# Patient Record
Sex: MALE | Race: WHITE | Employment: OTHER | URBAN - NONMETROPOLITAN AREA
[De-identification: names, ages, dates, MRNs, and addresses within clinical notes are randomized per-mention and may not be internally consistent; named-entity substitution may affect disease eponyms.]

---

## 2017-02-07 LAB — HBA1C MFR BLD: 12.4 %

## 2017-04-13 LAB
CHOLESTEROL, TOTAL: 209 MG/DL
CHOLESTEROL/HDL RATIO: 5
HDLC SERPL-MCNC: 42 MG/DL (ref 35–70)
LDL CHOLESTEROL CALCULATED: 98.2 MG/DL (ref 0–160)
TRIGL SERPL-MCNC: 344 MG/DL
VLDLC SERPL CALC-MCNC: 69 MG/DL

## 2017-05-01 RX ORDER — LISINOPRIL 5 MG/1
5 TABLET ORAL DAILY
Qty: 90 TABLET | Refills: 3 | Status: SHIPPED | OUTPATIENT
Start: 2017-05-01 | End: 2017-12-13 | Stop reason: SDUPTHER

## 2017-05-04 VITALS
HEART RATE: 84 BPM | WEIGHT: 187 LBS | HEIGHT: 65 IN | SYSTOLIC BLOOD PRESSURE: 122 MMHG | DIASTOLIC BLOOD PRESSURE: 90 MMHG | BODY MASS INDEX: 31.16 KG/M2

## 2017-05-04 DIAGNOSIS — Z80.42 FAMILY HISTORY OF PROSTATE CANCER: ICD-10-CM

## 2017-05-04 DIAGNOSIS — K21.9 GASTROESOPHAGEAL REFLUX DISEASE WITHOUT ESOPHAGITIS: ICD-10-CM

## 2017-05-04 DIAGNOSIS — I10 UNSPECIFIED ESSENTIAL HYPERTENSION: ICD-10-CM

## 2017-05-04 DIAGNOSIS — E78.49 FAMILIAL COMBINED HYPERLIPIDEMIA: ICD-10-CM

## 2017-05-04 DIAGNOSIS — I35.0 NONRHEUMATIC AORTIC VALVE STENOSIS: ICD-10-CM

## 2017-05-04 PROBLEM — E11.9 TYPE 2 DIABETES MELLITUS WITHOUT COMPLICATION (HCC): Status: ACTIVE | Noted: 2017-05-04

## 2017-05-04 RX ORDER — GLIMEPIRIDE 4 MG/1
TABLET ORAL
COMMUNITY
End: 2017-12-13 | Stop reason: SDUPTHER

## 2017-05-04 RX ORDER — CLOTRIMAZOLE AND BETAMETHASONE DIPROPIONATE 10; .64 MG/G; MG/G
CREAM TOPICAL 2 TIMES DAILY
COMMUNITY
End: 2017-08-15 | Stop reason: ALTCHOICE

## 2017-05-04 RX ORDER — HYDROCHLOROTHIAZIDE 50 MG/1
50 TABLET ORAL DAILY
COMMUNITY
End: 2017-07-09 | Stop reason: SDUPTHER

## 2017-05-04 RX ORDER — OMEPRAZOLE 20 MG/1
20 CAPSULE, DELAYED RELEASE ORAL DAILY
COMMUNITY
End: 2017-12-13 | Stop reason: SDUPTHER

## 2017-05-04 RX ORDER — PRAVASTATIN SODIUM 80 MG/1
80 TABLET ORAL DAILY
COMMUNITY
End: 2017-11-16 | Stop reason: ALTCHOICE

## 2017-05-04 RX ORDER — LABETALOL 100 MG/1
100 TABLET, FILM COATED ORAL 2 TIMES DAILY
COMMUNITY
End: 2017-12-13 | Stop reason: SDUPTHER

## 2017-05-04 RX ORDER — PIOGLITAZONEHYDROCHLORIDE 15 MG/1
45 TABLET ORAL DAILY
COMMUNITY
End: 2017-05-10 | Stop reason: SDUPTHER

## 2017-05-04 RX ORDER — NAPROXEN SODIUM 220 MG
220 TABLET ORAL 2 TIMES DAILY WITH MEALS
Status: ON HOLD | COMMUNITY
End: 2018-11-04 | Stop reason: HOSPADM

## 2017-05-10 ENCOUNTER — OFFICE VISIT (OUTPATIENT)
Dept: FAMILY MEDICINE CLINIC | Age: 72
End: 2017-05-10
Payer: MEDICARE

## 2017-05-10 VITALS
DIASTOLIC BLOOD PRESSURE: 80 MMHG | HEIGHT: 65 IN | HEART RATE: 68 BPM | WEIGHT: 189 LBS | BODY MASS INDEX: 31.49 KG/M2 | SYSTOLIC BLOOD PRESSURE: 140 MMHG

## 2017-05-10 DIAGNOSIS — E11.8 TYPE 2 DIABETES MELLITUS WITH COMPLICATION, WITHOUT LONG-TERM CURRENT USE OF INSULIN (HCC): ICD-10-CM

## 2017-05-10 DIAGNOSIS — Z80.42 FAMILY HISTORY OF PROSTATE CANCER: ICD-10-CM

## 2017-05-10 DIAGNOSIS — E11.65 TYPE 2 DIABETES MELLITUS WITH HYPERGLYCEMIA, WITHOUT LONG-TERM CURRENT USE OF INSULIN (HCC): Primary | ICD-10-CM

## 2017-05-10 DIAGNOSIS — K21.9 GASTROESOPHAGEAL REFLUX DISEASE, ESOPHAGITIS PRESENCE NOT SPECIFIED: ICD-10-CM

## 2017-05-10 DIAGNOSIS — I10 ESSENTIAL HYPERTENSION: ICD-10-CM

## 2017-05-10 DIAGNOSIS — I35.0 NONRHEUMATIC AORTIC VALVE STENOSIS: ICD-10-CM

## 2017-05-10 DIAGNOSIS — E78.49 FAMILIAL COMBINED HYPERLIPIDEMIA: ICD-10-CM

## 2017-05-10 LAB — HBA1C MFR BLD: 11 %

## 2017-05-10 PROCEDURE — 3046F HEMOGLOBIN A1C LEVEL >9.0%: CPT | Performed by: FAMILY MEDICINE

## 2017-05-10 PROCEDURE — 4040F PNEUMOC VAC/ADMIN/RCVD: CPT | Performed by: FAMILY MEDICINE

## 2017-05-10 PROCEDURE — 1123F ACP DISCUSS/DSCN MKR DOCD: CPT | Performed by: FAMILY MEDICINE

## 2017-05-10 PROCEDURE — 99214 OFFICE O/P EST MOD 30 MIN: CPT | Performed by: FAMILY MEDICINE

## 2017-05-10 PROCEDURE — 83036 HEMOGLOBIN GLYCOSYLATED A1C: CPT | Performed by: FAMILY MEDICINE

## 2017-05-10 PROCEDURE — 1036F TOBACCO NON-USER: CPT | Performed by: FAMILY MEDICINE

## 2017-05-10 PROCEDURE — 3017F COLORECTAL CA SCREEN DOC REV: CPT | Performed by: FAMILY MEDICINE

## 2017-05-10 PROCEDURE — G8427 DOCREV CUR MEDS BY ELIG CLIN: HCPCS | Performed by: FAMILY MEDICINE

## 2017-05-10 PROCEDURE — G8417 CALC BMI ABV UP PARAM F/U: HCPCS | Performed by: FAMILY MEDICINE

## 2017-05-10 RX ORDER — PIOGLITAZONEHYDROCHLORIDE 45 MG/1
45 TABLET ORAL DAILY
Qty: 90 TABLET | Refills: 3 | Status: SHIPPED | OUTPATIENT
Start: 2017-05-10 | End: 2017-12-13 | Stop reason: SDUPTHER

## 2017-05-10 ASSESSMENT — PATIENT HEALTH QUESTIONNAIRE - PHQ9
2. FEELING DOWN, DEPRESSED OR HOPELESS: 0
1. LITTLE INTEREST OR PLEASURE IN DOING THINGS: 0
SUM OF ALL RESPONSES TO PHQ QUESTIONS 1-9: 0
SUM OF ALL RESPONSES TO PHQ9 QUESTIONS 1 & 2: 0

## 2017-05-11 PROBLEM — E11.65 TYPE 2 DIABETES MELLITUS WITH HYPERGLYCEMIA, WITHOUT LONG-TERM CURRENT USE OF INSULIN (HCC): Status: ACTIVE | Noted: 2017-05-11

## 2017-05-11 ASSESSMENT — ENCOUNTER SYMPTOMS
WHEEZING: 0
BACK PAIN: 1
SHORTNESS OF BREATH: 0
ABDOMINAL PAIN: 0
CHEST TIGHTNESS: 0
COUGH: 0
BLOOD IN STOOL: 0
SORE THROAT: 0

## 2017-06-26 ENCOUNTER — TELEPHONE (OUTPATIENT)
Dept: FAMILY MEDICINE CLINIC | Age: 72
End: 2017-06-26

## 2017-06-26 DIAGNOSIS — M54.40 LOW BACK PAIN WITH SCIATICA, SCIATICA LATERALITY UNSPECIFIED, UNSPECIFIED BACK PAIN LATERALITY, UNSPECIFIED CHRONICITY: Primary | ICD-10-CM

## 2017-07-10 RX ORDER — HYDROCHLOROTHIAZIDE 50 MG/1
TABLET ORAL
Qty: 90 TABLET | Refills: 3 | Status: SHIPPED | OUTPATIENT
Start: 2017-07-10 | End: 2017-12-13 | Stop reason: SDUPTHER

## 2017-07-10 RX ORDER — TAMSULOSIN HYDROCHLORIDE 0.4 MG/1
CAPSULE ORAL
Qty: 90 CAPSULE | Refills: 3 | Status: SHIPPED | OUTPATIENT
Start: 2017-07-10 | End: 2017-12-13 | Stop reason: SDUPTHER

## 2017-08-15 ENCOUNTER — OFFICE VISIT (OUTPATIENT)
Dept: FAMILY MEDICINE CLINIC | Age: 72
End: 2017-08-15
Payer: MEDICARE

## 2017-08-15 VITALS
DIASTOLIC BLOOD PRESSURE: 70 MMHG | BODY MASS INDEX: 32.62 KG/M2 | HEART RATE: 60 BPM | SYSTOLIC BLOOD PRESSURE: 112 MMHG | WEIGHT: 193 LBS

## 2017-08-15 DIAGNOSIS — I10 ESSENTIAL HYPERTENSION: ICD-10-CM

## 2017-08-15 DIAGNOSIS — E11.8 TYPE 2 DIABETES MELLITUS WITH COMPLICATION, UNSPECIFIED LONG TERM INSULIN USE STATUS: Primary | ICD-10-CM

## 2017-08-15 DIAGNOSIS — I10 UNSPECIFIED ESSENTIAL HYPERTENSION: ICD-10-CM

## 2017-08-15 DIAGNOSIS — E78.2 MIXED HYPERLIPIDEMIA: ICD-10-CM

## 2017-08-15 DIAGNOSIS — I35.0 NONRHEUMATIC AORTIC VALVE STENOSIS: ICD-10-CM

## 2017-08-15 DIAGNOSIS — Z12.5 SCREENING PSA (PROSTATE SPECIFIC ANTIGEN): ICD-10-CM

## 2017-08-15 DIAGNOSIS — Z12.11 ENCOUNTER FOR SCREENING COLONOSCOPY: ICD-10-CM

## 2017-08-15 DIAGNOSIS — K21.9 GASTROESOPHAGEAL REFLUX DISEASE, ESOPHAGITIS PRESENCE NOT SPECIFIED: ICD-10-CM

## 2017-08-15 LAB — HBA1C MFR BLD: 8.4 %

## 2017-08-15 PROCEDURE — 3017F COLORECTAL CA SCREEN DOC REV: CPT | Performed by: FAMILY MEDICINE

## 2017-08-15 PROCEDURE — 3046F HEMOGLOBIN A1C LEVEL >9.0%: CPT | Performed by: FAMILY MEDICINE

## 2017-08-15 PROCEDURE — 1036F TOBACCO NON-USER: CPT | Performed by: FAMILY MEDICINE

## 2017-08-15 PROCEDURE — G8427 DOCREV CUR MEDS BY ELIG CLIN: HCPCS | Performed by: FAMILY MEDICINE

## 2017-08-15 PROCEDURE — 83036 HEMOGLOBIN GLYCOSYLATED A1C: CPT | Performed by: FAMILY MEDICINE

## 2017-08-15 PROCEDURE — G8417 CALC BMI ABV UP PARAM F/U: HCPCS | Performed by: FAMILY MEDICINE

## 2017-08-15 PROCEDURE — 99214 OFFICE O/P EST MOD 30 MIN: CPT | Performed by: FAMILY MEDICINE

## 2017-08-15 PROCEDURE — 1123F ACP DISCUSS/DSCN MKR DOCD: CPT | Performed by: FAMILY MEDICINE

## 2017-08-15 PROCEDURE — 4040F PNEUMOC VAC/ADMIN/RCVD: CPT | Performed by: FAMILY MEDICINE

## 2017-08-15 RX ORDER — CLOTRIMAZOLE AND BETAMETHASONE DIPROPIONATE 10; .64 MG/G; MG/G
CREAM TOPICAL 2 TIMES DAILY
Status: ON HOLD | COMMUNITY
End: 2018-11-04

## 2017-08-17 ASSESSMENT — ENCOUNTER SYMPTOMS
ABDOMINAL DISTENTION: 0
CHEST TIGHTNESS: 0
SHORTNESS OF BREATH: 0
BACK PAIN: 1
WHEEZING: 0
SORE THROAT: 0
ABDOMINAL PAIN: 0

## 2017-10-09 LAB
AGE FOR GFR: 72
ALT SERPL-CCNC: 49 UNITS/L
ANION GAP SERPL CALCULATED.3IONS-SCNC: 16 MMOL/L
AST SERPL-CCNC: 39 UNITS/L
BUN BLDV-MCNC: 16 MG/DL
CALCIUM SERPL-MCNC: 10.4 MG/DL
CHLORIDE BLD-SCNC: 98 MMOL/L
CHOLESTEROL/HDL RATIO: 4.3 RATIO
CHOLESTEROL: 232 MG/DL
CO2: 26 MMOL/L
CREAT SERPL-MCNC: 0.8 MG/DL
CREATININE, RANDOM: 47.1 MG/DL
EGFR BF: 85 ML/MIN/1.73 M2
EGFR BM: 115 ML/MIN/1.73 M2
EGFR WF: 71 ML/MIN/1.73 M2
EGFR WM: 95 ML/MIN/1.73 M2
GLUCOSE: 206 MG/DL
HCT VFR BLD CALC: 38.7 %
HDL, DIRECT: 54 MG/DL
HEMOGLOBIN: 12.7 G/DL
LDL CHOLESTEROL CALCULATED: 131.8 MG/DL
MCH RBC QN AUTO: 30.2 PG
MCHC RBC AUTO-ENTMCNC: 32.9 G/DL
MCV RBC AUTO: 91.7 FL
MICROALBUMIN UR-MCNC: 4.1 MG/DL
MICROALBUMIN/CREAT UR-RTO: 87 MCG/MG CR
PDW BLD-RTO: 11.4 %
PLATELET # BLD: 294 THOU/MM3
PMV BLD AUTO: 7.9 FL
POTASSIUM SERPL-SCNC: 4.8 MMOL/L
RBC # BLD: 4.22 M/UL
SCREENING PSA: 1.07 NG/ML
SODIUM BLD-SCNC: 135 MMOL/L
TRIGL SERPL-MCNC: 231 MG/DL
VLDLC SERPL CALC-MCNC: 46 MG/DL
WBC # BLD: 8.92 THOU/ML3

## 2017-11-16 ENCOUNTER — OFFICE VISIT (OUTPATIENT)
Dept: FAMILY MEDICINE CLINIC | Age: 72
End: 2017-11-16
Payer: MEDICARE

## 2017-11-16 VITALS
WEIGHT: 201 LBS | HEIGHT: 65 IN | SYSTOLIC BLOOD PRESSURE: 106 MMHG | DIASTOLIC BLOOD PRESSURE: 60 MMHG | BODY MASS INDEX: 33.49 KG/M2 | HEART RATE: 68 BPM

## 2017-11-16 DIAGNOSIS — M54.40 LOW BACK PAIN WITH SCIATICA, SCIATICA LATERALITY UNSPECIFIED, UNSPECIFIED BACK PAIN LATERALITY, UNSPECIFIED CHRONICITY: ICD-10-CM

## 2017-11-16 DIAGNOSIS — K21.9 GASTROESOPHAGEAL REFLUX DISEASE, ESOPHAGITIS PRESENCE NOT SPECIFIED: ICD-10-CM

## 2017-11-16 DIAGNOSIS — E78.2 MIXED HYPERLIPIDEMIA: ICD-10-CM

## 2017-11-16 DIAGNOSIS — E11.8 TYPE 2 DIABETES MELLITUS WITH COMPLICATION, UNSPECIFIED LONG TERM INSULIN USE STATUS: Primary | ICD-10-CM

## 2017-11-16 DIAGNOSIS — Z80.42 FAMILY HISTORY OF PROSTATE CANCER: ICD-10-CM

## 2017-11-16 DIAGNOSIS — I35.0 NONRHEUMATIC AORTIC VALVE STENOSIS: ICD-10-CM

## 2017-11-16 DIAGNOSIS — I10 ESSENTIAL HYPERTENSION: ICD-10-CM

## 2017-11-16 DIAGNOSIS — K57.30 DIVERTICULOSIS OF LARGE INTESTINE WITHOUT HEMORRHAGE: ICD-10-CM

## 2017-11-16 LAB — HBA1C MFR BLD: 8 %

## 2017-11-16 PROCEDURE — G8417 CALC BMI ABV UP PARAM F/U: HCPCS | Performed by: FAMILY MEDICINE

## 2017-11-16 PROCEDURE — G8484 FLU IMMUNIZE NO ADMIN: HCPCS | Performed by: FAMILY MEDICINE

## 2017-11-16 PROCEDURE — 3045F PR MOST RECENT HEMOGLOBIN A1C LEVEL 7.0-9.0%: CPT | Performed by: FAMILY MEDICINE

## 2017-11-16 PROCEDURE — 1036F TOBACCO NON-USER: CPT | Performed by: FAMILY MEDICINE

## 2017-11-16 PROCEDURE — 99214 OFFICE O/P EST MOD 30 MIN: CPT | Performed by: FAMILY MEDICINE

## 2017-11-16 PROCEDURE — G8427 DOCREV CUR MEDS BY ELIG CLIN: HCPCS | Performed by: FAMILY MEDICINE

## 2017-11-16 PROCEDURE — 1123F ACP DISCUSS/DSCN MKR DOCD: CPT | Performed by: FAMILY MEDICINE

## 2017-11-16 PROCEDURE — 3017F COLORECTAL CA SCREEN DOC REV: CPT | Performed by: FAMILY MEDICINE

## 2017-11-16 PROCEDURE — 4040F PNEUMOC VAC/ADMIN/RCVD: CPT | Performed by: FAMILY MEDICINE

## 2017-11-16 PROCEDURE — 83036 HEMOGLOBIN GLYCOSYLATED A1C: CPT | Performed by: FAMILY MEDICINE

## 2017-11-16 RX ORDER — ROSUVASTATIN CALCIUM 20 MG/1
20 TABLET, COATED ORAL DAILY
Qty: 30 TABLET | Refills: 5 | Status: SHIPPED | OUTPATIENT
Start: 2017-11-16 | End: 2017-12-13 | Stop reason: SDUPTHER

## 2017-11-19 PROBLEM — M54.40 LOW BACK PAIN WITH SCIATICA: Status: ACTIVE | Noted: 2017-11-19

## 2017-11-19 ASSESSMENT — ENCOUNTER SYMPTOMS
CHEST TIGHTNESS: 0
BACK PAIN: 1
ABDOMINAL PAIN: 0
SORE THROAT: 0
WHEEZING: 0
ABDOMINAL DISTENTION: 0
SHORTNESS OF BREATH: 0

## 2017-11-19 NOTE — PROGRESS NOTES
 lisinopril (PRINIVIL;ZESTRIL) 5 MG tablet Take 1 tablet by mouth daily 90 tablet 3    SITagliptin (JANUVIA) 50 MG tablet Take 50 mg by mouth daily       No current facility-administered medications for this visit. Allergies   Allergen Reactions    Invokana [Canagliflozin]      dizziness    Januvia [Sitagliptin]      dizziness       Health Maintenance   Topic Date Due    DTaP/Tdap/Td vaccine (1 - Tdap) 11/15/1963    Zostavax vaccine  11/15/2004    Flu vaccine (1) 09/01/2017    Pneumococcal low/med risk (1 of 2 - PCV13) 08/15/2022 (Originally 11/15/2009)    Diabetic foot exam  08/17/2018    Diabetic retinal exam  08/24/2018    Diabetic microalbuminuria test  10/09/2018    Lipid screen  10/09/2018    Diabetic hemoglobin A1C test  11/16/2018    Colon cancer screen colonoscopy  10/09/2027       Subjective:      Review of Systems   Constitutional: Negative for chills, diaphoresis and fever. HENT: Negative for sore throat. Respiratory: Negative for chest tightness, shortness of breath and wheezing. Cardiovascular: Negative for chest pain, palpitations and leg swelling. Gastrointestinal: Negative for abdominal distention and abdominal pain. Genitourinary: Negative for difficulty urinating, dysuria and hematuria. Nocturia    Musculoskeletal: Positive for back pain (much improved). Negative for neck pain. Hematological: Negative for adenopathy. Objective:     /60   Pulse 68   Ht 5' 4.5\" (1.638 m)   Wt 201 lb (91.2 kg)   BMI 33.97 kg/m²     Physical Exam   Constitutional: He appears well-developed and well-nourished. HENT:   Head: Normocephalic and atraumatic. Nose: Nose normal.   Mouth/Throat: No posterior oropharyngeal edema or posterior oropharyngeal erythema. Eyes: No scleral icterus. Neck: Neck supple. Carotid bruit is not present. Cardiovascular: Normal rate, regular rhythm, S1 normal and S2 normal.   No extrasystoles are present.  Exam reveals distant exam:     Answer:   history of aortic stenosis     Prescriptions:    Orders Placed This Encounter   Medications    rosuvastatin (CRESTOR) 20 MG tablet     Sig: Take 1 tablet by mouth daily     Dispense:  30 tablet     Refill:  5        Return in about 3 months (around 2/16/2018). Electronically signed by Kandace Schulte MD on 11/19/2017.

## 2017-12-01 DIAGNOSIS — I35.0 NONRHEUMATIC AORTIC VALVE STENOSIS: ICD-10-CM

## 2017-12-13 DIAGNOSIS — K21.9 GASTROESOPHAGEAL REFLUX DISEASE, ESOPHAGITIS PRESENCE NOT SPECIFIED: Primary | ICD-10-CM

## 2017-12-13 DIAGNOSIS — N40.0 BENIGN PROSTATIC HYPERPLASIA, UNSPECIFIED WHETHER LOWER URINARY TRACT SYMPTOMS PRESENT: ICD-10-CM

## 2017-12-13 DIAGNOSIS — E78.2 MIXED HYPERLIPIDEMIA: ICD-10-CM

## 2017-12-13 DIAGNOSIS — E11.65 TYPE 2 DIABETES MELLITUS WITH HYPERGLYCEMIA, WITHOUT LONG-TERM CURRENT USE OF INSULIN (HCC): ICD-10-CM

## 2017-12-13 RX ORDER — TAMSULOSIN HYDROCHLORIDE 0.4 MG/1
0.4 CAPSULE ORAL DAILY
Qty: 90 CAPSULE | Refills: 3 | Status: SHIPPED | OUTPATIENT
Start: 2017-12-13 | End: 2018-03-08 | Stop reason: SDUPTHER

## 2017-12-13 RX ORDER — ROSUVASTATIN CALCIUM 20 MG/1
20 TABLET, COATED ORAL DAILY
Qty: 90 TABLET | Refills: 3 | Status: SHIPPED | OUTPATIENT
Start: 2017-12-13 | End: 2018-03-08 | Stop reason: SDUPTHER

## 2017-12-13 RX ORDER — GLIMEPIRIDE 4 MG/1
4 TABLET ORAL
Qty: 90 TABLET | Refills: 3 | Status: SHIPPED | OUTPATIENT
Start: 2017-12-13 | End: 2018-03-08 | Stop reason: SDUPTHER

## 2017-12-13 RX ORDER — LISINOPRIL 5 MG/1
5 TABLET ORAL DAILY
Qty: 90 TABLET | Refills: 3 | Status: SHIPPED | OUTPATIENT
Start: 2017-12-13 | End: 2018-03-08 | Stop reason: SDUPTHER

## 2017-12-13 RX ORDER — PIOGLITAZONEHYDROCHLORIDE 45 MG/1
45 TABLET ORAL DAILY
Qty: 90 TABLET | Refills: 3 | Status: SHIPPED | OUTPATIENT
Start: 2017-12-13 | End: 2018-03-08 | Stop reason: SDUPTHER

## 2017-12-13 RX ORDER — HYDROCHLOROTHIAZIDE 50 MG/1
50 TABLET ORAL DAILY
Qty: 90 TABLET | Refills: 3 | Status: SHIPPED | OUTPATIENT
Start: 2017-12-13 | End: 2018-03-08 | Stop reason: SDUPTHER

## 2017-12-13 RX ORDER — OMEPRAZOLE 20 MG/1
20 CAPSULE, DELAYED RELEASE ORAL DAILY
Qty: 90 CAPSULE | Refills: 3 | Status: SHIPPED | OUTPATIENT
Start: 2017-12-13 | End: 2018-03-08 | Stop reason: SDUPTHER

## 2017-12-13 RX ORDER — LABETALOL 100 MG/1
100 TABLET, FILM COATED ORAL 2 TIMES DAILY
Qty: 180 TABLET | Refills: 3 | Status: SHIPPED | OUTPATIENT
Start: 2017-12-13 | End: 2018-03-08 | Stop reason: SDUPTHER

## 2017-12-13 NOTE — TELEPHONE ENCOUNTER
Sofía Joe is calling to request a refill on the following medication(s):  Requested Prescriptions     Pending Prescriptions Disp Refills    glimepiride (AMARYL) 4 MG tablet 90 tablet 3     Sig: Take 1 tablet by mouth every morning (before breakfast)    hydrochlorothiazide (HYDRODIURIL) 50 MG tablet 90 tablet 3     Sig: Take 1 tablet by mouth daily    labetalol (NORMODYNE) 100 MG tablet 180 tablet 3     Sig: Take 1 tablet by mouth 2 times daily    lisinopril (PRINIVIL;ZESTRIL) 5 MG tablet 90 tablet 3     Sig: Take 1 tablet by mouth daily    metFORMIN (GLUCOPHAGE) 500 MG tablet 180 tablet 3     Sig: Take 1 tablet by mouth 2 times daily (with meals)    omeprazole (PRILOSEC) 20 MG delayed release capsule 90 capsule 3     Sig: Take 1 capsule by mouth daily    pioglitazone (ACTOS) 45 MG tablet 90 tablet 3     Sig: Take 1 tablet by mouth daily    rosuvastatin (CRESTOR) 20 MG tablet 90 tablet 3     Sig: Take 1 tablet by mouth daily    SITagliptin (JANUVIA) 50 MG tablet 90 tablet 3     Sig: Take 1 tablet by mouth daily    tamsulosin (FLOMAX) 0.4 MG capsule 90 capsule 3     Sig: Take 1 capsule by mouth daily       Last Visit Date (If Applicable):  18/03/5525    Next Visit Date:    2/19/2018

## 2018-02-19 ENCOUNTER — OFFICE VISIT (OUTPATIENT)
Dept: FAMILY MEDICINE CLINIC | Age: 73
End: 2018-02-19
Payer: MEDICARE

## 2018-02-19 VITALS
DIASTOLIC BLOOD PRESSURE: 84 MMHG | SYSTOLIC BLOOD PRESSURE: 120 MMHG | WEIGHT: 195 LBS | HEART RATE: 76 BPM | BODY MASS INDEX: 32.95 KG/M2

## 2018-02-19 DIAGNOSIS — E11.8 TYPE 2 DIABETES MELLITUS WITH COMPLICATION, UNSPECIFIED LONG TERM INSULIN USE STATUS: Primary | ICD-10-CM

## 2018-02-19 DIAGNOSIS — E78.2 MIXED HYPERLIPIDEMIA: ICD-10-CM

## 2018-02-19 DIAGNOSIS — I35.0 NONRHEUMATIC AORTIC VALVE STENOSIS: ICD-10-CM

## 2018-02-19 DIAGNOSIS — K21.9 GASTROESOPHAGEAL REFLUX DISEASE, ESOPHAGITIS PRESENCE NOT SPECIFIED: ICD-10-CM

## 2018-02-19 DIAGNOSIS — N40.0 BENIGN PROSTATIC HYPERPLASIA, UNSPECIFIED WHETHER LOWER URINARY TRACT SYMPTOMS PRESENT: ICD-10-CM

## 2018-02-19 DIAGNOSIS — E11.65 TYPE 2 DIABETES MELLITUS WITH HYPERGLYCEMIA, WITHOUT LONG-TERM CURRENT USE OF INSULIN (HCC): ICD-10-CM

## 2018-02-19 LAB
ALT SERPL-CCNC: 38 UNITS/L
AST SERPL-CCNC: 26 UNITS/L
CHOLESTEROL/HDL RATIO: 3.1 RATIO
CHOLESTEROL: 146 MG/DL
HBA1C MFR BLD: 8.5 %
HDL, DIRECT: 47 MG/DL
LDL CHOLESTEROL CALCULATED: 60.8 MG/DL
TRIGL SERPL-MCNC: 191 MG/DL
VLDLC SERPL CALC-MCNC: 38 MG/DL

## 2018-02-19 PROCEDURE — 3017F COLORECTAL CA SCREEN DOC REV: CPT | Performed by: FAMILY MEDICINE

## 2018-02-19 PROCEDURE — G8417 CALC BMI ABV UP PARAM F/U: HCPCS | Performed by: FAMILY MEDICINE

## 2018-02-19 PROCEDURE — 1036F TOBACCO NON-USER: CPT | Performed by: FAMILY MEDICINE

## 2018-02-19 PROCEDURE — G8484 FLU IMMUNIZE NO ADMIN: HCPCS | Performed by: FAMILY MEDICINE

## 2018-02-19 PROCEDURE — 1123F ACP DISCUSS/DSCN MKR DOCD: CPT | Performed by: FAMILY MEDICINE

## 2018-02-19 PROCEDURE — 4040F PNEUMOC VAC/ADMIN/RCVD: CPT | Performed by: FAMILY MEDICINE

## 2018-02-19 PROCEDURE — 99214 OFFICE O/P EST MOD 30 MIN: CPT | Performed by: FAMILY MEDICINE

## 2018-02-19 PROCEDURE — 3045F PR MOST RECENT HEMOGLOBIN A1C LEVEL 7.0-9.0%: CPT | Performed by: FAMILY MEDICINE

## 2018-02-19 PROCEDURE — 83036 HEMOGLOBIN GLYCOSYLATED A1C: CPT | Performed by: FAMILY MEDICINE

## 2018-02-19 PROCEDURE — G8427 DOCREV CUR MEDS BY ELIG CLIN: HCPCS | Performed by: FAMILY MEDICINE

## 2018-02-19 NOTE — PROGRESS NOTES
[address] [address] [address]  Dept: [phone]  Dept Fax: [phone]    Power Hoff is a 67 y.o. male who presents today for his medical conditions/complaints as noted below. Power Hoff is c/o of 3 Month Follow-Up; Hyperlipidemia (pt states sometimes will get a pain in upper left shoulder area, sob with exertion, c/o leg edema. denies dizziness, palpatations); Hypertension (pt states sometimes will get a pain in upper left shoulder area, sob with exertion, c/o leg edema.  denies dizziness, palpatations); Diabetes (denies bs ck, last eye exam in october, denies polyuria or polydypsia, numbness or tingling in extremeties); and Gastroesophageal Reflux (denies abd pains, nausea or vomiting, heartburn, rectal bleeding)      HPI:   Patient is here for a routine check up     Hypertension  Doesn't check   well controlled; BP: 120/84   No problems with medication side effects; tolerating well  No chest pain  No edema     Hyperlipidemia   No labs since we started crestor  Medication side effects - none  The 10-year CVD risk score (D'Agostino, et al., 2008) is: 34.2%    Values used to calculate the score:      Age: 67 years      Sex: Male      Diabetic: Yes      Tobacco smoker: No      Systolic Blood Pressure: 829 mmHg      Is BP treated: Yes      HDL Cholesterol: 47 mg/dL      Total Cholesterol: 146 mg/dL  Chest pain -   Claudication -     Diabetes  Doing well      Checking blood sugars   []   none Doesn't check at all since he is needle phobic    Meds   []   none    Last hemoglobin A1C   []   none 8   Last eye exam   []   none August 2017 ; [facility]   microalbumin   []   none    Last diabetic foot exam   []   none    Endocrinologist    [x]   none    Hypoglycemic episodes    [x]   none    Complications   []   none     CAD    []   none       Aortic stenosis   Moderate on most recent echo  Chest pains left upper chest ; occurs when he lays down and goes to bed   Quite a change in report ; went from mild to moderate to severe     Pain management   Doing well since his last injections with Dr Edilma Moreno  He does note that he can walk better with a shopping cart  BP Readings from Last 3 Encounters:   02/20/18 118/78   02/19/18 120/84   11/16/17 106/60            (goal 120/80)    Past Medical History:   Diagnosis Date    Degenerative disc disease, lumbar     Diverticulosis of sigmoid colon     severe    Hyperlipidemia     Hypertension     Kidney stone     Obesity     Tubular adenoma of colon 2007    refuses repeat colonoscopy    Type 2 diabetes mellitus without complication (HCC)       Past Surgical History:   Procedure Laterality Date    CARDIAC CATHETERIZATION      minimal disease    COLONOSCOPY  2005    for hemoccult positive stool    COLONOSCOPY  10/2017    diverticuli; Dr Cherie Mendoza Left 1971    softball injury     Family History   Problem Relation Age of Onset    Cancer Father      prostate    Alzheimer's Disease Father      Social History   Substance Use Topics    Smoking status: Never Smoker    Smokeless tobacco: Never Used    Alcohol use No        Current Outpatient Prescriptions   Medication Sig Dispense Refill    glimepiride (AMARYL) 4 MG tablet Take 1 tablet by mouth every morning (before breakfast) 90 tablet 3    hydrochlorothiazide (HYDRODIURIL) 50 MG tablet Take 1 tablet by mouth daily 90 tablet 3    labetalol (NORMODYNE) 100 MG tablet Take 1 tablet by mouth 2 times daily 180 tablet 3    lisinopril (PRINIVIL;ZESTRIL) 5 MG tablet Take 1 tablet by mouth daily 90 tablet 3    metFORMIN (GLUCOPHAGE) 500 MG tablet Take 1 tablet by mouth 2 times daily (with meals) 180 tablet 3    omeprazole (PRILOSEC) 20 MG delayed release capsule Take 1 capsule by mouth daily 90 capsule 3    pioglitazone (ACTOS) 45 MG tablet Take 1 tablet by mouth daily 90 tablet 3    rosuvastatin (CRESTOR) 20 MG tablet Take 1 tablet by tenderness. Gait is very antalgic; wide based    Skin: No rash noted. Diabetic foot exam performed today. Normal strength; intact sensation to monofilament; normal pulses. POC Testing Results (If Applicable):  Results for POC orders placed in visit on 02/19/18   POCT glycosylated hemoglobin (Hb A1C)   Result Value Ref Range    Hemoglobin A1C 8.5 %       Recent Lab Results:    Lab Results   Component Value Date    WBC 8.92 10/09/2017    HGB 12.7 (L) 10/09/2017    HCT 38.7 (L) 10/09/2017     10/09/2017    CHOL 146 02/19/2018    TRIG 191 02/19/2018    HDL 42 04/13/2017    ALT 38 02/19/2018    AST 26 02/19/2018     10/09/2017    K 4.8 10/09/2017    CL 98 10/09/2017    CREATININE 0.8 10/09/2017    BUN 16 10/09/2017    CO2 26 10/09/2017    LABA1C 8.5 02/19/2018    LABMICR 4.1 (H) 10/09/2017     Assessment/Plan:       Mr. Flor Alfaro came in to the office for a routine follow-up of his Hypertension, Hyperlipidemia, and Diabetes. Visit DX and Orders are as follows:    1. Type 2 diabetes mellitus with complication, unspecified long term insulin use status (HCC)  POCT glycosylated hemoglobin (Hb A1C)   2. Type 2 diabetes mellitus with hyperglycemia, without long-term current use of insulin (HCC)   DIABETES FOOT EXAM   3. Mixed hyperlipidemia  Lipid Panel    AST    ALT   4. Gastroesophageal reflux disease, esophagitis presence not specified     5. Benign prostatic hyperplasia, unspecified whether lower urinary tract symptoms present     6. Nonrheumatic aortic valve stenosis  Agustina Peterson MD, Cardiology [facility]    moderate to severe on most recent echo       · Continue current medication(s) regimen. · Recheck in 3 months, sooner should new symptoms or problems arise  · Needs lipids repeated after addition of crestor  · Will proceed with referral to cardiology in face of increasing aortic stenosis     Electronically signed by Douglas Collins MD on 2/24/2018.

## 2018-02-20 ENCOUNTER — OFFICE VISIT (OUTPATIENT)
Dept: CARDIOLOGY CLINIC | Age: 73
End: 2018-02-20
Payer: MEDICARE

## 2018-02-20 VITALS
DIASTOLIC BLOOD PRESSURE: 78 MMHG | SYSTOLIC BLOOD PRESSURE: 118 MMHG | BODY MASS INDEX: 32.97 KG/M2 | WEIGHT: 195.11 LBS | HEART RATE: 72 BPM

## 2018-02-20 DIAGNOSIS — I35.0 AORTIC VALVE STENOSIS, ETIOLOGY OF CARDIAC VALVE DISEASE UNSPECIFIED: Primary | ICD-10-CM

## 2018-02-20 DIAGNOSIS — I10 ESSENTIAL HYPERTENSION: ICD-10-CM

## 2018-02-20 DIAGNOSIS — I35.0 NONRHEUMATIC AORTIC VALVE STENOSIS: ICD-10-CM

## 2018-02-20 DIAGNOSIS — E11.8 TYPE 2 DIABETES MELLITUS WITH COMPLICATION, UNSPECIFIED LONG TERM INSULIN USE STATUS: ICD-10-CM

## 2018-02-20 DIAGNOSIS — Z12.5 SCREENING PSA (PROSTATE SPECIFIC ANTIGEN): ICD-10-CM

## 2018-02-20 DIAGNOSIS — E78.2 MIXED HYPERLIPIDEMIA: ICD-10-CM

## 2018-02-20 PROCEDURE — 99203 OFFICE O/P NEW LOW 30 MIN: CPT | Performed by: INTERNAL MEDICINE

## 2018-02-20 NOTE — PROGRESS NOTES
clotrimazole-betamethasone (LOTRISONE) 1-0.05 % cream Apply topically 2 times daily Apply topically 2 times daily. Yes Historical Provider, MD   naproxen sodium (ALEVE) 220 MG tablet Take 220 mg by mouth 2 times daily (with meals)   Yes Historical Provider, MD   aspirin 81 MG tablet Take 81 mg by mouth daily   Yes Historical Provider, MD       Allergies:  Invokana [canagliflozin] and Januvia [sitagliptin]    Social History:   reports that he has never smoked. He has never used smokeless tobacco. He reports that he does not drink alcohol or use drugs. REVIEW OF SYSTEMS:  CONSTITUTIONAL:NEGATIVE  HEENT:NEG  Cardiovascular: No chest pain, Yes dyspnea on exertion, No palpitations. Lower extremity edema: Yes  RESPIRATORY: DIA  GASTROINTESTINAL:  negative  GENITOURINARY:  negative  INTEGUMENT:  negative  MUSCULOSKELETAL:  positive for  pain  NEUROLOGICAL:  negative    PHYSICAL EXAM:      /78   Pulse 72   Wt 195 lb 1.7 oz (88.5 kg)   BMI 32.97 kg/m²    HEENT: PERRL, no cervical lymphadenopathy. No masses palpable. Cardiovascular:  · The apical impulse is not displaced  · Heart  Sounds:RRR, MID-LATE PEAKING WILNER  · Jugular venous pulsation Normal  · The carotid upstroke is NL  · Peripheral pulses are symmetrical and full  Respiratory: Good respiratory effort. On auscultation: clear to auscultation bilaterally and rales bibasilar  Abdomen:  · No masses or tenderness  · Bowel sounds present  Extremities:  ·  No Cyanosis or Clubbing  ·  Lower extremity edema: Yes  Skin: Warm and dry    Cardiac data:      Labs:     CBC: No results for input(s): WBC, HGB, HCT, PLT in the last 72 hours. BMP: No results for input(s): NA, K, CO2, BUN, CREATININE, LABGLOM, GLUCOSE in the last 72 hours. PT/INR: No results for input(s): PROTIME, INR in the last 72 hours.   FASTING LIPID PANEL:  Lab Results   Component Value Date    HDL 42 04/13/2017    LDLCALC 60.8 02/19/2018    TRIG 191 02/19/2018     LIVER PROFILE:  Recent Labs

## 2018-02-24 ASSESSMENT — ENCOUNTER SYMPTOMS
SHORTNESS OF BREATH: 0
BACK PAIN: 1
ABDOMINAL DISTENTION: 0
CHEST TIGHTNESS: 0
ABDOMINAL PAIN: 0
SORE THROAT: 0
WHEEZING: 0

## 2018-03-01 ENCOUNTER — HOSPITAL ENCOUNTER (OUTPATIENT)
Dept: CARDIAC CATH/INVASIVE PROCEDURES | Age: 73
Discharge: HOME | End: 2018-03-01
Payer: MEDICARE

## 2018-03-01 VITALS
TEMPERATURE: 97.8 F | HEIGHT: 66 IN | BODY MASS INDEX: 31.34 KG/M2 | RESPIRATION RATE: 20 BRPM | OXYGEN SATURATION: 96 % | SYSTOLIC BLOOD PRESSURE: 118 MMHG | HEART RATE: 84 BPM | WEIGHT: 195 LBS | DIASTOLIC BLOOD PRESSURE: 62 MMHG

## 2018-03-01 LAB
GFR NON-AFRICAN AMERICAN: >60 ML/MIN
GFR SERPL CREATININE-BSD FRML MDRD: >60 ML/MIN
GFR SERPL CREATININE-BSD FRML MDRD: NORMAL ML/MIN/{1.73_M2}
GLUCOSE BLD-MCNC: 270 MG/DL (ref 74–100)
PLATELET # BLD: 255 K/UL (ref 138–453)
POC CHLORIDE: 103 MMOL/L (ref 98–107)
POC CREATININE: 0.69 MG/DL (ref 0.51–1.19)
POC HEMATOCRIT: 34 % (ref 41–53)
POC HEMOGLOBIN: 11.5 G/DL (ref 13.5–17.5)
POC POTASSIUM: 4.2 MMOL/L (ref 3.5–4.5)
POC SODIUM: 137 MMOL/L (ref 138–146)

## 2018-03-01 PROCEDURE — C1760 CLOSURE DEV, VASC: HCPCS

## 2018-03-01 PROCEDURE — C1725 CATH, TRANSLUMIN NON-LASER: HCPCS

## 2018-03-01 PROCEDURE — 7100000011 HC PHASE II RECOVERY - ADDTL 15 MIN

## 2018-03-01 PROCEDURE — 84295 ASSAY OF SERUM SODIUM: CPT

## 2018-03-01 PROCEDURE — C1894 INTRO/SHEATH, NON-LASER: HCPCS

## 2018-03-01 PROCEDURE — 2500000003 HC RX 250 WO HCPCS

## 2018-03-01 PROCEDURE — 82565 ASSAY OF CREATININE: CPT

## 2018-03-01 PROCEDURE — 93567 NJX CAR CTH SPRVLV AORTGRPHY: CPT | Performed by: INTERNAL MEDICINE

## 2018-03-01 PROCEDURE — 7100000010 HC PHASE II RECOVERY - FIRST 15 MIN

## 2018-03-01 PROCEDURE — 82435 ASSAY OF BLOOD CHLORIDE: CPT

## 2018-03-01 PROCEDURE — 85014 HEMATOCRIT: CPT

## 2018-03-01 PROCEDURE — 82947 ASSAY GLUCOSE BLOOD QUANT: CPT

## 2018-03-01 PROCEDURE — 6360000004 HC RX CONTRAST MEDICATION

## 2018-03-01 PROCEDURE — 84132 ASSAY OF SERUM POTASSIUM: CPT

## 2018-03-01 PROCEDURE — 6360000002 HC RX W HCPCS

## 2018-03-01 PROCEDURE — C1751 CATH, INF, PER/CENT/MIDLINE: HCPCS

## 2018-03-01 PROCEDURE — 93460 R&L HRT ART/VENTRICLE ANGIO: CPT | Performed by: INTERNAL MEDICINE

## 2018-03-01 PROCEDURE — 85049 AUTOMATED PLATELET COUNT: CPT

## 2018-03-01 PROCEDURE — C1769 GUIDE WIRE: HCPCS

## 2018-03-01 RX ORDER — ACETAMINOPHEN 325 MG/1
650 TABLET ORAL EVERY 4 HOURS PRN
Status: DISCONTINUED | OUTPATIENT
Start: 2018-03-01 | End: 2018-03-02 | Stop reason: HOSPADM

## 2018-03-01 RX ORDER — SODIUM CHLORIDE 9 MG/ML
INJECTION, SOLUTION INTRAVENOUS CONTINUOUS
Status: DISCONTINUED | OUTPATIENT
Start: 2018-03-01 | End: 2018-03-02 | Stop reason: HOSPADM

## 2018-03-01 RX ORDER — ONDANSETRON 2 MG/ML
4 INJECTION INTRAMUSCULAR; INTRAVENOUS EVERY 6 HOURS PRN
Status: DISCONTINUED | OUTPATIENT
Start: 2018-03-01 | End: 2018-03-02 | Stop reason: HOSPADM

## 2018-03-01 RX ORDER — SODIUM CHLORIDE 0.9 % (FLUSH) 0.9 %
10 SYRINGE (ML) INJECTION PRN
Status: DISCONTINUED | OUTPATIENT
Start: 2018-03-01 | End: 2018-03-02 | Stop reason: HOSPADM

## 2018-03-01 RX ORDER — SODIUM CHLORIDE 0.9 % (FLUSH) 0.9 %
10 SYRINGE (ML) INJECTION EVERY 12 HOURS SCHEDULED
Status: DISCONTINUED | OUTPATIENT
Start: 2018-03-01 | End: 2018-03-02 | Stop reason: HOSPADM

## 2018-03-01 RX ADMIN — SODIUM CHLORIDE: 9 INJECTION, SOLUTION INTRAVENOUS at 11:15

## 2018-03-01 NOTE — OP NOTE
[facility]        Date:   3/1/2018  Patient name: Rebecca Deal  Date of admission:  3/1/2018 10:33 AM  MRN:   [id number]  YOB: 1945    CARDIAC CATHETERIZATION    Operators:  Primary: Gabby Paulino (Attending Physician)  Assistant: George Saez (Cardiovascular Fellow)    Indications for cath: AS    Procedure performed: Right heart catheterization, Left heart catheterization, selective coronary angiography, left ventriculography    Catheters used: JL4, JR4, Pigtail    Access: Right Femoral artery     Procedure: After informed consent was obtained with explanation of the risks and benefits, patient was brought to the cath lab. The right groin were prepped and draped in sterile fashion. 1% lidocaine was used for local block. The Femoral artery was cannulated with 6  Fr sheath with brisk arterial blood return. The Femoral vein was cannulated with 7  Fr sheath with brisk arterial blood return. The side port was frequently flushed and aspirated with normal saline. Findings:  Left main: NL  LAD: Diffuse 30-40%  LCX: Diffuse 20-30%  RCA: Diffuse 30-40%  The LV gram was performed in the HUBBRAD 30 position. LVEF: 60%. Mean AV gradient 28  HYUN 1.3    RT HEART  RA 4/2 (2)  RV 24/1 (5)  PA 23/7 (14)  PCW 9/5 (7)    CO 7.56  CI 3.8    Conclusions:  1. Non obstructive CAD. 2. Moderate AS. 3. Normal RT heart pressures  4. Overall preserved LV function. Recommendation:  1. Medical treatments. 2. Risk factor modification. Electronically signed by Cesar Noel MD on 3/1/2018 at 2:48 PM  Cardiology Fellow. Attending Physician Statement  I have discussed the case of Rebecca Deal including pertinent history and exam findings with the resident. I have seen and examined the patient and the key elements of the encounter have been performed by me. I agree with the assessment, plan and orders as documented by the resident With changes made to the note.      Electronically signed

## 2018-03-08 DIAGNOSIS — E78.2 MIXED HYPERLIPIDEMIA: ICD-10-CM

## 2018-03-08 DIAGNOSIS — N40.0 BENIGN PROSTATIC HYPERPLASIA, UNSPECIFIED WHETHER LOWER URINARY TRACT SYMPTOMS PRESENT: ICD-10-CM

## 2018-03-08 DIAGNOSIS — K21.9 GASTROESOPHAGEAL REFLUX DISEASE, ESOPHAGITIS PRESENCE NOT SPECIFIED: ICD-10-CM

## 2018-03-08 DIAGNOSIS — E11.65 TYPE 2 DIABETES MELLITUS WITH HYPERGLYCEMIA, WITHOUT LONG-TERM CURRENT USE OF INSULIN (HCC): ICD-10-CM

## 2018-03-08 RX ORDER — TAMSULOSIN HYDROCHLORIDE 0.4 MG/1
0.4 CAPSULE ORAL DAILY
Qty: 90 CAPSULE | Refills: 3 | Status: SHIPPED | OUTPATIENT
Start: 2018-03-08 | End: 2018-06-18 | Stop reason: SDUPTHER

## 2018-03-08 RX ORDER — HYDROCHLOROTHIAZIDE 50 MG/1
50 TABLET ORAL DAILY
Qty: 90 TABLET | Refills: 3 | Status: SHIPPED | OUTPATIENT
Start: 2018-03-08 | End: 2018-06-18 | Stop reason: SDUPTHER

## 2018-03-08 RX ORDER — ROSUVASTATIN CALCIUM 20 MG/1
20 TABLET, COATED ORAL DAILY
Qty: 90 TABLET | Refills: 3 | Status: SHIPPED | OUTPATIENT
Start: 2018-03-08 | End: 2018-06-18 | Stop reason: SDUPTHER

## 2018-03-08 RX ORDER — LABETALOL 100 MG/1
100 TABLET, FILM COATED ORAL 2 TIMES DAILY
Qty: 180 TABLET | Refills: 3 | Status: SHIPPED | OUTPATIENT
Start: 2018-03-08 | End: 2018-06-18 | Stop reason: SDUPTHER

## 2018-03-08 RX ORDER — OMEPRAZOLE 20 MG/1
20 CAPSULE, DELAYED RELEASE ORAL DAILY
Qty: 90 CAPSULE | Refills: 3 | Status: SHIPPED | OUTPATIENT
Start: 2018-03-08 | End: 2018-06-18 | Stop reason: SDUPTHER

## 2018-03-08 RX ORDER — LISINOPRIL 5 MG/1
5 TABLET ORAL DAILY
Qty: 90 TABLET | Refills: 3 | Status: SHIPPED | OUTPATIENT
Start: 2018-03-08 | End: 2018-06-18 | Stop reason: SDUPTHER

## 2018-03-08 RX ORDER — GLIMEPIRIDE 4 MG/1
4 TABLET ORAL
Qty: 90 TABLET | Refills: 3 | Status: SHIPPED | OUTPATIENT
Start: 2018-03-08 | End: 2018-08-20 | Stop reason: SDUPTHER

## 2018-03-08 RX ORDER — PIOGLITAZONEHYDROCHLORIDE 45 MG/1
45 TABLET ORAL DAILY
Qty: 90 TABLET | Refills: 3 | Status: SHIPPED | OUTPATIENT
Start: 2018-03-08 | End: 2018-06-18 | Stop reason: SDUPTHER

## 2018-03-08 NOTE — TELEPHONE ENCOUNTER
Rebeca Johnson is calling to request a refill on the following medication(s):  Requested Prescriptions     Pending Prescriptions Disp Refills    tamsulosin (FLOMAX) 0.4 MG capsule 90 capsule 3     Sig: Take 1 capsule by mouth daily    rosuvastatin (CRESTOR) 20 MG tablet 90 tablet 3     Sig: Take 1 tablet by mouth daily    pioglitazone (ACTOS) 45 MG tablet 90 tablet 3     Sig: Take 1 tablet by mouth daily    omeprazole (PRILOSEC) 20 MG delayed release capsule 90 capsule 3     Sig: Take 1 capsule by mouth daily    metFORMIN (GLUCOPHAGE) 500 MG tablet 180 tablet 3     Sig: Take 1 tablet by mouth 2 times daily (with meals)    lisinopril (PRINIVIL;ZESTRIL) 5 MG tablet 90 tablet 3     Sig: Take 1 tablet by mouth daily    labetalol (NORMODYNE) 100 MG tablet 180 tablet 3     Sig: Take 1 tablet by mouth 2 times daily    hydrochlorothiazide (HYDRODIURIL) 50 MG tablet 90 tablet 3     Sig: Take 1 tablet by mouth daily    glimepiride (AMARYL) 4 MG tablet 90 tablet 3     Sig: Take 1 tablet by mouth every morning (before breakfast)       Last Visit Date (If Applicable):  3/89/8275    Next Visit Date:    6/1/2018

## 2018-03-20 ENCOUNTER — OFFICE VISIT (OUTPATIENT)
Dept: CARDIOLOGY CLINIC | Age: 73
End: 2018-03-20
Payer: MEDICARE

## 2018-03-20 VITALS
DIASTOLIC BLOOD PRESSURE: 80 MMHG | SYSTOLIC BLOOD PRESSURE: 122 MMHG | BODY MASS INDEX: 31.86 KG/M2 | HEART RATE: 80 BPM | WEIGHT: 197.4 LBS

## 2018-03-20 DIAGNOSIS — I35.0 AORTIC VALVE STENOSIS, ETIOLOGY OF CARDIAC VALVE DISEASE UNSPECIFIED: Primary | ICD-10-CM

## 2018-03-20 PROCEDURE — 99213 OFFICE O/P EST LOW 20 MIN: CPT | Performed by: INTERNAL MEDICINE

## 2018-03-20 NOTE — PROGRESS NOTES
Today's Date: 3/20/2018  Patient Name: Lisa Weller  Patient's age: 67 y. o., 1945          The patient is a 67 y.o.  male is in the office for f/u, had a cardiac cath on 3/1/2018 which showed no significant CAD and moderate AS. Past Medical History:   has a past medical history of Abnormal echocardiogram; Aortic stenosis; Degenerative disc disease, lumbar; Diverticulosis of sigmoid colon; DIA (dyspnea on exertion); GERD (gastroesophageal reflux disease); Hyperlipidemia; Hypertension; Kidney stone; Obesity; Tubular adenoma of colon; and Type 2 diabetes mellitus without complication ([facility] [address] Past Surgical History:   has a past surgical history that includes Orbital fracture repair (Left, 1971); Colonoscopy (2005); Colonoscopy (10/2017); and Cardiac catheterization (03/01/2018). Home Medications:    Prior to Admission medications    Medication Sig Start Date End Date Taking?  Authorizing Provider   tamsulosin (FLOMAX) 0.4 MG capsule Take 1 capsule by mouth daily 3/8/18   Ebonie Mustafa MD   rosuvastatin (CRESTOR) 20 MG tablet Take 1 tablet by mouth daily 3/8/18   Ebonie Mustafa MD   pioglitazone (ACTOS) 45 MG tablet Take 1 tablet by mouth daily 3/8/18   Ebonie Mustafa MD   omeprazole (PRILOSEC) 20 MG delayed release capsule Take 1 capsule by mouth daily 3/8/18   Ebonie Mustafa MD   metFORMIN (GLUCOPHAGE) 500 MG tablet Take 1 tablet by mouth 2 times daily (with meals) 3/8/18   Ebonie Mustafa MD   lisinopril (PRINIVIL;ZESTRIL) 5 MG tablet Take 1 tablet by mouth daily 3/8/18   Ebonie Mustafa MD   labetalol (NORMODYNE) 100 MG tablet Take 1 tablet by mouth 2 times daily 3/8/18   Ebonie Mustafa MD   hydrochlorothiazide (HYDRODIURIL) 50 MG tablet Take 1 tablet by mouth daily 3/8/18   Ebonie Mustafa MD   glimepiride (AMARYL) 4 MG tablet Take 1 tablet by mouth every morning (before breakfast) 3/8/18   Ebonie Mustafa MD   clotrimazole-betamethasone Nate Fang) 1-0.05 % cream Apply topically 2 times daily Apply topically 2 times daily. Historical Provider, MD   naproxen sodium (ALEVE) 220 MG tablet Take 220 mg by mouth 2 times daily (with meals)    Historical Provider, MD   aspirin 81 MG tablet Take 81 mg by mouth daily    Historical Provider, MD       Allergies:  Invokana [canagliflozin] and Januvia [sitagliptin]    Social History:   reports that he has quit smoking. He has never used smokeless tobacco. He reports that he does not drink alcohol or use drugs. REVIEW OF SYSTEMS:  CONSTITUTIONAL:NEGATIVE  HEENT:NEG  Cardiovascular: No chest pain, Yes dyspnea on exertion, No palpitations. Lower extremity edema: No  RESPIRATORY: DIA  GASTROINTESTINAL:  negative  GENITOURINARY:  negative  INTEGUMENT:  negative  MUSCULOSKELETAL:  positive for  pain  NEUROLOGICAL:  negative    PHYSICAL EXAM:      /80   Pulse 80   Wt 197 lb 6.4 oz (89.5 kg)   BMI 31.86 kg/m²    HEENT: PERRL, no cervical lymphadenopathy. No masses palpable. Cardiovascular:  · The apical impulse is not displaced  · Heart  Sounds:RRR, WILNER, S4  · Jugular venous pulsation Normal  · The carotid upstroke is NL  · Peripheral pulses are symmetrical and full  Respiratory: Good respiratory effort. On auscultation: clear to auscultation bilaterally  Abdomen:  · No masses or tenderness  · Bowel sounds present  Extremities:  ·  No Cyanosis or Clubbing  ·  Lower extremity edema: No  Skin: Warm and dry    Cardiac data:    Cardiac cath 3/1/2018: Findings:  Left main: NL  LAD: Diffuse 30-40%  LCX: Diffuse 20-30%  RCA: Diffuse 30-40%  The LV gram was performed in the HUBBARD 30 position. LVEF: 60%.     Mean AV gradient 28  HYUN 1.3  Labs:     CBC: No results for input(s): WBC, HGB, HCT, PLT in the last 72 hours. BMP: No results for input(s): NA, K, CO2, BUN, CREATININE, LABGLOM, GLUCOSE in the last 72 hours. PT/INR: No results for input(s): PROTIME, INR in the last 72 hours.   FASTING LIPID PANEL:  Lab Results

## 2018-06-01 ENCOUNTER — OFFICE VISIT (OUTPATIENT)
Dept: FAMILY MEDICINE CLINIC | Age: 73
End: 2018-06-01
Payer: MEDICARE

## 2018-06-01 VITALS
BODY MASS INDEX: 31.96 KG/M2 | WEIGHT: 198 LBS | DIASTOLIC BLOOD PRESSURE: 78 MMHG | SYSTOLIC BLOOD PRESSURE: 120 MMHG | HEART RATE: 76 BPM

## 2018-06-01 DIAGNOSIS — R09.89 BILATERAL CAROTID BRUITS: ICD-10-CM

## 2018-06-01 DIAGNOSIS — E11.65 TYPE 2 DIABETES MELLITUS WITH HYPERGLYCEMIA, WITHOUT LONG-TERM CURRENT USE OF INSULIN (HCC): ICD-10-CM

## 2018-06-01 DIAGNOSIS — K21.9 GASTROESOPHAGEAL REFLUX DISEASE WITHOUT ESOPHAGITIS: ICD-10-CM

## 2018-06-01 DIAGNOSIS — I10 ESSENTIAL HYPERTENSION: ICD-10-CM

## 2018-06-01 DIAGNOSIS — Z13.6 SCREENING FOR AAA (AORTIC ABDOMINAL ANEURYSM): ICD-10-CM

## 2018-06-01 DIAGNOSIS — E11.8 TYPE 2 DIABETES MELLITUS WITH COMPLICATION, UNSPECIFIED LONG TERM INSULIN USE STATUS: Primary | ICD-10-CM

## 2018-06-01 DIAGNOSIS — E78.2 MIXED HYPERLIPIDEMIA: ICD-10-CM

## 2018-06-01 DIAGNOSIS — Z12.5 SCREENING PSA (PROSTATE SPECIFIC ANTIGEN): ICD-10-CM

## 2018-06-01 DIAGNOSIS — Z80.42 FAMILY HISTORY OF PROSTATE CANCER: ICD-10-CM

## 2018-06-01 DIAGNOSIS — I35.0 NONRHEUMATIC AORTIC VALVE STENOSIS: ICD-10-CM

## 2018-06-01 LAB — HBA1C MFR BLD: 8.1 %

## 2018-06-01 PROCEDURE — 4040F PNEUMOC VAC/ADMIN/RCVD: CPT | Performed by: FAMILY MEDICINE

## 2018-06-01 PROCEDURE — 3017F COLORECTAL CA SCREEN DOC REV: CPT | Performed by: FAMILY MEDICINE

## 2018-06-01 PROCEDURE — 99214 OFFICE O/P EST MOD 30 MIN: CPT | Performed by: FAMILY MEDICINE

## 2018-06-01 PROCEDURE — 3045F PR MOST RECENT HEMOGLOBIN A1C LEVEL 7.0-9.0%: CPT | Performed by: FAMILY MEDICINE

## 2018-06-01 PROCEDURE — 83036 HEMOGLOBIN GLYCOSYLATED A1C: CPT | Performed by: FAMILY MEDICINE

## 2018-06-01 PROCEDURE — G8417 CALC BMI ABV UP PARAM F/U: HCPCS | Performed by: FAMILY MEDICINE

## 2018-06-01 PROCEDURE — G8427 DOCREV CUR MEDS BY ELIG CLIN: HCPCS | Performed by: FAMILY MEDICINE

## 2018-06-01 PROCEDURE — 1036F TOBACCO NON-USER: CPT | Performed by: FAMILY MEDICINE

## 2018-06-01 PROCEDURE — 1123F ACP DISCUSS/DSCN MKR DOCD: CPT | Performed by: FAMILY MEDICINE

## 2018-06-01 PROCEDURE — 2022F DILAT RTA XM EVC RTNOPTHY: CPT | Performed by: FAMILY MEDICINE

## 2018-06-01 ASSESSMENT — ENCOUNTER SYMPTOMS
SORE THROAT: 0
CHEST TIGHTNESS: 0
WHEEZING: 0
ABDOMINAL PAIN: 0
ABDOMINAL DISTENTION: 0
SHORTNESS OF BREATH: 0

## 2018-06-01 ASSESSMENT — PATIENT HEALTH QUESTIONNAIRE - PHQ9
SUM OF ALL RESPONSES TO PHQ QUESTIONS 1-9: 0
2. FEELING DOWN, DEPRESSED OR HOPELESS: 0
SUM OF ALL RESPONSES TO PHQ9 QUESTIONS 1 & 2: 0
1. LITTLE INTEREST OR PLEASURE IN DOING THINGS: 0

## 2018-06-06 ASSESSMENT — ENCOUNTER SYMPTOMS: BACK PAIN: 0

## 2018-06-15 DIAGNOSIS — I65.22 CAROTID STENOSIS, ASYMPTOMATIC, LEFT: Primary | ICD-10-CM

## 2018-06-18 DIAGNOSIS — E11.65 TYPE 2 DIABETES MELLITUS WITH HYPERGLYCEMIA, WITHOUT LONG-TERM CURRENT USE OF INSULIN (HCC): ICD-10-CM

## 2018-06-18 DIAGNOSIS — E11.8 TYPE 2 DIABETES MELLITUS WITH COMPLICATION, WITHOUT LONG-TERM CURRENT USE OF INSULIN (HCC): Primary | ICD-10-CM

## 2018-06-18 DIAGNOSIS — E78.2 MIXED HYPERLIPIDEMIA: ICD-10-CM

## 2018-06-18 DIAGNOSIS — K21.9 GASTROESOPHAGEAL REFLUX DISEASE, ESOPHAGITIS PRESENCE NOT SPECIFIED: ICD-10-CM

## 2018-06-18 DIAGNOSIS — N40.0 BENIGN PROSTATIC HYPERPLASIA, UNSPECIFIED WHETHER LOWER URINARY TRACT SYMPTOMS PRESENT: ICD-10-CM

## 2018-06-18 LAB
AGE FOR GFR: 73
CREAT SERPL-MCNC: 0.7 MG/DL
EGFR BF: 99 ML/MIN/1.73 M2
EGFR BM: 134 ML/MIN/1.73 M2
EGFR WF: 82 ML/MIN/1.73 M2
EGFR WM: 111 ML/MIN/1.73 M2

## 2018-06-18 RX ORDER — LABETALOL 100 MG/1
100 TABLET, FILM COATED ORAL 2 TIMES DAILY
Qty: 180 TABLET | Refills: 3 | Status: ON HOLD | OUTPATIENT
Start: 2018-06-18 | End: 2018-11-04 | Stop reason: HOSPADM

## 2018-06-18 RX ORDER — HYDROCHLOROTHIAZIDE 50 MG/1
50 TABLET ORAL DAILY
Qty: 90 TABLET | Refills: 3 | Status: ON HOLD | OUTPATIENT
Start: 2018-06-18 | End: 2018-11-04 | Stop reason: HOSPADM

## 2018-06-18 RX ORDER — OMEPRAZOLE 20 MG/1
20 CAPSULE, DELAYED RELEASE ORAL DAILY
Qty: 90 CAPSULE | Refills: 3 | Status: SHIPPED | OUTPATIENT
Start: 2018-06-18 | End: 2018-08-20 | Stop reason: SDUPTHER

## 2018-06-18 RX ORDER — LISINOPRIL 5 MG/1
5 TABLET ORAL DAILY
Qty: 90 TABLET | Refills: 3 | Status: SHIPPED | OUTPATIENT
Start: 2018-06-18 | End: 2018-08-20 | Stop reason: SDUPTHER

## 2018-06-18 RX ORDER — PIOGLITAZONEHYDROCHLORIDE 45 MG/1
45 TABLET ORAL DAILY
Qty: 90 TABLET | Refills: 3 | Status: SHIPPED | OUTPATIENT
Start: 2018-06-18 | End: 2018-08-20 | Stop reason: SDUPTHER

## 2018-06-18 RX ORDER — ROSUVASTATIN CALCIUM 20 MG/1
20 TABLET, COATED ORAL DAILY
Qty: 90 TABLET | Refills: 3 | Status: SHIPPED | OUTPATIENT
Start: 2018-06-18 | End: 2018-08-20 | Stop reason: SDUPTHER

## 2018-06-18 RX ORDER — TAMSULOSIN HYDROCHLORIDE 0.4 MG/1
0.4 CAPSULE ORAL DAILY
Qty: 90 CAPSULE | Refills: 3 | Status: SHIPPED | OUTPATIENT
Start: 2018-06-18 | End: 2018-08-20 | Stop reason: SDUPTHER

## 2018-06-26 ENCOUNTER — TELEPHONE (OUTPATIENT)
Dept: FAMILY MEDICINE CLINIC | Age: 73
End: 2018-06-26

## 2018-06-26 DIAGNOSIS — I65.22: Primary | ICD-10-CM

## 2018-07-12 ENCOUNTER — OFFICE VISIT (OUTPATIENT)
Dept: VASCULAR SURGERY | Age: 73
End: 2018-07-12
Payer: MEDICARE

## 2018-07-12 VITALS
BODY MASS INDEX: 33.53 KG/M2 | WEIGHT: 196.4 LBS | OXYGEN SATURATION: 99 % | HEART RATE: 80 BPM | DIASTOLIC BLOOD PRESSURE: 80 MMHG | HEIGHT: 64 IN | SYSTOLIC BLOOD PRESSURE: 112 MMHG

## 2018-07-12 DIAGNOSIS — I65.22 CAROTID STENOSIS, ASYMPTOMATIC, LEFT: Primary | ICD-10-CM

## 2018-07-12 PROCEDURE — G8598 ASA/ANTIPLAT THER USED: HCPCS | Performed by: SURGERY

## 2018-07-12 PROCEDURE — 3017F COLORECTAL CA SCREEN DOC REV: CPT | Performed by: SURGERY

## 2018-07-12 PROCEDURE — 99204 OFFICE O/P NEW MOD 45 MIN: CPT | Performed by: SURGERY

## 2018-07-12 PROCEDURE — G8427 DOCREV CUR MEDS BY ELIG CLIN: HCPCS | Performed by: SURGERY

## 2018-07-12 PROCEDURE — G8417 CALC BMI ABV UP PARAM F/U: HCPCS | Performed by: SURGERY

## 2018-07-12 PROCEDURE — 4040F PNEUMOC VAC/ADMIN/RCVD: CPT | Performed by: SURGERY

## 2018-07-12 PROCEDURE — 1036F TOBACCO NON-USER: CPT | Performed by: SURGERY

## 2018-07-12 PROCEDURE — 1101F PT FALLS ASSESS-DOCD LE1/YR: CPT | Performed by: SURGERY

## 2018-07-12 PROCEDURE — 1123F ACP DISCUSS/DSCN MKR DOCD: CPT | Performed by: SURGERY

## 2018-07-31 ENCOUNTER — OFFICE VISIT (OUTPATIENT)
Dept: CARDIOLOGY CLINIC | Age: 73
End: 2018-07-31
Payer: MEDICARE

## 2018-07-31 VITALS
BODY MASS INDEX: 32.99 KG/M2 | SYSTOLIC BLOOD PRESSURE: 138 MMHG | HEIGHT: 65 IN | HEART RATE: 82 BPM | WEIGHT: 198 LBS | DIASTOLIC BLOOD PRESSURE: 86 MMHG

## 2018-07-31 DIAGNOSIS — Z01.810 PREOP CARDIOVASCULAR EXAM: ICD-10-CM

## 2018-07-31 DIAGNOSIS — I10 ESSENTIAL HYPERTENSION: ICD-10-CM

## 2018-07-31 DIAGNOSIS — I35.0 NONRHEUMATIC AORTIC VALVE STENOSIS: Primary | ICD-10-CM

## 2018-07-31 PROCEDURE — 99213 OFFICE O/P EST LOW 20 MIN: CPT | Performed by: INTERNAL MEDICINE

## 2018-07-31 NOTE — PROGRESS NOTES
MG tablet Take 1 tablet by mouth every morning (before breakfast) 3/8/18  Yes Jv Whittington MD   clotrimazole-betamethasone (LOTRISONE) 1-0.05 % cream Apply topically 2 times daily Apply topically 2 times daily. Yes Historical Provider, MD   naproxen sodium (ALEVE) 220 MG tablet Take 220 mg by mouth 2 times daily (with meals)   Yes Historical Provider, MD   aspirin 81 MG tablet Take 81 mg by mouth daily   Yes Historical Provider, MD       Allergies:  Invokana [canagliflozin] and Januvia [sitagliptin]    Social History:   reports that he has quit smoking. He has never used smokeless tobacco. He reports that he does not drink alcohol or use drugs. REVIEW OF SYSTEMS:  CONSTITUTIONAL:NEGATIVE  HEENT:NEG  Cardiovascular: No chest pain, No dyspnea on exertion, No palpitations. Lower extremity edema: No  RESPIRATORY: neg  GASTROINTESTINAL:  negative  GENITOURINARY:  negative  INTEGUMENT:  negative  MUSCULOSKELETAL:  positive for  pain  NEUROLOGICAL:  negative    PHYSICAL EXAM:      /86   Pulse 82   Ht 5' 5\" (1.651 m)   Wt 198 lb (89.8 kg)   BMI 32.95 kg/m²    HEENT: PERRL, no cervical lymphadenopathy. No masses palpable. Cardiovascular:  · The apical impulse is not displaced  · Heart  Sounds:RRR, WILNER MID PEAKING  · Jugular venous pulsation Normal  · The carotid upstroke is NL  · Peripheral pulses are symmetrical and full  Respiratory: Good respiratory effort. On auscultation: clear to auscultation bilaterally  Abdomen:  · No masses or tenderness  · Bowel sounds present  Extremities:  ·  No Cyanosis or Clubbing  ·  Lower extremity edema: No  Skin: Warm and dry    Cardiac data:    EKG: NSR, RBBB  Cardiac cath 3/1/2018: Findings:  Left main: NL  LAD: Diffuse 30-40%  LCX: Diffuse 20-30%  RCA: Diffuse 30-40%  The LV gram was performed in the HUBBARD 30 position. LVEF: 60%. Labs:     CBC: No results for input(s): WBC, HGB, HCT, PLT in the last 72 hours.   BMP: No results for input(s): NA, K, CO2, BUN,

## 2018-08-15 LAB
AGE FOR GFR: 73
ANION GAP SERPL CALCULATED.3IONS-SCNC: 15 MMOL/L
BASOPHILS # BLD: 0.13 THOU/MM3
BUN BLDV-MCNC: 17 MG/DL
CALCIUM SERPL-MCNC: 9.8 MG/DL
CHLORIDE BLD-SCNC: 101 MMOL/L
CO2: 27 MMOL/L
CREAT SERPL-MCNC: 0.8 MG/DL
DIFFERENTIAL: AUTOMATED DIFF
EGFR BF: 85 ML/MIN/1.73 M2
EGFR BM: 115 ML/MIN/1.73 M2
EGFR WF: 70 ML/MIN/1.73 M2
EGFR WM: 95 ML/MIN/1.73 M2
EOSINOPHIL # BLD: 0.3 THOU/MM3
GLUCOSE: 199 MG/DL
HCT VFR BLD CALC: 33.4 %
HEMOGLOBIN: 11.4 G/DL
LYMPHOCYTES # BLD: 2.49 THOU/MM3
MCH RBC QN AUTO: 30.9 PG
MCHC RBC AUTO-ENTMCNC: 34.3 G/DL
MCV RBC AUTO: 90.2 FL
MONOCYTES # BLD: 0.59 THOU/MM3
NEUTROPHILS: 5.52 THOU/MM3
PDW BLD-RTO: 12.2 %
PLATELET # BLD: 216 THOU/MM3
PMV BLD AUTO: 7.6 FL
POTASSIUM SERPL-SCNC: 4.8 MMOL/L
RBC # BLD: 3.7 M/UL
SCREENING PSA: 0.94 NG/ML
SODIUM BLD-SCNC: 138 MMOL/L
WBC # BLD: 9.03 THOU/ML3

## 2018-08-20 DIAGNOSIS — K21.9 GASTROESOPHAGEAL REFLUX DISEASE, ESOPHAGITIS PRESENCE NOT SPECIFIED: ICD-10-CM

## 2018-08-20 DIAGNOSIS — E11.65 TYPE 2 DIABETES MELLITUS WITH HYPERGLYCEMIA, WITHOUT LONG-TERM CURRENT USE OF INSULIN (HCC): ICD-10-CM

## 2018-08-20 DIAGNOSIS — N40.0 BENIGN PROSTATIC HYPERPLASIA, UNSPECIFIED WHETHER LOWER URINARY TRACT SYMPTOMS PRESENT: ICD-10-CM

## 2018-08-20 DIAGNOSIS — E78.2 MIXED HYPERLIPIDEMIA: ICD-10-CM

## 2018-08-20 RX ORDER — OMEPRAZOLE 20 MG/1
20 CAPSULE, DELAYED RELEASE ORAL DAILY
Qty: 90 CAPSULE | Refills: 3 | Status: ON HOLD | OUTPATIENT
Start: 2018-08-20 | End: 2018-11-04

## 2018-08-20 RX ORDER — TAMSULOSIN HYDROCHLORIDE 0.4 MG/1
0.4 CAPSULE ORAL DAILY
Qty: 90 CAPSULE | Refills: 3 | Status: ON HOLD | OUTPATIENT
Start: 2018-08-20 | End: 2018-11-04

## 2018-08-20 RX ORDER — GLIMEPIRIDE 4 MG/1
4 TABLET ORAL
Qty: 90 TABLET | Refills: 3 | Status: SHIPPED | OUTPATIENT
Start: 2018-08-20 | End: 2018-08-30 | Stop reason: DRUGHIGH

## 2018-08-20 RX ORDER — ROSUVASTATIN CALCIUM 20 MG/1
20 TABLET, COATED ORAL DAILY
Qty: 90 TABLET | Refills: 3 | Status: ON HOLD | OUTPATIENT
Start: 2018-08-20 | End: 2018-11-04

## 2018-08-20 RX ORDER — PIOGLITAZONEHYDROCHLORIDE 45 MG/1
45 TABLET ORAL DAILY
Qty: 90 TABLET | Refills: 3 | Status: ON HOLD | OUTPATIENT
Start: 2018-08-20 | End: 2018-11-04

## 2018-08-20 RX ORDER — LISINOPRIL 5 MG/1
5 TABLET ORAL DAILY
Qty: 90 TABLET | Refills: 3 | Status: ON HOLD | OUTPATIENT
Start: 2018-08-20 | End: 2018-11-04 | Stop reason: HOSPADM

## 2018-08-27 ENCOUNTER — TELEPHONE (OUTPATIENT)
Dept: FAMILY MEDICINE CLINIC | Age: 73
End: 2018-08-27

## 2018-08-30 RX ORDER — GLIMEPIRIDE 4 MG/1
4 TABLET ORAL 2 TIMES DAILY
Status: ON HOLD | COMMUNITY
End: 2018-11-04

## 2018-09-11 ENCOUNTER — OFFICE VISIT (OUTPATIENT)
Dept: FAMILY MEDICINE CLINIC | Age: 73
End: 2018-09-11
Payer: MEDICARE

## 2018-09-11 ENCOUNTER — TELEPHONE (OUTPATIENT)
Dept: CARDIOLOGY CLINIC | Age: 73
End: 2018-09-11

## 2018-09-11 VITALS
TEMPERATURE: 98.4 F | WEIGHT: 200 LBS | SYSTOLIC BLOOD PRESSURE: 108 MMHG | BODY MASS INDEX: 33.28 KG/M2 | HEART RATE: 80 BPM | DIASTOLIC BLOOD PRESSURE: 76 MMHG | OXYGEN SATURATION: 98 % | RESPIRATION RATE: 14 BRPM

## 2018-09-11 DIAGNOSIS — K21.9 GASTROESOPHAGEAL REFLUX DISEASE, ESOPHAGITIS PRESENCE NOT SPECIFIED: ICD-10-CM

## 2018-09-11 DIAGNOSIS — E78.2 MIXED HYPERLIPIDEMIA: ICD-10-CM

## 2018-09-11 DIAGNOSIS — E11.65 TYPE 2 DIABETES MELLITUS WITH HYPERGLYCEMIA, WITHOUT LONG-TERM CURRENT USE OF INSULIN (HCC): Primary | ICD-10-CM

## 2018-09-11 DIAGNOSIS — N40.0 BENIGN PROSTATIC HYPERPLASIA, UNSPECIFIED WHETHER LOWER URINARY TRACT SYMPTOMS PRESENT: ICD-10-CM

## 2018-09-11 DIAGNOSIS — Z80.42 FAMILY HISTORY OF PROSTATE CANCER: ICD-10-CM

## 2018-09-11 DIAGNOSIS — I35.0 NONRHEUMATIC AORTIC VALVE STENOSIS: ICD-10-CM

## 2018-09-11 DIAGNOSIS — I65.22 CAROTID STENOSIS, ASYMPTOMATIC, LEFT: ICD-10-CM

## 2018-09-11 DIAGNOSIS — I35.0 AORTIC VALVE STENOSIS, ETIOLOGY OF CARDIAC VALVE DISEASE UNSPECIFIED: ICD-10-CM

## 2018-09-11 DIAGNOSIS — I10 ESSENTIAL HYPERTENSION: ICD-10-CM

## 2018-09-11 LAB — HBA1C MFR BLD: 8.3 %

## 2018-09-11 PROCEDURE — 1123F ACP DISCUSS/DSCN MKR DOCD: CPT | Performed by: FAMILY MEDICINE

## 2018-09-11 PROCEDURE — 3045F PR MOST RECENT HEMOGLOBIN A1C LEVEL 7.0-9.0%: CPT | Performed by: FAMILY MEDICINE

## 2018-09-11 PROCEDURE — 1101F PT FALLS ASSESS-DOCD LE1/YR: CPT | Performed by: FAMILY MEDICINE

## 2018-09-11 PROCEDURE — 3017F COLORECTAL CA SCREEN DOC REV: CPT | Performed by: FAMILY MEDICINE

## 2018-09-11 PROCEDURE — G8427 DOCREV CUR MEDS BY ELIG CLIN: HCPCS | Performed by: FAMILY MEDICINE

## 2018-09-11 PROCEDURE — 99214 OFFICE O/P EST MOD 30 MIN: CPT | Performed by: FAMILY MEDICINE

## 2018-09-11 PROCEDURE — 83036 HEMOGLOBIN GLYCOSYLATED A1C: CPT | Performed by: FAMILY MEDICINE

## 2018-09-11 PROCEDURE — 4040F PNEUMOC VAC/ADMIN/RCVD: CPT | Performed by: FAMILY MEDICINE

## 2018-09-11 PROCEDURE — 1036F TOBACCO NON-USER: CPT | Performed by: FAMILY MEDICINE

## 2018-09-11 PROCEDURE — G8598 ASA/ANTIPLAT THER USED: HCPCS | Performed by: FAMILY MEDICINE

## 2018-09-11 PROCEDURE — 2022F DILAT RTA XM EVC RTNOPTHY: CPT | Performed by: FAMILY MEDICINE

## 2018-09-11 PROCEDURE — G8417 CALC BMI ABV UP PARAM F/U: HCPCS | Performed by: FAMILY MEDICINE

## 2018-09-13 ENCOUNTER — TELEPHONE (OUTPATIENT)
Dept: PHARMACY | Age: 73
End: 2018-09-13

## 2018-09-13 NOTE — TELEPHONE ENCOUNTER
Transitions of Care Pharmacy Service   Pharmacy Medication Review: Pre-AdmissionTesting    Attempted to contact patient to update his/her medication list prior to the upcoming Pre-Admission Testing appt scheduled for 9/13/18. Left message for pt to call me back at [phone] to review meds prior to the appt or to otherwise bring all medications (including OTC meds) to the upcoming appt.       Respectfully,     [place] [facility] [place] [facility] [place]  Transitions of [facility] [place]   [phone]

## 2018-09-17 ASSESSMENT — ENCOUNTER SYMPTOMS
BACK PAIN: 0
SORE THROAT: 0
ABDOMINAL DISTENTION: 0
CHEST TIGHTNESS: 0
SHORTNESS OF BREATH: 0
WHEEZING: 0
ABDOMINAL PAIN: 0

## 2018-09-18 ENCOUNTER — HOSPITAL ENCOUNTER (OUTPATIENT)
Dept: PREADMISSION TESTING | Age: 73
Discharge: HOME | End: 2018-09-22
Payer: MEDICARE

## 2018-09-18 VITALS
SYSTOLIC BLOOD PRESSURE: 114 MMHG | HEIGHT: 62 IN | BODY MASS INDEX: 36.43 KG/M2 | OXYGEN SATURATION: 98 % | WEIGHT: 197.97 LBS | RESPIRATION RATE: 18 BRPM | HEART RATE: 79 BPM | DIASTOLIC BLOOD PRESSURE: 71 MMHG

## 2018-09-18 LAB
ABO/RH: NORMAL
ABSOLUTE EOS #: 0.4 K/UL (ref 0–0.4)
ABSOLUTE IMMATURE GRANULOCYTE: ABNORMAL K/UL (ref 0–0.3)
ABSOLUTE LYMPH #: 2.4 K/UL (ref 1–4.8)
ABSOLUTE MONO #: 0.6 K/UL (ref 0.2–0.8)
ANION GAP SERPL CALCULATED.3IONS-SCNC: 14 MMOL/L (ref 9–17)
ANTIBODY SCREEN: NEGATIVE
ARM BAND NUMBER: NORMAL
BASOPHILS # BLD: 1 % (ref 0–2)
BASOPHILS ABSOLUTE: 0.1 K/UL (ref 0–0.2)
BUN BLDV-MCNC: 20 MG/DL (ref 8–23)
BUN/CREAT BLD: 26 (ref 9–20)
CALCIUM SERPL-MCNC: 10 MG/DL (ref 8.6–10.4)
CHLORIDE BLD-SCNC: 97 MMOL/L (ref 98–107)
CO2: 24 MMOL/L (ref 20–31)
CREAT SERPL-MCNC: 0.76 MG/DL (ref 0.7–1.2)
DIFFERENTIAL TYPE: ABNORMAL
EOSINOPHILS RELATIVE PERCENT: 4 % (ref 1–4)
EXPIRATION DATE: NORMAL
GFR AFRICAN AMERICAN: >60 ML/MIN
GFR NON-AFRICAN AMERICAN: >60 ML/MIN
GFR SERPL CREATININE-BSD FRML MDRD: ABNORMAL ML/MIN/{1.73_M2}
GFR SERPL CREATININE-BSD FRML MDRD: ABNORMAL ML/MIN/{1.73_M2}
GLUCOSE BLD-MCNC: 185 MG/DL (ref 70–99)
HCT VFR BLD CALC: 34.7 % (ref 41–53)
HEMOGLOBIN: 11.5 G/DL (ref 13.5–17.5)
IMMATURE GRANULOCYTES: ABNORMAL %
INR BLD: 1.1
LYMPHOCYTES # BLD: 25 % (ref 24–44)
MCH RBC QN AUTO: 30.7 PG (ref 26–34)
MCHC RBC AUTO-ENTMCNC: 33.3 G/DL (ref 31–37)
MCV RBC AUTO: 92.4 FL (ref 80–100)
MONOCYTES # BLD: 7 % (ref 1–7)
NRBC AUTOMATED: ABNORMAL PER 100 WBC
PARTIAL THROMBOPLASTIN TIME: 28.6 SEC (ref 23–31)
PDW BLD-RTO: 14.1 % (ref 11.5–14.5)
PLATELET # BLD: 254 K/UL (ref 130–400)
PLATELET ESTIMATE: ABNORMAL
PMV BLD AUTO: 8.6 FL (ref 6–12)
POTASSIUM SERPL-SCNC: 4.5 MMOL/L (ref 3.7–5.3)
PROTHROMBIN TIME: 11.4 SEC (ref 9.7–11.6)
RBC # BLD: 3.75 M/UL (ref 4.5–5.9)
RBC # BLD: ABNORMAL 10*6/UL
SEG NEUTROPHILS: 63 % (ref 36–66)
SEGMENTED NEUTROPHILS ABSOLUTE COUNT: 6 K/UL (ref 1.8–7.7)
SODIUM BLD-SCNC: 135 MMOL/L (ref 135–144)
WBC # BLD: 9.5 K/UL (ref 3.5–11)
WBC # BLD: ABNORMAL 10*3/UL

## 2018-09-18 PROCEDURE — 86900 BLOOD TYPING SEROLOGIC ABO: CPT

## 2018-09-18 PROCEDURE — 85610 PROTHROMBIN TIME: CPT

## 2018-09-18 PROCEDURE — 86901 BLOOD TYPING SEROLOGIC RH(D): CPT

## 2018-09-18 PROCEDURE — 80048 BASIC METABOLIC PNL TOTAL CA: CPT

## 2018-09-18 PROCEDURE — 86850 RBC ANTIBODY SCREEN: CPT

## 2018-09-18 PROCEDURE — 85730 THROMBOPLASTIN TIME PARTIAL: CPT

## 2018-09-18 PROCEDURE — 85025 COMPLETE CBC W/AUTO DIFF WBC: CPT

## 2018-09-18 PROCEDURE — 36415 COLL VENOUS BLD VENIPUNCTURE: CPT

## 2018-09-18 NOTE — PROGRESS NOTES
documented eye exam was about a year ago, but he says he is pretty sure he had one at [facility] because he just had new glasses. He is on a baby aspirin per day.     Coronary artery disease   He is on labetalol. He does take a baby aspirin. This year he had a cardiac catheterization, in March, really for his aortic stenosis. It showed nonocclusive disease in the right circumflex and LAD. Treated medically. He denies chest pains. He denies palpitations. He has no PND, orthopnea or increase in edema.     Aortic stenosis  Followed by Cardiology. He did get an echo 6 months after his cardiac catheterization. The aortic stenosis was felt to be very severe but on the catheterization it was moderate, so it is just being followed. He denies chest pain. No syncope.      Left carotid stenosis  Since his last visit he is documented to have a high-grade stenosis in the left carotid system. He is due to have a left carotid endarterectomy with Dr. Osiel Apple in October. He has no amaurosis fugax. No weakness. No difficulties with speech. No visual changes. He is on aspirin.      Hypertension  He does not check his blood pressures. His blood pressure is well controlled for us today with a systolic of 185/87. He remains on labetalol 100 mg twice a day, also hydrochlorothiazide 50 mg daily and lisinopril 5 mg daily.     Hyperlipidemia  He is on Crestor 20 mg daily. His lipids checked in February of this year had a LDL of 60.       Low back pain  He has seen Dr. Jessica Dawn in the past. He has not received any call back from him. His low back hurts him particularly if he stands for a long amount of time.   He does try to stay active.            BP Readings from Last 3 Encounters:   09/11/18 108/76   07/31/18 138/86   07/12/18 112/80                                                                                         (goal 120/80)     Past Medical History        Past Medical History:   Diagnosis Date    Abnormal daily 180 tablet 3    hydrochlorothiazide (HYDRODIURIL) 50 MG tablet Take 1 tablet by mouth daily 90 tablet 3    clotrimazole-betamethasone (LOTRISONE) 1-0.05 % cream Apply topically 2 times daily Apply topically 2 times daily.        naproxen sodium (ALEVE) 220 MG tablet Take 220 mg by mouth 2 times daily (with meals)        aspirin 81 MG tablet Take 81 mg by mouth daily          No current facility-administered medications for this visit.                Allergies   Allergen Reactions    Invokana [Canagliflozin]         dizziness    Januvia [Sitagliptin]         dizziness              Health Maintenance   Topic Date Due    DTaP/Tdap/Td vaccine (1 - Tdap) 11/15/1963    Shingles Vaccine (1 of 2 - 2 Dose Series) 11/15/1994    Flu vaccine (1) 09/01/2018    Diabetic retinal exam  08/24/2018    Pneumococcal low/med risk (1 of 2 - PCV13) 08/15/2022 (Originally 11/15/2009)    Diabetic microalbuminuria test  10/09/2018    Diabetic foot exam  02/19/2019    Lipid screen  02/19/2019    Potassium monitoring  08/15/2019    Creatinine monitoring  08/15/2019    A1C test (Diabetic or Prediabetic)  09/11/2019    Colon cancer screen colonoscopy  10/09/2027    AAA screen  Completed               Lab Results   Component Value Date     K 4.8 08/15/2018     CREATININE 0.8 08/15/2018     AST 26 02/19/2018     ALT 38 02/19/2018     HCT 33.4 08/15/2018     LABA1C 8.3 09/11/2018     GLUCOSE 199 08/15/2018     CALCIUM 9.8 08/15/2018            Lab Results   Component Value Date     CHOL 146 02/19/2018     CHOL 209 04/13/2017     TRIG 191 02/19/2018     HDL 42 04/13/2017         Review of Systems:      Review of Systems   Constitutional: Negative for chills, diaphoresis and fever. HENT: Negative for sore throat. Eyes: Negative for visual disturbance. Respiratory: Negative for chest tightness, shortness of breath and wheezing. Cardiovascular: Negative for chest pain, palpitations and leg swelling. Gastrointestinal: Negative for abdominal distention and abdominal pain. Endocrine: Negative for polydipsia, polyphagia and polyuria. Genitourinary: Negative for difficulty urinating, dysuria, frequency and hematuria. Nocturia    Musculoskeletal: Negative for back pain and neck pain. Hematological: Negative for adenopathy.         Objective:      /76   Pulse 80   Temp 98.4 °F (36.9 °C)   Resp 14   Wt 200 lb (90.7 kg)   SpO2 98%   BMI 33.28 kg/m²      Physical Exam   Constitutional: He appears well-developed and well-nourished. HENT:   Head: Normocephalic and atraumatic. Nose: Nose normal.   Mouth/Throat: No posterior oropharyngeal edema or posterior oropharyngeal erythema. Neck: Neck supple. Carotid bruit: Transmitted murmur of aortic stenosis. No thyromegaly present. Cardiovascular: Normal rate, regular rhythm, S1 normal and S2 normal.   No extrasystoles are present. Exam reveals distant heart sounds. Murmur heard. Systolic murmur is present with a grade of 3/6    Diastolic murmur is present with a grade of 2/6   Pulmonary/Chest: Breath sounds normal.   Abdominal: Soft. Bowel sounds are normal. There is no hepatosplenomegaly. Musculoskeletal: He exhibits no edema or tenderness. Neurological:   Antalgic, wide based gait    Skin: No rash noted.         Assessment:        Diagnosis Orders   1. Type 2 diabetes mellitus with hyperglycemia, without long-term current use of insulin (HCC)  POCT glycosylated hemoglobin (Hb A1C)   2. Mixed hyperlipidemia      3. Gastroesophageal reflux disease, esophagitis presence not specified      4. Benign prostatic hyperplasia, unspecified whether lower urinary tract symptoms present      5. Carotid stenosis, asymptomatic, left      6. Essential hypertension      7. Family history of prostate cancer      8.  Nonrheumatic aortic valve stenosis         POC Testing Results (If Applicable):        Results for POC orders placed in visit on 09/11/18

## 2018-09-18 NOTE — PRE-PROCEDURE INSTRUCTIONS
from your physician. You will need to shower at home the night before surgery and the morning of surgery with a special soap called chlorhexidine gluconate (CHG*). *Not to be used by people allergic to Chlorhexidine Gluconate (CHG). Following these instructions will help you be sure that your skin is clean before surgery. Instructions on cleaning your skin before surgery: The night before your surgery:      You will need to shower with warm water (not hot) and the CHG soap.  Use a clean wash cloth and a clean towel. Have clean clothes available to put on after the shower.    First wash your hair with regular shampoo. Rinse your hair and body thoroughly to remove the shampoo. [facility] Wash your face with your regular soap or water only. Thoroughly rinse your body with warm water from the neck down.  Turn water off to prevent rinsing the soap off too soon.  With a clean wet washcloth and half of the CHG soap in the bottle, lather your entire body from the neck down. Do not use CHG soap near your eyes or ears to avoid injury to those areas.  Wash thoroughly, paying special attention to the area where your surgery will be performed.  Wash your body gently for five (5) minutes. Avoid scrubbing your skin too hard.  Turn the water back on and rinse your body thoroughly.  Pat yourself dry with a clean, soft towel. Do not apply lotion, cream or powder.  Dress with clean freshly washed clothes. The morning of surgery:     Repeat shower following steps above - using remaining half of CHG soap in bottle. Patient Instructions:    [facility] If you are having any type of anesthesia you are to have nothing to eat or drink after midnight the night before your surgery. This includes gum, mints, water or smoking or chewing tobacco.  The only exception to this is a small sip of water to take with any morning dose of heart, blood pressure, or seizure medications.   No alcoholic

## 2018-09-21 ENCOUNTER — TELEPHONE (OUTPATIENT)
Dept: VASCULAR SURGERY | Age: 73
End: 2018-09-21

## 2018-09-21 ENCOUNTER — TELEPHONE (OUTPATIENT)
Dept: CARDIOLOGY | Age: 73
End: 2018-09-21

## 2018-09-21 ENCOUNTER — OFFICE VISIT (OUTPATIENT)
Dept: CARDIOLOGY CLINIC | Age: 73
End: 2018-09-21
Payer: MEDICARE

## 2018-09-21 VITALS
WEIGHT: 198.2 LBS | BODY MASS INDEX: 36.25 KG/M2 | SYSTOLIC BLOOD PRESSURE: 144 MMHG | DIASTOLIC BLOOD PRESSURE: 74 MMHG | HEART RATE: 76 BPM

## 2018-09-21 DIAGNOSIS — I35.0 SEVERE AORTIC STENOSIS: Primary | ICD-10-CM

## 2018-09-21 DIAGNOSIS — I35.0 AORTIC VALVE STENOSIS, ETIOLOGY OF CARDIAC VALVE DISEASE UNSPECIFIED: Primary | ICD-10-CM

## 2018-09-21 PROCEDURE — 99213 OFFICE O/P EST LOW 20 MIN: CPT | Performed by: INTERNAL MEDICINE

## 2018-09-21 NOTE — PROGRESS NOTES
Today's Date: 9/21/2018  Patient Name: Yesi Kenney  Patient's age: 67 y. o., 1945          The patient is a 67 y.o.  male is in the office for f/u, had TTE on 9/11/2018 which showed severe AS. He denies angina, syncope. He is scheduled to have left carotid done. Past Medical History:   has a past medical history of Abnormal echocardiogram; Aortic stenosis; Degenerative disc disease, lumbar; Diverticulosis of sigmoid colon; DIA (dyspnea on exertion); GERD (gastroesophageal reflux disease); Hyperlipidemia; Hypertension; Kidney stone; Obesity; Tubular adenoma of colon; and Type 2 diabetes mellitus without complication ([facility] [address] Past Surgical History:   has a past surgical history that includes Orbital fracture repair (Left, 1971); Colonoscopy (2005); Colonoscopy (10/2017); Cardiac catheterization (03/01/2018); and hernia repair. Home Medications:    Prior to Admission medications    Medication Sig Start Date End Date Taking?  Authorizing Provider   metFORMIN (GLUCOPHAGE) 500 MG tablet Take 500 mg by mouth 2 times daily (with meals) Patient takes 2 tabs twice daily    Historical Provider, MD   glimepiride (AMARYL) 4 MG tablet Take 4 mg by mouth 2 times daily    Historical Provider, MD   lisinopril (PRINIVIL;ZESTRIL) 5 MG tablet Take 1 tablet by mouth daily 8/20/18   Alfred Dobson MD   omeprazole (PRILOSEC) 20 MG delayed release capsule Take 1 capsule by mouth daily 8/20/18   Alfred Dobson MD   pioglitazone (ACTOS) 45 MG tablet Take 1 tablet by mouth daily 8/20/18   Alfred Dobson MD   rosuvastatin (CRESTOR) 20 MG tablet Take 1 tablet by mouth daily 8/20/18   Alfred Dobson MD   tamsulosin [facility]) 0.4 MG capsule Take 1 capsule by mouth daily 8/20/18   Alfred Dobson MD   labetalol (NORMODYNE) 100 MG tablet Take 1 tablet by mouth 2 times daily 6/18/18   Alfred Dobson MD   hydrochlorothiazide (HYDRODIURIL) 50 MG tablet Take 1 tablet by mouth daily 6/18/18 Baldo De La Paz MD   clotrimazole-betamethasone (LOTRISONE) 1-0.05 % cream Apply topically 2 times daily Apply topically 2 times daily. Historical Provider, MD   naproxen sodium (ALEVE) 220 MG tablet Take 220 mg by mouth 2 times daily (with meals)    Historical Provider, MD   aspirin 81 MG tablet Take 81 mg by mouth daily    Historical Provider, MD       Allergies:  Invokana [canagliflozin] and Januvia [sitagliptin]    Social History:   reports that he quit smoking about 20 years ago. His smoking use included Cigarettes. He has a 80.00 pack-year smoking history. He has never used smokeless tobacco. He reports that he does not drink alcohol or use drugs. REVIEW OF SYSTEMS:  CONSTITUTIONAL:NEGATIVE  HEENT:NEG  Cardiovascular: No chest pain, Yes dyspnea on exertion, No palpitations. Lower extremity edema: No  RESPIRATORY: DIA  GASTROINTESTINAL:  negative  GENITOURINARY:  negative  INTEGUMENT:  negative  MUSCULOSKELETAL:  positive for  pain  NEUROLOGICAL:  negative    PHYSICAL EXAM:      BP (!) 144/74   Pulse 76   Wt 198 lb 3.2 oz (89.9 kg)   BMI 36.25 kg/m²    HEENT: PERRL, no cervical lymphadenopathy. No masses palpable. Cardiovascular:  · The apical impulse is not displaced  · Heart  Sounds:RRR, WILNER  · Jugular venous pulsation Normal  · The carotid upstroke is NL  · Peripheral pulses are symmetrical and full  Respiratory: Good respiratory effort.  On auscultation: clear to auscultation bilaterally  Abdomen:  · No masses or tenderness  · Bowel sounds present  Extremities:  ·  No Cyanosis or Clubbing  ·  Lower extremity edema: No  Skin: Warm and dry    Cardiac data:      Labs:     CBC:   Recent Labs      09/18/18   1241   WBC  9.5   HGB  11.5*   HCT  34.7*   PLT  254     BMP:   Recent Labs      09/18/18   1241   NA  135   K  4.5   CO2  24   BUN  20   CREATININE  0.76   LABGLOM  >60   GLUCOSE  185*     PT/INR:   Recent Labs      09/18/18   1241   PROTIME  11.4   INR  1.1     FASTING LIPID PANEL:  Lab Results

## 2018-10-03 ENCOUNTER — INITIAL CONSULT (OUTPATIENT)
Dept: CARDIOTHORACIC SURGERY | Age: 73
End: 2018-10-03
Payer: MEDICARE

## 2018-10-03 VITALS
RESPIRATION RATE: 15 BRPM | DIASTOLIC BLOOD PRESSURE: 83 MMHG | OXYGEN SATURATION: 97 % | TEMPERATURE: 97.1 F | HEART RATE: 74 BPM | SYSTOLIC BLOOD PRESSURE: 131 MMHG | BODY MASS INDEX: 32.47 KG/M2 | HEIGHT: 66 IN | WEIGHT: 202 LBS

## 2018-10-03 DIAGNOSIS — R06.02 SOB (SHORTNESS OF BREATH): ICD-10-CM

## 2018-10-03 DIAGNOSIS — I35.0 AORTIC VALVE STENOSIS, ETIOLOGY OF CARDIAC VALVE DISEASE UNSPECIFIED: Primary | ICD-10-CM

## 2018-10-03 DIAGNOSIS — I70.0 AORTIC ATHEROSCLEROSIS (HCC): ICD-10-CM

## 2018-10-03 PROCEDURE — G8427 DOCREV CUR MEDS BY ELIG CLIN: HCPCS | Performed by: PHYSICIAN ASSISTANT

## 2018-10-03 PROCEDURE — 1123F ACP DISCUSS/DSCN MKR DOCD: CPT | Performed by: THORACIC SURGERY (CARDIOTHORACIC VASCULAR SURGERY)

## 2018-10-03 PROCEDURE — 4040F PNEUMOC VAC/ADMIN/RCVD: CPT | Performed by: THORACIC SURGERY (CARDIOTHORACIC VASCULAR SURGERY)

## 2018-10-03 PROCEDURE — 1036F TOBACCO NON-USER: CPT | Performed by: THORACIC SURGERY (CARDIOTHORACIC VASCULAR SURGERY)

## 2018-10-03 PROCEDURE — G8484 FLU IMMUNIZE NO ADMIN: HCPCS | Performed by: PHYSICIAN ASSISTANT

## 2018-10-03 PROCEDURE — G8417 CALC BMI ABV UP PARAM F/U: HCPCS | Performed by: THORACIC SURGERY (CARDIOTHORACIC VASCULAR SURGERY)

## 2018-10-03 PROCEDURE — 4040F PNEUMOC VAC/ADMIN/RCVD: CPT | Performed by: PHYSICIAN ASSISTANT

## 2018-10-03 PROCEDURE — 1101F PT FALLS ASSESS-DOCD LE1/YR: CPT | Performed by: PHYSICIAN ASSISTANT

## 2018-10-03 PROCEDURE — 1123F ACP DISCUSS/DSCN MKR DOCD: CPT | Performed by: PHYSICIAN ASSISTANT

## 2018-10-03 PROCEDURE — 99204 OFFICE O/P NEW MOD 45 MIN: CPT | Performed by: PHYSICIAN ASSISTANT

## 2018-10-03 PROCEDURE — 3017F COLORECTAL CA SCREEN DOC REV: CPT | Performed by: PHYSICIAN ASSISTANT

## 2018-10-03 PROCEDURE — 1036F TOBACCO NON-USER: CPT | Performed by: PHYSICIAN ASSISTANT

## 2018-10-03 PROCEDURE — G8598 ASA/ANTIPLAT THER USED: HCPCS | Performed by: THORACIC SURGERY (CARDIOTHORACIC VASCULAR SURGERY)

## 2018-10-03 PROCEDURE — G8598 ASA/ANTIPLAT THER USED: HCPCS | Performed by: PHYSICIAN ASSISTANT

## 2018-10-03 PROCEDURE — 3017F COLORECTAL CA SCREEN DOC REV: CPT | Performed by: THORACIC SURGERY (CARDIOTHORACIC VASCULAR SURGERY)

## 2018-10-03 PROCEDURE — G8427 DOCREV CUR MEDS BY ELIG CLIN: HCPCS | Performed by: THORACIC SURGERY (CARDIOTHORACIC VASCULAR SURGERY)

## 2018-10-03 PROCEDURE — G8484 FLU IMMUNIZE NO ADMIN: HCPCS | Performed by: THORACIC SURGERY (CARDIOTHORACIC VASCULAR SURGERY)

## 2018-10-03 PROCEDURE — G8417 CALC BMI ABV UP PARAM F/U: HCPCS | Performed by: PHYSICIAN ASSISTANT

## 2018-10-03 PROCEDURE — 1101F PT FALLS ASSESS-DOCD LE1/YR: CPT | Performed by: THORACIC SURGERY (CARDIOTHORACIC VASCULAR SURGERY)

## 2018-10-03 ASSESSMENT — ENCOUNTER SYMPTOMS
NAUSEA: 0
DIARRHEA: 0
WHEEZING: 0
ABDOMINAL PAIN: 0
SHORTNESS OF BREATH: 1
CHEST TIGHTNESS: 0
COUGH: 0
VOMITING: 0
CONSTIPATION: 0

## 2018-10-03 NOTE — PROGRESS NOTES
[id number] [facility] Cardiothoracic Surgery  Consult    Patient's Name/Date of Birth: Danya Gallego / [birth year] (11 y.o.)    Date: October 3, 2018     Chief Complaint   Patient presents with   Marylu Hamman Consultation     referral from [facility] Severe AS       HPI: Danya Gallego is a 67 y.o.  male who presents to office as consult from Dr George Corona for severe AS. Pt c/o Sob and fatigue for sometime. Cardiac work up revealed AS. Cath from 3/18 shows area of 1.3 cm, mean of 27 and TTE from 9/7/18 shows area of less than 1 cm, mean of 44 and peak of 73. Pt with non obstructive CAD. Fairly active, lives alone. Pt also has left carotid stenosis, was being scheduled for CEA with Dr. Radha De Guzman but was cancelled due to severe AS. I am seeing this pt with Dr. Halina Nixon. All available reports and films reviewed. ROS:   Review of Systems   Constitutional: Positive for fatigue. Negative for chills, diaphoresis, fever and unexpected weight change. Respiratory: Positive for shortness of breath. Negative for cough, chest tightness and wheezing. Cardiovascular: Negative for chest pain, palpitations and leg swelling. Gastrointestinal: Negative for abdominal pain, constipation, diarrhea, nausea and vomiting. Endocrine: Negative for polydipsia, polyphagia and polyuria. Genitourinary: Negative for dysuria and frequency. Musculoskeletal: Negative for arthralgias. Skin: Negative for pallor and rash. Neurological: Negative for dizziness, tremors, seizures, syncope, weakness, light-headedness and numbness. Psychiatric/Behavioral: Negative for confusion, sleep disturbance and suicidal ideas.        Past Medical History:   Diagnosis Date    Abnormal echocardiogram     Aortic stenosis     Degenerative disc disease, lumbar     Diverticulosis of sigmoid colon     severe    DIA (dyspnea on exertion)     GERD (gastroesophageal reflux disease)     Hyperlipidemia     Hypertension     Kidney stone     Obesity     Tubular

## 2018-10-06 NOTE — TELEPHONE ENCOUNTER
Pt saw Dr. Homer Bird, Cardiothoracic Surgeon. Pt to be scheduled for additional testing prior to surgery. Dr. Homer Bird is aware of Vascular CEA need also. See his note.

## 2018-10-10 ENCOUNTER — HOSPITAL ENCOUNTER (OUTPATIENT)
Dept: PULMONOLOGY | Age: 73
Discharge: HOME | End: 2018-10-10
Payer: MEDICARE

## 2018-10-10 ENCOUNTER — HOSPITAL ENCOUNTER (OUTPATIENT)
Dept: CT IMAGING | Age: 73
Discharge: HOME | End: 2018-10-12
Payer: MEDICARE

## 2018-10-10 DIAGNOSIS — R06.02 SOB (SHORTNESS OF BREATH): ICD-10-CM

## 2018-10-10 DIAGNOSIS — I70.0 AORTIC ATHEROSCLEROSIS (HCC): ICD-10-CM

## 2018-10-10 PROCEDURE — 94060 EVALUATION OF WHEEZING: CPT

## 2018-10-10 PROCEDURE — 94640 AIRWAY INHALATION TREATMENT: CPT

## 2018-10-10 PROCEDURE — 71250 CT THORAX DX C-: CPT

## 2018-10-10 PROCEDURE — 94726 PLETHYSMOGRAPHY LUNG VOLUMES: CPT

## 2018-10-10 PROCEDURE — 94729 DIFFUSING CAPACITY: CPT

## 2018-10-10 PROCEDURE — 6360000002 HC RX W HCPCS: Performed by: INTERNAL MEDICINE

## 2018-10-10 RX ORDER — ALBUTEROL SULFATE 2.5 MG/3ML
2.5 SOLUTION RESPIRATORY (INHALATION) ONCE
Status: COMPLETED | OUTPATIENT
Start: 2018-10-10 | End: 2018-10-10

## 2018-10-10 RX ADMIN — ALBUTEROL SULFATE 2.5 MG: 2.5 SOLUTION RESPIRATORY (INHALATION) at 14:52

## 2018-10-11 ENCOUNTER — HOSPITAL ENCOUNTER (OUTPATIENT)
Dept: CARDIAC CATH/INVASIVE PROCEDURES | Age: 73
Discharge: HOME | End: 2018-10-11
Payer: MEDICARE

## 2018-10-11 VITALS
WEIGHT: 202 LBS | HEIGHT: 65 IN | OXYGEN SATURATION: 98 % | TEMPERATURE: 97.6 F | RESPIRATION RATE: 18 BRPM | HEART RATE: 76 BPM | SYSTOLIC BLOOD PRESSURE: 107 MMHG | DIASTOLIC BLOOD PRESSURE: 64 MMHG | BODY MASS INDEX: 33.66 KG/M2

## 2018-10-11 DIAGNOSIS — I34.0 MODERATE MITRAL REGURGITATION: Chronic | ICD-10-CM

## 2018-10-11 LAB
GLUCOSE BLD-MCNC: 193 MG/DL (ref 75–110)
LV EF: 60 %
LV EF: 60 %
LVEF MODALITY: NORMAL
LVEF MODALITY: NORMAL

## 2018-10-11 PROCEDURE — 2709999900 HC NON-CHARGEABLE SUPPLY

## 2018-10-11 PROCEDURE — 82947 ASSAY GLUCOSE BLOOD QUANT: CPT

## 2018-10-11 PROCEDURE — 93325 DOPPLER ECHO COLOR FLOW MAPG: CPT

## 2018-10-11 PROCEDURE — 93320 DOPPLER ECHO COMPLETE: CPT

## 2018-10-11 PROCEDURE — 7100000011 HC PHASE II RECOVERY - ADDTL 15 MIN

## 2018-10-11 PROCEDURE — 93312 ECHO TRANSESOPHAGEAL: CPT

## 2018-10-11 PROCEDURE — 6360000002 HC RX W HCPCS

## 2018-10-11 PROCEDURE — 7100000010 HC PHASE II RECOVERY - FIRST 15 MIN

## 2018-10-11 RX ORDER — SODIUM CHLORIDE 9 MG/ML
INJECTION, SOLUTION INTRAVENOUS CONTINUOUS
Status: DISCONTINUED | OUTPATIENT
Start: 2018-10-11 | End: 2018-10-12 | Stop reason: HOSPADM

## 2018-10-11 RX ORDER — SODIUM CHLORIDE 0.9 % (FLUSH) 0.9 %
10 SYRINGE (ML) INJECTION PRN
Status: CANCELLED | OUTPATIENT
Start: 2018-10-11

## 2018-10-11 RX ORDER — SODIUM CHLORIDE 0.9 % (FLUSH) 0.9 %
10 SYRINGE (ML) INJECTION EVERY 12 HOURS SCHEDULED
Status: CANCELLED | OUTPATIENT
Start: 2018-10-11

## 2018-10-11 RX ADMIN — SODIUM CHLORIDE: 9 INJECTION, SOLUTION INTRAVENOUS at 13:35

## 2018-10-11 NOTE — PROGRESS NOTES
All discharge instructions reviewed, questions answered, paper signed and given copy. Patient discharged per ambulatory with son and belongings.

## 2018-10-11 NOTE — H&P
[facility]  Pre- Procedure History and Physical/Update          Patient Name:  Lisette Conn  MRN:    [id number]  YOB: 1945  Date of evaluation:  10/11/2018       Please refer to the consult note / H&P completed by Dr. Amrit Lucia on 9/21/18 in the medical record and note that:       [] I have examined the patient and reviewed the H&P/Consult and there are no changes to be made to the assessment or plan. [x] I have examined the patient and reviewed the H&P/Consult and have noted the following changes: The patient has been having dyspnea on exertion especially when he takes the garbage out to the curb down his driveway. This has gotten worse for him in the last 1 year. No syncope, or anginal episodes. Had the following workuop:  2D Echo 12/1/2017 [facility]   LVEF 55-60%, AV MG 34 mmHg, HYUN 1.0 cm2, mild-moderate MR, posteriorly directed     Coronary Angiography 3/1/2018  Non-obstructive CAD. Moderate aortic stenosis, HYUN of 1.3 cm2 and mean gradient of 27 mmHg.   Normal LV systolic function.   Normal right heart pressures    2D Echo [facility] 9/4/18  LVEF 70% grade II diastolic dysfunction. \"HYUN less than 1 cm2\", joe SÁNCHEZ.  MG 44, PG 73         Past Medical History:   Diagnosis Date    Abnormal echocardiogram     Aortic stenosis     Degenerative disc disease, lumbar     Diverticulosis of sigmoid colon     severe    DIA (dyspnea on exertion)     GERD (gastroesophageal reflux disease)     Hyperlipidemia     Hypertension     Kidney stone     Obesity     Tubular adenoma of colon 2007    refuses repeat colonoscopy    Type 2 diabetes mellitus without complication ([facility] [address]     last A1C 8.3       Past Surgical History:   Procedure Laterality Date    CARDIAC CATHETERIZATION  03/01/2018    non obstructive plaque ; normal EF ; mild AS    COLONOSCOPY  2005    for hemoccult positive stool    COLONOSCOPY  10/2017    diverticuli; Dr Brian Blevins

## 2018-10-24 ENCOUNTER — OFFICE VISIT (OUTPATIENT)
Dept: CARDIOTHORACIC SURGERY | Age: 73
End: 2018-10-24
Payer: MEDICARE

## 2018-10-24 VITALS
HEIGHT: 65 IN | TEMPERATURE: 97 F | HEART RATE: 79 BPM | BODY MASS INDEX: 33.66 KG/M2 | DIASTOLIC BLOOD PRESSURE: 78 MMHG | SYSTOLIC BLOOD PRESSURE: 126 MMHG | OXYGEN SATURATION: 98 % | RESPIRATION RATE: 14 BRPM | WEIGHT: 202 LBS

## 2018-10-24 DIAGNOSIS — R06.09 DYSPNEA ON EXERTION: Primary | ICD-10-CM

## 2018-10-24 PROCEDURE — G8427 DOCREV CUR MEDS BY ELIG CLIN: HCPCS | Performed by: THORACIC SURGERY (CARDIOTHORACIC VASCULAR SURGERY)

## 2018-10-24 PROCEDURE — G8484 FLU IMMUNIZE NO ADMIN: HCPCS | Performed by: THORACIC SURGERY (CARDIOTHORACIC VASCULAR SURGERY)

## 2018-10-24 PROCEDURE — G8417 CALC BMI ABV UP PARAM F/U: HCPCS | Performed by: THORACIC SURGERY (CARDIOTHORACIC VASCULAR SURGERY)

## 2018-10-24 PROCEDURE — 3017F COLORECTAL CA SCREEN DOC REV: CPT | Performed by: THORACIC SURGERY (CARDIOTHORACIC VASCULAR SURGERY)

## 2018-10-24 PROCEDURE — 4040F PNEUMOC VAC/ADMIN/RCVD: CPT | Performed by: THORACIC SURGERY (CARDIOTHORACIC VASCULAR SURGERY)

## 2018-10-24 PROCEDURE — G8598 ASA/ANTIPLAT THER USED: HCPCS | Performed by: THORACIC SURGERY (CARDIOTHORACIC VASCULAR SURGERY)

## 2018-10-24 PROCEDURE — 1036F TOBACCO NON-USER: CPT | Performed by: THORACIC SURGERY (CARDIOTHORACIC VASCULAR SURGERY)

## 2018-10-24 PROCEDURE — 99213 OFFICE O/P EST LOW 20 MIN: CPT | Performed by: THORACIC SURGERY (CARDIOTHORACIC VASCULAR SURGERY)

## 2018-10-24 PROCEDURE — 1101F PT FALLS ASSESS-DOCD LE1/YR: CPT | Performed by: THORACIC SURGERY (CARDIOTHORACIC VASCULAR SURGERY)

## 2018-10-24 PROCEDURE — 1123F ACP DISCUSS/DSCN MKR DOCD: CPT | Performed by: THORACIC SURGERY (CARDIOTHORACIC VASCULAR SURGERY)

## 2018-10-24 NOTE — PROGRESS NOTES
mouth daily 90 tablet 3    rosuvastatin (CRESTOR) 20 MG tablet Take 1 tablet by mouth daily 90 tablet 3    tamsulosin (FLOMAX) 0.4 MG capsule Take 1 capsule by mouth daily 90 capsule 3    labetalol (NORMODYNE) 100 MG tablet Take 1 tablet by mouth 2 times daily 180 tablet 3    hydrochlorothiazide (HYDRODIURIL) 50 MG tablet Take 1 tablet by mouth daily 90 tablet 3    clotrimazole-betamethasone (LOTRISONE) 1-0.05 % cream Apply topically 2 times daily Apply topically 2 times daily.  naproxen sodium (ALEVE) 220 MG tablet Take 220 mg by mouth 2 times daily (with meals)      aspirin 81 MG tablet Take 81 mg by mouth daily       No current facility-administered medications for this visit. Allergies   Allergen Reactions    Invokana [Canagliflozin]      dizziness    Januvia [Sitagliptin]      dizziness     Family History   Problem Relation Age of Onset    Cancer Father         prostate    Alzheimer's Disease Father     Heart Disease Neg Hx     Diabetes Neg Hx      Social History     Social History    Marital status:      Spouse name: N/A    Number of children: N/A    Years of education: N/A     Occupational History    Not on file.      Social History Main Topics    Smoking status: Former Smoker     Packs/day: 2.00     Years: 40.00     Types: Cigarettes     Quit date: 1998    Smokeless tobacco: Never Used    Alcohol use No    Drug use: No    Sexual activity: Not on file     Other Topics Concern    Not on file     Social History Narrative    No narrative on file     ROS:   CONSTITUTIONAL:  fatigue  Respiratory: positive for shortness of breath  Cardiovascular: positive for dyspnea  Gastrointestinal: negative  Genitourinary:negative  Hematologic/lymphatic: negative  Musculoskeletal:negative  Neurological: negative  Endocrine: negative    Physical Exam:  Vitals:    10/24/18 1013   BP: 126/78   Pulse: 79   Resp: 14   Temp: 97 °F (36.1 °C)   SpO2: 98%     Weight: Weight: 202 lb (91.6 kg) Weight: 202 lb (91.6 kg)    [unfilled]    Labs:      CBC: No results for input(s): WBC, HGB, HCT, MCV, PLT in the last 72 hours. BMP: No results for input(s): NA, K, CL, CO2, PHOS, BUN, CREATININE, MG in the last 72 hours. Invalid input(s): CA  Accucheck Glucoses: No results for input(s): POCGLU in the last 72 hours. Cardiac Enzymes: No results for input(s): CKTOTAL, CKMB, CKMBINDEX, TROPONINI in the last 72 hours. PT/INR: No results for input(s): PROTIME, INR in the last 72 hours. APTT: No results for input(s): APTT in the last 72 hours. Liver Profile:  Lab Results   Component Value Date    AST 26 02/19/2018    ALT 38 02/19/2018     Lab Results   Component Value Date    CHOL 146 02/19/2018    CHOL 209 04/13/2017    HDL 42 04/13/2017    TRIG 191 02/19/2018     TSH: No results found for: TSH  UA: No results found for: NITRITE, COLORU, PHUR, LABCAST, WBCUA, RBCUA, MUCUS, TRICHOMONAS, YEAST, BACTERIA, CLARITYU, SPECGRAV, LEUKOCYTESUR, UROBILINOGEN, BILIRUBINUR, BLOODU, GLUCOSEU, AMORPHOUS             Neck: Supple, no JVD, no carotid bruits  Heart: S1 and S2, no murmurs, no rubs  Lungs: no rales, wheezes, or rhonchi  Abdomen: positive bowel sounds, soft, non-tender, non-distended, no bruits, no masses  Extremities: warm and dry, no varicosities, no edema  Neurologic: alert and oriented x 3, moves all extremities, grasps strong bilaterally    Assessment:  Patient Active Problem List   Diagnosis    Mixed hyperlipidemia    Essential hypertension    Esophageal reflux    Family history of prostate cancer    Nonrheumatic aortic valve stenosis    Type 2 diabetes mellitus with complication (HCC)    Type 2 diabetes mellitus with hyperglycemia, without long-term current use of insulin (HCC)    Low back pain with sciatica    Moderate mitral regurgitation       Plan:  1.  Plan for surgery next Tuesday discussed SAVR/TAVR patient is moderate risk and wishes to have surgical replacement  2. preop pending

## 2018-10-26 ENCOUNTER — HOSPITAL ENCOUNTER (OUTPATIENT)
Dept: PREADMISSION TESTING | Age: 73
Discharge: HOME | End: 2018-10-30
Payer: MEDICARE

## 2018-10-26 ENCOUNTER — HOSPITAL ENCOUNTER (OUTPATIENT)
Dept: GENERAL RADIOLOGY | Age: 73
Discharge: HOME | End: 2018-10-28
Payer: MEDICARE

## 2018-10-26 VITALS
HEIGHT: 65 IN | TEMPERATURE: 98.1 F | DIASTOLIC BLOOD PRESSURE: 79 MMHG | RESPIRATION RATE: 18 BRPM | BODY MASS INDEX: 33.5 KG/M2 | OXYGEN SATURATION: 96 % | WEIGHT: 201.06 LBS | HEART RATE: 79 BPM | SYSTOLIC BLOOD PRESSURE: 128 MMHG

## 2018-10-26 LAB
ABSOLUTE EOS #: <0.03 K/UL (ref 0–0.44)
ABSOLUTE IMMATURE GRANULOCYTE: 0.06 K/UL (ref 0–0.3)
ABSOLUTE LYMPH #: 2.39 K/UL (ref 1.1–3.7)
ABSOLUTE MONO #: 0.71 K/UL (ref 0.1–1.2)
ALBUMIN SERPL-MCNC: 4.2 G/DL (ref 3.5–5.2)
ALBUMIN/GLOBULIN RATIO: 1.4 (ref 1–2.5)
ALLEN TEST: POSITIVE
ALP BLD-CCNC: 47 U/L (ref 40–129)
ALT SERPL-CCNC: 25 U/L (ref 5–41)
ANION GAP SERPL CALCULATED.3IONS-SCNC: 19 MMOL/L (ref 9–17)
AST SERPL-CCNC: 29 U/L
BASOPHILS # BLD: 1 % (ref 0–2)
BASOPHILS ABSOLUTE: 0.06 K/UL (ref 0–0.2)
BILIRUB SERPL-MCNC: 0.42 MG/DL (ref 0.3–1.2)
BILIRUBIN URINE: NEGATIVE
BUN BLDV-MCNC: 16 MG/DL (ref 8–23)
BUN/CREAT BLD: ABNORMAL (ref 9–20)
CALCIUM SERPL-MCNC: 9.8 MG/DL (ref 8.6–10.4)
CHLORIDE BLD-SCNC: 94 MMOL/L (ref 98–107)
CO2: 22 MMOL/L (ref 20–31)
COLOR: YELLOW
COMMENT UA: ABNORMAL
CREAT SERPL-MCNC: 0.8 MG/DL (ref 0.7–1.2)
DIFFERENTIAL TYPE: ABNORMAL
EKG ATRIAL RATE: 76 BPM
EKG P AXIS: 41 DEGREES
EKG P-R INTERVAL: 164 MS
EKG Q-T INTERVAL: 406 MS
EKG QRS DURATION: 142 MS
EKG QTC CALCULATION (BAZETT): 456 MS
EKG R AXIS: -53 DEGREES
EKG T AXIS: 3 DEGREES
EKG VENTRICULAR RATE: 76 BPM
EOSINOPHILS RELATIVE PERCENT: 0 % (ref 1–4)
FIO2: 21
GFR AFRICAN AMERICAN: >60 ML/MIN
GFR NON-AFRICAN AMERICAN: >60 ML/MIN
GFR SERPL CREATININE-BSD FRML MDRD: ABNORMAL ML/MIN/{1.73_M2}
GFR SERPL CREATININE-BSD FRML MDRD: ABNORMAL ML/MIN/{1.73_M2}
GLUCOSE BLD-MCNC: 238 MG/DL (ref 70–99)
GLUCOSE URINE: ABNORMAL
HCT VFR BLD CALC: 36.3 % (ref 40.7–50.3)
HEMOGLOBIN: 11.7 G/DL (ref 13–17)
IMMATURE GRANULOCYTES: 1 %
INR BLD: 1
KETONES, URINE: NEGATIVE
LEUKOCYTE ESTERASE, URINE: NEGATIVE
LYMPHOCYTES # BLD: 26 % (ref 24–43)
MCH RBC QN AUTO: 30.2 PG (ref 25.2–33.5)
MCHC RBC AUTO-ENTMCNC: 32.2 G/DL (ref 28.4–34.8)
MCV RBC AUTO: 93.6 FL (ref 82.6–102.9)
MODE: NORMAL
MONOCYTES # BLD: 8 % (ref 3–12)
MRSA, DNA, NASAL: NORMAL
NEGATIVE BASE EXCESS, ART: NORMAL (ref 0–2)
NITRITE, URINE: NEGATIVE
NRBC AUTOMATED: 0 PER 100 WBC
O2 DEVICE/FLOW/%: NORMAL
PARTIAL THROMBOPLASTIN TIME: 27.7 SEC (ref 20.5–30.5)
PATIENT TEMP: NORMAL
PDW BLD-RTO: 13 % (ref 11.8–14.4)
PH UA: 7 (ref 5–8)
PLATELET # BLD: 240 K/UL (ref 138–453)
PLATELET ESTIMATE: ABNORMAL
PMV BLD AUTO: 11.4 FL (ref 8.1–13.5)
POC HCO3: 27 MMOL/L (ref 21–28)
POC O2 SATURATION: 96 % (ref 94–98)
POC PCO2 TEMP: NORMAL MM HG
POC PCO2: 40.5 MM HG (ref 35–48)
POC PH TEMP: NORMAL
POC PH: 7.43 (ref 7.35–7.45)
POC PO2 TEMP: NORMAL MM HG
POC PO2: 83.1 MM HG (ref 83–108)
POSITIVE BASE EXCESS, ART: 2 (ref 0–3)
POTASSIUM SERPL-SCNC: 5.6 MMOL/L (ref 3.7–5.3)
PROTEIN UA: NEGATIVE
PROTHROMBIN TIME: 10.8 SEC (ref 9–12)
RBC # BLD: 3.88 M/UL (ref 4.21–5.77)
RBC # BLD: ABNORMAL 10*6/UL
SAMPLE SITE: NORMAL
SEG NEUTROPHILS: 64 % (ref 36–65)
SEGMENTED NEUTROPHILS ABSOLUTE COUNT: 6.02 K/UL (ref 1.5–8.1)
SODIUM BLD-SCNC: 135 MMOL/L (ref 135–144)
SPECIFIC GRAVITY UA: 1.02 (ref 1–1.03)
SPECIMEN DESCRIPTION: NORMAL
TCO2 (CALC), ART: 28 MMOL/L (ref 22–29)
TOTAL PROTEIN: 7.1 G/DL (ref 6.4–8.3)
TURBIDITY: CLEAR
URINE HGB: NEGATIVE
UROBILINOGEN, URINE: NORMAL
WBC # BLD: 9.2 K/UL (ref 3.5–11.3)
WBC # BLD: ABNORMAL 10*3/UL

## 2018-10-26 PROCEDURE — 71046 X-RAY EXAM CHEST 2 VIEWS: CPT

## 2018-10-26 PROCEDURE — 86901 BLOOD TYPING SEROLOGIC RH(D): CPT

## 2018-10-26 PROCEDURE — 93005 ELECTROCARDIOGRAM TRACING: CPT

## 2018-10-26 PROCEDURE — 86850 RBC ANTIBODY SCREEN: CPT

## 2018-10-26 PROCEDURE — 36415 COLL VENOUS BLD VENIPUNCTURE: CPT

## 2018-10-26 PROCEDURE — 87086 URINE CULTURE/COLONY COUNT: CPT

## 2018-10-26 PROCEDURE — 85610 PROTHROMBIN TIME: CPT

## 2018-10-26 PROCEDURE — 85730 THROMBOPLASTIN TIME PARTIAL: CPT

## 2018-10-26 PROCEDURE — 80053 COMPREHEN METABOLIC PANEL: CPT

## 2018-10-26 PROCEDURE — 36600 WITHDRAWAL OF ARTERIAL BLOOD: CPT

## 2018-10-26 PROCEDURE — 81003 URINALYSIS AUTO W/O SCOPE: CPT

## 2018-10-26 PROCEDURE — 86900 BLOOD TYPING SEROLOGIC ABO: CPT

## 2018-10-26 PROCEDURE — 85025 COMPLETE CBC W/AUTO DIFF WBC: CPT

## 2018-10-26 PROCEDURE — 87641 MR-STAPH DNA AMP PROBE: CPT

## 2018-10-26 PROCEDURE — 82803 BLOOD GASES ANY COMBINATION: CPT

## 2018-10-26 RX ORDER — SODIUM CHLORIDE, SODIUM LACTATE, POTASSIUM CHLORIDE, CALCIUM CHLORIDE 600; 310; 30; 20 MG/100ML; MG/100ML; MG/100ML; MG/100ML
1000 INJECTION, SOLUTION INTRAVENOUS CONTINUOUS
Status: CANCELLED | OUTPATIENT
Start: 2018-10-26

## 2018-10-27 LAB
CULTURE: NORMAL
Lab: NORMAL
SPECIMEN DESCRIPTION: NORMAL
STATUS: NORMAL

## 2018-10-29 ENCOUNTER — ANESTHESIA EVENT (OUTPATIENT)
Dept: OPERATING ROOM | Age: 73
DRG: 220 | End: 2018-10-29
Payer: MEDICARE

## 2018-10-29 PROCEDURE — 99024 POSTOP FOLLOW-UP VISIT: CPT | Performed by: PHYSICIAN ASSISTANT

## 2018-10-29 ASSESSMENT — ENCOUNTER SYMPTOMS
ABDOMINAL PAIN: 0
CHEST TIGHTNESS: 0
WHEEZING: 0
DIARRHEA: 0
COUGH: 0
VOMITING: 0
SHORTNESS OF BREATH: 1
NAUSEA: 0
CONSTIPATION: 0

## 2018-10-29 NOTE — H&P
capsule by mouth daily 90 capsule 3    labetalol (NORMODYNE) 100 MG tablet Take 1 tablet by mouth 2 times daily 180 tablet 3    hydrochlorothiazide (HYDRODIURIL) 50 MG tablet Take 1 tablet by mouth daily 90 tablet 3    clotrimazole-betamethasone (LOTRISONE) 1-0.05 % cream Apply topically 2 times daily       naproxen sodium (ALEVE) 220 MG tablet Take 220 mg by mouth 2 times daily (with meals)      aspirin 81 MG tablet Take 81 mg by mouth daily Pt.will stop 10-30 -18 AM for OR         Physical Exam:  There were no vitals filed for this visit. Weight:           No intake/output data recorded. General: Alert and Oriented x3. Sitting up in bed. No apparent distress. HEENT:  Normocephalic and atraumatic. PERRL. EOMI. Lips and oral mucosa moist and without lesions. Neck:  Supple. Trachea midline. Chest:  No abnormality. Equal and symmetric expansion with respiration. Lungs:  Clear to auscultation. No fremitus. Cardiac:  Regular rate and rhythm without murmurs, rubs or gallops. NSRon bedside telemetry. Abdomen:  Soft, non-tender, normoactive bowel sounds. No masses or organomegaly. Extremities:  No cyanosis, clubbing, or edema. Intact pulses in all four extremities. Musculoskeletal:  Intact range of motion of peripheral joints. Normal muscular strength. Neurologic:  Cranial nerves are grossly intact. Non-focal sensory deficits on exam.  Psychiatric: Mood and affect are appropriate. Data:    CBC: No results for input(s): WBC, HGB, HCT, MCV, PLT in the last 72 hours. BMP: No results for input(s): NA, K, CL, CO2, PHOS, BUN, CREATININE, MG in the last 72 hours. Invalid input(s): CA  Accucheck Glucoses:   No results for input(s): POCGLU in the last 72 hours. Cardiac Enzymes: No results for input(s): CKTOTAL, CKMB, CKMBINDEX, TROPONINI in the last 72 hours. PTT/PT/INR:   No results for input(s): PROTIME, INR in the last 72 hours. No results for input(s): APTT in the last 72 hours.   Liver

## 2018-10-30 ENCOUNTER — ANESTHESIA (OUTPATIENT)
Dept: OPERATING ROOM | Age: 73
DRG: 220 | End: 2018-10-30
Payer: MEDICARE

## 2018-10-30 ENCOUNTER — APPOINTMENT (OUTPATIENT)
Dept: GENERAL RADIOLOGY | Age: 73
DRG: 220 | End: 2018-10-30
Attending: THORACIC SURGERY (CARDIOTHORACIC VASCULAR SURGERY)
Payer: MEDICARE

## 2018-10-30 ENCOUNTER — HOSPITAL ENCOUNTER (INPATIENT)
Age: 73
LOS: 5 days | Discharge: HOME HEALTH | DRG: 220 | End: 2018-11-04
Attending: THORACIC SURGERY (CARDIOTHORACIC VASCULAR SURGERY) | Admitting: THORACIC SURGERY (CARDIOTHORACIC VASCULAR SURGERY)
Payer: MEDICARE

## 2018-10-30 VITALS — TEMPERATURE: 97 F | OXYGEN SATURATION: 100 % | SYSTOLIC BLOOD PRESSURE: 126 MMHG | DIASTOLIC BLOOD PRESSURE: 77 MMHG

## 2018-10-30 DIAGNOSIS — N40.0 BENIGN PROSTATIC HYPERPLASIA, UNSPECIFIED WHETHER LOWER URINARY TRACT SYMPTOMS PRESENT: ICD-10-CM

## 2018-10-30 DIAGNOSIS — E78.2 MIXED HYPERLIPIDEMIA: ICD-10-CM

## 2018-10-30 DIAGNOSIS — Z95.2 S/P AVR (AORTIC VALVE REPLACEMENT): Primary | ICD-10-CM

## 2018-10-30 DIAGNOSIS — E11.65 TYPE 2 DIABETES MELLITUS WITH HYPERGLYCEMIA, WITHOUT LONG-TERM CURRENT USE OF INSULIN (HCC): ICD-10-CM

## 2018-10-30 DIAGNOSIS — K21.9 GASTROESOPHAGEAL REFLUX DISEASE, ESOPHAGITIS PRESENCE NOT SPECIFIED: ICD-10-CM

## 2018-10-30 LAB
ALLEN TEST: ABNORMAL
ANION GAP SERPL CALCULATED.3IONS-SCNC: 10 MMOL/L (ref 9–17)
ANION GAP: 12 MMOL/L (ref 7–16)
BUN BLDV-MCNC: 14 MG/DL (ref 8–23)
BUN/CREAT BLD: ABNORMAL (ref 9–20)
CALCIUM SERPL-MCNC: 7.7 MG/DL (ref 8.6–10.4)
CHLORIDE BLD-SCNC: 108 MMOL/L (ref 98–107)
CO2: 21 MMOL/L (ref 20–31)
CREAT SERPL-MCNC: 0.8 MG/DL (ref 0.7–1.2)
EKG ATRIAL RATE: 82 BPM
EKG P AXIS: 46 DEGREES
EKG P-R INTERVAL: 170 MS
EKG Q-T INTERVAL: 420 MS
EKG QRS DURATION: 142 MS
EKG QTC CALCULATION (BAZETT): 490 MS
EKG R AXIS: -63 DEGREES
EKG T AXIS: -12 DEGREES
EKG VENTRICULAR RATE: 82 BPM
FIO2: 40
FIO2: 55
FIO2: ABNORMAL
GFR AFRICAN AMERICAN: >60 ML/MIN
GFR NON-AFRICAN AMERICAN: >60 ML/MIN
GFR NON-AFRICAN AMERICAN: >60 ML/MIN
GFR SERPL CREATININE-BSD FRML MDRD: >60 ML/MIN
GFR SERPL CREATININE-BSD FRML MDRD: ABNORMAL ML/MIN/{1.73_M2}
GFR SERPL CREATININE-BSD FRML MDRD: ABNORMAL ML/MIN/{1.73_M2}
GFR SERPL CREATININE-BSD FRML MDRD: NORMAL ML/MIN/{1.73_M2}
GLUCOSE BLD-MCNC: 112 MG/DL (ref 75–110)
GLUCOSE BLD-MCNC: 116 MG/DL (ref 75–110)
GLUCOSE BLD-MCNC: 122 MG/DL (ref 75–110)
GLUCOSE BLD-MCNC: 130 MG/DL (ref 75–110)
GLUCOSE BLD-MCNC: 136 MG/DL (ref 75–110)
GLUCOSE BLD-MCNC: 141 MG/DL (ref 75–110)
GLUCOSE BLD-MCNC: 144 MG/DL (ref 74–100)
GLUCOSE BLD-MCNC: 152 MG/DL (ref 75–110)
GLUCOSE BLD-MCNC: 166 MG/DL (ref 70–99)
GLUCOSE BLD-MCNC: 169 MG/DL (ref 74–100)
GLUCOSE BLD-MCNC: 189 MG/DL (ref 74–100)
GLUCOSE BLD-MCNC: 212 MG/DL (ref 74–100)
GLUCOSE BLD-MCNC: 223 MG/DL (ref 74–100)
GLUCOSE BLD-MCNC: 231 MG/DL (ref 74–100)
GLUCOSE BLD-MCNC: 272 MG/DL (ref 74–100)
HCT VFR BLD CALC: 29.1 % (ref 40.7–50.3)
HEMOGLOBIN: 9.3 G/DL (ref 13–17)
INR BLD: 1.2
MAGNESIUM: 2.3 MG/DL (ref 1.6–2.6)
MAGNESIUM: 2.5 MG/DL (ref 1.6–2.6)
MCH RBC QN AUTO: 30.4 PG (ref 25.2–33.5)
MCHC RBC AUTO-ENTMCNC: 32 G/DL (ref 28.4–34.8)
MCV RBC AUTO: 95.1 FL (ref 82.6–102.9)
MODE: ABNORMAL
NEGATIVE BASE EXCESS, ART: 1 (ref 0–2)
NEGATIVE BASE EXCESS, ART: 1 (ref 0–2)
NEGATIVE BASE EXCESS, ART: 2 (ref 0–2)
NEGATIVE BASE EXCESS, ART: 3 (ref 0–2)
NEGATIVE BASE EXCESS, ART: 4 (ref 0–2)
NEGATIVE BASE EXCESS, ART: ABNORMAL (ref 0–2)
NEGATIVE BASE EXCESS, ART: ABNORMAL (ref 0–2)
NRBC AUTOMATED: 0 PER 100 WBC
O2 DEVICE/FLOW/%: ABNORMAL
PARTIAL THROMBOPLASTIN TIME: 28.1 SEC (ref 20.5–30.5)
PATIENT TEMP: ABNORMAL
PDW BLD-RTO: 12.9 % (ref 11.8–14.4)
PLATELET # BLD: 106 K/UL (ref 138–453)
PMV BLD AUTO: 11.6 FL (ref 8.1–13.5)
POC CHLORIDE: 108 MMOL/L (ref 98–107)
POC CREATININE: 0.74 MG/DL (ref 0.51–1.19)
POC HCO3: 20.9 MMOL/L (ref 21–28)
POC HCO3: 21.9 MMOL/L (ref 21–28)
POC HCO3: 22.1 MMOL/L (ref 21–28)
POC HCO3: 22.9 MMOL/L (ref 21–28)
POC HCO3: 23.8 MMOL/L (ref 21–28)
POC HCO3: 25.6 MMOL/L (ref 21–28)
POC HCO3: 26.1 MMOL/L (ref 21–28)
POC HEMATOCRIT: 20 % (ref 41–53)
POC HEMATOCRIT: 21 % (ref 41–53)
POC HEMATOCRIT: 25 % (ref 41–53)
POC HEMATOCRIT: 28 % (ref 41–53)
POC HEMATOCRIT: 29 % (ref 41–53)
POC HEMATOCRIT: 31 % (ref 41–53)
POC HEMOGLOBIN: 10.6 G/DL (ref 13.5–17.5)
POC HEMOGLOBIN: 6.8 G/DL (ref 13.5–17.5)
POC HEMOGLOBIN: 7 G/DL (ref 13.5–17.5)
POC HEMOGLOBIN: 8.6 G/DL (ref 13.5–17.5)
POC HEMOGLOBIN: 9.4 G/DL (ref 13.5–17.5)
POC HEMOGLOBIN: 9.8 G/DL (ref 13.5–17.5)
POC IONIZED CALCIUM: 1.1 MMOL/L (ref 1.15–1.33)
POC IONIZED CALCIUM: 1.16 MMOL/L (ref 1.15–1.33)
POC IONIZED CALCIUM: 1.25 MMOL/L (ref 1.15–1.33)
POC IONIZED CALCIUM: 1.26 MMOL/L (ref 1.15–1.33)
POC IONIZED CALCIUM: 1.28 MMOL/L (ref 1.15–1.33)
POC IONIZED CALCIUM: 1.56 MMOL/L (ref 1.15–1.33)
POC LACTIC ACID: 2.31 MMOL/L (ref 0.56–1.39)
POC O2 SATURATION: 100 % (ref 94–98)
POC O2 SATURATION: 96 % (ref 94–98)
POC O2 SATURATION: 98 % (ref 94–98)
POC O2 SATURATION: 99 % (ref 94–98)
POC PCO2 TEMP: ABNORMAL MM HG
POC PCO2: 34.1 MM HG (ref 35–48)
POC PCO2: 35 MM HG (ref 35–48)
POC PCO2: 35.3 MM HG (ref 35–48)
POC PCO2: 35.9 MM HG (ref 35–48)
POC PCO2: 37.6 MM HG (ref 35–48)
POC PCO2: 39.4 MM HG (ref 35–48)
POC PCO2: 39.6 MM HG (ref 35–48)
POC PH TEMP: ABNORMAL
POC PH: 7.38 (ref 7.35–7.45)
POC PH: 7.39 (ref 7.35–7.45)
POC PH: 7.41 (ref 7.35–7.45)
POC PH: 7.41 (ref 7.35–7.45)
POC PH: 7.42 (ref 7.35–7.45)
POC PH: 7.43 (ref 7.35–7.45)
POC PH: 7.44 (ref 7.35–7.45)
POC PO2 TEMP: ABNORMAL MM HG
POC PO2: 100.1 MM HG (ref 83–108)
POC PO2: 139.5 MM HG (ref 83–108)
POC PO2: 409.8 MM HG (ref 83–108)
POC PO2: 436.3 MM HG (ref 83–108)
POC PO2: 543.7 MM HG (ref 83–108)
POC PO2: 577.5 MM HG (ref 83–108)
POC PO2: 82.2 MM HG (ref 83–108)
POC POTASSIUM: 3.4 MMOL/L (ref 3.5–4.5)
POC POTASSIUM: 3.8 MMOL/L (ref 3.5–4.5)
POC POTASSIUM: 4.1 MMOL/L (ref 3.5–4.5)
POC POTASSIUM: 4.3 MMOL/L (ref 3.5–4.5)
POC POTASSIUM: 4.6 MMOL/L (ref 3.5–4.5)
POC POTASSIUM: 5.2 MMOL/L (ref 3.5–4.5)
POC SODIUM: 142 MMOL/L (ref 138–146)
POSITIVE BASE EXCESS, ART: 1 (ref 0–3)
POSITIVE BASE EXCESS, ART: 2 (ref 0–3)
POSITIVE BASE EXCESS, ART: ABNORMAL (ref 0–3)
POTASSIUM SERPL-SCNC: 3.6 MMOL/L (ref 3.7–5.3)
POTASSIUM SERPL-SCNC: 4.7 MMOL/L (ref 3.7–5.3)
POTASSIUM SERPL-SCNC: 4.9 MMOL/L (ref 3.7–5.3)
PROTHROMBIN TIME: 13.1 SEC (ref 9–12)
RBC # BLD: 3.06 M/UL (ref 4.21–5.77)
SAMPLE SITE: ABNORMAL
SODIUM BLD-SCNC: 139 MMOL/L (ref 135–144)
TCO2 (CALC), ART: 22 MMOL/L (ref 22–29)
TCO2 (CALC), ART: 23 MMOL/L (ref 22–29)
TCO2 (CALC), ART: 23 MMOL/L (ref 22–29)
TCO2 (CALC), ART: 24 MMOL/L (ref 22–29)
TCO2 (CALC), ART: 25 MMOL/L (ref 22–29)
TCO2 (CALC), ART: 27 MMOL/L (ref 22–29)
TCO2 (CALC), ART: 27 MMOL/L (ref 22–29)
WBC # BLD: 19.7 K/UL (ref 3.5–11.3)

## 2018-10-30 PROCEDURE — S0028 INJECTION, FAMOTIDINE, 20 MG: HCPCS | Performed by: PHYSICIAN ASSISTANT

## 2018-10-30 PROCEDURE — 6360000002 HC RX W HCPCS: Performed by: SPECIALIST

## 2018-10-30 PROCEDURE — 3700000001 HC ADD 15 MINUTES (ANESTHESIA): Performed by: THORACIC SURGERY (CARDIOTHORACIC VASCULAR SURGERY)

## 2018-10-30 PROCEDURE — 85576 BLOOD PLATELET AGGREGATION: CPT

## 2018-10-30 PROCEDURE — 2700000000 HC OXYGEN THERAPY PER DAY

## 2018-10-30 PROCEDURE — 88311 DECALCIFY TISSUE: CPT

## 2018-10-30 PROCEDURE — 2580000003 HC RX 258: Performed by: SPECIALIST

## 2018-10-30 PROCEDURE — 94762 N-INVAS EAR/PLS OXIMTRY CONT: CPT

## 2018-10-30 PROCEDURE — 6360000002 HC RX W HCPCS

## 2018-10-30 PROCEDURE — 85390 FIBRINOLYSINS SCREEN I&R: CPT

## 2018-10-30 PROCEDURE — 2000000000 HC ICU R&B

## 2018-10-30 PROCEDURE — 3600000008 HC SURGERY OHS BASE: Performed by: THORACIC SURGERY (CARDIOTHORACIC VASCULAR SURGERY)

## 2018-10-30 PROCEDURE — 2580000003 HC RX 258: Performed by: THORACIC SURGERY (CARDIOTHORACIC VASCULAR SURGERY)

## 2018-10-30 PROCEDURE — 82330 ASSAY OF CALCIUM: CPT

## 2018-10-30 PROCEDURE — 3600000018 HC SURGERY OHS ADDTL 15MIN: Performed by: THORACIC SURGERY (CARDIOTHORACIC VASCULAR SURGERY)

## 2018-10-30 PROCEDURE — 2720000010 HC SURG SUPPLY STERILE: Performed by: THORACIC SURGERY (CARDIOTHORACIC VASCULAR SURGERY)

## 2018-10-30 PROCEDURE — 82947 ASSAY GLUCOSE BLOOD QUANT: CPT

## 2018-10-30 PROCEDURE — 2580000003 HC RX 258: Performed by: PHYSICIAN ASSISTANT

## 2018-10-30 PROCEDURE — 83735 ASSAY OF MAGNESIUM: CPT

## 2018-10-30 PROCEDURE — 82803 BLOOD GASES ANY COMBINATION: CPT

## 2018-10-30 PROCEDURE — 6370000000 HC RX 637 (ALT 250 FOR IP): Performed by: THORACIC SURGERY (CARDIOTHORACIC VASCULAR SURGERY)

## 2018-10-30 PROCEDURE — 2500000003 HC RX 250 WO HCPCS: Performed by: THORACIC SURGERY (CARDIOTHORACIC VASCULAR SURGERY)

## 2018-10-30 PROCEDURE — 3700000000 HC ANESTHESIA ATTENDED CARE: Performed by: THORACIC SURGERY (CARDIOTHORACIC VASCULAR SURGERY)

## 2018-10-30 PROCEDURE — 83605 ASSAY OF LACTIC ACID: CPT

## 2018-10-30 PROCEDURE — 85014 HEMATOCRIT: CPT

## 2018-10-30 PROCEDURE — 80048 BASIC METABOLIC PNL TOTAL CA: CPT

## 2018-10-30 PROCEDURE — B24BZZ4 ULTRASONOGRAPHY OF HEART WITH AORTA, TRANSESOPHAGEAL: ICD-10-PCS | Performed by: THORACIC SURGERY (CARDIOTHORACIC VASCULAR SURGERY)

## 2018-10-30 PROCEDURE — 88305 TISSUE EXAM BY PATHOLOGIST: CPT

## 2018-10-30 PROCEDURE — 85347 COAGULATION TIME ACTIVATED: CPT

## 2018-10-30 PROCEDURE — P9041 ALBUMIN (HUMAN),5%, 50ML: HCPCS

## 2018-10-30 PROCEDURE — 33405 REPLACEMENT AORTIC VALVE OPN: CPT | Performed by: THORACIC SURGERY (CARDIOTHORACIC VASCULAR SURGERY)

## 2018-10-30 PROCEDURE — P9041 ALBUMIN (HUMAN),5%, 50ML: HCPCS | Performed by: PHYSICIAN ASSISTANT

## 2018-10-30 PROCEDURE — 82435 ASSAY OF BLOOD CHLORIDE: CPT

## 2018-10-30 PROCEDURE — 82565 ASSAY OF CREATININE: CPT

## 2018-10-30 PROCEDURE — 71045 X-RAY EXAM CHEST 1 VIEW: CPT

## 2018-10-30 PROCEDURE — 94002 VENT MGMT INPAT INIT DAY: CPT

## 2018-10-30 PROCEDURE — 87086 URINE CULTURE/COLONY COUNT: CPT

## 2018-10-30 PROCEDURE — 84132 ASSAY OF SERUM POTASSIUM: CPT

## 2018-10-30 PROCEDURE — 5A1221Z PERFORMANCE OF CARDIAC OUTPUT, CONTINUOUS: ICD-10-PCS | Performed by: THORACIC SURGERY (CARDIOTHORACIC VASCULAR SURGERY)

## 2018-10-30 PROCEDURE — 02RF0JZ REPLACEMENT OF AORTIC VALVE WITH SYNTHETIC SUBSTITUTE, OPEN APPROACH: ICD-10-PCS | Performed by: THORACIC SURGERY (CARDIOTHORACIC VASCULAR SURGERY)

## 2018-10-30 PROCEDURE — 85384 FIBRINOGEN ACTIVITY: CPT

## 2018-10-30 PROCEDURE — 2709999900 HC NON-CHARGEABLE SUPPLY: Performed by: THORACIC SURGERY (CARDIOTHORACIC VASCULAR SURGERY)

## 2018-10-30 PROCEDURE — 6370000000 HC RX 637 (ALT 250 FOR IP): Performed by: PHYSICIAN ASSISTANT

## 2018-10-30 PROCEDURE — C9290 INJ, BUPIVACAINE LIPOSOME: HCPCS | Performed by: THORACIC SURGERY (CARDIOTHORACIC VASCULAR SURGERY)

## 2018-10-30 PROCEDURE — 84295 ASSAY OF SERUM SODIUM: CPT

## 2018-10-30 PROCEDURE — 2780000006 HC MISC HEART VALVE: Performed by: THORACIC SURGERY (CARDIOTHORACIC VASCULAR SURGERY)

## 2018-10-30 PROCEDURE — 2500000003 HC RX 250 WO HCPCS: Performed by: PHYSICIAN ASSISTANT

## 2018-10-30 PROCEDURE — 6360000002 HC RX W HCPCS: Performed by: THORACIC SURGERY (CARDIOTHORACIC VASCULAR SURGERY)

## 2018-10-30 PROCEDURE — 85610 PROTHROMBIN TIME: CPT

## 2018-10-30 PROCEDURE — 93005 ELECTROCARDIOGRAM TRACING: CPT

## 2018-10-30 PROCEDURE — 6370000000 HC RX 637 (ALT 250 FOR IP): Performed by: SPECIALIST

## 2018-10-30 PROCEDURE — 85730 THROMBOPLASTIN TIME PARTIAL: CPT

## 2018-10-30 PROCEDURE — 85027 COMPLETE CBC AUTOMATED: CPT

## 2018-10-30 PROCEDURE — 94770 HC ETCO2 MONITOR DAILY: CPT

## 2018-10-30 PROCEDURE — 6360000002 HC RX W HCPCS: Performed by: PHYSICIAN ASSISTANT

## 2018-10-30 PROCEDURE — 2500000003 HC RX 250 WO HCPCS: Performed by: SPECIALIST

## 2018-10-30 DEVICE — IMPLANTABLE DEVICE: Type: IMPLANTABLE DEVICE | Site: HEART | Status: FUNCTIONAL

## 2018-10-30 RX ORDER — FAMOTIDINE 20 MG/1
20 TABLET, FILM COATED ORAL 2 TIMES DAILY
Status: DISCONTINUED | OUTPATIENT
Start: 2018-11-01 | End: 2018-11-04 | Stop reason: HOSPADM

## 2018-10-30 RX ORDER — ACETAMINOPHEN 325 MG/1
650 TABLET ORAL EVERY 4 HOURS PRN
Status: DISCONTINUED | OUTPATIENT
Start: 2018-10-30 | End: 2018-11-04 | Stop reason: HOSPADM

## 2018-10-30 RX ORDER — PROPOFOL 10 MG/ML
INJECTION, EMULSION INTRAVENOUS
Status: COMPLETED
Start: 2018-10-30 | End: 2018-10-30

## 2018-10-30 RX ORDER — POTASSIUM CHLORIDE 29.8 MG/ML
20 INJECTION INTRAVENOUS PRN
Status: DISCONTINUED | OUTPATIENT
Start: 2018-10-30 | End: 2018-11-04 | Stop reason: HOSPADM

## 2018-10-30 RX ORDER — MIDAZOLAM HYDROCHLORIDE 1 MG/ML
INJECTION INTRAMUSCULAR; INTRAVENOUS PRN
Status: DISCONTINUED | OUTPATIENT
Start: 2018-10-30 | End: 2018-10-30 | Stop reason: SDUPTHER

## 2018-10-30 RX ORDER — FENTANYL CITRATE 50 UG/ML
50 INJECTION, SOLUTION INTRAMUSCULAR; INTRAVENOUS
Status: DISCONTINUED | OUTPATIENT
Start: 2018-10-30 | End: 2018-11-04 | Stop reason: HOSPADM

## 2018-10-30 RX ORDER — ONDANSETRON 2 MG/ML
4 INJECTION INTRAMUSCULAR; INTRAVENOUS EVERY 8 HOURS PRN
Status: DISCONTINUED | OUTPATIENT
Start: 2018-10-30 | End: 2018-11-04 | Stop reason: HOSPADM

## 2018-10-30 RX ORDER — SODIUM CHLORIDE 0.9 % (FLUSH) 0.9 %
10 SYRINGE (ML) INJECTION PRN
Status: DISCONTINUED | OUTPATIENT
Start: 2018-10-30 | End: 2018-11-04 | Stop reason: HOSPADM

## 2018-10-30 RX ORDER — CHLORHEXIDINE GLUCONATE 4 G/100ML
SOLUTION TOPICAL SEE ADMIN INSTRUCTIONS
Status: DISCONTINUED | OUTPATIENT
Start: 2018-10-30 | End: 2018-10-30 | Stop reason: HOSPADM

## 2018-10-30 RX ORDER — DOCUSATE SODIUM 100 MG/1
100 CAPSULE, LIQUID FILLED ORAL 2 TIMES DAILY
Status: DISCONTINUED | OUTPATIENT
Start: 2018-10-31 | End: 2018-11-04 | Stop reason: HOSPADM

## 2018-10-30 RX ORDER — ALBUMIN, HUMAN INJ 5% 5 %
SOLUTION INTRAVENOUS
Status: COMPLETED
Start: 2018-10-30 | End: 2018-10-30

## 2018-10-30 RX ORDER — FENTANYL CITRATE 50 UG/ML
25 INJECTION, SOLUTION INTRAMUSCULAR; INTRAVENOUS
Status: DISCONTINUED | OUTPATIENT
Start: 2018-10-30 | End: 2018-11-04 | Stop reason: HOSPADM

## 2018-10-30 RX ORDER — POLYETHYLENE GLYCOL 3350 17 G/17G
17 POWDER, FOR SOLUTION ORAL DAILY
Status: DISCONTINUED | OUTPATIENT
Start: 2018-10-31 | End: 2018-11-04 | Stop reason: HOSPADM

## 2018-10-30 RX ORDER — VANCOMYCIN HYDROCHLORIDE 1 G/200ML
INJECTION, SOLUTION INTRAVENOUS PRN
Status: DISCONTINUED | OUTPATIENT
Start: 2018-10-30 | End: 2018-10-30 | Stop reason: SDUPTHER

## 2018-10-30 RX ORDER — EPHEDRINE SULFATE/0.9% NACL/PF 50 MG/5 ML
SYRINGE (ML) INTRAVENOUS PRN
Status: DISCONTINUED | OUTPATIENT
Start: 2018-10-30 | End: 2018-10-30 | Stop reason: SDUPTHER

## 2018-10-30 RX ORDER — ASPIRIN 81 MG/1
81 TABLET ORAL DAILY
Status: DISCONTINUED | OUTPATIENT
Start: 2018-10-30 | End: 2018-11-04 | Stop reason: HOSPADM

## 2018-10-30 RX ORDER — SODIUM CHLORIDE, SODIUM LACTATE, POTASSIUM CHLORIDE, CALCIUM CHLORIDE 600; 310; 30; 20 MG/100ML; MG/100ML; MG/100ML; MG/100ML
1000 INJECTION, SOLUTION INTRAVENOUS CONTINUOUS
Status: DISCONTINUED | OUTPATIENT
Start: 2018-10-30 | End: 2018-10-30

## 2018-10-30 RX ORDER — PROTAMINE SULFATE 10 MG/ML
50 INJECTION, SOLUTION INTRAVENOUS
Status: ACTIVE | OUTPATIENT
Start: 2018-10-30 | End: 2018-10-30

## 2018-10-30 RX ORDER — SODIUM CHLORIDE 0.9 % (FLUSH) 0.9 %
10 SYRINGE (ML) INJECTION EVERY 12 HOURS SCHEDULED
Status: DISCONTINUED | OUTPATIENT
Start: 2018-10-30 | End: 2018-11-04 | Stop reason: HOSPADM

## 2018-10-30 RX ORDER — OXYCODONE HYDROCHLORIDE AND ACETAMINOPHEN 5; 325 MG/1; MG/1
2 TABLET ORAL EVERY 4 HOURS PRN
Status: DISCONTINUED | OUTPATIENT
Start: 2018-10-30 | End: 2018-11-04 | Stop reason: HOSPADM

## 2018-10-30 RX ORDER — HYDRALAZINE HYDROCHLORIDE 20 MG/ML
5 INJECTION INTRAMUSCULAR; INTRAVENOUS EVERY 5 MIN PRN
Status: DISCONTINUED | OUTPATIENT
Start: 2018-10-30 | End: 2018-11-04 | Stop reason: HOSPADM

## 2018-10-30 RX ORDER — BISACODYL 10 MG
10 SUPPOSITORY, RECTAL RECTAL DAILY PRN
Status: DISCONTINUED | OUTPATIENT
Start: 2018-10-30 | End: 2018-11-04 | Stop reason: HOSPADM

## 2018-10-30 RX ORDER — MEPERIDINE HYDROCHLORIDE 50 MG/ML
25 INJECTION INTRAMUSCULAR; INTRAVENOUS; SUBCUTANEOUS
Status: ACTIVE | OUTPATIENT
Start: 2018-10-30 | End: 2018-10-30

## 2018-10-30 RX ORDER — SODIUM CHLORIDE, SODIUM LACTATE, POTASSIUM CHLORIDE, CALCIUM CHLORIDE 600; 310; 30; 20 MG/100ML; MG/100ML; MG/100ML; MG/100ML
INJECTION, SOLUTION INTRAVENOUS CONTINUOUS
Status: DISCONTINUED | OUTPATIENT
Start: 2018-10-30 | End: 2018-10-30

## 2018-10-30 RX ORDER — SODIUM CHLORIDE 9 MG/ML
INJECTION, SOLUTION INTRAVENOUS CONTINUOUS PRN
Status: DISCONTINUED | OUTPATIENT
Start: 2018-10-30 | End: 2018-10-30 | Stop reason: SDUPTHER

## 2018-10-30 RX ORDER — ASPIRIN 81 MG/1
81 TABLET ORAL
Status: DISCONTINUED | OUTPATIENT
Start: 2018-10-30 | End: 2018-10-30 | Stop reason: HOSPADM

## 2018-10-30 RX ORDER — ALBUMIN, HUMAN INJ 5% 5 %
25 SOLUTION INTRAVENOUS PRN
Status: DISCONTINUED | OUTPATIENT
Start: 2018-10-30 | End: 2018-11-04 | Stop reason: HOSPADM

## 2018-10-30 RX ORDER — PROPOFOL 10 MG/ML
INJECTION, EMULSION INTRAVENOUS PRN
Status: DISCONTINUED | OUTPATIENT
Start: 2018-10-30 | End: 2018-10-30 | Stop reason: SDUPTHER

## 2018-10-30 RX ORDER — SODIUM CHLORIDE, SODIUM LACTATE, POTASSIUM CHLORIDE, CALCIUM CHLORIDE 600; 310; 30; 20 MG/100ML; MG/100ML; MG/100ML; MG/100ML
INJECTION, SOLUTION INTRAVENOUS CONTINUOUS PRN
Status: DISCONTINUED | OUTPATIENT
Start: 2018-10-30 | End: 2018-10-30 | Stop reason: SDUPTHER

## 2018-10-30 RX ORDER — CHLORHEXIDINE GLUCONATE 0.12 MG/ML
15 RINSE ORAL ONCE
Status: COMPLETED | OUTPATIENT
Start: 2018-10-30 | End: 2018-10-30

## 2018-10-30 RX ORDER — DEXTROSE MONOHYDRATE 50 MG/ML
100 INJECTION, SOLUTION INTRAVENOUS PRN
Status: DISCONTINUED | OUTPATIENT
Start: 2018-10-30 | End: 2018-11-04 | Stop reason: HOSPADM

## 2018-10-30 RX ORDER — PROPOFOL 10 MG/ML
10 INJECTION, EMULSION INTRAVENOUS
Status: DISCONTINUED | OUTPATIENT
Start: 2018-10-30 | End: 2018-10-31

## 2018-10-30 RX ORDER — AMIODARONE HYDROCHLORIDE 200 MG/1
200 TABLET ORAL 2 TIMES DAILY
Status: DISCONTINUED | OUTPATIENT
Start: 2018-10-30 | End: 2018-11-04 | Stop reason: HOSPADM

## 2018-10-30 RX ORDER — PROTAMINE SULFATE 10 MG/ML
INJECTION, SOLUTION INTRAVENOUS PRN
Status: DISCONTINUED | OUTPATIENT
Start: 2018-10-30 | End: 2018-10-30 | Stop reason: SDUPTHER

## 2018-10-30 RX ORDER — MAGNESIUM SULFATE 1 G/100ML
1 INJECTION INTRAVENOUS PRN
Status: DISCONTINUED | OUTPATIENT
Start: 2018-10-30 | End: 2018-11-04 | Stop reason: HOSPADM

## 2018-10-30 RX ORDER — PROPOFOL 10 MG/ML
INJECTION, EMULSION INTRAVENOUS CONTINUOUS PRN
Status: DISCONTINUED | OUTPATIENT
Start: 2018-10-30 | End: 2018-10-30 | Stop reason: SDUPTHER

## 2018-10-30 RX ORDER — DIPHENHYDRAMINE HCL 25 MG
25 TABLET ORAL NIGHTLY PRN
Status: DISCONTINUED | OUTPATIENT
Start: 2018-10-31 | End: 2018-11-04 | Stop reason: HOSPADM

## 2018-10-30 RX ORDER — OXYCODONE HYDROCHLORIDE AND ACETAMINOPHEN 5; 325 MG/1; MG/1
1 TABLET ORAL EVERY 4 HOURS PRN
Status: DISCONTINUED | OUTPATIENT
Start: 2018-10-30 | End: 2018-11-04 | Stop reason: HOSPADM

## 2018-10-30 RX ORDER — DEXTROSE MONOHYDRATE 25 G/50ML
12.5 INJECTION, SOLUTION INTRAVENOUS PRN
Status: DISCONTINUED | OUTPATIENT
Start: 2018-10-30 | End: 2018-11-04 | Stop reason: HOSPADM

## 2018-10-30 RX ORDER — ROCURONIUM BROMIDE 10 MG/ML
INJECTION, SOLUTION INTRAVENOUS PRN
Status: DISCONTINUED | OUTPATIENT
Start: 2018-10-30 | End: 2018-10-30 | Stop reason: SDUPTHER

## 2018-10-30 RX ORDER — CHLORHEXIDINE GLUCONATE 0.12 MG/ML
15 RINSE ORAL 2 TIMES DAILY
Status: DISCONTINUED | OUTPATIENT
Start: 2018-10-30 | End: 2018-10-31

## 2018-10-30 RX ORDER — NICOTINE POLACRILEX 4 MG
15 LOZENGE BUCCAL PRN
Status: DISCONTINUED | OUTPATIENT
Start: 2018-10-30 | End: 2018-11-04 | Stop reason: HOSPADM

## 2018-10-30 RX ORDER — SODIUM CHLORIDE 9 MG/ML
INJECTION, SOLUTION INTRAVENOUS CONTINUOUS
Status: DISCONTINUED | OUTPATIENT
Start: 2018-10-30 | End: 2018-11-01

## 2018-10-30 RX ORDER — M-VIT,TX,IRON,MINS/CALC/FOLIC 27MG-0.4MG
1 TABLET ORAL
Status: DISCONTINUED | OUTPATIENT
Start: 2018-10-31 | End: 2018-11-04 | Stop reason: HOSPADM

## 2018-10-30 RX ORDER — SODIUM CHLORIDE 0.9 % (FLUSH) 0.9 %
10 SYRINGE (ML) INJECTION PRN
Status: DISCONTINUED | OUTPATIENT
Start: 2018-10-30 | End: 2018-10-30 | Stop reason: HOSPADM

## 2018-10-30 RX ORDER — VANCOMYCIN HYDROCHLORIDE 1 G/20ML
INJECTION, POWDER, LYOPHILIZED, FOR SOLUTION INTRAVENOUS PRN
Status: DISCONTINUED | OUTPATIENT
Start: 2018-10-30 | End: 2018-10-30 | Stop reason: HOSPADM

## 2018-10-30 RX ORDER — SODIUM CHLORIDE 0.9 % (FLUSH) 0.9 %
10 SYRINGE (ML) INJECTION EVERY 12 HOURS SCHEDULED
Status: DISCONTINUED | OUTPATIENT
Start: 2018-10-30 | End: 2018-10-30 | Stop reason: HOSPADM

## 2018-10-30 RX ORDER — FENTANYL CITRATE 50 UG/ML
INJECTION, SOLUTION INTRAMUSCULAR; INTRAVENOUS PRN
Status: DISCONTINUED | OUTPATIENT
Start: 2018-10-30 | End: 2018-10-30 | Stop reason: SDUPTHER

## 2018-10-30 RX ORDER — TAMSULOSIN HYDROCHLORIDE 0.4 MG/1
0.4 CAPSULE ORAL DAILY
Status: DISCONTINUED | OUTPATIENT
Start: 2018-10-30 | End: 2018-11-04 | Stop reason: HOSPADM

## 2018-10-30 RX ORDER — ACETAMINOPHEN 650 MG/1
650 SUPPOSITORY RECTAL EVERY 4 HOURS PRN
Status: DISCONTINUED | OUTPATIENT
Start: 2018-10-30 | End: 2018-11-04 | Stop reason: HOSPADM

## 2018-10-30 RX ORDER — HEPARIN SODIUM 1000 [USP'U]/ML
INJECTION, SOLUTION INTRAVENOUS; SUBCUTANEOUS PRN
Status: DISCONTINUED | OUTPATIENT
Start: 2018-10-30 | End: 2018-10-30 | Stop reason: SDUPTHER

## 2018-10-30 RX ORDER — LIDOCAINE HYDROCHLORIDE 10 MG/ML
INJECTION, SOLUTION INFILTRATION; PERINEURAL PRN
Status: DISCONTINUED | OUTPATIENT
Start: 2018-10-30 | End: 2018-10-30 | Stop reason: SDUPTHER

## 2018-10-30 RX ADMIN — POTASSIUM CHLORIDE 20 MEQ: 29.8 INJECTION, SOLUTION INTRAVENOUS at 12:32

## 2018-10-30 RX ADMIN — PHENYLEPHRINE HYDROCHLORIDE 200 MCG: 10 INJECTION INTRAVENOUS at 07:39

## 2018-10-30 RX ADMIN — PHENYLEPHRINE HYDROCHLORIDE 200 MCG: 10 INJECTION INTRAVENOUS at 08:17

## 2018-10-30 RX ADMIN — ROCURONIUM BROMIDE 30 MG: 10 INJECTION INTRAVENOUS at 08:21

## 2018-10-30 RX ADMIN — PROPOFOL 1000 MG: 10 INJECTION, EMULSION INTRAVENOUS at 13:43

## 2018-10-30 RX ADMIN — ROCURONIUM BROMIDE 20 MG: 10 INJECTION INTRAVENOUS at 09:33

## 2018-10-30 RX ADMIN — ROCURONIUM BROMIDE 30 MG: 10 INJECTION INTRAVENOUS at 10:13

## 2018-10-30 RX ADMIN — ALBUMIN (HUMAN) 25 G: 12.5 INJECTION, SOLUTION INTRAVENOUS at 12:28

## 2018-10-30 RX ADMIN — Medication 10 MG: at 07:39

## 2018-10-30 RX ADMIN — FENTANYL CITRATE 100 MCG: 50 INJECTION, SOLUTION INTRAMUSCULAR; INTRAVENOUS at 07:34

## 2018-10-30 RX ADMIN — EPINEPHRINE 0.04 MCG/KG/MIN: 1 INJECTION PARENTERAL at 10:21

## 2018-10-30 RX ADMIN — Medication 10 MG: at 09:10

## 2018-10-30 RX ADMIN — ROCURONIUM BROMIDE 50 MG: 10 INJECTION INTRAVENOUS at 07:34

## 2018-10-30 RX ADMIN — POTASSIUM CHLORIDE 20 MEQ: 29.8 INJECTION, SOLUTION INTRAVENOUS at 14:43

## 2018-10-30 RX ADMIN — ALBUMIN (HUMAN) 25 G: 12.5 INJECTION, SOLUTION INTRAVENOUS at 12:01

## 2018-10-30 RX ADMIN — PROTAMINE SULFATE 200 MG: 10 INJECTION, SOLUTION INTRAVENOUS at 10:43

## 2018-10-30 RX ADMIN — SODIUM CHLORIDE: 900 INJECTION, SOLUTION INTRAVENOUS at 08:51

## 2018-10-30 RX ADMIN — PHENYLEPHRINE HYDROCHLORIDE 200 MCG: 10 INJECTION INTRAVENOUS at 08:40

## 2018-10-30 RX ADMIN — SODIUM CHLORIDE: 9 INJECTION, SOLUTION INTRAVENOUS at 12:15

## 2018-10-30 RX ADMIN — FENTANYL CITRATE 100 MCG: 50 INJECTION, SOLUTION INTRAMUSCULAR; INTRAVENOUS at 08:51

## 2018-10-30 RX ADMIN — VANCOMYCIN HYDROCHLORIDE 1 G: 1 INJECTION, SOLUTION INTRAVENOUS at 08:11

## 2018-10-30 RX ADMIN — FAMOTIDINE 20 MG: 10 INJECTION, SOLUTION INTRAVENOUS at 19:50

## 2018-10-30 RX ADMIN — METOPROLOL TARTRATE 12.5 MG: 25 TABLET ORAL at 06:14

## 2018-10-30 RX ADMIN — SODIUM CHLORIDE 2 G: 0.9 INJECTION, SOLUTION INTRAVENOUS at 08:08

## 2018-10-30 RX ADMIN — PHENYLEPHRINE HYDROCHLORIDE 50 MCG/MIN: 10 INJECTION INTRAVENOUS at 08:17

## 2018-10-30 RX ADMIN — PHENYLEPHRINE HYDROCHLORIDE 200 MCG: 10 INJECTION INTRAVENOUS at 07:48

## 2018-10-30 RX ADMIN — Medication 10 MG: at 08:40

## 2018-10-30 RX ADMIN — MIDAZOLAM HYDROCHLORIDE 2 MG: 1 INJECTION, SOLUTION INTRAMUSCULAR; INTRAVENOUS at 09:45

## 2018-10-30 RX ADMIN — SODIUM CHLORIDE, POTASSIUM CHLORIDE, SODIUM LACTATE AND CALCIUM CHLORIDE: 600; 310; 30; 20 INJECTION, SOLUTION INTRAVENOUS at 07:05

## 2018-10-30 RX ADMIN — ASPIRIN 81 MG: 81 TABLET, COATED ORAL at 06:00

## 2018-10-30 RX ADMIN — Medication 10 MG: at 07:48

## 2018-10-30 RX ADMIN — SODIUM CHLORIDE 7 UNITS/HR: 9 INJECTION, SOLUTION INTRAVENOUS at 08:26

## 2018-10-30 RX ADMIN — FENTANYL CITRATE 100 MCG: 50 INJECTION, SOLUTION INTRAMUSCULAR; INTRAVENOUS at 10:45

## 2018-10-30 RX ADMIN — LIDOCAINE HYDROCHLORIDE 50 MG: 10 INJECTION, SOLUTION INFILTRATION; PERINEURAL at 07:34

## 2018-10-30 RX ADMIN — LIDOCAINE HYDROCHLORIDE 150 MG: 10 INJECTION, SOLUTION INFILTRATION; PERINEURAL at 08:34

## 2018-10-30 RX ADMIN — FENTANYL CITRATE 100 MCG: 50 INJECTION, SOLUTION INTRAMUSCULAR; INTRAVENOUS at 10:26

## 2018-10-30 RX ADMIN — VASOPRESSIN 5 UNITS/HR: 20 INJECTION INTRAVENOUS at 09:23

## 2018-10-30 RX ADMIN — FAMOTIDINE 20 MG: 10 INJECTION, SOLUTION INTRAVENOUS at 14:05

## 2018-10-30 RX ADMIN — SODIUM CHLORIDE 5.76 UNITS/HR: 9 INJECTION, SOLUTION INTRAVENOUS at 23:00

## 2018-10-30 RX ADMIN — Medication 15 ML: at 06:15

## 2018-10-30 RX ADMIN — FENTANYL CITRATE 100 MCG: 50 INJECTION, SOLUTION INTRAMUSCULAR; INTRAVENOUS at 10:10

## 2018-10-30 RX ADMIN — AMINOCAPROIC ACID 5 G/HR: 250 INJECTION, SOLUTION INTRAVENOUS at 08:04

## 2018-10-30 RX ADMIN — FENTANYL CITRATE 50 MCG: 50 INJECTION, SOLUTION INTRAMUSCULAR; INTRAVENOUS at 15:35

## 2018-10-30 RX ADMIN — FENTANYL CITRATE 50 MCG: 50 INJECTION, SOLUTION INTRAMUSCULAR; INTRAVENOUS at 13:19

## 2018-10-30 RX ADMIN — MUPIROCIN: 20 OINTMENT TOPICAL at 06:00

## 2018-10-30 RX ADMIN — MIDAZOLAM HYDROCHLORIDE 2 MG: 1 INJECTION, SOLUTION INTRAMUSCULAR; INTRAVENOUS at 07:13

## 2018-10-30 RX ADMIN — VANCOMYCIN HYDROCHLORIDE 1500 MG: 10 INJECTION, POWDER, LYOPHILIZED, FOR SOLUTION INTRAVENOUS at 19:50

## 2018-10-30 RX ADMIN — MUPIROCIN: 20 OINTMENT TOPICAL at 19:50

## 2018-10-30 RX ADMIN — ALBUMIN (HUMAN) 25 G: 12.5 INJECTION, SOLUTION INTRAVENOUS at 21:07

## 2018-10-30 RX ADMIN — MUPIROCIN: 20 OINTMENT TOPICAL at 14:05

## 2018-10-30 RX ADMIN — CHLORHEXIDINE GLUCONATE 0.12% ORAL RINSE 15 ML: 1.2 LIQUID ORAL at 14:05

## 2018-10-30 RX ADMIN — Medication 10 MG: at 08:17

## 2018-10-30 RX ADMIN — AMIODARONE HYDROCHLORIDE 200 MG: 200 TABLET ORAL at 19:49

## 2018-10-30 RX ADMIN — PHENYLEPHRINE HYDROCHLORIDE 300 MCG: 10 INJECTION INTRAVENOUS at 10:49

## 2018-10-30 RX ADMIN — PROPOFOL 150 MG: 10 INJECTION, EMULSION INTRAVENOUS at 07:34

## 2018-10-30 RX ADMIN — FENTANYL CITRATE 100 MCG: 50 INJECTION, SOLUTION INTRAMUSCULAR; INTRAVENOUS at 07:35

## 2018-10-30 RX ADMIN — OXYCODONE HYDROCHLORIDE AND ACETAMINOPHEN 1 TABLET: 5; 325 TABLET ORAL at 19:50

## 2018-10-30 RX ADMIN — CHLORHEXIDINE GLUCONATE 0.12% ORAL RINSE 15 ML: 1.2 LIQUID ORAL at 19:50

## 2018-10-30 RX ADMIN — PROPOFOL 20 MCG/KG/MIN: 10 INJECTION, EMULSION INTRAVENOUS at 10:54

## 2018-10-30 RX ADMIN — HEPARIN SODIUM 32000 UNITS: 1000 INJECTION, SOLUTION INTRAVENOUS; SUBCUTANEOUS at 08:38

## 2018-10-30 ASSESSMENT — PULMONARY FUNCTION TESTS
PIF_VALUE: 22
PIF_VALUE: 21
PIF_VALUE: 22
PIF_VALUE: 21
PIF_VALUE: 2
PIF_VALUE: 21
PIF_VALUE: 27
PIF_VALUE: 1
PIF_VALUE: 22
PIF_VALUE: 23
PIF_VALUE: 20
PIF_VALUE: 2
PIF_VALUE: 1
PIF_VALUE: 21
PIF_VALUE: 1
PIF_VALUE: 20
PIF_VALUE: 22
PIF_VALUE: 19
PIF_VALUE: 21
PIF_VALUE: 40
PIF_VALUE: 1
PIF_VALUE: 23
PIF_VALUE: 22
PIF_VALUE: 21
PIF_VALUE: 21
PIF_VALUE: 1
PIF_VALUE: 2
PIF_VALUE: 0
PIF_VALUE: 22
PIF_VALUE: 23
PIF_VALUE: 21
PIF_VALUE: 23
PIF_VALUE: 26
PIF_VALUE: 1
PIF_VALUE: 22
PIF_VALUE: 1
PIF_VALUE: 0
PIF_VALUE: 21
PIF_VALUE: 21
PIF_VALUE: 20
PIF_VALUE: 1
PIF_VALUE: 23
PIF_VALUE: 1
PIF_VALUE: 0
PIF_VALUE: 1
PIF_VALUE: 1
PIF_VALUE: 22
PIF_VALUE: 1
PIF_VALUE: 1
PIF_VALUE: 23
PIF_VALUE: 1
PIF_VALUE: 22
PIF_VALUE: 23
PIF_VALUE: 1
PIF_VALUE: 21
PIF_VALUE: 1
PIF_VALUE: 27
PIF_VALUE: 23
PIF_VALUE: 20
PIF_VALUE: 1
PIF_VALUE: 25
PIF_VALUE: 30
PIF_VALUE: 21
PIF_VALUE: 0
PIF_VALUE: 26
PIF_VALUE: 1
PIF_VALUE: 23
PIF_VALUE: 0
PIF_VALUE: 27
PIF_VALUE: 31
PIF_VALUE: 20
PIF_VALUE: 20
PIF_VALUE: 28
PIF_VALUE: 25
PIF_VALUE: 23
PIF_VALUE: 21
PIF_VALUE: 1
PIF_VALUE: 23
PIF_VALUE: 1
PIF_VALUE: 21
PIF_VALUE: 21
PIF_VALUE: 2
PIF_VALUE: 1
PIF_VALUE: 1
PIF_VALUE: 21
PIF_VALUE: 0
PIF_VALUE: 24
PIF_VALUE: 21
PIF_VALUE: 21
PIF_VALUE: 1
PIF_VALUE: 1
PIF_VALUE: 24
PIF_VALUE: 21
PIF_VALUE: 1
PIF_VALUE: 23
PIF_VALUE: 22
PIF_VALUE: 25
PIF_VALUE: 21
PIF_VALUE: 1
PIF_VALUE: 21
PIF_VALUE: 1
PIF_VALUE: 21
PIF_VALUE: 21
PIF_VALUE: 2
PIF_VALUE: 21
PIF_VALUE: 24
PIF_VALUE: 23
PIF_VALUE: 0
PIF_VALUE: 1
PIF_VALUE: 2
PIF_VALUE: 21
PIF_VALUE: 1
PIF_VALUE: 21
PIF_VALUE: 22
PIF_VALUE: 23
PIF_VALUE: 2
PIF_VALUE: 1
PIF_VALUE: 0
PIF_VALUE: 24
PIF_VALUE: 1
PIF_VALUE: 22
PIF_VALUE: 19
PIF_VALUE: 30
PIF_VALUE: 21
PIF_VALUE: 21
PIF_VALUE: 1
PIF_VALUE: 0
PIF_VALUE: 28
PIF_VALUE: 21
PIF_VALUE: 21
PIF_VALUE: 28
PIF_VALUE: 20
PIF_VALUE: 1
PIF_VALUE: 20
PIF_VALUE: 21
PIF_VALUE: 23
PIF_VALUE: 2
PIF_VALUE: 21
PIF_VALUE: 1
PIF_VALUE: 1
PIF_VALUE: 20
PIF_VALUE: 24
PIF_VALUE: 21
PIF_VALUE: 0
PIF_VALUE: 30
PIF_VALUE: 22
PIF_VALUE: 20
PIF_VALUE: 2
PIF_VALUE: 1
PIF_VALUE: 22
PIF_VALUE: 21
PIF_VALUE: 21
PIF_VALUE: 1
PIF_VALUE: 24
PIF_VALUE: 22
PIF_VALUE: 1
PIF_VALUE: 21
PIF_VALUE: 1
PIF_VALUE: 0
PIF_VALUE: 23
PIF_VALUE: 30
PIF_VALUE: 22
PIF_VALUE: 23
PIF_VALUE: 22
PIF_VALUE: 27
PIF_VALUE: 22
PIF_VALUE: 1
PIF_VALUE: 23
PIF_VALUE: 23
PIF_VALUE: 10
PIF_VALUE: 21
PIF_VALUE: 21
PIF_VALUE: 0
PIF_VALUE: 1
PIF_VALUE: 0
PIF_VALUE: 20
PIF_VALUE: 22
PIF_VALUE: 1
PIF_VALUE: 0
PIF_VALUE: 1
PIF_VALUE: 23
PIF_VALUE: 22
PIF_VALUE: 21
PIF_VALUE: 21
PIF_VALUE: 1
PIF_VALUE: 22
PIF_VALUE: 1
PIF_VALUE: 20
PIF_VALUE: 25
PIF_VALUE: 0
PIF_VALUE: 21
PIF_VALUE: 1
PIF_VALUE: 20
PIF_VALUE: 1
PIF_VALUE: 1
PIF_VALUE: 23
PIF_VALUE: 1
PIF_VALUE: 21
PIF_VALUE: 1
PIF_VALUE: 21
PIF_VALUE: 24
PIF_VALUE: 23
PIF_VALUE: 25
PIF_VALUE: 25
PIF_VALUE: 19
PIF_VALUE: 27
PIF_VALUE: 21
PIF_VALUE: 0
PIF_VALUE: 22
PIF_VALUE: 11
PIF_VALUE: 1
PIF_VALUE: 22
PIF_VALUE: 2
PIF_VALUE: 1
PIF_VALUE: 23
PIF_VALUE: 23
PIF_VALUE: 21
PIF_VALUE: 1
PIF_VALUE: 1
PIF_VALUE: 20
PIF_VALUE: 21
PIF_VALUE: 1
PIF_VALUE: 27
PIF_VALUE: 1
PIF_VALUE: 1
PIF_VALUE: 21
PIF_VALUE: 23
PIF_VALUE: 1
PIF_VALUE: 21
PIF_VALUE: 0
PIF_VALUE: 27
PIF_VALUE: 22
PIF_VALUE: 21
PIF_VALUE: 20
PIF_VALUE: 21
PIF_VALUE: 21
PIF_VALUE: 0
PIF_VALUE: 24
PIF_VALUE: 21
PIF_VALUE: 1
PIF_VALUE: 21
PIF_VALUE: 22
PIF_VALUE: 23
PIF_VALUE: 25
PIF_VALUE: 2
PIF_VALUE: 19
PIF_VALUE: 22
PIF_VALUE: 1
PIF_VALUE: 29
PIF_VALUE: 1
PIF_VALUE: 22
PIF_VALUE: 27
PIF_VALUE: 19
PIF_VALUE: 20
PIF_VALUE: 21
PIF_VALUE: 1
PIF_VALUE: 21
PIF_VALUE: 23
PIF_VALUE: 1
PIF_VALUE: 21
PIF_VALUE: 1
PIF_VALUE: 23
PIF_VALUE: 23
PIF_VALUE: 2
PIF_VALUE: 1
PIF_VALUE: 20
PIF_VALUE: 20
PIF_VALUE: 21
PIF_VALUE: 28
PIF_VALUE: 29
PIF_VALUE: 25
PIF_VALUE: 24
PIF_VALUE: 1
PIF_VALUE: 21

## 2018-10-30 ASSESSMENT — PAIN DESCRIPTION - LOCATION: LOCATION: CHEST

## 2018-10-30 ASSESSMENT — PAIN DESCRIPTION - PAIN TYPE: TYPE: SURGICAL PAIN

## 2018-10-30 ASSESSMENT — PAIN DESCRIPTION - ORIENTATION: ORIENTATION: MID

## 2018-10-30 ASSESSMENT — ENCOUNTER SYMPTOMS: SHORTNESS OF BREATH: 1

## 2018-10-30 ASSESSMENT — PAIN DESCRIPTION - DESCRIPTORS: DESCRIPTORS: ACHING

## 2018-10-30 ASSESSMENT — PAIN SCALES - GENERAL: PAINLEVEL_OUTOF10: 5

## 2018-10-30 ASSESSMENT — PAIN DESCRIPTION - FREQUENCY: FREQUENCY: INTERMITTENT

## 2018-10-30 ASSESSMENT — PAIN DESCRIPTION - ONSET: ONSET: ON-GOING

## 2018-10-30 NOTE — ANESTHESIA PROCEDURE NOTES
Procedure Performed: DOREEN     Start Time:        End Time:                      Department of Anesthesiology  DOREEN Report         Name:  Rosina James                                         Age:  67 y.o. MRN:  [id number]           Procedure (Scheduled):  DOREEN  Requested by Surgeon:  Daniele Serra  Performed by Dr. Smita Moya: Transesophageal Echo    Today's Date: 10/30/2018    Patient seen and examined. History and Physical reviewed. Labs reviewed. DOREEN:    Structures:    LA: Normal  RA: Normal  RV: Normal size and function  LV: LVH. Normal size and function. Estimated LVEF 55 % preop. No RWMA  LV apex: No LV apical thrombus identified  Aorta: Mild atheromatous disease Asc Aorta, arch and descending Aorta  Percardium: No pericardial effusion  JOLIE: No appendage thrombus identified  Septum: No intracardiac shunt via color Doppler. Valves:    Mitral Valve: Structurally normal. mild central regurgitation is identified. MAC. Aortic Valve: The aortic valve is trileaflet and opens inadequately. severe stenosis is identified. Annulus 2.23 cm. Sinus 2.66 cm. Sinotubular junction 2.05 cm.   mild regurgitiation is identified. Tricuspid valve: Structurally normal. mild regurgitation is identified. Pulmonary valve: Normal. No significant regurgitation    No valvular vegetations or thrombus identified. Summary:     1. A DOREEN was performed without complications. 2. LVEF 55 % preop. 3. Pre-op valvular abnormalities Severe AS with peak/mean 49/26 mmHg. Annulus 2.23cm. Mild AI, Mild MR and Mild TR.  4. No Aortic dissection  5. Significant findings were communicated to CTS. Electronically signed by Calixto Kerns MD on 10/30/2018 at 8:19 AM        Summary:   1. A Postop DOREEN was performed without complications. 2. LVEF 55 % postop  3. Post-op patient underwent intuity AVR size 23 valve. Valve is well seated. NO AS. Trace central AI. NO paravalvular leak. Mean gradient 5 mmHg.   4. No apparent Aortic

## 2018-10-30 NOTE — FLOWSHEET NOTE
Spiritual Service Consult:  Pt seen per [facility] re: post ohs. RN told  that pt had been extubated when he saw her while rounding. Pt open to 's presence & appeared happy to have him in the room post op. Pt is  & has children & grandchildren. He mentioned his strong & long connections to his Hindu in [place]. Pt expects that this surgery will add quality to his life. Pt accepted 's offer of prayer w/ gratitude. Chaplains will remain available to offer spiritual and emotional support as needed. Rev. Breann Dacosta, [address]     10/30/18 1900   Encounter Summary   Services provided to: Patient   Referral/Consult From: Rounding  (Spiritual Services Consult)   Support System Children;Family members;Friends/neighbors; Bahai/dewayne community   Place of [address] [place] [address]   Continue Visiting (10/30)   Complexity of Encounter Moderate   Length of Encounter 15 minutes   Spiritual Assessment Completed Yes   Routine   Type Initial  (post procedure)   Assessment Calm; Approachable;Coping; Hopeful  (grateful)   Intervention Sustaining presence/ Ministry of presence; Active listening;Explored feelings, thoughts, concerns;Explored coping resources; Discussed belief system/Orthodoxy practices/dewayne;Nurtured hope;Prayer  (left Spiritual CAre information)   Outcome Receptive; Acceptance; Coping; Hopeful;Encouraged; Shared life review;Expressed feelings of germán, peace, and/or awe;Expressed gratitude

## 2018-10-30 NOTE — PROGRESS NOTES
Pt arrives from [facility] 23 to room 1005 s/p AVR accompanied by OR staff  Krupa Her in R side, L radial ART line, Chest tubes x2, ETT/ OG in place, stein catheter  Pt connected to telemetry, vitals taken  Report received from CRNA  Will continue to monitor

## 2018-10-30 NOTE — CARE COORDINATION
Surgical day, patient intubated, no family available at this time.   Defer initial interview until family available or patient able to participate

## 2018-10-30 NOTE — OP NOTE
operating  room, placed supine on the operating table. After adequate IV sedation,  the patient was intubated without complication. Appropriate monitoring  lines were placed. DOREEN demonstrated severe aortic stenosis with no AI  as well as mild MR, demonstrated with the blood pressure systolic in the  501-588 range. Planned to only perform isolated aortic valve  replacement. Based on the patient's age, tissue valve was chosen. Based on mitigating the patient's other comorbid issues to expedite the  case, an Intuity valve was chosen. Once the chest was prepped and draped in standard sterile fashion,  midline sternotomy was performed. Pericardium was opened in the  midline, teasing the diaphragm inferiorly. Pericardial stay sutures  were placed. Retractor was applied, opened widely. Heart exhibited  normal orientation and anatomy. Cannulation sutures were placed high in  the aorta and right atrial appendage. The patient was given full-dose  heparin. Upon ACT greater than 450, the patient was subsequently  cannulated, placed on coronary bypass. Cross-clamp was applied. Antegrade was given single-dose [place], 1500 mL. Upon arrest,  aortotomy was created 2 cm above the takeoff of the right coronary,  nearly circumferentially, exposing the valve. The valve was then  excised sharply, and debridement was performed around the annulus with  care to monitor particulate matter and remove it. Pulse irrigation was  performed under high pressure and suctioned all debris under the  ventricle. This was done several times. Approximately a liter worth  irrigation was instilled and removed. Once completed, 3-valve stitch  was placed at the nidus of each valve and brought approximately 2 mm  above the annulus. Then, the valve was sized to a 23-mm Intuity valve. The valve was then brought into the field.   Stitch was placed through  the ovidio delineated on the valve, brought down tightly, and secured with  Rommel red rubber catheters. The balloon was then inflated to 4.5 mm,  held for 10 seconds and then removed. The Rommel was removed. The  stitches were then secured using a Cor-Knot device x3. The valve was  then re-inspected. No leak identified using the double-arm,  double-pledged 4-0 Prolene. We then reapproximated the aorta using a  horizontal mattress followed by running locking stitch, and deairing  maneuver was performed during this procedure, and subsequently the  patient was given warm continuous blood. Root was vented and  spontaneous contractility. Bipolar pacing wire was placed on the  diaphragmatic surface of the heart, brought through a separate stable  incision and secured to the skin with 2-0 silk pop-off. Once identified  adequate contractility, echo confirmed mild MR with minimal gradient  noted at the aortic valve with all cusps functioning adequate. No  paravalvular leak noted. The patient had good concentric squeeze. EF  was approximately 60%. All walls moving concentric. No air bubble was  identified. Of important note, during the entire operation, we had CO2  instilled on the field. Once DOREEN demonstrated adequate functionality of  the heart and valvular function, we subsequently weaned the patient from  coronary bypass. Test-dose protamine was given followed by full-dose  protamine, subsequently decannulated. Sites were checked for  hemostasis. Two chest tubes were placed through subxiphoid stab  incisions and secured to the skin with 2-0 silk pop-offs. Six #6  sternal wires were used to reapproximate the sternum. 250 mL of Exparel  were injected along the periosteum. The wires were twisted down  tightly. The deep sternal fascia and linea alba were closed with 0 PDS  sutures. Skin was closed with 3-0 and 4-0 Monocryl. Staples were not  applied, we had running 4-0 Monocryl. Bio-occlusive dressing was  placed. The patient was then transferred to the ICU.   Needle

## 2018-10-30 NOTE — PROGRESS NOTES
Smoking Cessation - topics covered   []  Health Risks  []  Benefits of Quitting   []  Smoking Cessation  []  Patient has no history of tobacco use  [x]  Patient is former smoker. Patient quit in 1998. [x]  No need for tobacco cessation education. []  Booklet given  []  Patient verbalizes understanding. []  Patient denies need for tobacco cessation education.   Mohinder Hudson  12:26 PM

## 2018-10-30 NOTE — PROGRESS NOTES
Physical Therapy  DATE: 10/30/2018    NAME: Sallie Cook  MRN: [id number]   : 1945    Patient not seen this date for Physical Therapy due to:  [] Blood transfusion in progress  [] Hemodialysis  []  Patient Declined  [] Spine Precautions   [] Strict Bedrest  [x] Surgery/ Procedure- AVR. PT will check back 10/31/18  [] Testing      [] Other        [] PT being discontinued at this time. Patient independent. No further needs. [] PT being discontinued at this time as the patient has been transferred to palliative care. No further needs.     Luis Mobley, PT

## 2018-10-31 ENCOUNTER — APPOINTMENT (OUTPATIENT)
Dept: GENERAL RADIOLOGY | Age: 73
DRG: 220 | End: 2018-10-31
Attending: THORACIC SURGERY (CARDIOTHORACIC VASCULAR SURGERY)
Payer: MEDICARE

## 2018-10-31 LAB
ANION GAP SERPL CALCULATED.3IONS-SCNC: 9 MMOL/L (ref 9–17)
BUN BLDV-MCNC: 19 MG/DL (ref 8–23)
BUN/CREAT BLD: ABNORMAL (ref 9–20)
CALCIUM SERPL-MCNC: 8.3 MG/DL (ref 8.6–10.4)
CHLORIDE BLD-SCNC: 107 MMOL/L (ref 98–107)
CO2: 21 MMOL/L (ref 20–31)
CREAT SERPL-MCNC: 0.91 MG/DL (ref 0.7–1.2)
CULTURE: NO GROWTH
GFR AFRICAN AMERICAN: >60 ML/MIN
GFR NON-AFRICAN AMERICAN: >60 ML/MIN
GFR SERPL CREATININE-BSD FRML MDRD: ABNORMAL ML/MIN/{1.73_M2}
GFR SERPL CREATININE-BSD FRML MDRD: ABNORMAL ML/MIN/{1.73_M2}
GLUCOSE BLD-MCNC: 100 MG/DL (ref 75–110)
GLUCOSE BLD-MCNC: 107 MG/DL (ref 70–99)
GLUCOSE BLD-MCNC: 109 MG/DL (ref 75–110)
GLUCOSE BLD-MCNC: 111 MG/DL (ref 75–110)
GLUCOSE BLD-MCNC: 114 MG/DL (ref 75–110)
GLUCOSE BLD-MCNC: 118 MG/DL (ref 75–110)
GLUCOSE BLD-MCNC: 122 MG/DL (ref 75–110)
GLUCOSE BLD-MCNC: 124 MG/DL (ref 75–110)
GLUCOSE BLD-MCNC: 132 MG/DL (ref 75–110)
GLUCOSE BLD-MCNC: 141 MG/DL (ref 75–110)
GLUCOSE BLD-MCNC: 145 MG/DL (ref 75–110)
GLUCOSE BLD-MCNC: 219 MG/DL (ref 75–110)
GLUCOSE BLD-MCNC: 92 MG/DL (ref 75–110)
GLUCOSE BLD-MCNC: 99 MG/DL (ref 75–110)
HBCO, MIXED, EXTENDED: 1 % (ref 0–5)
HCT VFR BLD CALC: 24.2 % (ref 40.7–50.3)
HEMOGLOBIN, MIXED, EXTENDED: 7.9 G/DL (ref 12–18)
HEMOGLOBIN: 7.9 G/DL (ref 13–17)
INR BLD: 1.1
Lab: NORMAL
MAGNESIUM: 1.9 MG/DL (ref 1.6–2.6)
MCH RBC QN AUTO: 30.7 PG (ref 25.2–33.5)
MCHC RBC AUTO-ENTMCNC: 32.6 G/DL (ref 28.4–34.8)
MCV RBC AUTO: 94.2 FL (ref 82.6–102.9)
METHB, MIXED, EXTENDED: 0.6 % (ref 0–1.5)
NRBC AUTOMATED: 0 PER 100 WBC
O2 CONTENT, MIXED, EXTENDED: 9 VOL % (ref 12–20)
O2 SAT, MIXED, EXTENDED: 78.8 % (ref 60–80)
OXYGEN STATUS: ABNORMAL
PDW BLD-RTO: 13.3 % (ref 11.8–14.4)
PLATELET # BLD: ABNORMAL K/UL (ref 138–453)
PLATELET, FLUORESCENCE: 95 K/UL (ref 138–453)
PLATELET, IMMATURE FRACTION: 6 % (ref 1.1–10.3)
PMV BLD AUTO: ABNORMAL FL (ref 8.1–13.5)
POC ANGLE TEG W HEP: 72.3 DEG (ref 59–74)
POC ANGLE TEG W HEP: 72.7 DEG (ref 59–74)
POC ANGLE TEG: 71.8 DEG (ref 59–74)
POC ANGLE TEG: 72.4 DEG (ref 59–74)
POC EPL TEG W/HEP: 0 % (ref 0–15)
POC EPL TEG W/HEP: 2.6 % (ref 0–15)
POC EPL TEG: 0 % (ref 0–15)
POC EPL TEG: 0 % (ref 0–15)
POC KINETICS TEG W HEP: 1.2 MIN (ref 1–3)
POC KINETICS TEG W HEP: 1.3 MIN (ref 1–3)
POC KINETICS TEG: 1.2 MIN (ref 1–3)
POC KINETICS TEG: 1.3 MIN (ref 1–3)
POC LY30(LYSIS) TEG W HEP: 0 % (ref 0–8)
POC LY30(LYSIS) TEG W HEP: 2.6 % (ref 0–8)
POC LY30(LYSIS) TEG: 0 % (ref 0–8)
POC LY30(LYSIS) TEG: 0 % (ref 0–8)
POC MA(MAX CLOT) TEG: 70.1 MM (ref 55–74)
POC MA(MAX CLOT) TEG: 77.8 MM (ref 55–74)
POC MAX CLOT TEG W HEP: 62.8 MM (ref 55–74)
POC MAX CLOT TEG W HEP: 76.9 MM (ref 55–74)
POC REACTION TIME TEG W HEP: 3.8 MIN (ref 4–9)
POC REACTION TIME TEG W HEP: 4.8 MIN (ref 4–9)
POC REACTION TIME TEG: 4 MIN (ref 4–9)
POC REACTION TIME TEG: 5 MIN (ref 4–9)
POTASSIUM SERPL-SCNC: 4.2 MMOL/L (ref 3.7–5.3)
POTASSIUM SERPL-SCNC: 4.4 MMOL/L (ref 3.7–5.3)
PROTHROMBIN TIME: 11.3 SEC (ref 9–12)
RBC # BLD: 2.57 M/UL (ref 4.21–5.77)
SODIUM BLD-SCNC: 137 MMOL/L (ref 135–144)
SPECIMEN DESCRIPTION: NORMAL
STATUS: NORMAL
SURGICAL PATHOLOGY REPORT: NORMAL
TEG COMMENT: ABNORMAL
TEG COMMENT: ABNORMAL
TEG COMMENT: NORMAL
TEG COMMENT: NORMAL
WBC # BLD: 20.1 K/UL (ref 3.5–11.3)

## 2018-10-31 PROCEDURE — 2140000001 HC CVICU INTERMEDIATE R&B

## 2018-10-31 PROCEDURE — 85027 COMPLETE CBC AUTOMATED: CPT

## 2018-10-31 PROCEDURE — 6360000002 HC RX W HCPCS: Performed by: PHYSICIAN ASSISTANT

## 2018-10-31 PROCEDURE — 82805 BLOOD GASES W/O2 SATURATION: CPT

## 2018-10-31 PROCEDURE — 6370000000 HC RX 637 (ALT 250 FOR IP): Performed by: PHYSICIAN ASSISTANT

## 2018-10-31 PROCEDURE — S0028 INJECTION, FAMOTIDINE, 20 MG: HCPCS | Performed by: PHYSICIAN ASSISTANT

## 2018-10-31 PROCEDURE — 99024 POSTOP FOLLOW-UP VISIT: CPT | Performed by: PHYSICIAN ASSISTANT

## 2018-10-31 PROCEDURE — 36415 COLL VENOUS BLD VENIPUNCTURE: CPT

## 2018-10-31 PROCEDURE — 84132 ASSAY OF SERUM POTASSIUM: CPT

## 2018-10-31 PROCEDURE — 85055 RETICULATED PLATELET ASSAY: CPT

## 2018-10-31 PROCEDURE — 83735 ASSAY OF MAGNESIUM: CPT

## 2018-10-31 PROCEDURE — 83050 HGB METHEMOGLOBIN QUAN: CPT

## 2018-10-31 PROCEDURE — 80048 BASIC METABOLIC PNL TOTAL CA: CPT

## 2018-10-31 PROCEDURE — 85610 PROTHROMBIN TIME: CPT

## 2018-10-31 PROCEDURE — 82375 ASSAY CARBOXYHB QUANT: CPT

## 2018-10-31 PROCEDURE — 2580000003 HC RX 258: Performed by: PHYSICIAN ASSISTANT

## 2018-10-31 PROCEDURE — 2500000003 HC RX 250 WO HCPCS: Performed by: PHYSICIAN ASSISTANT

## 2018-10-31 PROCEDURE — 71045 X-RAY EXAM CHEST 1 VIEW: CPT

## 2018-10-31 RX ORDER — FUROSEMIDE 10 MG/ML
20 INJECTION INTRAMUSCULAR; INTRAVENOUS ONCE
Status: COMPLETED | OUTPATIENT
Start: 2018-10-31 | End: 2018-10-31

## 2018-10-31 RX ORDER — WARFARIN SODIUM 5 MG/1
5 TABLET ORAL DAILY
Status: DISCONTINUED | OUTPATIENT
Start: 2018-10-31 | End: 2018-11-02

## 2018-10-31 RX ORDER — MIDODRINE HYDROCHLORIDE 5 MG/1
10 TABLET ORAL
Status: DISCONTINUED | OUTPATIENT
Start: 2018-10-31 | End: 2018-11-01

## 2018-10-31 RX ADMIN — DOCUSATE SODIUM 100 MG: 100 CAPSULE, LIQUID FILLED ORAL at 08:03

## 2018-10-31 RX ADMIN — DOCUSATE SODIUM 100 MG: 100 CAPSULE, LIQUID FILLED ORAL at 21:41

## 2018-10-31 RX ADMIN — VANCOMYCIN HYDROCHLORIDE 1500 MG: 10 INJECTION, POWDER, LYOPHILIZED, FOR SOLUTION INTRAVENOUS at 17:09

## 2018-10-31 RX ADMIN — AMIODARONE HYDROCHLORIDE 200 MG: 200 TABLET ORAL at 21:41

## 2018-10-31 RX ADMIN — MIDODRINE HYDROCHLORIDE 10 MG: 5 TABLET ORAL at 12:05

## 2018-10-31 RX ADMIN — MIDODRINE HYDROCHLORIDE 10 MG: 5 TABLET ORAL at 08:17

## 2018-10-31 RX ADMIN — ENOXAPARIN SODIUM 40 MG: 40 INJECTION SUBCUTANEOUS at 08:02

## 2018-10-31 RX ADMIN — ASPIRIN 81 MG: 81 TABLET ORAL at 08:03

## 2018-10-31 RX ADMIN — WARFARIN SODIUM 5 MG: 5 TABLET ORAL at 17:09

## 2018-10-31 RX ADMIN — INSULIN LISPRO 2 UNITS: 100 INJECTION, SOLUTION INTRAVENOUS; SUBCUTANEOUS at 22:00

## 2018-10-31 RX ADMIN — MAGNESIUM SULFATE HEPTAHYDRATE 1 G: 1 INJECTION, SOLUTION INTRAVENOUS at 05:09

## 2018-10-31 RX ADMIN — FUROSEMIDE 20 MG: 10 INJECTION, SOLUTION INTRAMUSCULAR; INTRAVENOUS at 04:12

## 2018-10-31 RX ADMIN — VANCOMYCIN HYDROCHLORIDE 1500 MG: 10 INJECTION, POWDER, LYOPHILIZED, FOR SOLUTION INTRAVENOUS at 06:07

## 2018-10-31 RX ADMIN — OXYCODONE HYDROCHLORIDE AND ACETAMINOPHEN 2 TABLET: 5; 325 TABLET ORAL at 08:03

## 2018-10-31 RX ADMIN — FAMOTIDINE 20 MG: 10 INJECTION, SOLUTION INTRAVENOUS at 08:03

## 2018-10-31 RX ADMIN — INSULIN LISPRO 2 UNITS: 100 INJECTION, SOLUTION INTRAVENOUS; SUBCUTANEOUS at 16:21

## 2018-10-31 RX ADMIN — MULTIPLE VITAMINS W/ MINERALS TAB 1 TABLET: TAB at 08:02

## 2018-10-31 RX ADMIN — TAMSULOSIN HYDROCHLORIDE 0.4 MG: 0.4 CAPSULE ORAL at 08:02

## 2018-10-31 RX ADMIN — AMIODARONE HYDROCHLORIDE 200 MG: 200 TABLET ORAL at 08:02

## 2018-10-31 RX ADMIN — OXYCODONE HYDROCHLORIDE AND ACETAMINOPHEN 1 TABLET: 5; 325 TABLET ORAL at 21:51

## 2018-10-31 RX ADMIN — MIDODRINE HYDROCHLORIDE 10 MG: 5 TABLET ORAL at 16:20

## 2018-10-31 RX ADMIN — OXYCODONE HYDROCHLORIDE AND ACETAMINOPHEN 2 TABLET: 5; 325 TABLET ORAL at 01:08

## 2018-10-31 RX ADMIN — OXYCODONE HYDROCHLORIDE AND ACETAMINOPHEN 2 TABLET: 5; 325 TABLET ORAL at 14:23

## 2018-10-31 ASSESSMENT — PAIN DESCRIPTION - ORIENTATION: ORIENTATION: MID

## 2018-10-31 ASSESSMENT — PAIN SCALES - GENERAL
PAINLEVEL_OUTOF10: 7
PAINLEVEL_OUTOF10: 4
PAINLEVEL_OUTOF10: 4
PAINLEVEL_OUTOF10: 6

## 2018-10-31 ASSESSMENT — PAIN DESCRIPTION - LOCATION: LOCATION: CHEST

## 2018-10-31 ASSESSMENT — PAIN DESCRIPTION - PAIN TYPE: TYPE: SURGICAL PAIN

## 2018-10-31 ASSESSMENT — PAIN DESCRIPTION - FREQUENCY: FREQUENCY: INTERMITTENT

## 2018-10-31 ASSESSMENT — PAIN DESCRIPTION - DESCRIPTORS: DESCRIPTORS: ACHING

## 2018-10-31 NOTE — PROGRESS NOTES
Nasal BID    therapeutic multivitamin-minerals  1 tablet Oral Daily with breakfast    enoxaparin  40 mg Subcutaneous Daily    aspirin  81 mg Oral Daily    insulin lispro  0-12 Units Subcutaneous TID WC    insulin lispro  0-6 Units Subcutaneous Nightly    vancomycin (VANCOCIN) IV  1,500 mg Intravenous Q12H     Continuous Infusions:    sodium chloride 75 mL/hr at 10/30/18 1215    insulin (HUMAN R) non-weight based infusion 4.08 Units/hr (10/31/18 0609)    dextrose      vasopressin infusion Stopped (10/30/18 1650)    EPINEPHrine infusion Stopped (10/30/18 1635)    norepinephrine      phenylephrine (PAPA-SYNEPHRINE) 50mg/250mL infusion 60 mcg/min (10/31/18 0220)       Exam:   General: alert and oriented to person, place and time, well-developed and well-nourished, in no acute distress  Heart:Normal S1 and S2.  Regular rhythm. No murmurs, gallops, or rubs. , Pacing wires  Yes  Lungs: clear to auscultation bilaterally and diminished breath sounds bibasilar Equal and symmetric expansion with respiration. Chest tubes to suction  Abdomen: soft, non tender, non distended, BSx4  Extremities: Trace edema, intact pulses in all four extremities  Musculoskeletal:  Intact range of motion of peripheral joints. grossly normal  Neurologic:  Non-focal sensory deficits on exam.  Psychiatric: Mood and affect are appropriate. Wounds: clean and dry, healing appropriately, dressing in place       Assessment/Plan:   Patient Active Problem List   Diagnosis    Mixed hyperlipidemia    Essential hypertension    Esophageal reflux    Family history of prostate cancer    Nonrheumatic aortic valve stenosis    Type 2 diabetes mellitus with complication ([facility])    Type 2 diabetes mellitus with hyperglycemia, without long-term current use of insulin ([facility])    Low back pain with sciatica    Moderate mitral regurgitation    S/P AVR (aortic valve replacement)       1. S/p AVR   POD 1  Start Warfarin 5 mg, goal 1.5-2  2.  HD - stable, wean off Frank, start midodrine  3. Pulmo - IS, acapella, OOBTC once off pressors   4. GI - advance diet  5.  - flomax, adequate UOP  6. Neuro - pain minimal  ABLA - 7.9, plt 95 monitor  Leucocytosis - reactive post surgical, monitor    DVT ppx: Lovenox & SCD's while stationary  Plan for discharge rehab vs home    The above recommendations including medications and orders were discussed and agreed upon with  the attending on service for the cardiothoracic surgery group today.     Electronically signed by JUANIS Zaman on 10/31/2018 at 7:29 AM

## 2018-10-31 NOTE — CONSULTS
are crisp and normal except . regular S1 and S2.  · Jugular venous pulsation Normal  · The carotid upstroke is normal in amplitude and contour without delay or bruit  · Peripheral pulses are symmetrical and full     Abdomen:   · No masses or tenderness  · Bowel sounds present  Extremities:  ·  No Cyanosis or Clubbing  ·  Lower extremity edema: No  ·  Skin: Warm and dry  Neurological:  · Alert and oriented. · Moves all extremities well  · No abnormalities of mood, affect, memory, mentation, or behavior are noted    DATA:    Diagnostics:    EKG: Normal sinus rhythm with right bundle branch block. ECHO: on 10-11-18 showed showed ejection fraction 60% with left atrial dilatation. Heavily calcified and thickened aortic valve elevated with evidence of severe aortic stenosis. Peak gradient pressure 101 mmHg and mean gradient of 57 mmHg. Cardiac Angiography: on 3-1-18  Nonobstructive coronary artery disease with moderate aortic stenosis. Normal right heart pressures. Labs:     CBC:   Recent Labs      10/30/18   1220  10/31/18   0431   WBC  19.7*  20.1*   HGB  9.3*  7.9*   HCT  29.1*  24.2*   PLT  106*  See Reflexed IPF Result     BMP: Recent Labs      10/30/18   1220   10/31/18   0033  10/31/18   0431   NA  139   --    --   137   K  3.6*   < >  4.4  4.2   CO2  21   --    --   21   BUN  14   --    --   19   CREATININE  0.80   --    --   0.91   LABGLOM  >60   --    --   >60   GLUCOSE  166*   --    --   107*    < > = values in this interval not displayed. BNP: No results for input(s): BNP in the last 72 hours. PT/INR:   Recent Labs      10/30/18   1220  10/31/18   0431   PROTIME  13.1*  11.3   INR  1.2  1.1     APTT:  Recent Labs      10/30/18   1220   APTT  28.1     CARDIAC ENZYMES:No results for input(s): CKTOTAL, CKMB, CKMBINDEX, TROPONINI in the last 72 hours.     Invalid input(s):  TROPONINT  FASTING LIPID PANEL:  Lab Results   Component Value Date    HDL 42 04/13/2017    LDLCALC 60.8 02/19/2018    TRIG 191

## 2018-10-31 NOTE — DISCHARGE INSTR - COC
(1500kcals/day)    Routes of Feeding: Oral  Liquids: No Restrictions  Daily Fluid Restriction: yes - amount 2400  Last Modified Barium Swallow with Video (Video Swallowing Test): not done    Treatments at the Time of Hospital Discharge:   Respiratory Treatments:   N/A  Oxygen Therapy:  is not on home oxygen therapy. Ventilator:    - No ventilator support    Rehab Therapies: Physical Therapy and Occupational Therapy  Weight Bearing Status/Restrictions: No weight bearing restirctions  Other Medical Equipment (for information only, NOT a DME order):  Patient has own wheeled walker  Other Treatments:   Skilled Cardiac and Resp assessment with vital signs Q visit  Report abnormal findings  Monitor incisional healing report abnormal findings  Reinforce pt to do Daily weight call weight gain 2-3lbs/24hour or 5-7lbs/7 days  Reinforce and teach diet, medication, and activity instructions  Assist medication setup  Incentive spirometer 10x a day for 10 breaths  Ambulate 5 times a day           Patient's personal belongings (please select all that are sent with patient): All personal belongings sent home with patient. RN SIGNATURE:  Electronically signed by Jessi Livingston RN on 11/4/18 at 10:36 AM    CASE MANAGEMENT/SOCIAL WORK SECTION    Inpatient Status Date: 10/30/18    Readmission Risk Assessment Score:  Readmission Risk              Risk of Unplanned Readmission:        11           Discharging to Facility/ Agency   · Name: [facility] care  Address:  [address] [place] [address]        Phone: [phone]       Fax: [phone]        ·   ·     Dialysis Facility (if applicable)   · Name:  · Address:  · Dialysis Schedule:  · Phone:  · Fax:    / signature: Electronically signed by Sariah Colindres RN on 11/4/18 at 8:48 AM    The patient is being discharged home on coumadin for AVR bioprosthetic. His INR at discharge was 1.8. His goal INR is 1.5-2.  The cardiothoracic

## 2018-11-01 ENCOUNTER — APPOINTMENT (OUTPATIENT)
Dept: GENERAL RADIOLOGY | Age: 73
DRG: 220 | End: 2018-11-01
Attending: THORACIC SURGERY (CARDIOTHORACIC VASCULAR SURGERY)
Payer: MEDICARE

## 2018-11-01 LAB
ANION GAP SERPL CALCULATED.3IONS-SCNC: 16 MMOL/L (ref 9–17)
BUN BLDV-MCNC: 28 MG/DL (ref 8–23)
BUN/CREAT BLD: ABNORMAL (ref 9–20)
CALCIUM SERPL-MCNC: 8.6 MG/DL (ref 8.6–10.4)
CHLORIDE BLD-SCNC: 100 MMOL/L (ref 98–107)
CO2: 20 MMOL/L (ref 20–31)
CREAT SERPL-MCNC: 0.84 MG/DL (ref 0.7–1.2)
GFR AFRICAN AMERICAN: >60 ML/MIN
GFR NON-AFRICAN AMERICAN: >60 ML/MIN
GFR SERPL CREATININE-BSD FRML MDRD: ABNORMAL ML/MIN/{1.73_M2}
GFR SERPL CREATININE-BSD FRML MDRD: ABNORMAL ML/MIN/{1.73_M2}
GLUCOSE BLD-MCNC: 184 MG/DL (ref 75–110)
GLUCOSE BLD-MCNC: 226 MG/DL (ref 70–99)
GLUCOSE BLD-MCNC: 241 MG/DL (ref 75–110)
GLUCOSE BLD-MCNC: 262 MG/DL (ref 75–110)
HCT VFR BLD CALC: 25.1 % (ref 40.7–50.3)
HEMOGLOBIN: 7.9 G/DL (ref 13–17)
INR BLD: 1
MAGNESIUM: 2.3 MG/DL (ref 1.6–2.6)
MCH RBC QN AUTO: 30.6 PG (ref 25.2–33.5)
MCHC RBC AUTO-ENTMCNC: 31.5 G/DL (ref 28.4–34.8)
MCV RBC AUTO: 97.3 FL (ref 82.6–102.9)
NRBC AUTOMATED: 0 PER 100 WBC
PDW BLD-RTO: 13.2 % (ref 11.8–14.4)
PLATELET # BLD: ABNORMAL K/UL (ref 138–453)
PLATELET, FLUORESCENCE: 83 K/UL (ref 138–453)
PLATELET, IMMATURE FRACTION: 7 % (ref 1.1–10.3)
PMV BLD AUTO: ABNORMAL FL (ref 8.1–13.5)
POTASSIUM SERPL-SCNC: 4.7 MMOL/L (ref 3.7–5.3)
PROTHROMBIN TIME: 10.7 SEC (ref 9–12)
RBC # BLD: 2.58 M/UL (ref 4.21–5.77)
SODIUM BLD-SCNC: 136 MMOL/L (ref 135–144)
WBC # BLD: 15.8 K/UL (ref 3.5–11.3)

## 2018-11-01 PROCEDURE — G8988 SELF CARE GOAL STATUS: HCPCS

## 2018-11-01 PROCEDURE — 6370000000 HC RX 637 (ALT 250 FOR IP): Performed by: PHYSICIAN ASSISTANT

## 2018-11-01 PROCEDURE — 6370000000 HC RX 637 (ALT 250 FOR IP): Performed by: STUDENT IN AN ORGANIZED HEALTH CARE EDUCATION/TRAINING PROGRAM

## 2018-11-01 PROCEDURE — 82947 ASSAY GLUCOSE BLOOD QUANT: CPT

## 2018-11-01 PROCEDURE — 6370000000 HC RX 637 (ALT 250 FOR IP): Performed by: NURSE PRACTITIONER

## 2018-11-01 PROCEDURE — 97166 OT EVAL MOD COMPLEX 45 MIN: CPT

## 2018-11-01 PROCEDURE — 80048 BASIC METABOLIC PNL TOTAL CA: CPT

## 2018-11-01 PROCEDURE — 97530 THERAPEUTIC ACTIVITIES: CPT

## 2018-11-01 PROCEDURE — 99024 POSTOP FOLLOW-UP VISIT: CPT | Performed by: PHYSICIAN ASSISTANT

## 2018-11-01 PROCEDURE — 2580000003 HC RX 258: Performed by: PHYSICIAN ASSISTANT

## 2018-11-01 PROCEDURE — 6360000002 HC RX W HCPCS: Performed by: PHYSICIAN ASSISTANT

## 2018-11-01 PROCEDURE — 36415 COLL VENOUS BLD VENIPUNCTURE: CPT

## 2018-11-01 PROCEDURE — 97110 THERAPEUTIC EXERCISES: CPT

## 2018-11-01 PROCEDURE — 83735 ASSAY OF MAGNESIUM: CPT

## 2018-11-01 PROCEDURE — G8987 SELF CARE CURRENT STATUS: HCPCS

## 2018-11-01 PROCEDURE — G8978 MOBILITY CURRENT STATUS: HCPCS

## 2018-11-01 PROCEDURE — 71045 X-RAY EXAM CHEST 1 VIEW: CPT

## 2018-11-01 PROCEDURE — 85027 COMPLETE CBC AUTOMATED: CPT

## 2018-11-01 PROCEDURE — 2140000001 HC CVICU INTERMEDIATE R&B

## 2018-11-01 PROCEDURE — 97535 SELF CARE MNGMENT TRAINING: CPT

## 2018-11-01 PROCEDURE — 85055 RETICULATED PLATELET ASSAY: CPT

## 2018-11-01 PROCEDURE — 97162 PT EVAL MOD COMPLEX 30 MIN: CPT

## 2018-11-01 PROCEDURE — G8979 MOBILITY GOAL STATUS: HCPCS

## 2018-11-01 PROCEDURE — 85610 PROTHROMBIN TIME: CPT

## 2018-11-01 RX ORDER — PIOGLITAZONEHYDROCHLORIDE 45 MG/1
45 TABLET ORAL DAILY
Status: DISCONTINUED | OUTPATIENT
Start: 2018-11-01 | End: 2018-11-04 | Stop reason: HOSPADM

## 2018-11-01 RX ORDER — GLIMEPIRIDE 2 MG/1
4 TABLET ORAL 2 TIMES DAILY
Status: DISCONTINUED | OUTPATIENT
Start: 2018-11-01 | End: 2018-11-04 | Stop reason: HOSPADM

## 2018-11-01 RX ORDER — MIDODRINE HYDROCHLORIDE 5 MG/1
5 TABLET ORAL
Status: DISCONTINUED | OUTPATIENT
Start: 2018-11-01 | End: 2018-11-02

## 2018-11-01 RX ADMIN — GLIMEPIRIDE 4 MG: 2 TABLET ORAL at 10:53

## 2018-11-01 RX ADMIN — FAMOTIDINE 20 MG: 20 TABLET, FILM COATED ORAL at 08:23

## 2018-11-01 RX ADMIN — GLIMEPIRIDE 4 MG: 2 TABLET ORAL at 20:38

## 2018-11-01 RX ADMIN — METOPROLOL TARTRATE 25 MG: 25 TABLET ORAL at 20:38

## 2018-11-01 RX ADMIN — FENTANYL CITRATE 25 MCG: 50 INJECTION, SOLUTION INTRAMUSCULAR; INTRAVENOUS at 13:25

## 2018-11-01 RX ADMIN — INSULIN LISPRO 6 UNITS: 100 INJECTION, SOLUTION INTRAVENOUS; SUBCUTANEOUS at 13:40

## 2018-11-01 RX ADMIN — OXYCODONE HYDROCHLORIDE AND ACETAMINOPHEN 2 TABLET: 5; 325 TABLET ORAL at 08:32

## 2018-11-01 RX ADMIN — MIDODRINE HYDROCHLORIDE 5 MG: 5 TABLET ORAL at 17:44

## 2018-11-01 RX ADMIN — AMIODARONE HYDROCHLORIDE 200 MG: 200 TABLET ORAL at 08:23

## 2018-11-01 RX ADMIN — ASPIRIN 81 MG: 81 TABLET ORAL at 08:23

## 2018-11-01 RX ADMIN — TAMSULOSIN HYDROCHLORIDE 0.4 MG: 0.4 CAPSULE ORAL at 08:20

## 2018-11-01 RX ADMIN — Medication 10 ML: at 20:39

## 2018-11-01 RX ADMIN — OXYCODONE HYDROCHLORIDE AND ACETAMINOPHEN 1 TABLET: 5; 325 TABLET ORAL at 21:46

## 2018-11-01 RX ADMIN — MIDODRINE HYDROCHLORIDE 10 MG: 5 TABLET ORAL at 08:21

## 2018-11-01 RX ADMIN — INSULIN LISPRO 4 UNITS: 100 INJECTION, SOLUTION INTRAVENOUS; SUBCUTANEOUS at 08:24

## 2018-11-01 RX ADMIN — INSULIN LISPRO 4 UNITS: 100 INJECTION, SOLUTION INTRAVENOUS; SUBCUTANEOUS at 17:52

## 2018-11-01 RX ADMIN — WARFARIN SODIUM 5 MG: 5 TABLET ORAL at 18:57

## 2018-11-01 RX ADMIN — DOCUSATE SODIUM 100 MG: 100 CAPSULE, LIQUID FILLED ORAL at 08:23

## 2018-11-01 RX ADMIN — METOPROLOL TARTRATE 12.5 MG: 25 TABLET ORAL at 09:17

## 2018-11-01 RX ADMIN — FAMOTIDINE 20 MG: 20 TABLET, FILM COATED ORAL at 20:39

## 2018-11-01 RX ADMIN — ENOXAPARIN SODIUM 40 MG: 40 INJECTION SUBCUTANEOUS at 08:22

## 2018-11-01 RX ADMIN — AMIODARONE HYDROCHLORIDE 200 MG: 200 TABLET ORAL at 20:39

## 2018-11-01 RX ADMIN — MUPIROCIN: 20 OINTMENT TOPICAL at 08:23

## 2018-11-01 RX ADMIN — PIOGLITAZONE 45 MG: 45 TABLET ORAL at 10:53

## 2018-11-01 RX ADMIN — METFORMIN HYDROCHLORIDE 1000 MG: 500 TABLET ORAL at 17:44

## 2018-11-01 RX ADMIN — METFORMIN HYDROCHLORIDE 1000 MG: 500 TABLET ORAL at 10:52

## 2018-11-01 RX ADMIN — MULTIPLE VITAMINS W/ MINERALS TAB 1 TABLET: TAB at 08:21

## 2018-11-01 RX ADMIN — INSULIN LISPRO 1 UNITS: 100 INJECTION, SOLUTION INTRAVENOUS; SUBCUTANEOUS at 20:54

## 2018-11-01 ASSESSMENT — PAIN DESCRIPTION - PAIN TYPE
TYPE: ACUTE PAIN
TYPE: ACUTE PAIN;SURGICAL PAIN
TYPE: ACUTE PAIN
TYPE: SURGICAL PAIN

## 2018-11-01 ASSESSMENT — PAIN DESCRIPTION - LOCATION
LOCATION: CHEST
LOCATION: CHEST
LOCATION: CHEST;INCISION
LOCATION: CHEST
LOCATION: CHEST
LOCATION: CHEST;INCISION
LOCATION: CHEST

## 2018-11-01 ASSESSMENT — PAIN DESCRIPTION - DESCRIPTORS
DESCRIPTORS: DISCOMFORT
DESCRIPTORS: DISCOMFORT

## 2018-11-01 ASSESSMENT — PAIN SCALES - GENERAL
PAINLEVEL_OUTOF10: 2
PAINLEVEL_OUTOF10: 6
PAINLEVEL_OUTOF10: 6
PAINLEVEL_OUTOF10: 2
PAINLEVEL_OUTOF10: 3
PAINLEVEL_OUTOF10: 5
PAINLEVEL_OUTOF10: 5
PAINLEVEL_OUTOF10: 2
PAINLEVEL_OUTOF10: 6

## 2018-11-01 ASSESSMENT — PAIN DESCRIPTION - ORIENTATION
ORIENTATION: MID
ORIENTATION: MID

## 2018-11-01 ASSESSMENT — PAIN DESCRIPTION - FREQUENCY: FREQUENCY: INTERMITTENT

## 2018-11-01 NOTE — PROGRESS NOTES
Status (): At least 1 percent but less than 20 percent impaired, limited or restricted      Goals  Short term goals  Time Frame for Short term goals: 15  Short term goal 1: Pt to perform bed mobility mod. independently  Short term goal 2: Pt to demo functional transfers mod. independently  Short term goal 3: Pt to amb 300ft w/ least restrictive AD mod. independently  Short term goal 4: Ascend/ descend 3 stairs w/ bilateral rails SBA   Short term goal 5: Tolerate 30 minutes to demo increased endurance  Patient Goals   Patient goals :  To start moving around    [address] per week: BID  Current Treatment Recommendations: Strengthening, Balance Training, Functional Mobility Training, Transfer Training, Neuromuscular Re-education, Endurance Training, Gait Training, Stair training, Home Exercise Program, Safety Education & Training, Patient/Caregiver Education & Training  Safety Devices  Type of devices: Call light within reach, Gait belt, Chair alarm in place, Left in bed, Left in chair, Nurse notified  Restraints  Initially in place: No     Therapy Time   Individual Concurrent Group Co-treatment   Time In 1502         Time Out 1536         Minutes [address]

## 2018-11-01 NOTE — PROGRESS NOTES
Physical Therapy    Facility/Department: [facility]  Initial Assessment    NAME: Hiro Manzano  :   MRN: [id number]    Date of Service: 2018   S/p AVR (10/30/18)     Discharge Recommendations:  Home with assist PRN   PT Equipment Recommendations  Equipment Needed: No (Pt states he owns a RW and has access to it at home)    Patient Diagnosis(es): There were no encounter diagnoses. has a past medical history of Aortic stenosis; Degenerative disc disease, lumbar; Diabetes mellitus ([address] Diverticulosis of sigmoid colon; DIA (dyspnea on exertion); Full dentures; GERD (gastroesophageal reflux disease); Heart murmur; Hyperlipidemia; Hypertension; Kidney stone; Mitral regurgitation; Obesity; Tubular adenoma of colon; Wears glasses; and Yeast infection. has a past surgical history that includes Orbital fracture repair (Left, ); hernia repair; Cardiac catheterization (2018); Colonoscopy (); Colonoscopy (10/2017); and pr replacement of mitral valve (N/A, 10/30/2018). Restrictions  Restrictions/Precautions  Restrictions/Precautions: Fall Risk  Position Activity Restriction  Sternal Precautions: No Pushing, 5# Lifting Restrictions, No Pulling  Other position/activity restrictions: Amb. pt; S/p AVR (10/30)   Vision/Hearing  Vision: Impaired  Vision Exceptions: Wears glasses at all times  Hearing: Within functional limits     Subjective  General  Patient assessed for rehabilitation services?: Yes  Response To Previous Treatment: Not applicable  Family / Caregiver Present: No  Follows Commands: Within Functional Limits  Subjective  Subjective: RN and pt agreeable to PT. Pt sititng in bedside chair upon arrival, pleasant and cooperative throughout. Pain Screening  Patient Currently in Pain: Yes  Pain Assessment  Pain Assessment: 0-10  Pain Level: 6  Pain Type: Acute pain  Pain Location: Chest  Pain Descriptors: Discomfort  Pain Intervention(s): Repositioned; Ambulation/Increased 1  Surface: level tile  Device: Rolling Walker  Other Apparatus: O2 (1L)  Assistance: Minimal assistance (For RW progression and line management )  Quality of Gait: Waddling gait, short step length, decreased gait speed  Distance: 100ft   Comments: Pt requires mod vc's for progression of RW- good return demo   Stairs/Curb  Stairs?: No     Balance  Posture: Fair  Sitting - Static: Fair;+  Sitting - Dynamic: Fair;+  Standing - Static: Fair;+  Standing - Dynamic: Fair;-  Comments: Pt sat EOB ~4min CGA provided for safety, Standing balance assessed w/ RW        Assessment   Body structures, Functions, Activity limitations: Decreased functional mobility ; Decreased ROM; Decreased strength;Decreased endurance;Decreased balance;Decreased sensation  Assessment: Pt amb 100ft w/ RW min A, no LOB noted during amb or functional transfers; Recommending pt return home w/ prn assist from family  Prognosis: Good  Decision Making: Medium Complexity  Patient Education: purpose of PT eval; importance of mobility; POC; discharge recommendations; sternal precautions   REQUIRES PT FOLLOW UP: Yes  Activity Tolerance  Activity Tolerance: Patient limited by fatigue;Patient limited by pain; Patient limited by endurance         Plan   Plan  Times per week: BID  Current Treatment Recommendations: Strengthening, Balance Training, Functional Mobility Training, Transfer Training, Neuromuscular Re-education, Endurance Training, Gait Training, Stair training, Home Exercise Program, Safety Education & Training, Patient/Caregiver Education & Training  Safety Devices  Type of devices: Call light within reach, Gait belt, Chair alarm in place, Left in bed, Left in chair, Nurse notified  Restraints  Initially in place: No    G-Code  PT G-Codes  Functional Limitation: Mobility: Walking and moving around  Mobility: Walking and Moving Around Current Status ():  At least 60 percent but less than 80 percent impaired, limited or restricted  Mobility: Walking

## 2018-11-01 NOTE — PROGRESS NOTES
IPF Result     BMP:  Recent Labs      10/30/18   1220   10/31/18   0033  10/31/18   0431  11/01/18   0441   NA  139   --    --   137  136   K  3.6*   < >  4.4  4.2  4.7   CL  108*   --    --   107  100   CO2  21   --    --   21  20   BUN  14   --    --   19  28*   CREATININE  0.80   --    --   0.91  0.84   GLUCOSE  166*   --    --   107*  226*    < > = values in this interval not displayed. Hepatic: No results for input(s): AST, ALT, ALB, BILITOT, ALKPHOS in the last 72 hours. Troponin: No results for input(s): TROPONINI in the last 72 hours. No results for input(s): TROPONINT in the last 72 hours. BNP: No results for input(s): PROBNP in the last 72 hours. No results for input(s): BNP in the last 72 hours. Lipids: No results for input(s): CHOL, HDL in the last 72 hours. Invalid input(s): LDLCALCU  INR:   Recent Labs      10/30/18   1220  10/31/18   0431  11/01/18   0441   INR  1.2  1.1  1.0       Objective:   Vitals: /75   Pulse 124   Temp 98.8 °F (37.1 °C) (Oral)   Resp 18   Ht 5' 5\" (1.651 m)   Wt 208 lb 5.4 oz (94.5 kg)   SpO2 96%   BMI 34.67 kg/m²      Constitutional and General Appearance: alert, cooperative, no distress and appears stated age  HEENT: PERRL, no cervical lymphadenopathy. No masses palpable. Normal oral mucosa  Respiratory:  · Normal excursion and expansion without use of accessory muscles  · Resp Auscultation: Good respiratory effort. No for increased work of breathing. On auscultation: clear to auscultation bilaterally  Cardiovascular:  · Mid-sternal dressing dry and intact.   · The apical impulse is not displaced  · Loud valve closure murmur at right second inter coastal space   · Heart tones are crisp and normal except . regular S1 and S2.  · Jugular venous pulsation Normal  · The carotid upstroke is normal in amplitude and contour without delay or bruit  · Peripheral pulses are symmetrical and full      Abdomen:   · No masses or tenderness  · Bowel sounds present  Extremities:  ·  No Cyanosis or Clubbing  ·  Lower extremity edema: No  ·  Skin: Warm and dry  Neurological:  · Alert and oriented. · Moves all extremities well  · No abnormalities of mood, affect, memory, mentation, or behavior are noted    Diagnostic Studies:   EKG: Normal sinus rhythm with right bundle branch block. ECHO: on 10-11-18 showed showed ejection fraction 60% with left atrial dilatation. Heavily calcified and thickened aortic valve elevated with evidence of severe aortic stenosis. Peak gradient pressure 101 mmHg and mean gradient of 57 mmHg. Cardiac Angiography: on 3-1-18  Nonobstructive coronary artery disease with moderate aortic stenosis. Normal right heart pressures. LAD 30-40%, LCx 20-30%, RCA 30-40%   .    Postop DOREEN 10/30/18 LVEF 55%    Patient's Active Problem List   Active Problems:    S/P AVR (aortic valve replacement)  Resolved Problems:    * No resolved hospital problems. *          Assessment / Acute Cardiac Problems:   1. Severe aortic stenosis s/p SAVR with 23 mm Intuity valve on 10/30  2. Nonobstructive CAD   3. Diabetes   4. Hypertension   5. BPH        Plan of Treatment:   1. Continue anticoagulation  with Coumadin, pharmacy dosing warfarin   2. Wean off midodrine as able   3. Increased metoprolol to 25 mg bid due to tachycardia  4. Post op management by Stan Waters MD  Fellow, Cardiovascular Diseases    [facility]            Please note that part of this chart were generated using voice recognition  dictation software. Although every effort was made to ensure the accuracy of this automated transcription, some errors in transcription may have occurred.       Attending note,   The patient was seen and examined, agree with above, doing well and asymptomatic, continue current medications, encourage ambulation and PT/OT will follow

## 2018-11-01 NOTE — PROGRESS NOTES
level  Home Access: Stairs to enter with rails  Entrance Stairs - Number of Steps: 3 TMO  Entrance Stairs - Rails: Both  Bathroom Shower/Tub: Tub/Shower unit  Bathroom Toilet: Standard  Bathroom Equipment: Grab bars in shower  Home Equipment: Rolling walker (Pt did not amb w/ AD prior to admission)  ADL Assistance: [address]: Independent  Homemaking Responsibilities: Yes  Meal Prep Responsibility: Primary  Laundry Responsibility: Primary  Cleaning Responsibility: Primary  Shopping Responsibility: Primary  Ambulation Assistance: Independent  Transfer Assistance: Independent  Active : Yes  Mode of Transportation: Car  Occupation: Retired  Additional Comments: Pt reports pt's childrenare planning on rotating in to stay with pt for 24/7 assistance on discharge    Objective   Vision: Impaired  Vision Exceptions: Wears glasses at all times  Hearing: Within functional limits      Orientation  Overall Orientation Status: Within Functional Limits  Observation/Palpation  Posture: Good  Balance  Sitting Balance: Contact guard assistance (seated EOB)  Standing Balance: Contact guard assistance (with use of RW)    Standing Balance  Sit to stand: Minimal assistance  Stand to sit: Minimal assistance    Functional Mobility  Functional - Mobility Device: Rolling Walker  Activity: Other  Assist Level: Contact guard assistance  Functional Mobility Comments: use of RW, VCs for hand placement on RW during session. Pt required VCs for proper pursed lipped breathing and rest breaks as needed. VCs for body placement on RW during functional activities     ADL  Feeding: Independent  Grooming: Stand by assistance;Setup (to wash face supine in bed)  UE Bathing: Minimal assistance;Setup (to wash back and chest seated EOB)  LE Bathing:  Moderate assistance;Setup (to wash tops of legs seated EOB)  UE Dressing: Minimal assistance;Setup (to don/ doff gown seated EOB)  LE Dressing: Maximum assistance;Setup (to don/ doff sx. Pt required use of RW to assist with functional mobility on this date  Prognosis: Good  Decision Making: Medium Complexity  Patient Education: OT POC, discharge planning, safety, sternal precautions   REQUIRES OT FOLLOW UP: Yes  Activity Tolerance  Activity Tolerance: Patient Tolerated treatment well  Safety Devices  Safety Devices in place: Yes  Type of devices: Left in chair;Nurse notified;Call light within reach;Gait belt;Patient at risk for falls  Restraints  Initially in place: No         Plan   Plan  Times per week: 4-5x/wk   Current Treatment Recommendations: Functional Mobility Training, Endurance Training, Safety Education & Training, Self-Care / ADL, Patient/Caregiver Education & Training, Equipment Evaluation, Education, & procurement    G-Code  OT G-codes  Functional Assessment Tool Used: [facility]  Score: 17/24  Functional Limitation: Self care  Self Care Current Status (): At least 40 percent but less than 60 percent impaired, limited or restricted  Self Care Goal Status (): At least 20 percent but less than 40 percent impaired, limited or restricted    AM-PAC Score  AM-[facility] Inpatient Daily Activity Raw Score: 17  AM-PAC Inpatient ADL T-Scale Score : 37.26  ADL Inpatient CMS 0-100% Score: 50.11  ADL Inpatient CMS G-Code Modifier : CK  How much help from another person does the pt currently need? Unable A Lot A Little None   1. Putting on and taking off regular lower body clothing? 1      2      3       4   2. Bathing (including washing, rinsing, drying)? 1      2      3      4   3. Toileting, which includes using toilet, bedpan, or urinal?      1      2        3      4   4. Putting on and taking off regular upper body clothing? 1      2      3       4   5. Taking care of personal grooming such as brushing teeth? 1      2      3      4   6. Eating meals? 1      2       3       4     1. Unable = Total/Dependent Assist  2. A Lot = Maximum/Moderate Assist  3.  A Little =

## 2018-11-02 ENCOUNTER — APPOINTMENT (OUTPATIENT)
Dept: GENERAL RADIOLOGY | Age: 73
DRG: 220 | End: 2018-11-02
Attending: THORACIC SURGERY (CARDIOTHORACIC VASCULAR SURGERY)
Payer: MEDICARE

## 2018-11-02 ENCOUNTER — ANTI-COAG VISIT (OUTPATIENT)
Dept: CARDIOTHORACIC SURGERY | Age: 73
End: 2018-11-02

## 2018-11-02 DIAGNOSIS — Z95.2 S/P AVR (AORTIC VALVE REPLACEMENT): ICD-10-CM

## 2018-11-02 LAB
ABO/RH: NORMAL
ANION GAP SERPL CALCULATED.3IONS-SCNC: 13 MMOL/L (ref 9–17)
ANTIBODY SCREEN: NEGATIVE
ARM BAND NUMBER: NORMAL
BLD PROD TYP BPU: NORMAL
BLD PROD TYP BPU: NORMAL
BUN BLDV-MCNC: 42 MG/DL (ref 8–23)
BUN/CREAT BLD: ABNORMAL (ref 9–20)
CALCIUM SERPL-MCNC: 8.8 MG/DL (ref 8.6–10.4)
CHLORIDE BLD-SCNC: 101 MMOL/L (ref 98–107)
CO2: 22 MMOL/L (ref 20–31)
CREAT SERPL-MCNC: 0.8 MG/DL (ref 0.7–1.2)
CROSSMATCH RESULT: NORMAL
CROSSMATCH RESULT: NORMAL
DISPENSE STATUS BLOOD BANK: NORMAL
DISPENSE STATUS BLOOD BANK: NORMAL
EXPIRATION DATE: NORMAL
GFR AFRICAN AMERICAN: >60 ML/MIN
GFR NON-AFRICAN AMERICAN: >60 ML/MIN
GFR SERPL CREATININE-BSD FRML MDRD: ABNORMAL ML/MIN/{1.73_M2}
GFR SERPL CREATININE-BSD FRML MDRD: ABNORMAL ML/MIN/{1.73_M2}
GLUCOSE BLD-MCNC: 137 MG/DL (ref 75–110)
GLUCOSE BLD-MCNC: 164 MG/DL (ref 75–110)
GLUCOSE BLD-MCNC: 215 MG/DL (ref 70–99)
GLUCOSE BLD-MCNC: 216 MG/DL (ref 75–110)
GLUCOSE BLD-MCNC: 235 MG/DL (ref 75–110)
HCT VFR BLD CALC: 24.8 % (ref 40.7–50.3)
HEMOGLOBIN: 7.9 G/DL (ref 13–17)
INR BLD: 1.1
MAGNESIUM: 2.3 MG/DL (ref 1.6–2.6)
MCH RBC QN AUTO: 30.9 PG (ref 25.2–33.5)
MCHC RBC AUTO-ENTMCNC: 31.9 G/DL (ref 28.4–34.8)
MCV RBC AUTO: 96.9 FL (ref 82.6–102.9)
NRBC AUTOMATED: 0 PER 100 WBC
PDW BLD-RTO: 13.3 % (ref 11.8–14.4)
PLATELET # BLD: 113 K/UL (ref 138–453)
PMV BLD AUTO: 12.2 FL (ref 8.1–13.5)
POTASSIUM SERPL-SCNC: 4.4 MMOL/L (ref 3.7–5.3)
PROTHROMBIN TIME: 11.2 SEC (ref 9–12)
RBC # BLD: 2.56 M/UL (ref 4.21–5.77)
SODIUM BLD-SCNC: 136 MMOL/L (ref 135–144)
TRANSFUSION STATUS: NORMAL
TRANSFUSION STATUS: NORMAL
UNIT DIVISION: 0
UNIT DIVISION: 0
UNIT NUMBER: NORMAL
UNIT NUMBER: NORMAL
WBC # BLD: 13 K/UL (ref 3.5–11.3)

## 2018-11-02 PROCEDURE — 6370000000 HC RX 637 (ALT 250 FOR IP): Performed by: PHYSICIAN ASSISTANT

## 2018-11-02 PROCEDURE — 2140000001 HC CVICU INTERMEDIATE R&B

## 2018-11-02 PROCEDURE — 36415 COLL VENOUS BLD VENIPUNCTURE: CPT

## 2018-11-02 PROCEDURE — 6370000000 HC RX 637 (ALT 250 FOR IP): Performed by: STUDENT IN AN ORGANIZED HEALTH CARE EDUCATION/TRAINING PROGRAM

## 2018-11-02 PROCEDURE — 99024 POSTOP FOLLOW-UP VISIT: CPT | Performed by: NURSE PRACTITIONER

## 2018-11-02 PROCEDURE — 2580000003 HC RX 258: Performed by: PHYSICIAN ASSISTANT

## 2018-11-02 PROCEDURE — 85610 PROTHROMBIN TIME: CPT

## 2018-11-02 PROCEDURE — 82947 ASSAY GLUCOSE BLOOD QUANT: CPT

## 2018-11-02 PROCEDURE — 6370000000 HC RX 637 (ALT 250 FOR IP): Performed by: NURSE PRACTITIONER

## 2018-11-02 PROCEDURE — 71045 X-RAY EXAM CHEST 1 VIEW: CPT

## 2018-11-02 PROCEDURE — 97110 THERAPEUTIC EXERCISES: CPT

## 2018-11-02 PROCEDURE — 6360000002 HC RX W HCPCS: Performed by: PHYSICIAN ASSISTANT

## 2018-11-02 PROCEDURE — 83735 ASSAY OF MAGNESIUM: CPT

## 2018-11-02 PROCEDURE — 80048 BASIC METABOLIC PNL TOTAL CA: CPT

## 2018-11-02 PROCEDURE — 97116 GAIT TRAINING THERAPY: CPT

## 2018-11-02 PROCEDURE — 85027 COMPLETE CBC AUTOMATED: CPT

## 2018-11-02 RX ORDER — METOPROLOL TARTRATE 50 MG/1
50 TABLET, FILM COATED ORAL 2 TIMES DAILY
Status: DISCONTINUED | OUTPATIENT
Start: 2018-11-02 | End: 2018-11-04 | Stop reason: HOSPADM

## 2018-11-02 RX ORDER — WARFARIN SODIUM 10 MG/1
10 TABLET ORAL DAILY
Status: DISCONTINUED | OUTPATIENT
Start: 2018-11-02 | End: 2018-11-03

## 2018-11-02 RX ADMIN — FAMOTIDINE 20 MG: 20 TABLET, FILM COATED ORAL at 07:43

## 2018-11-02 RX ADMIN — INSULIN LISPRO 4 UNITS: 100 INJECTION, SOLUTION INTRAVENOUS; SUBCUTANEOUS at 07:48

## 2018-11-02 RX ADMIN — Medication 10 ML: at 20:25

## 2018-11-02 RX ADMIN — MULTIPLE VITAMINS W/ MINERALS TAB 1 TABLET: TAB at 07:43

## 2018-11-02 RX ADMIN — METOPROLOL TARTRATE 25 MG: 25 TABLET ORAL at 07:43

## 2018-11-02 RX ADMIN — ASPIRIN 81 MG: 81 TABLET ORAL at 07:43

## 2018-11-02 RX ADMIN — METFORMIN HYDROCHLORIDE 1000 MG: 500 TABLET ORAL at 07:43

## 2018-11-02 RX ADMIN — Medication 10 ML: at 07:44

## 2018-11-02 RX ADMIN — GLIMEPIRIDE 4 MG: 2 TABLET ORAL at 20:24

## 2018-11-02 RX ADMIN — MUPIROCIN: 20 OINTMENT TOPICAL at 07:44

## 2018-11-02 RX ADMIN — INSULIN LISPRO 4 UNITS: 100 INJECTION, SOLUTION INTRAVENOUS; SUBCUTANEOUS at 11:51

## 2018-11-02 RX ADMIN — GLIMEPIRIDE 4 MG: 2 TABLET ORAL at 07:43

## 2018-11-02 RX ADMIN — WARFARIN SODIUM 10 MG: 10 TABLET ORAL at 18:16

## 2018-11-02 RX ADMIN — METOPROLOL TARTRATE 50 MG: 50 TABLET, FILM COATED ORAL at 20:24

## 2018-11-02 RX ADMIN — METFORMIN HYDROCHLORIDE 1000 MG: 500 TABLET ORAL at 18:16

## 2018-11-02 RX ADMIN — MIDODRINE HYDROCHLORIDE 5 MG: 5 TABLET ORAL at 07:44

## 2018-11-02 RX ADMIN — FAMOTIDINE 20 MG: 20 TABLET, FILM COATED ORAL at 20:24

## 2018-11-02 RX ADMIN — MUPIROCIN: 20 OINTMENT TOPICAL at 20:24

## 2018-11-02 RX ADMIN — AMIODARONE HYDROCHLORIDE 200 MG: 200 TABLET ORAL at 20:24

## 2018-11-02 RX ADMIN — INSULIN LISPRO 2 UNITS: 100 INJECTION, SOLUTION INTRAVENOUS; SUBCUTANEOUS at 16:43

## 2018-11-02 RX ADMIN — PIOGLITAZONE 45 MG: 45 TABLET ORAL at 07:45

## 2018-11-02 RX ADMIN — TAMSULOSIN HYDROCHLORIDE 0.4 MG: 0.4 CAPSULE ORAL at 07:43

## 2018-11-02 RX ADMIN — AMIODARONE HYDROCHLORIDE 200 MG: 200 TABLET ORAL at 07:43

## 2018-11-02 RX ADMIN — ENOXAPARIN SODIUM 40 MG: 40 INJECTION SUBCUTANEOUS at 07:45

## 2018-11-02 ASSESSMENT — PAIN SCALES - GENERAL: PAINLEVEL_OUTOF10: 0

## 2018-11-02 NOTE — PROGRESS NOTES
CL  107  100  101   CO2  21  20  22   BUN  19  28*  42*   CREATININE  0.91  0.84  0.80   GLUCOSE  107*  226*  215*     Hepatic: No results for input(s): AST, ALT, ALB, BILITOT, ALKPHOS in the last 72 hours. Troponin: No results for input(s): TROPONINI in the last 72 hours. No results for input(s): TROPONINT in the last 72 hours. BNP: No results for input(s): PROBNP in the last 72 hours. No results for input(s): BNP in the last 72 hours. Lipids: No results for input(s): CHOL, HDL in the last 72 hours. Invalid input(s): LDLCALCU  INR:   Recent Labs      10/31/18   0431  11/01/18   0441  11/02/18   0516   INR  1.1  1.0  1.1       Objective:   Vitals: BP (!) 140/75   Pulse 90   Temp 98.2 °F (36.8 °C) (Oral)   Resp 16   Ht 5' 5\" (1.651 m)   Wt 207 lb 14.3 oz (94.3 kg)   SpO2 96%   BMI 34.60 kg/m²      Constitutional and General Appearance: alert, cooperative, no distress and appears stated age  HEENT: PERRL, no cervical lymphadenopathy. No masses palpable. Normal oral mucosa  Respiratory:  · Normal excursion and expansion without use of accessory muscles  · Resp Auscultation: Good respiratory effort. No for increased work of breathing. On auscultation: clear to auscultation bilaterally  Cardiovascular:  · Mid-sternal dressing dry and intact. · The apical impulse is not displaced  · Loud valve closure murmur at right second inter coastal space   · Heart tones are crisp and normal except . regular S1 and S2.  · Jugular venous pulsation Normal  · The carotid upstroke is normal in amplitude and contour without delay or bruit  · Peripheral pulses are symmetrical and full      Abdomen:   · No masses or tenderness  · Bowel sounds present  Extremities:  ·  No Cyanosis or Clubbing  ·  Lower extremity edema: No  ·  Skin: Warm and dry  Neurological:  · Alert and oriented.   · Moves all extremities well  · No abnormalities of mood, affect, memory, mentation, or behavior are noted    Diagnostic

## 2018-11-02 NOTE — PROGRESS NOTES
[facility]  Daily Progress Note  Surgeon:  Homer Bird MD          POD# 3    S/P :  Aortic bioprosthetic valve replacement  EF: 55 %      Subjective:  Mr. Leatha Boxer feels well today with no acute complaints. Pain is controlled. OOBTC and ambulating. Last BMs prior to surgery. Good appetite. Denies chest pain or shortness of breath. Plan of care reviewed and questions answered. Physical Exam  Vital Signs: BP (!) 140/75   Pulse 90   Temp 98.2 °F (36.8 °C) (Oral)   Resp 16   Ht 5' 5\" (1.651 m)   Wt 207 lb 14.3 oz (94.3 kg)   SpO2 96%   BMI 34.60 kg/m²  O2 Flow Rate (L/min): 2 L/min   Admit Weight: Weight: 195 lb 12.3 oz (88.8 kg)   WEIGHTWeight: 207 lb 14.3 oz (94.3 kg)     General: alert and oriented to person, place and time. Up in chair, No apparent distress. Heart:Normal S1 and S2.  Regular rhythm. No murmurs, gallops, or rubs. Pacing Wires Yes   Lungs: clear to auscultation bilaterally  Abdomen: soft, non tender, non distended, BSx4  Extremities: negative  Wounds: clean and dry, healing appropriately.      Scheduled Meds:    warfarin  10 mg Oral Daily    metoprolol tartrate  50 mg Oral BID    glimepiride  4 mg Oral BID    pioglitazone  45 mg Oral Daily    metFORMIN  1,000 mg Oral BID WC    midodrine  5 mg Oral TID WC    tamsulosin  0.4 mg Oral Daily    sodium chloride flush  10 mL Intravenous 2 times per day    docusate sodium  100 mg Oral BID    polyethylene glycol  17 g Oral Daily    famotidine  20 mg Oral BID    amiodarone  200 mg Oral BID    mupirocin   Nasal BID    therapeutic multivitamin-minerals  1 tablet Oral Daily with breakfast    enoxaparin  40 mg Subcutaneous Daily    aspirin  81 mg Oral Daily    insulin lispro  0-12 Units Subcutaneous TID WC    insulin lispro  0-6 Units Subcutaneous Nightly     Continuous Infusions:    dextrose         Data:  CBC:   Recent Labs      10/31/18   0431  11/01/18   0441  11/02/18   0516   WBC  20.1*  15.8*  13.0*

## 2018-11-02 NOTE — PROGRESS NOTES
Physical Therapy  Facility/Department: [facility]  Daily Treatment Note  NAME: Lisa Dai  :   MRN: [id number]    Date of Service: 2018    Discharge Recommendations:  Home with assist PRN        Patient Diagnosis(es): There were no encounter diagnoses. has a past medical history of Aortic stenosis; Degenerative disc disease, lumbar; Diabetes mellitus ([address] Diverticulosis of sigmoid colon; DIA (dyspnea on exertion); Full dentures; GERD (gastroesophageal reflux disease); Heart murmur; Hyperlipidemia; Hypertension; Kidney stone; Mitral regurgitation; Obesity; Tubular adenoma of colon; Wears glasses; and Yeast infection. has a past surgical history that includes Orbital fracture repair (Left, ); hernia repair; Cardiac catheterization (2018); Colonoscopy (); Colonoscopy (10/2017); and pr replacement of mitral valve (N/A, 10/30/2018). Restrictions  Restrictions/Precautions  Restrictions/Precautions: Fall Risk  Position Activity Restriction  Sternal Precautions: No Pushing, 5# Lifting Restrictions, No Pulling  Other position/activity restrictions: Amb. pt; S/p AVR (10/30)   Subjective    Pt sitting up in his chair. Pt has no c/o pain except when coughing.    Orientation   WFL   Objective     Transfers  Sit to Stand: Minimal Assistance  Stand to sit: Minimal Assistance  Stand Pivot Transfers: Contact guard assistance  Comment: transfers performed with RW, pt demos appropriate sequencing  Ambulation  Ambulation?: Yes  Ambulation 1  Surface: level tile  Device: Rolling Walker  Other Apparatus: O2 (1L)  Assistance: Contact guard assistance (For RW progression and line management )  Quality of Gait: mild decrease is elis, steady, no LOB, decreased stride length  Distance: 180ft  Comments: improved use of RW noted this PM  Stairs/Curb  Stairs?: No  Balance  Posture: Fair  Sitting - Static: Fair;+  Sitting - Dynamic: Fair;+  Standing - Static: Fair;+  Standing - Dynamic: Fair  Comments: Standing balance assessed w/ RW  Exercises    Seated LE exercise program: [place] Arc Quads,heel/toe raises, and marches. Reps: 20 x with 2 lb wt on B LE's. Assessment     Body structures, Functions, Activity limitations: Decreased functional mobility ; Decreased ROM; Decreased strength;Decreased endurance;Decreased balance;Decreased sensation  Assessment: Pt amb 180ft w/ RW min A, no LOB noted during amb or functional transfers; Recommending pt return home w/ prn assist from family  Prognosis: Good  Patient Education: June Cristobal. REQUIRES PT FOLLOW UP: Yes  Activity Tolerance  Activity Tolerance: Patient limited by endurance     Goals  Short term goals  Time Frame for Short term goals: 14  Short term goal 1: Pt to perform bed mobility mod. independently  Short term goal 2: Pt to demo functional transfers mod. independently  Short term goal 3: Pt to amb 300ft w/ least restrictive AD mod. independently  Short term goal 4: Ascend/ descend 3 stairs w/ bilateral rails SBA   Short term goal 5: Tolerate 30 minutes to demo increased endurance  Patient Goals   Patient goals :  To start moving around    [address] per week: BID  Current Treatment Recommendations: Strengthening, Balance Training, Functional Mobility Training, Transfer Training, Neuromuscular Re-education, Endurance Training, Gait Training, Stair training, Home Exercise Program, Safety Education & Training, Patient/Caregiver Education & Training  Safety Devices  Type of devices: Call light within reach, Gait belt, Chair alarm in place, Left in chair, Left in chair, Nurse notified  Restraints  Initially in place: No     Therapy Time   Individual Concurrent Group Co-treatment   Time In  0745         Time Out  815         Minutes  [address] [place] [address]

## 2018-11-02 NOTE — PLAN OF CARE
Problem: Pain:  Goal: Control of chronic pain  Control of chronic pain   Outcome: Ongoing      Problem: OXYGENATION/RESPIRATORY FUNCTION  Goal: Patient will maintain patent airway  Outcome: Ongoing    Goal: Patient will achieve/maintain normal respiratory rate/effort  Respiratory rate and effort will be within normal limits for the patient   Outcome: Ongoing      Problem: MECHANICAL VENTILATION  Goal: Patient will maintain patent airway  Outcome: Ongoing    Goal: Oral health is maintained or improved  Outcome: Ongoing    Goal: ET tube will be managed safely  Outcome: Ongoing    Goal: Mobility/activity is maintained at optimum level for patient  Outcome: Ongoing      Problem: Falls - Risk of:  Goal: Absence of physical injury  Absence of physical injury   Outcome: Ongoing      Problem: Musculor/Skeletal Functional Status  Goal: Highest potential functional level  Outcome: Ongoing    Goal: Absence of falls  Outcome: Ongoing

## 2018-11-03 LAB
ANION GAP SERPL CALCULATED.3IONS-SCNC: 9 MMOL/L (ref 9–17)
BUN BLDV-MCNC: 44 MG/DL (ref 8–23)
BUN/CREAT BLD: ABNORMAL (ref 9–20)
CALCIUM SERPL-MCNC: 9.1 MG/DL (ref 8.6–10.4)
CHLORIDE BLD-SCNC: 102 MMOL/L (ref 98–107)
CO2: 24 MMOL/L (ref 20–31)
CREAT SERPL-MCNC: 0.79 MG/DL (ref 0.7–1.2)
GFR AFRICAN AMERICAN: >60 ML/MIN
GFR NON-AFRICAN AMERICAN: >60 ML/MIN
GFR SERPL CREATININE-BSD FRML MDRD: ABNORMAL ML/MIN/{1.73_M2}
GFR SERPL CREATININE-BSD FRML MDRD: ABNORMAL ML/MIN/{1.73_M2}
GLUCOSE BLD-MCNC: 160 MG/DL (ref 75–110)
GLUCOSE BLD-MCNC: 170 MG/DL (ref 70–99)
GLUCOSE BLD-MCNC: 183 MG/DL (ref 75–110)
GLUCOSE BLD-MCNC: 200 MG/DL (ref 75–110)
GLUCOSE BLD-MCNC: 201 MG/DL (ref 75–110)
GLUCOSE BLD-MCNC: 94 MG/DL (ref 75–110)
HCT VFR BLD CALC: 25.4 % (ref 40.7–50.3)
HEMOGLOBIN: 8.2 G/DL (ref 13–17)
INR BLD: 1.3
MAGNESIUM: 2.2 MG/DL (ref 1.6–2.6)
MCH RBC QN AUTO: 30.9 PG (ref 25.2–33.5)
MCHC RBC AUTO-ENTMCNC: 32.3 G/DL (ref 28.4–34.8)
MCV RBC AUTO: 95.8 FL (ref 82.6–102.9)
NRBC AUTOMATED: 0 PER 100 WBC
PDW BLD-RTO: 13.2 % (ref 11.8–14.4)
PLATELET # BLD: 157 K/UL (ref 138–453)
PMV BLD AUTO: 12.2 FL (ref 8.1–13.5)
POTASSIUM SERPL-SCNC: 4.9 MMOL/L (ref 3.7–5.3)
PROTHROMBIN TIME: 13.4 SEC (ref 9–12)
RBC # BLD: 2.65 M/UL (ref 4.21–5.77)
SODIUM BLD-SCNC: 135 MMOL/L (ref 135–144)
WBC # BLD: 12.1 K/UL (ref 3.5–11.3)

## 2018-11-03 PROCEDURE — 2140000001 HC CVICU INTERMEDIATE R&B

## 2018-11-03 PROCEDURE — 97530 THERAPEUTIC ACTIVITIES: CPT

## 2018-11-03 PROCEDURE — 36415 COLL VENOUS BLD VENIPUNCTURE: CPT

## 2018-11-03 PROCEDURE — 6370000000 HC RX 637 (ALT 250 FOR IP): Performed by: INTERNAL MEDICINE

## 2018-11-03 PROCEDURE — 6370000000 HC RX 637 (ALT 250 FOR IP): Performed by: STUDENT IN AN ORGANIZED HEALTH CARE EDUCATION/TRAINING PROGRAM

## 2018-11-03 PROCEDURE — 6370000000 HC RX 637 (ALT 250 FOR IP): Performed by: PHYSICIAN ASSISTANT

## 2018-11-03 PROCEDURE — 85027 COMPLETE CBC AUTOMATED: CPT

## 2018-11-03 PROCEDURE — 6360000002 HC RX W HCPCS: Performed by: PHYSICIAN ASSISTANT

## 2018-11-03 PROCEDURE — 99024 POSTOP FOLLOW-UP VISIT: CPT | Performed by: PHYSICIAN ASSISTANT

## 2018-11-03 PROCEDURE — 2580000003 HC RX 258: Performed by: PHYSICIAN ASSISTANT

## 2018-11-03 PROCEDURE — 82947 ASSAY GLUCOSE BLOOD QUANT: CPT

## 2018-11-03 PROCEDURE — 83735 ASSAY OF MAGNESIUM: CPT

## 2018-11-03 PROCEDURE — 85610 PROTHROMBIN TIME: CPT

## 2018-11-03 PROCEDURE — 97110 THERAPEUTIC EXERCISES: CPT

## 2018-11-03 PROCEDURE — 80048 BASIC METABOLIC PNL TOTAL CA: CPT

## 2018-11-03 PROCEDURE — 97116 GAIT TRAINING THERAPY: CPT

## 2018-11-03 RX ORDER — WARFARIN SODIUM 5 MG/1
5 TABLET ORAL DAILY
Status: DISCONTINUED | OUTPATIENT
Start: 2018-11-03 | End: 2018-11-04 | Stop reason: HOSPADM

## 2018-11-03 RX ORDER — LISINOPRIL 2.5 MG/1
2.5 TABLET ORAL DAILY
Status: DISCONTINUED | OUTPATIENT
Start: 2018-11-03 | End: 2018-11-03

## 2018-11-03 RX ORDER — HYDROCHLOROTHIAZIDE 25 MG/1
25 TABLET ORAL DAILY
Status: DISCONTINUED | OUTPATIENT
Start: 2018-11-03 | End: 2018-11-04 | Stop reason: HOSPADM

## 2018-11-03 RX ADMIN — MUPIROCIN: 20 OINTMENT TOPICAL at 20:44

## 2018-11-03 RX ADMIN — METOPROLOL TARTRATE 50 MG: 50 TABLET, FILM COATED ORAL at 08:46

## 2018-11-03 RX ADMIN — METFORMIN HYDROCHLORIDE 1000 MG: 500 TABLET ORAL at 18:16

## 2018-11-03 RX ADMIN — ENOXAPARIN SODIUM 40 MG: 40 INJECTION SUBCUTANEOUS at 08:51

## 2018-11-03 RX ADMIN — FAMOTIDINE 20 MG: 20 TABLET, FILM COATED ORAL at 20:44

## 2018-11-03 RX ADMIN — PIOGLITAZONE 45 MG: 45 TABLET ORAL at 08:48

## 2018-11-03 RX ADMIN — MULTIPLE VITAMINS W/ MINERALS TAB 1 TABLET: TAB at 08:46

## 2018-11-03 RX ADMIN — TAMSULOSIN HYDROCHLORIDE 0.4 MG: 0.4 CAPSULE ORAL at 08:46

## 2018-11-03 RX ADMIN — HYDROCHLOROTHIAZIDE 25 MG: 25 TABLET ORAL at 12:26

## 2018-11-03 RX ADMIN — GLIMEPIRIDE 4 MG: 2 TABLET ORAL at 08:48

## 2018-11-03 RX ADMIN — INSULIN LISPRO 2 UNITS: 100 INJECTION, SOLUTION INTRAVENOUS; SUBCUTANEOUS at 20:44

## 2018-11-03 RX ADMIN — GLIMEPIRIDE 4 MG: 2 TABLET ORAL at 20:44

## 2018-11-03 RX ADMIN — AMIODARONE HYDROCHLORIDE 200 MG: 200 TABLET ORAL at 08:49

## 2018-11-03 RX ADMIN — AMIODARONE HYDROCHLORIDE 200 MG: 200 TABLET ORAL at 20:44

## 2018-11-03 RX ADMIN — ASPIRIN 81 MG: 81 TABLET ORAL at 08:46

## 2018-11-03 RX ADMIN — FAMOTIDINE 20 MG: 20 TABLET, FILM COATED ORAL at 08:48

## 2018-11-03 RX ADMIN — Medication 10 ML: at 08:46

## 2018-11-03 RX ADMIN — INSULIN LISPRO 2 UNITS: 100 INJECTION, SOLUTION INTRAVENOUS; SUBCUTANEOUS at 12:27

## 2018-11-03 RX ADMIN — INSULIN LISPRO 4 UNITS: 100 INJECTION, SOLUTION INTRAVENOUS; SUBCUTANEOUS at 08:55

## 2018-11-03 RX ADMIN — WARFARIN SODIUM 5 MG: 5 TABLET ORAL at 18:17

## 2018-11-03 RX ADMIN — METFORMIN HYDROCHLORIDE 1000 MG: 500 TABLET ORAL at 08:46

## 2018-11-03 RX ADMIN — METOPROLOL TARTRATE 50 MG: 50 TABLET, FILM COATED ORAL at 20:44

## 2018-11-03 ASSESSMENT — PAIN SCALES - GENERAL: PAINLEVEL_OUTOF10: 0

## 2018-11-03 NOTE — PROGRESS NOTES
[facility]  Progress Note    11/3/2018 9:21 AM  Surgeon:  Norma Frances MD          POD# 4     S/P :  Aortic bioprosthetic valve replacement  EF: 55 %    Subjective:  Mr. Banks Adjutant seen and examined today. no overnight events. Pain minimal..   Vital Signs: /68   Pulse 73   Temp 97.9 °F (36.6 °C) (Oral)   Resp 20   Ht 5' 5\" (1.651 m)   Wt 191 lb 12.8 oz (87 kg)   SpO2 96%   BMI 31.92 kg/m²  O2 Flow Rate (L/min): 2 L/min   Admit Weight: Weight: 195 lb 12.3 oz (88.8 kg)   WEIGHTWeight: 191 lb 12.8 oz (87 kg)     Rhythm: NSR    Labs:   CBC:   Recent Labs      11/01/18 0441 11/02/18   0516  11/03/18   0433   WBC  15.8*  13.0*  12.1*   HGB  7.9*  7.9*  8.2*   HCT  25.1*  24.8*  25.4*   MCV  97.3  96.9  95.8   PLT  See Reflexed IPF Result  113*  157     BMP:   Recent Labs      11/01/18 0441 11/02/18   0516  11/03/18   0433   NA  136  136  135   K  4.7  4.4  4.9   CL  100  101  102   CO2  20  22  24   BUN  28*  42*  44*   CREATININE  0.84  0.80  0.79     PT/INR:   Recent Labs      11/01/18 0441 11/02/18   0516  11/03/18   0433   PROTIME  10.7  11.2  13.4*   INR  1.0  1.1  1.3     APTT:   No results for input(s): APTT in the last 72 hours.     I/O:  I/O last 3 completed shifts:  In: -   Out: 825 [Urine:825]  Air Leak:  No air leak     Scheduled Meds:    hydrochlorothiazide  25 mg Oral Daily    warfarin  5 mg Oral Daily    metoprolol tartrate  50 mg Oral BID    glimepiride  4 mg Oral BID    pioglitazone  45 mg Oral Daily    metFORMIN  1,000 mg Oral BID WC    tamsulosin  0.4 mg Oral Daily    sodium chloride flush  10 mL Intravenous 2 times per day    docusate sodium  100 mg Oral BID    polyethylene glycol  17 g Oral Daily    famotidine  20 mg Oral BID    amiodarone  200 mg Oral BID    mupirocin   Nasal BID    therapeutic multivitamin-minerals  1 tablet Oral Daily with breakfast    enoxaparin  40 mg Subcutaneous Daily    aspirin  81 mg Oral Daily    insulin lispro  0-12 Units Subcutaneous TID WC    insulin lispro  0-6 Units Subcutaneous Nightly     Continuous Infusions:    dextrose         Exam:   General: alert and oriented to person, place and time, well-developed and well-nourished, in no acute distress  Heart:Normal S1 and S2.  Regular rhythm. No murmurs, gallops, or rubs. , Pacing wires  Yes  Lungs: clear to auscultation bilaterally and diminished breath sounds bibasilar Equal and symmetric expansion with respiration. Chest tubes out  Abdomen: soft, non tender, non distended, BSx4  Extremities: Trace edema, intact pulses in all four extremities  Musculoskeletal:  Intact range of motion of peripheral joints. grossly normal  Neurologic:  Non-focal sensory deficits on exam.  Psychiatric: Mood and affect are appropriate. Wounds: clean and dry, healing appropriately, dressing in place       Assessment/Plan:   Patient Active Problem List   Diagnosis    Mixed hyperlipidemia    Essential hypertension    Esophageal reflux    Family history of prostate cancer    Nonrheumatic aortic valve stenosis    Type 2 diabetes mellitus with complication ([facility])    Type 2 diabetes mellitus with hyperglycemia, without long-term current use of insulin ([facility])    Low back pain with sciatica    Moderate mitral regurgitation    S/P AVR (aortic valve replacement)       1. S/p AVR   POD 4  Warfarin 5 mg, goal 1.5-2  INR 1.3  2. HD - stable,  keep midodrine  Start lopressor 25 bid  3. Pulmo - IS, acapella, ambulate  4. GI - advance diet  5.  - flomax, adequate UOP  6. Neuro - pain minimal  ABLA - 7.9, plt 83 from 95 monitor  Leucocytosis - improving, reactive post surgical, monitor    DVT ppx: Lovenox & SCD's while stationary  Plan for discharge home with home care tomorrow after therapeutic INR    The above recommendations including medications and orders were discussed and agreed upon with Taniya Schneider the attending on service for the cardiothoracic surgery group today.     Electronically signed by JUANIS Hernandez on 11/3/2018 at 9:21 AM

## 2018-11-03 NOTE — PROGRESS NOTES
Physical Therapy  Facility/Department: [facility]  Daily Treatment Note  NAME: Isabella Bob  :   MRN: [id number]    Date of Service: 11/3/2018    Discharge Recommendations:  Home with assist PRN        Patient Diagnosis(es): There were no encounter diagnoses. has a past medical history of Aortic stenosis; Degenerative disc disease, lumbar; Diabetes mellitus ([address] Diverticulosis of sigmoid colon; DIA (dyspnea on exertion); Full dentures; GERD (gastroesophageal reflux disease); Heart murmur; Hyperlipidemia; Hypertension; Kidney stone; Mitral regurgitation; Obesity; Tubular adenoma of colon; Wears glasses; and Yeast infection. has a past surgical history that includes Orbital fracture repair (Left, ); hernia repair; Cardiac catheterization (2018); Colonoscopy (); Colonoscopy (10/2017); and pr replacement of mitral valve (N/A, 10/30/2018). Restrictions  Restrictions/Precautions  Restrictions/Precautions: Fall Risk  Position Activity Restriction  Sternal Precautions: No Pushing, 5# Lifting Restrictions, No Pulling  Other position/activity restrictions: Amb. pt; S/p AVR (10/30)   Subjective    Pt sitting up in his chair. Pt has no c/o pain except in chest when coughing. Orientation   WFL   Objective     Transfers  Sit to Stand: SBA  Stand to sit: SBA  Stand Pivot Transfers: CGA  Comments: Cues for keeping RW in close proximity when turning, pt unsteady. Ambulation  Ambulation?: Yes  Ambulation 1  Surface: level tile  Device: Rolling Walker  Assistance: SBA  Quality of Gait: mild decrease in elis, steady, mild LOB during stand pivots, ER of RLE (pt reports is his norm)  Distance: 275 ft  More Ambulation? Yes  Ambulation 2  Surface: level tile  Device: No AD intermittently w/ HHA (wall railing)  Assistance: CGA  Quality of gait: Pt mildly unsteady, increased GREG, LOB when hand railing wasn't available -- returned to using RW for safety purposes.    Stairs/Curb  Stairs?: Co-treatment   Time In   0900         Time Out   0925         Minutes  [place] , [place]

## 2018-11-03 NOTE — PLAN OF CARE
Problem: Pain:  Goal: Pain level will decrease  Pain level will decrease   Outcome: Ongoing    Goal: Control of acute pain  Control of acute pain   Outcome: Ongoing    Goal: Control of chronic pain  Control of chronic pain   Outcome: Ongoing      Problem: OXYGENATION/RESPIRATORY FUNCTION  Goal: Patient will maintain patent airway  Outcome: Ongoing    Goal: Patient will achieve/maintain normal respiratory rate/effort  Respiratory rate and effort will be within normal limits for the patient   Outcome: Ongoing      Problem: SKIN INTEGRITY  Goal: Skin integrity is maintained or improved  Outcome: Ongoing      Problem: Falls - Risk of:  Goal: Will remain free from falls  Will remain free from falls   Outcome: Ongoing    Goal: Absence of physical injury  Absence of physical injury   Outcome: Ongoing      Problem: Risk for Impaired Skin Integrity  Goal: Tissue integrity - skin and mucous membranes  Structural intactness and normal physiological function of skin and  mucous membranes.    Outcome: Ongoing

## 2018-11-03 NOTE — PROGRESS NOTES
Result  113*  157     BMP:    Recent Labs      11/01/18   0441  11/02/18   0516  11/03/18   0433   NA  136  136  135   K  4.7  4.4  4.9   CL  100  101  102   CO2  20  22  24   BUN  28*  42*  44*   CREATININE  0.84  0.80  0.79   GLUCOSE  226*  215*  170*     Hepatic: No results for input(s): AST, ALT, ALB, BILITOT, ALKPHOS in the last 72 hours. Troponin: No results for input(s): TROPONINI in the last 72 hours. No results for input(s): TROPONINT in the last 72 hours. BNP: No results for input(s): PROBNP in the last 72 hours. No results for input(s): BNP in the last 72 hours. Lipids: No results for input(s): CHOL, HDL in the last 72 hours. Invalid input(s): LDLCALCU  INR:   Recent Labs      11/01/18 0441 11/02/18   0516  11/03/18   0433   INR  1.0  1.1  1.3       Objective:   Vitals: /68   Pulse 73   Temp 97.9 °F (36.6 °C) (Oral)   Resp 20   Ht 5' 5\" (1.651 m)   Wt 191 lb 12.8 oz (87 kg)   SpO2 96%   BMI 31.92 kg/m²      Constitutional and General Appearance: alert, cooperative, no distress and appears stated age  HEENT: PERRL, no cervical lymphadenopathy. No masses palpable. Normal oral mucosa  Respiratory:  · Normal excursion and expansion without use of accessory muscles  · Resp Auscultation: Good respiratory effort. No for increased work of breathing. On auscultation: clear to auscultation bilaterally  Cardiovascular:  · Mid-sternal dressing dry and intact.   · The apical impulse is not displaced  · Loud valve closure murmur at right second inter coastal space   · Heart tones are crisp and normal except . regular S1 and S2.  · Jugular venous pulsation Normal  · The carotid upstroke is normal in amplitude and contour without delay or bruit  · Peripheral pulses are symmetrical and full      Abdomen:   · No masses or tenderness  · Bowel sounds present  Extremities:  ·  No Cyanosis or Clubbing  ·  Lower extremity edema: Mild pitting b/l   ·  Skin: Warm and dry  Neurological:  · Alert and oriented. · Moves all extremities well  · No abnormalities of mood, affect, memory, mentation, or behavior are noted    Diagnostic Studies:   EKG: Normal sinus rhythm with right bundle branch block. ECHO: on 10-11-18 showed showed ejection fraction 60% with left atrial dilatation. Heavily calcified and thickened aortic valve elevated with evidence of severe aortic stenosis. Peak gradient pressure 101 mmHg and mean gradient of 57 mmHg. Cardiac Angiography: on 3-1-18  Nonobstructive coronary artery disease with moderate aortic stenosis. Normal right heart pressures. LAD 30-40%, LCx 20-30%, RCA 30-40%   . Intraop DOREEN 10/30/18 LVEF 55%    Patient's Active Problem List   Active Problems:    S/P AVR (aortic valve replacement)  Resolved Problems:    * No resolved hospital problems. *          Assessment / Acute Cardiac Problems:   1. Severe aortic stenosis s/p SAVR with 23 mm Intuity valve (Bioprosthetic valve) on 10/30/2018  2. Mild Nonobstructive CAD   3. Preserved LVEF  4. Diabetes Mellitus, type 2 on oral meds. 5. Essential Hypertension   6. BPH  6. Postop acute blood loss anemia    Plan of Treatment:   1. Continue routine postop management per CT surgery. 2. On Amio per CT surgery. 3. Continue coumadin dosing per CT surgery. 4. Continue anticoagulation with Coumadin, pharmacy dosing warfarin  5. Resume HCTZ at 25 mg dose. Re-introduce lisinopril as BP and potassium levels tolerates  6. Discontinued midodrine   7. Continue Lopressor at current dose. 8. Post op management by Jing Henry MD  Fellow, [address]        Attending Physician Statement  I have discussed the care of Rukhsana Lamar, including pertinent history and exam findings,  with the resident. I have seen and examined the patient and the key elements of all parts of the encounter have been performed by me.   I agree with the

## 2018-11-03 NOTE — PROGRESS NOTES
Fair  Comments: Standing balance assessed w/ RW    Assessment      Body structures, Functions, Activity limitations: Decreased functional mobility ; Decreased ROM; Decreased strength;Decreased endurance;Decreased balance;Decreased sensation  Assessment: Pt amb 160ft w/ RW SBA. Pt used bilateral HR to ascend/descend 4 stairs. Pt was stable without any LOB. Pt would benefit from home PT, safe to d/c home in my professional opinion. Prognosis: Good  Patient Education: Taylor Jeffries. REQUIRES PT FOLLOW UP: Yes  Activity Tolerance  Activity Tolerance: Patient limited by endurance     Goals  Short term goals  Time Frame for Short term goals: 14  Short term goal 1: Pt to perform bed mobility mod. independently  Short term goal 2: Pt to demo functional transfers mod. independently  Short term goal 3: Pt to amb 300ft w/ least restrictive AD mod. independently  Short term goal 4: Ascend/ descend 3 stairs w/ bilateral rails SBA   Short term goal 5: Tolerate 30 minutes to demo increased endurance  Patient Goals   Patient goals :  To start moving around    [address] per week: BID  Current Treatment Recommendations: Strengthening, Balance Training, Functional Mobility Training, Transfer Training, Neuromuscular Re-education, Endurance Training, Gait Training, Stair training, Home Exercise Program, Safety Education & Training, Patient/Caregiver Education & Training  Safety Devices  Type of devices: Call light within reach, Gait belt, bed alarm in place, Left in bed, Nurse notified  Restraints  Initially in place: No     Therapy Time   Individual Concurrent Group Co-treatment   Time In   0900         Time Out   0925         Minutes  [address]

## 2018-11-04 VITALS
WEIGHT: 199.08 LBS | SYSTOLIC BLOOD PRESSURE: 141 MMHG | HEART RATE: 67 BPM | HEIGHT: 65 IN | OXYGEN SATURATION: 98 % | TEMPERATURE: 98.1 F | RESPIRATION RATE: 16 BRPM | BODY MASS INDEX: 33.17 KG/M2 | DIASTOLIC BLOOD PRESSURE: 71 MMHG

## 2018-11-04 LAB
ANION GAP SERPL CALCULATED.3IONS-SCNC: 9 MMOL/L (ref 9–17)
BUN BLDV-MCNC: 33 MG/DL (ref 8–23)
BUN/CREAT BLD: ABNORMAL (ref 9–20)
CALCIUM SERPL-MCNC: 9 MG/DL (ref 8.6–10.4)
CHLORIDE BLD-SCNC: 102 MMOL/L (ref 98–107)
CO2: 26 MMOL/L (ref 20–31)
CREAT SERPL-MCNC: 0.75 MG/DL (ref 0.7–1.2)
GFR AFRICAN AMERICAN: >60 ML/MIN
GFR NON-AFRICAN AMERICAN: >60 ML/MIN
GFR SERPL CREATININE-BSD FRML MDRD: ABNORMAL ML/MIN/{1.73_M2}
GFR SERPL CREATININE-BSD FRML MDRD: ABNORMAL ML/MIN/{1.73_M2}
GLUCOSE BLD-MCNC: 100 MG/DL (ref 70–99)
GLUCOSE BLD-MCNC: 101 MG/DL (ref 75–110)
GLUCOSE BLD-MCNC: 115 MG/DL (ref 75–110)
HCT VFR BLD CALC: 26.6 % (ref 40.7–50.3)
HEMOGLOBIN: 8.6 G/DL (ref 13–17)
INR BLD: 1.8
MAGNESIUM: 2 MG/DL (ref 1.6–2.6)
MCH RBC QN AUTO: 30.3 PG (ref 25.2–33.5)
MCHC RBC AUTO-ENTMCNC: 32.3 G/DL (ref 28.4–34.8)
MCV RBC AUTO: 93.7 FL (ref 82.6–102.9)
NRBC AUTOMATED: 0 PER 100 WBC
PDW BLD-RTO: 13.2 % (ref 11.8–14.4)
PLATELET # BLD: 187 K/UL (ref 138–453)
PMV BLD AUTO: 10.7 FL (ref 8.1–13.5)
POTASSIUM SERPL-SCNC: 3.9 MMOL/L (ref 3.7–5.3)
PROTHROMBIN TIME: 18.1 SEC (ref 9–12)
RBC # BLD: 2.84 M/UL (ref 4.21–5.77)
SODIUM BLD-SCNC: 137 MMOL/L (ref 135–144)
WBC # BLD: 11.8 K/UL (ref 3.5–11.3)

## 2018-11-04 PROCEDURE — 6360000002 HC RX W HCPCS: Performed by: PHYSICIAN ASSISTANT

## 2018-11-04 PROCEDURE — 6370000000 HC RX 637 (ALT 250 FOR IP): Performed by: STUDENT IN AN ORGANIZED HEALTH CARE EDUCATION/TRAINING PROGRAM

## 2018-11-04 PROCEDURE — 83735 ASSAY OF MAGNESIUM: CPT

## 2018-11-04 PROCEDURE — 6370000000 HC RX 637 (ALT 250 FOR IP): Performed by: PHYSICIAN ASSISTANT

## 2018-11-04 PROCEDURE — 97110 THERAPEUTIC EXERCISES: CPT

## 2018-11-04 PROCEDURE — 85027 COMPLETE CBC AUTOMATED: CPT

## 2018-11-04 PROCEDURE — 6370000000 HC RX 637 (ALT 250 FOR IP): Performed by: INTERNAL MEDICINE

## 2018-11-04 PROCEDURE — 99024 POSTOP FOLLOW-UP VISIT: CPT | Performed by: PHYSICIAN ASSISTANT

## 2018-11-04 PROCEDURE — 80048 BASIC METABOLIC PNL TOTAL CA: CPT

## 2018-11-04 PROCEDURE — 85610 PROTHROMBIN TIME: CPT

## 2018-11-04 PROCEDURE — 36415 COLL VENOUS BLD VENIPUNCTURE: CPT

## 2018-11-04 PROCEDURE — 97530 THERAPEUTIC ACTIVITIES: CPT

## 2018-11-04 PROCEDURE — 97116 GAIT TRAINING THERAPY: CPT

## 2018-11-04 PROCEDURE — 82947 ASSAY GLUCOSE BLOOD QUANT: CPT

## 2018-11-04 RX ORDER — POTASSIUM CHLORIDE 20 MEQ/1
40 TABLET, EXTENDED RELEASE ORAL PRN
Status: DISCONTINUED | OUTPATIENT
Start: 2018-11-04 | End: 2018-11-04

## 2018-11-04 RX ORDER — CLOTRIMAZOLE AND BETAMETHASONE DIPROPIONATE 10; .64 MG/G; MG/G
CREAM TOPICAL 2 TIMES DAILY
Qty: 1 TUBE | Refills: 1 | Status: SHIPPED | OUTPATIENT
Start: 2018-11-04 | End: 2019-09-26

## 2018-11-04 RX ORDER — M-VIT,TX,IRON,MINS/CALC/FOLIC 27MG-0.4MG
1 TABLET ORAL
Qty: 30 TABLET | Refills: 3 | Status: SHIPPED | OUTPATIENT
Start: 2018-11-04 | End: 2018-11-04

## 2018-11-04 RX ORDER — ROSUVASTATIN CALCIUM 20 MG/1
20 TABLET, COATED ORAL DAILY
Qty: 90 TABLET | Refills: 3 | Status: SHIPPED | OUTPATIENT
Start: 2018-11-04 | End: 2018-11-06 | Stop reason: ALTCHOICE

## 2018-11-04 RX ORDER — TAMSULOSIN HYDROCHLORIDE 0.4 MG/1
0.4 CAPSULE ORAL DAILY
Qty: 90 CAPSULE | Refills: 3 | Status: SHIPPED | OUTPATIENT
Start: 2018-11-04 | End: 2018-11-06 | Stop reason: ALTCHOICE

## 2018-11-04 RX ORDER — WARFARIN SODIUM 2.5 MG/1
2.5 TABLET ORAL DAILY
Qty: 30 TABLET | Refills: 3 | Status: SHIPPED | OUTPATIENT
Start: 2018-11-04 | End: 2019-01-22

## 2018-11-04 RX ORDER — HYDROCHLOROTHIAZIDE 25 MG/1
25 TABLET ORAL DAILY
Qty: 30 TABLET | Refills: 3 | Status: SHIPPED | OUTPATIENT
Start: 2018-11-04 | End: 2018-11-04

## 2018-11-04 RX ORDER — M-VIT,TX,IRON,MINS/CALC/FOLIC 27MG-0.4MG
1 TABLET ORAL
Qty: 30 TABLET | Refills: 3 | Status: SHIPPED | OUTPATIENT
Start: 2018-11-04

## 2018-11-04 RX ORDER — GLIMEPIRIDE 4 MG/1
4 TABLET ORAL 2 TIMES DAILY
Qty: 30 TABLET | Refills: 3 | Status: SHIPPED | OUTPATIENT
Start: 2018-11-04 | End: 2019-05-20 | Stop reason: SDUPTHER

## 2018-11-04 RX ORDER — METOPROLOL TARTRATE 50 MG/1
50 TABLET, FILM COATED ORAL 2 TIMES DAILY
Qty: 60 TABLET | Refills: 3 | Status: SHIPPED | OUTPATIENT
Start: 2018-11-04 | End: 2018-11-06 | Stop reason: SDUPTHER

## 2018-11-04 RX ORDER — LISINOPRIL 5 MG/1
5 TABLET ORAL DAILY
Status: DISCONTINUED | OUTPATIENT
Start: 2018-11-04 | End: 2018-11-04 | Stop reason: HOSPADM

## 2018-11-04 RX ORDER — HYDROCHLOROTHIAZIDE 25 MG/1
25 TABLET ORAL DAILY
Qty: 30 TABLET | Refills: 3 | Status: SHIPPED | OUTPATIENT
Start: 2018-11-04 | End: 2019-05-06 | Stop reason: SDUPTHER

## 2018-11-04 RX ORDER — LISINOPRIL 5 MG/1
5 TABLET ORAL DAILY
Qty: 30 TABLET | Refills: 3 | Status: SHIPPED | OUTPATIENT
Start: 2018-11-04 | End: 2019-01-04 | Stop reason: SDUPTHER

## 2018-11-04 RX ORDER — LISINOPRIL 5 MG/1
5 TABLET ORAL DAILY
Qty: 30 TABLET | Refills: 3
Start: 2018-11-04 | End: 2018-11-04

## 2018-11-04 RX ORDER — WARFARIN SODIUM 5 MG/1
5 TABLET ORAL DAILY
Qty: 30 TABLET | Refills: 3 | Status: SHIPPED | OUTPATIENT
Start: 2018-11-04 | End: 2018-11-04

## 2018-11-04 RX ORDER — METOPROLOL TARTRATE 50 MG/1
50 TABLET, FILM COATED ORAL 2 TIMES DAILY
Qty: 60 TABLET | Refills: 3 | Status: SHIPPED | OUTPATIENT
Start: 2018-11-04 | End: 2018-11-04

## 2018-11-04 RX ORDER — PSEUDOEPHEDRINE HCL 30 MG
100 TABLET ORAL 2 TIMES DAILY
Qty: 60 CAPSULE | Refills: 2 | Status: SHIPPED | OUTPATIENT
Start: 2018-11-04 | End: 2018-12-03 | Stop reason: ALTCHOICE

## 2018-11-04 RX ORDER — PIOGLITAZONEHYDROCHLORIDE 45 MG/1
45 TABLET ORAL DAILY
Qty: 90 TABLET | Refills: 3 | Status: SHIPPED | OUTPATIENT
Start: 2018-11-04 | End: 2018-12-03 | Stop reason: SDUPTHER

## 2018-11-04 RX ORDER — PSEUDOEPHEDRINE HCL 30 MG
100 TABLET ORAL 2 TIMES DAILY
Qty: 60 CAPSULE | Refills: 2 | Status: SHIPPED | OUTPATIENT
Start: 2018-11-04 | End: 2018-11-04

## 2018-11-04 RX ORDER — OMEPRAZOLE 20 MG/1
20 CAPSULE, DELAYED RELEASE ORAL DAILY
Qty: 90 CAPSULE | Refills: 3 | Status: ON HOLD | OUTPATIENT
Start: 2018-11-04 | End: 2019-02-08 | Stop reason: SDUPTHER

## 2018-11-04 RX ORDER — AMIODARONE HYDROCHLORIDE 200 MG/1
200 TABLET ORAL 2 TIMES DAILY
Qty: 30 TABLET | Refills: 3 | Status: SHIPPED | OUTPATIENT
Start: 2018-11-04 | End: 2018-11-04

## 2018-11-04 RX ORDER — OXYCODONE HYDROCHLORIDE AND ACETAMINOPHEN 5; 325 MG/1; MG/1
1 TABLET ORAL EVERY 6 HOURS PRN
Qty: 28 TABLET | Refills: 0 | Status: SHIPPED | OUTPATIENT
Start: 2018-11-04 | End: 2018-11-04

## 2018-11-04 RX ORDER — AMIODARONE HYDROCHLORIDE 200 MG/1
200 TABLET ORAL 2 TIMES DAILY
Qty: 30 TABLET | Refills: 3 | Status: SHIPPED | OUTPATIENT
Start: 2018-11-04 | End: 2019-02-01 | Stop reason: SDUPTHER

## 2018-11-04 RX ORDER — OXYCODONE HYDROCHLORIDE AND ACETAMINOPHEN 5; 325 MG/1; MG/1
1 TABLET ORAL EVERY 6 HOURS PRN
Qty: 28 TABLET | Refills: 0 | Status: SHIPPED | OUTPATIENT
Start: 2018-11-04 | End: 2018-11-11

## 2018-11-04 RX ADMIN — ENOXAPARIN SODIUM 40 MG: 40 INJECTION SUBCUTANEOUS at 10:20

## 2018-11-04 RX ADMIN — ASPIRIN 81 MG: 81 TABLET ORAL at 10:20

## 2018-11-04 RX ADMIN — MULTIPLE VITAMINS W/ MINERALS TAB 1 TABLET: TAB at 10:19

## 2018-11-04 RX ADMIN — METOPROLOL TARTRATE 50 MG: 50 TABLET, FILM COATED ORAL at 10:20

## 2018-11-04 RX ADMIN — POTASSIUM CHLORIDE 20 MEQ: 29.8 INJECTION, SOLUTION INTRAVENOUS at 05:21

## 2018-11-04 RX ADMIN — PIOGLITAZONE 45 MG: 45 TABLET ORAL at 15:03

## 2018-11-04 RX ADMIN — GLIMEPIRIDE 4 MG: 2 TABLET ORAL at 10:15

## 2018-11-04 RX ADMIN — LISINOPRIL 5 MG: 5 TABLET ORAL at 15:03

## 2018-11-04 RX ADMIN — FAMOTIDINE 20 MG: 20 TABLET, FILM COATED ORAL at 10:16

## 2018-11-04 RX ADMIN — HYDROCHLOROTHIAZIDE 25 MG: 25 TABLET ORAL at 10:15

## 2018-11-04 RX ADMIN — AMIODARONE HYDROCHLORIDE 200 MG: 200 TABLET ORAL at 10:19

## 2018-11-04 RX ADMIN — POTASSIUM CHLORIDE 10 MEQ: 29.8 INJECTION, SOLUTION INTRAVENOUS at 06:04

## 2018-11-04 RX ADMIN — TAMSULOSIN HYDROCHLORIDE 0.4 MG: 0.4 CAPSULE ORAL at 10:19

## 2018-11-04 RX ADMIN — OXYCODONE HYDROCHLORIDE AND ACETAMINOPHEN 2 TABLET: 5; 325 TABLET ORAL at 15:02

## 2018-11-04 RX ADMIN — METFORMIN HYDROCHLORIDE 1000 MG: 500 TABLET ORAL at 10:18

## 2018-11-04 RX ADMIN — OXYCODONE HYDROCHLORIDE AND ACETAMINOPHEN 2 TABLET: 5; 325 TABLET ORAL at 10:15

## 2018-11-04 ASSESSMENT — PAIN SCALES - GENERAL
PAINLEVEL_OUTOF10: 6
PAINLEVEL_OUTOF10: 5

## 2018-11-04 NOTE — CARE COORDINATION
Discharge [address] [facility] Case Management Department  Written by: Elida Haque RN    Patient Name: Lisa Dai  Attending Provider: Kenrick Zapien, *  Admit Date: 10/30/2018  4:58 AM  MRN: [id number]  Account: [de-identified]                     : 1945  Discharge Date: 18      Disposition: home with [facility] care, f/u with cts already made and referral made for  phase 2 cardiac rehab at [facility] .  [facility] discussed planned dc today will fax avs/orders when complete   13:44 faxed dc paperwork to [facility]    Elida Haque RN

## 2018-11-05 ENCOUNTER — TELEPHONE (OUTPATIENT)
Dept: FAMILY MEDICINE CLINIC | Age: 73
End: 2018-11-05

## 2018-11-05 ENCOUNTER — ANTI-COAG VISIT (OUTPATIENT)
Dept: CARDIOTHORACIC SURGERY | Age: 73
End: 2018-11-05

## 2018-11-05 ENCOUNTER — CARE COORDINATION (OUTPATIENT)
Dept: CASE MANAGEMENT | Age: 73
End: 2018-11-05

## 2018-11-05 DIAGNOSIS — Z95.2 S/P AVR (AORTIC VALVE REPLACEMENT): ICD-10-CM

## 2018-11-05 DIAGNOSIS — Z95.2 S/P AVR (AORTIC VALVE REPLACEMENT): Primary | ICD-10-CM

## 2018-11-05 DIAGNOSIS — M54.40 LOW BACK PAIN WITH SCIATICA, SCIATICA LATERALITY UNSPECIFIED, UNSPECIFIED BACK PAIN LATERALITY, UNSPECIFIED CHRONICITY: Primary | ICD-10-CM

## 2018-11-05 LAB
INR BLD: 2.1 RATIO
PT: 23.5 SEC

## 2018-11-06 ENCOUNTER — TELEPHONE (OUTPATIENT)
Dept: CARDIOLOGY | Age: 73
End: 2018-11-06

## 2018-11-06 ENCOUNTER — OFFICE VISIT (OUTPATIENT)
Dept: CARDIOLOGY CLINIC | Age: 73
End: 2018-11-06
Payer: MEDICARE

## 2018-11-06 VITALS
SYSTOLIC BLOOD PRESSURE: 90 MMHG | HEIGHT: 66 IN | BODY MASS INDEX: 31.98 KG/M2 | HEART RATE: 88 BPM | DIASTOLIC BLOOD PRESSURE: 48 MMHG | WEIGHT: 199 LBS

## 2018-11-06 DIAGNOSIS — I35.0 AORTIC STENOSIS, SEVERE: ICD-10-CM

## 2018-11-06 DIAGNOSIS — Z95.2 S/P AVR: ICD-10-CM

## 2018-11-06 DIAGNOSIS — Z95.2 S/P AVR (AORTIC VALVE REPLACEMENT): Primary | ICD-10-CM

## 2018-11-06 PROCEDURE — 99213 OFFICE O/P EST LOW 20 MIN: CPT | Performed by: INTERNAL MEDICINE

## 2018-11-06 ASSESSMENT — ENCOUNTER SYMPTOMS
COUGH: 0
NAUSEA: 0
VOMITING: 0
SHORTNESS OF BREATH: 0
CHEST TIGHTNESS: 0
DIARRHEA: 0
WHEEZING: 0
CONSTIPATION: 0
ABDOMINAL PAIN: 0

## 2018-11-06 NOTE — PROGRESS NOTES
tenderness  · Bowel sounds present  Extremities:  ·  No Cyanosis or Clubbing  ·  Lower extremity edema: No  Skin: Warm and dry    Cardiac data:    EKG: NSR, RBBB    Labs:     CBC:   Recent Labs      11/04/18   0404   WBC  11.8*   HGB  8.6*   HCT  26.6*   PLT  187     BMP:   Recent Labs      11/04/18   0404   NA  137   K  3.9   CO2  26   BUN  33*   CREATININE  0.75   LABGLOM  >60   GLUCOSE  100*     PT/INR:   Recent Labs      11/04/18   0404  11/05/18   1515   PROTIME  18.1*  23.5*   INR  1.8  2.1*     FASTING LIPID PANEL:  Lab Results   Component Value Date    HDL 42 04/13/2017    LDLCALC 60.8 02/19/2018    TRIG 191 02/19/2018     LIVER PROFILE:No results for input(s): AST, ALT, LABALBU in the last 72 hours.     IMPRESSION:    Mild CAD on cardiac cath 3/1/2018  AS, S/P AVR (23 mm Intuity valve on 10/30)  DM II  HTN  HLP ON STATIN  OBESITY  PVD/CAROTID STENOSIS  A/C WITH COUMADIN PER CTS  Patient Active Problem List   Diagnosis    Mixed hyperlipidemia    Essential hypertension    Esophageal reflux    Family history of prostate cancer    Nonrheumatic aortic valve stenosis    Type 2 diabetes mellitus with complication (HCC)    Type 2 diabetes mellitus with hyperglycemia, without long-term current use of insulin (HCC)    Low back pain with sciatica    Moderate mitral regurgitation    S/P AVR (aortic valve replacement)       RECOMMENDATIONS:  Decrease BB dose (low BP)  CARDIAC REHAB NEXT MONTH  [address], Nanette [place] 1527 Cardiology Consult           [phone]

## 2018-11-06 NOTE — DISCHARGE SUMMARY
11/04/18 1136   BP: (!) 141/71   Pulse: 67   Resp: 16   Temp: 98.1 °F (36.7 °C)   SpO2: 98%     Weight: Weight: 199 lb 1.2 oz (90.3 kg)    Weight: 195 lb 12.3 oz (88.8 kg)    I/O last 3 completed shifts: In: 3349 [P.O.:1140; I.V.:88]  Out: 1630 [Urine:1630]    General: Alert and Oriented x3. Resting in bed. No apparent distress. HEENT:  Normocephalic and atraumatic. PERRL. EOMI. Lips and oral mucosa moist and without lesions. Neck:  Supple. Trachea midline. Chest:  No abnormality. Equal and symmetric expansion with respiration. Lungs:  clear to auscultation. No fremitus. Cardiac:  normal rate and rhythm without murmurs, rubs or gallops. Abdomen:  Soft, non-tender,normoactive bowel sounds. No masses or organomegaly. Extremities:  No cyanosis, clubbing. Intact pulses in all four extremities. trace bilateral lower leg edema. Musculoskeletal:  Intact range of motion of peripheral joints. 5/5 muscular strength. Lymphatic:  No cervical or supraclavicular adenopathy. Neurologic:  Cranial nerves are grossly intact. Non-focal sensory deficits on exam.  Psychiatric: Mood and affect are appropriate. Surgical Incisions: Surgical incisions with clean, dry, intact dressings in place.        Past Medical History:   Diagnosis Date    Aortic stenosis     Degenerative disc disease, lumbar     Diabetes mellitus ([facility] [address]     PCP Dr. Anam Salinas Diverticulosis of sigmoid colon     DIA (dyspnea on exertion)     Full dentures     GERD (gastroesophageal reflux disease)     Heart murmur     Dr. Bayron Singer / [facility]    Hyperlipidemia     Hypertension     Kidney stone     Mitral regurgitation     Obesity     Tubular adenoma of colon 2007    refuses repeat colonoscopy    Wears glasses     Yeast infection      Past Surgical History:   Procedure Laterality Date    CARDIAC CATHETERIZATION  03/01/2018    COLONOSCOPY  2005    for hemoccult positive stool    COLONOSCOPY  10/2017    diverticuli;  [place] [facility]      umbilical hernia    ORBITAL FRACTURE SURGERY Left 1971    softball injury    MN REPLACEMENT OF MITRAL VALVE N/A 10/30/2018    AORTIC  VALVE REPLACEMENT 23 MM INTUITY VALVE, CHUNG OCONNOR, DOREEN performed by Lady Ngozi MD at [address] [Canagliflozin]      dizziness    Januvia [Sitagliptin]      dizziness     Family History   Problem Relation Age of Onset    Cancer Father         prostate    Alzheimer's Disease Father     Heart Disease Neg Hx     Diabetes Neg Hx      Social History     Social History    Marital status:      Spouse name: N/A    Number of children: N/A    Years of education: N/A     Occupational History    Not on file. Social History Main Topics    Smoking status: Former Smoker     Packs/day: 1.50     Years: 40.00     Types: Cigarettes     Quit date: 1998    Smokeless tobacco: Never Used    Alcohol use No    Drug use: No    Sexual activity: Not on file     Other Topics Concern    Not on file     Social History Narrative    No narrative on file          Medication List      START taking these medications    amiodarone 200 MG tablet  Commonly known as:  CORDARONE  Take 1 tablet by mouth 2 times daily     docusate 100 MG Caps  Commonly known as:  COLACE, DULCOLAX  Take 100 mg by mouth 2 times daily     metoprolol tartrate 50 MG tablet  Commonly known as:  LOPRESSOR  Take 1 tablet by mouth 2 times daily     oxyCODONE-acetaminophen 5-325 MG per tablet  Commonly known as:  PERCOCET  Take 1 tablet by mouth every 6 hours as needed for Pain for up to 7 days. Andrew Starkey therapeutic multivitamin-minerals tablet  Take 1 tablet by mouth daily (with breakfast)     warfarin 2.5 MG tablet  Commonly known as:  COUMADIN  Take as directed. If you are unsure how to take this medication, talk to your nurse or doctor. Original instructions:   Take 1 tablet by mouth daily        CHANGE how you take these medications hydrochlorothiazide 25 MG tablet  Commonly known as:  HYDRODIURIL  Take 1 tablet by mouth daily  What changed:  · medication strength  · how much to take        CONTINUE taking these medications    aspirin 81 MG tablet  Take 1 tablet by mouth daily Pt.will stop 10-30 -18 AM for OR     clotrimazole-betamethasone 1-0.05 % cream  Commonly known as:  LOTRISONE  Apply topically 2 times daily     glimepiride 4 MG tablet  Commonly known as:  AMARYL  Take 1 tablet by mouth 2 times daily     lisinopril 5 MG tablet  Commonly known as:  PRINIVIL;ZESTRIL  Take 1 tablet by mouth daily     metFORMIN 500 MG tablet  Commonly known as:  GLUCOPHAGE  Take 2 tablets by mouth 2 times daily (with meals)     omeprazole 20 MG delayed release capsule  Commonly known as:  PRILOSEC  Take 1 capsule by mouth daily     pioglitazone 45 MG tablet  Commonly known as:  ACTOS  Take 1 tablet by mouth daily     rosuvastatin 20 MG tablet  Commonly known as:  CRESTOR  Take 1 tablet by mouth daily     tamsulosin 0.4 MG capsule  Commonly known as:  FLOMAX  Take 1 capsule by mouth daily        STOP taking these medications    ALEVE 220 MG tablet  Generic drug:  naproxen sodium     labetalol 100 MG tablet  Commonly known as:  Elgie Linette           Where to Get Your Medications      These medications were sent to [facility] [address]  [address], [address]  [address]    Phone:  [phone]   · amiodarone 200 MG tablet  · aspirin 81 MG tablet  · clotrimazole-betamethasone 1-0.05 % cream  · docusate 100 MG Caps  · glimepiride 4 MG tablet  · hydrochlorothiazide 25 MG tablet  · lisinopril 5 MG tablet  · metFORMIN 500 MG tablet  · metoprolol tartrate 50 MG tablet  · omeprazole 20 MG delayed release capsule  · oxyCODONE-acetaminophen 5-325 MG per tablet  · pioglitazone 45 MG tablet  · rosuvastatin 20 MG tablet  · tamsulosin 0.4 MG capsule  · therapeutic multivitamin-minerals tablet  · warfarin 2.5 MG tablet           Data:    CBC:   Recent Labs      11/04/18   0404   WBC  11.8*   HGB  8.6*   HCT  26.6*   MCV  93.7   PLT  187     BMP:   Recent Labs      11/04/18   0404   NA  137   K  3.9   CL  102   CO2  26   BUN  33*   CREATININE  0.75   MG  2.0     Accucheck Glucoses:   Recent Labs      11/03/18   1133  11/03/18   1644  11/03/18   1958  11/04/18   0629  11/04/18   1136   POCGLU  183*  94  201*  101  115*     Cardiac Enzymes: No results for input(s): CKTOTAL, CKMB, CKMBINDEX, TROPONINI in the last 72 hours. PTT/PT/INR:   Recent Labs      11/04/18   0404  11/05/18   1515   PROTIME  18.1*  23.5*   INR  1.8  2.1*     No results for input(s): APTT in the last 72 hours. Liver Profile:  Lab Results   Component Value Date    AST 29 10/26/2018    ALT 25 10/26/2018    BILITOT 0.42 10/26/2018    ALKPHOS 47 10/26/2018     Lab Results   Component Value Date    CHOL 146 02/19/2018    CHOL 209 04/13/2017    HDL 42 04/13/2017    TRIG 191 02/19/2018     TSH: No results found for: TSH  UA:   Lab Results   Component Value Date    COLORU YELLOW 10/26/2018    PHUR 7.0 10/26/2018    SPECGRAV 1.021 10/26/2018    LEUKOCYTESUR NEGATIVE 10/26/2018    UROBILINOGEN Normal 10/26/2018    BILIRUBINUR NEGATIVE 10/26/2018    GLUCOSEU 3+ 10/26/2018       Active Hospital Problems    Diagnosis Date Noted    S/P AVR (aortic valve replacement) [Z95.2] 10/30/2018         Discharge Plan:    Follow up with our office in 1 week. Follow up with PCP and cardiology in 1-2 weeks.        Electronically signed by JUANIS Olmos on 11/6/2018 at 9:06 AM

## 2018-11-07 ENCOUNTER — TELEPHONE (OUTPATIENT)
Dept: FAMILY MEDICINE CLINIC | Age: 73
End: 2018-11-07

## 2018-11-07 NOTE — TELEPHONE ENCOUNTER
[address] Transitions Initial Follow Up Call    Outreach made within 2 business days of discharge: No    Patient: Audrie Canavan Patient :    MRN: [id number]  Reason for Admission:Darian had open heart Surgery on    Discharge Date: 18       Spoke with: Lucille Anna, dad is doing alright still having some pain but, stated that is expercted    Discharge department/facility: [facility] Patient Contact:  Was patient able to fill all prescriptions: Yes  Was patient instructed to bring all medications to the follow-up visit: Yes  Is patient taking all medications as directed in the discharge summary?  Yes  Does patient understand their discharge instructions: Yes  Does patient have questions or concerns that need addressed prior to 7-14 day follow up office visit: yes - blisters on the legs    Scheduled appointment with PCP within 7-14 days    Follow Up  Future Appointments  Date Time Provider Jenna Uribe   2018 11:15 AM Griselda Menendez MD [facility]   2018 10:45 AM JUANIS Rowe  [facility]   2018 2:15 PM Griselda Menendez MD [facility]   2019 11:30 AM Miguel A Rao  [address]       Gertrude Delvalle LPN

## 2018-11-08 ENCOUNTER — CARE COORDINATION (OUTPATIENT)
Dept: CASE MANAGEMENT | Age: 73
End: 2018-11-08

## 2018-11-08 ENCOUNTER — TELEPHONE (OUTPATIENT)
Dept: CARDIOTHORACIC SURGERY | Age: 73
End: 2018-11-08

## 2018-11-08 ENCOUNTER — TELEPHONE (OUTPATIENT)
Dept: FAMILY MEDICINE CLINIC | Age: 73
End: 2018-11-08

## 2018-11-08 LAB
AGE FOR GFR: 73
ANION GAP SERPL CALCULATED.3IONS-SCNC: 67 MMOL/L
BUN BLDV-MCNC: 14 MG/DL
CALCIUM SERPL-MCNC: 3.4 MG/DL
CHLORIDE BLD-SCNC: 71 MMOL/L
CO2: 12 MMOL/L
CREAT SERPL-MCNC: 0.7 MG/DL
EGFR BF: 99 ML/MIN/1.73 M2
EGFR BM: 134 ML/MIN/1.73 M2
EGFR WF: 82 ML/MIN/1.73 M2
EGFR WM: 111 ML/MIN/1.73 M2
GLUCOSE: 102 MG/DL
POTASSIUM SERPL-SCNC: 3.8 MMOL/L
SODIUM BLD-SCNC: 146 MMOL/L

## 2018-11-08 NOTE — TELEPHONE ENCOUNTER
Lauren Squires from [facility] called in stating she has tried 2-3 times to get patient's INR today but was unsuccessful. He has a Dr Appointment tomorrow and she wanted to know if the INR draw can wait for tomorrow. I spoke with Arabella Santiago PA-C who said that would be ok.  Cecy verbalized understanding

## 2018-11-09 ENCOUNTER — ANTI-COAG VISIT (OUTPATIENT)
Dept: CARDIOTHORACIC SURGERY | Age: 73
End: 2018-11-09

## 2018-11-09 ENCOUNTER — OFFICE VISIT (OUTPATIENT)
Dept: FAMILY MEDICINE CLINIC | Age: 73
End: 2018-11-09
Payer: MEDICARE

## 2018-11-09 VITALS
SYSTOLIC BLOOD PRESSURE: 100 MMHG | HEART RATE: 99 BPM | OXYGEN SATURATION: 97 % | BODY MASS INDEX: 32.45 KG/M2 | DIASTOLIC BLOOD PRESSURE: 58 MMHG | WEIGHT: 198 LBS

## 2018-11-09 DIAGNOSIS — L98.491 SUPERFICIAL ULCER OF SKIN (HCC): ICD-10-CM

## 2018-11-09 DIAGNOSIS — E78.2 MIXED HYPERLIPIDEMIA: ICD-10-CM

## 2018-11-09 DIAGNOSIS — Z95.2 S/P AORTIC VALVE REPLACEMENT: Primary | ICD-10-CM

## 2018-11-09 DIAGNOSIS — Z95.2 S/P AVR (AORTIC VALVE REPLACEMENT): ICD-10-CM

## 2018-11-09 DIAGNOSIS — I65.22 STENOSIS OF LEFT CAROTID ARTERY: ICD-10-CM

## 2018-11-09 LAB
INR BLD: 3 RATIO
PT: 33.1 SEC

## 2018-11-09 PROCEDURE — 99495 TRANSJ CARE MGMT MOD F2F 14D: CPT | Performed by: FAMILY MEDICINE

## 2018-11-09 PROCEDURE — 1111F DSCHRG MED/CURRENT MED MERGE: CPT | Performed by: FAMILY MEDICINE

## 2018-11-09 RX ORDER — ROSUVASTATIN CALCIUM 20 MG/1
20 TABLET, COATED ORAL DAILY
COMMUNITY
End: 2018-12-03 | Stop reason: ALTCHOICE

## 2018-11-09 RX ORDER — TAMSULOSIN HYDROCHLORIDE 0.4 MG/1
0.4 CAPSULE ORAL DAILY
COMMUNITY
End: 2018-12-03 | Stop reason: ALTCHOICE

## 2018-11-09 ASSESSMENT — ENCOUNTER SYMPTOMS
BACK PAIN: 0
BLOOD IN STOOL: 0
SHORTNESS OF BREATH: 0
SORE THROAT: 0
WHEEZING: 0
CHEST TIGHTNESS: 0
ABDOMINAL DISTENTION: 0
ABDOMINAL PAIN: 0

## 2018-11-12 ENCOUNTER — HOSPITAL ENCOUNTER (OUTPATIENT)
Dept: GENERAL RADIOLOGY | Age: 73
Discharge: HOME | End: 2018-11-14
Payer: MEDICARE

## 2018-11-12 ENCOUNTER — OFFICE VISIT (OUTPATIENT)
Dept: CARDIOTHORACIC SURGERY | Age: 73
End: 2018-11-12

## 2018-11-12 ENCOUNTER — ANTI-COAG VISIT (OUTPATIENT)
Dept: CARDIOTHORACIC SURGERY | Age: 73
End: 2018-11-12

## 2018-11-12 ENCOUNTER — HOSPITAL ENCOUNTER (OUTPATIENT)
Age: 73
Discharge: HOME | End: 2018-11-14
Payer: MEDICARE

## 2018-11-12 VITALS
HEIGHT: 65 IN | WEIGHT: 191 LBS | BODY MASS INDEX: 31.82 KG/M2 | HEART RATE: 94 BPM | SYSTOLIC BLOOD PRESSURE: 117 MMHG | OXYGEN SATURATION: 97 % | TEMPERATURE: 97.6 F | DIASTOLIC BLOOD PRESSURE: 69 MMHG | RESPIRATION RATE: 18 BRPM

## 2018-11-12 DIAGNOSIS — Z95.2 S/P AVR (AORTIC VALVE REPLACEMENT): ICD-10-CM

## 2018-11-12 DIAGNOSIS — Z95.2 S/P AVR (AORTIC VALVE REPLACEMENT): Primary | ICD-10-CM

## 2018-11-12 LAB
INR BLD: 1.8 RATIO
PT: 19.4 SEC

## 2018-11-12 PROCEDURE — 99024 POSTOP FOLLOW-UP VISIT: CPT | Performed by: NURSE PRACTITIONER

## 2018-11-12 PROCEDURE — 71046 X-RAY EXAM CHEST 2 VIEWS: CPT

## 2018-11-12 RX ORDER — WARFARIN SODIUM 1 MG/1
1 TABLET ORAL DAILY
Qty: 30 TABLET | Refills: 3 | Status: SHIPPED | OUTPATIENT
Start: 2018-11-12 | End: 2018-11-27 | Stop reason: DRUGHIGH

## 2018-11-12 NOTE — PROGRESS NOTES
Disp: 30 tablet, Rfl: 3    warfarin (COUMADIN) 2.5 MG tablet, Take 1 tablet by mouth daily, Disp: 30 tablet, Rfl: 3    glimepiride (AMARYL) 4 MG tablet, Take 1 tablet by mouth 2 times daily, Disp: 30 tablet, Rfl: 3    metFORMIN (GLUCOPHAGE) 500 MG tablet, Take 2 tablets by mouth 2 times daily (with meals), Disp: 60 tablet, Rfl: 3    pioglitazone (ACTOS) 45 MG tablet, Take 1 tablet by mouth daily, Disp: 90 tablet, Rfl: 3    lisinopril (PRINIVIL;ZESTRIL) 5 MG tablet, Take 1 tablet by mouth daily, Disp: 30 tablet, Rfl: 3    clotrimazole-betamethasone (LOTRISONE) 1-0.05 % cream, Apply topically 2 times daily, Disp: 1 Tube, Rfl: 1    hydrochlorothiazide (HYDRODIURIL) 25 MG tablet, Take 1 tablet by mouth daily, Disp: 30 tablet, Rfl: 3    docusate (COLACE, DULCOLAX) 100 MG CAPS, Take 100 mg by mouth 2 times daily, Disp: 60 capsule, Rfl: 2    Multiple Vitamins-Minerals (THERAPEUTIC MULTIVITAMIN-MINERALS) tablet, Take 1 tablet by mouth daily (with breakfast), Disp: 30 tablet, Rfl: 3    omeprazole (PRILOSEC) 20 MG delayed release capsule, Take 1 capsule by mouth daily, Disp: 90 capsule, Rfl: 3    warfarin (COUMADIN) 1 MG tablet, Take 1 tablet by mouth daily Adjust with INR reading, Disp: 30 tablet, Rfl: 3    Past Surgical History:   Procedure Laterality Date    AORTIC VALVE REPLACEMENT  11/2018    CARDIAC CATHETERIZATION  03/01/2018    COLONOSCOPY  2005    for hemoccult positive stool    COLONOSCOPY  10/2017    diverticuli; Dr Lyons Decent      umbilical hernia    ORBITAL FRACTURE SURGERY Left 1971    softball injury    RI REPLACEMENT OF MITRAL VALVE N/A 10/30/2018    AORTIC  VALVE REPLACEMENT 23 MM INTUITY VALVECHUNG, DOREEN performed by El Grande MD at [address] [place] [address] Hx: reports that he quit smoking about 20 years ago. His smoking use included Cigarettes. He has a 60.00 pack-year smoking history. He has never used smokeless tobacco.    Assessment & Plan:   1.  Incisions

## 2018-11-14 ENCOUNTER — TELEPHONE (OUTPATIENT)
Dept: FAMILY MEDICINE CLINIC | Age: 73
End: 2018-11-14

## 2018-11-16 ENCOUNTER — ANTI-COAG VISIT (OUTPATIENT)
Dept: CARDIOTHORACIC SURGERY | Age: 73
End: 2018-11-16

## 2018-11-16 DIAGNOSIS — Z95.2 S/P AVR (AORTIC VALVE REPLACEMENT): ICD-10-CM

## 2018-11-16 LAB
INR BLD: 1.3 RATIO
PT: 14.7 SEC

## 2018-11-19 ENCOUNTER — ANTI-COAG VISIT (OUTPATIENT)
Dept: CARDIOTHORACIC SURGERY | Age: 73
End: 2018-11-19

## 2018-11-19 DIAGNOSIS — Z95.2 S/P AVR (AORTIC VALVE REPLACEMENT): ICD-10-CM

## 2018-11-19 LAB
INR BLD: 1.3
INR BLD: 1.3 RATIO
PT: 14.1 SEC

## 2018-11-23 ENCOUNTER — TELEPHONE (OUTPATIENT)
Dept: CARDIOTHORACIC SURGERY | Age: 73
End: 2018-11-23

## 2018-11-23 LAB
INR BLD: 1.4
INR BLD: 1.4 RATIO
PT: 15.1 SEC

## 2018-11-26 ENCOUNTER — ANTI-COAG VISIT (OUTPATIENT)
Dept: CARDIOTHORACIC SURGERY | Age: 73
End: 2018-11-26

## 2018-11-26 ENCOUNTER — TELEPHONE (OUTPATIENT)
Dept: FAMILY MEDICINE CLINIC | Age: 73
End: 2018-11-26

## 2018-11-26 DIAGNOSIS — Z95.2 S/P AVR: Primary | ICD-10-CM

## 2018-11-26 DIAGNOSIS — Z95.2 S/P AVR (AORTIC VALVE REPLACEMENT): ICD-10-CM

## 2018-11-26 LAB
INR BLD: 1.4
INR BLD: 1.4 RATIO
PT: 15.6 SEC

## 2018-11-27 RX ORDER — WARFARIN SODIUM 1 MG/1
2 TABLET ORAL DAILY PRN
Qty: 30 TABLET | Refills: 3 | Status: SHIPPED | OUTPATIENT
Start: 2018-11-27 | End: 2019-01-22

## 2018-11-27 NOTE — TELEPHONE ENCOUNTER
Okay ; we can do it but he needs to make an appointment this week to review; needs another INR in the office this week

## 2018-11-29 ENCOUNTER — ANTI-COAG VISIT (OUTPATIENT)
Dept: CARDIOTHORACIC SURGERY | Age: 73
End: 2018-11-29

## 2018-11-29 ENCOUNTER — OFFICE VISIT (OUTPATIENT)
Dept: FAMILY MEDICINE CLINIC | Age: 73
End: 2018-11-29
Payer: MEDICARE

## 2018-11-29 VITALS
WEIGHT: 186 LBS | DIASTOLIC BLOOD PRESSURE: 80 MMHG | SYSTOLIC BLOOD PRESSURE: 110 MMHG | BODY MASS INDEX: 30.95 KG/M2 | OXYGEN SATURATION: 96 % | HEART RATE: 76 BPM

## 2018-11-29 DIAGNOSIS — Z95.2 H/O MECHANICAL AORTIC VALVE REPLACEMENT: Primary | ICD-10-CM

## 2018-11-29 DIAGNOSIS — Z95.2 S/P AVR (AORTIC VALVE REPLACEMENT): ICD-10-CM

## 2018-11-29 DIAGNOSIS — E11.65 TYPE 2 DIABETES MELLITUS WITH HYPERGLYCEMIA, WITHOUT LONG-TERM CURRENT USE OF INSULIN (HCC): ICD-10-CM

## 2018-11-29 DIAGNOSIS — E78.2 MIXED HYPERLIPIDEMIA: ICD-10-CM

## 2018-11-29 DIAGNOSIS — I65.22 STENOSIS OF LEFT CAROTID ARTERY: ICD-10-CM

## 2018-11-29 DIAGNOSIS — Z95.2 S/P AORTIC VALVE REPLACEMENT: ICD-10-CM

## 2018-11-29 DIAGNOSIS — Z79.01 CURRENT USE OF LONG TERM ANTICOAGULATION: ICD-10-CM

## 2018-11-29 DIAGNOSIS — N40.0 BENIGN PROSTATIC HYPERPLASIA, UNSPECIFIED WHETHER LOWER URINARY TRACT SYMPTOMS PRESENT: ICD-10-CM

## 2018-11-29 LAB
HBA1C MFR BLD: 7.3 %
INTERNATIONAL NORMALIZATION RATIO, POC: 1.3
PROTHROMBIN TIME, POC: NORMAL

## 2018-11-29 PROCEDURE — 1036F TOBACCO NON-USER: CPT | Performed by: FAMILY MEDICINE

## 2018-11-29 PROCEDURE — 3017F COLORECTAL CA SCREEN DOC REV: CPT | Performed by: FAMILY MEDICINE

## 2018-11-29 PROCEDURE — 3045F PR MOST RECENT HEMOGLOBIN A1C LEVEL 7.0-9.0%: CPT | Performed by: FAMILY MEDICINE

## 2018-11-29 PROCEDURE — 1111F DSCHRG MED/CURRENT MED MERGE: CPT | Performed by: FAMILY MEDICINE

## 2018-11-29 PROCEDURE — 99214 OFFICE O/P EST MOD 30 MIN: CPT | Performed by: FAMILY MEDICINE

## 2018-11-29 PROCEDURE — G8427 DOCREV CUR MEDS BY ELIG CLIN: HCPCS | Performed by: FAMILY MEDICINE

## 2018-11-29 PROCEDURE — G8484 FLU IMMUNIZE NO ADMIN: HCPCS | Performed by: FAMILY MEDICINE

## 2018-11-29 PROCEDURE — 1101F PT FALLS ASSESS-DOCD LE1/YR: CPT | Performed by: FAMILY MEDICINE

## 2018-11-29 PROCEDURE — G8598 ASA/ANTIPLAT THER USED: HCPCS | Performed by: FAMILY MEDICINE

## 2018-11-29 PROCEDURE — 4040F PNEUMOC VAC/ADMIN/RCVD: CPT | Performed by: FAMILY MEDICINE

## 2018-11-29 PROCEDURE — 2022F DILAT RTA XM EVC RTNOPTHY: CPT | Performed by: FAMILY MEDICINE

## 2018-11-29 PROCEDURE — 83036 HEMOGLOBIN GLYCOSYLATED A1C: CPT | Performed by: FAMILY MEDICINE

## 2018-11-29 PROCEDURE — 93793 ANTICOAG MGMT PT WARFARIN: CPT | Performed by: FAMILY MEDICINE

## 2018-11-29 PROCEDURE — G8417 CALC BMI ABV UP PARAM F/U: HCPCS | Performed by: FAMILY MEDICINE

## 2018-11-29 PROCEDURE — 85610 PROTHROMBIN TIME: CPT | Performed by: FAMILY MEDICINE

## 2018-11-29 PROCEDURE — 1123F ACP DISCUSS/DSCN MKR DOCD: CPT | Performed by: FAMILY MEDICINE

## 2018-11-29 RX ORDER — GLUCOSAMINE HCL/CHONDROITIN SU 500-400 MG
CAPSULE ORAL
Qty: 100 STRIP | Refills: 3 | Status: SHIPPED | OUTPATIENT
Start: 2018-11-29

## 2018-11-29 ASSESSMENT — ENCOUNTER SYMPTOMS
WHEEZING: 0
ABDOMINAL PAIN: 0
ABDOMINAL DISTENTION: 0
BACK PAIN: 1
COUGH: 0
SHORTNESS OF BREATH: 0
BLOOD IN STOOL: 0
CHEST TIGHTNESS: 0

## 2018-11-29 NOTE — PROGRESS NOTES
glycosylated hemoglobin (Hb A1C)    DME Order for Glucometer as OP     You must complete the order parameters below and add the medical necessity documentation for this DME in a separate note. Home blood glucose monitor    Diagnosis: NIDDM     Testing frequency: Check blood sugars and record once times daily    Duration: purchase       Prescriptions:    Orders Placed This Encounter   Medications    blood glucose monitor strips     Sig: Test 1 times a day & as needed for symptoms of irregular blood glucose. Dispense:  100 strip     Refill:  3     Brand per patient preference. May round up to next available package size. Return in about 3 months (around 2/15/2019).         Akosua Chapa am scribing for Daisha Woody MD 11/29/2018 at 3:52 PM.

## 2018-12-03 ENCOUNTER — NURSE ONLY (OUTPATIENT)
Dept: FAMILY MEDICINE CLINIC | Age: 73
End: 2018-12-03
Payer: MEDICARE

## 2018-12-03 ENCOUNTER — ANTI-COAG VISIT (OUTPATIENT)
Dept: FAMILY MEDICINE CLINIC | Age: 73
End: 2018-12-03

## 2018-12-03 VITALS
DIASTOLIC BLOOD PRESSURE: 60 MMHG | WEIGHT: 189 LBS | HEART RATE: 74 BPM | SYSTOLIC BLOOD PRESSURE: 120 MMHG | OXYGEN SATURATION: 97 % | BODY MASS INDEX: 31.45 KG/M2

## 2018-12-03 DIAGNOSIS — Z79.01 LONG TERM CURRENT USE OF ANTICOAGULANT THERAPY: Primary | ICD-10-CM

## 2018-12-03 DIAGNOSIS — Z95.2 AORTIC VALVE PROSTHESIS PRESENT: Primary | ICD-10-CM

## 2018-12-03 DIAGNOSIS — E11.65 TYPE 2 DIABETES MELLITUS WITH HYPERGLYCEMIA, WITHOUT LONG-TERM CURRENT USE OF INSULIN (HCC): ICD-10-CM

## 2018-12-03 LAB
CREATININE, RANDOM: 107.8 MG/DL
INTERNATIONAL NORMALIZATION RATIO, POC: 1.2
MICROALBUMIN UR-MCNC: 3.5 MG/DL
MICROALBUMIN/CREAT UR-RTO: 32.5 MCG/MG CR
PROTHROMBIN TIME, POC: ABNORMAL

## 2018-12-03 PROCEDURE — 85610 PROTHROMBIN TIME: CPT | Performed by: FAMILY MEDICINE

## 2018-12-03 RX ORDER — WARFARIN SODIUM 4 MG/1
4 TABLET ORAL DAILY
Qty: 30 TABLET | Refills: 3 | Status: SHIPPED | OUTPATIENT
Start: 2018-12-03 | End: 2019-04-22 | Stop reason: SDUPTHER

## 2018-12-04 RX ORDER — PIOGLITAZONEHYDROCHLORIDE 45 MG/1
45 TABLET ORAL DAILY
Qty: 90 TABLET | Refills: 3 | Status: SHIPPED | OUTPATIENT
Start: 2018-12-04 | End: 2019-09-26 | Stop reason: SINTOL

## 2018-12-07 ENCOUNTER — NURSE ONLY (OUTPATIENT)
Dept: FAMILY MEDICINE CLINIC | Age: 73
End: 2018-12-07
Payer: MEDICARE

## 2018-12-07 ENCOUNTER — ANTI-COAG VISIT (OUTPATIENT)
Dept: FAMILY MEDICINE CLINIC | Age: 73
End: 2018-12-07

## 2018-12-07 VITALS
BODY MASS INDEX: 31.95 KG/M2 | SYSTOLIC BLOOD PRESSURE: 130 MMHG | DIASTOLIC BLOOD PRESSURE: 70 MMHG | WEIGHT: 192 LBS | HEART RATE: 72 BPM

## 2018-12-07 DIAGNOSIS — Z79.01 LONG TERM CURRENT USE OF ANTICOAGULANT THERAPY: ICD-10-CM

## 2018-12-07 LAB
INTERNATIONAL NORMALIZATION RATIO, POC: 1.9
PROTHROMBIN TIME, POC: NORMAL

## 2018-12-07 PROCEDURE — 93793 ANTICOAG MGMT PT WARFARIN: CPT | Performed by: FAMILY MEDICINE

## 2018-12-07 PROCEDURE — 85610 PROTHROMBIN TIME: CPT | Performed by: FAMILY MEDICINE

## 2018-12-13 ENCOUNTER — OFFICE VISIT (OUTPATIENT)
Dept: VASCULAR SURGERY | Age: 73
End: 2018-12-13
Payer: MEDICARE

## 2018-12-13 VITALS
DIASTOLIC BLOOD PRESSURE: 62 MMHG | SYSTOLIC BLOOD PRESSURE: 124 MMHG | BODY MASS INDEX: 32.26 KG/M2 | WEIGHT: 193.6 LBS | HEART RATE: 66 BPM | HEIGHT: 65 IN

## 2018-12-13 DIAGNOSIS — I65.22 CAROTID STENOSIS, ASYMPTOMATIC, LEFT: Primary | ICD-10-CM

## 2018-12-13 PROCEDURE — G8598 ASA/ANTIPLAT THER USED: HCPCS | Performed by: SURGERY

## 2018-12-13 PROCEDURE — 1101F PT FALLS ASSESS-DOCD LE1/YR: CPT | Performed by: SURGERY

## 2018-12-13 PROCEDURE — 1036F TOBACCO NON-USER: CPT | Performed by: SURGERY

## 2018-12-13 PROCEDURE — 1123F ACP DISCUSS/DSCN MKR DOCD: CPT | Performed by: SURGERY

## 2018-12-13 PROCEDURE — 3017F COLORECTAL CA SCREEN DOC REV: CPT | Performed by: SURGERY

## 2018-12-13 PROCEDURE — 99214 OFFICE O/P EST MOD 30 MIN: CPT | Performed by: SURGERY

## 2018-12-13 PROCEDURE — G8484 FLU IMMUNIZE NO ADMIN: HCPCS | Performed by: SURGERY

## 2018-12-13 PROCEDURE — G8417 CALC BMI ABV UP PARAM F/U: HCPCS | Performed by: SURGERY

## 2018-12-13 PROCEDURE — 4040F PNEUMOC VAC/ADMIN/RCVD: CPT | Performed by: SURGERY

## 2018-12-13 PROCEDURE — G8427 DOCREV CUR MEDS BY ELIG CLIN: HCPCS | Performed by: SURGERY

## 2018-12-13 NOTE — PROGRESS NOTES
[address] [facility]  [facility] VASCULAR SURG  [address] [facility] [id number] [phone]    Sallie Cook  [birth year]  75 y.o.male       Chief Complaint:  Chief Complaint   Patient presents with    Follow-up     f/u [facility] to discuss carotids       History of present Illness:  Pt is here today for evaluation and treatment of His left carotid stenosis. He recently underwent aortic valve replacement at [facility] by Dr. Yaneth Godinez. Postoperatively he has done well and has been going to cardiac rehab. Past Medical History:  has a past medical history of Aortic stenosis; Degenerative disc disease, lumbar; Diabetes mellitus ([address] Diverticulosis of sigmoid colon; DIA (dyspnea on exertion); Full dentures; GERD (gastroesophageal reflux disease); Heart murmur; Hyperlipidemia; Hypertension; Kidney stone; Mitral regurgitation; Obesity; Tubular adenoma of colon; Wears glasses; and Yeast infection. Past Surgical History:   Past Surgical History:   Procedure Laterality Date    AORTIC VALVE REPLACEMENT  11/2018    CARDIAC CATHETERIZATION  03/01/2018    COLONOSCOPY  2005    for hemoccult positive stool    COLONOSCOPY  10/2017    diverticuli; Dr Laxmi Jasso      umbilical hernia    ORBITAL FRACTURE SURGERY Left 1971    softball injury    UT REPLACEMENT OF MITRAL VALVE N/A 10/30/2018    AORTIC  VALVE REPLACEMENT 23 MM INTUITY VALVE, SWMARY ELVIN, DOREEN performed by Guadalupe Alvarez MD at [address] [place] [address] History:  reports that he quit smoking about 20 years ago. His smoking use included Cigarettes. He has a 60.00 pack-year smoking history. He has never used smokeless tobacco. He reports that he does not drink alcohol or use drugs. Family History: family history includes Alzheimer's Disease in his father; Cancer in his father.     Review of Systems:   Constitutional:  negative for  fevers, chills, sweats, fatigue, and 5 MG tablet, Take 1 tablet by mouth daily, Disp: 30 tablet, Rfl: 3    clotrimazole-betamethasone (LOTRISONE) 1-0.05 % cream, Apply topically 2 times daily, Disp: 1 Tube, Rfl: 1    hydrochlorothiazide (HYDRODIURIL) 25 MG tablet, Take 1 tablet by mouth daily, Disp: 30 tablet, Rfl: 3    Multiple Vitamins-Minerals (THERAPEUTIC MULTIVITAMIN-MINERALS) tablet, Take 1 tablet by mouth daily (with breakfast), Disp: 30 tablet, Rfl: 3    omeprazole (PRILOSEC) 20 MG delayed release capsule, Take 1 capsule by mouth daily, Disp: 90 capsule, Rfl: 3    Vital Signs:  Vitals:    12/13/18 1251   BP: 124/62   Pulse: 66       Physical Exam:  General appearance - alert, well appearing and in no acute distress  Mental status - oriented to person, place and time with normal affect  Head - normocephalic and atraumatic  Eyes - pupils equal and reactive, extraocular eye movements intact, conjunctiva clear  Ears - hearing appears to be intact  Nose - no drainage noted  Mouth - mucous membranes moist  Neck - supple, no carotid bruits, thyroid not palpable, no JVD  Chest - clear to auscultation, normal effort, incision site clean and dry  Heart - normal rate, regular rhythm, no murmurs  Abdomen - soft, non-tender, non-distended, bowel sounds present all four quadrants, no masses, hepatomegaly, splenomegaly or aortic enlargement  Neurological - normal speech, no focal findings or movement disorder noted, cranial nerves II through XII grossly intact  Extremities - peripheral pulses palpable, no pedal edema or calf pain with palpation  Skin - no gross lesions, rashes, or induration noted      Labs:   Lab Results   Component Value Date    WBC 11.8 11/04/2018    HGB 8.6 11/04/2018    HCT 26.6 11/04/2018    MCV 93.7 11/04/2018     11/04/2018     Lab Results   Component Value Date     11/08/2018    K 3.8 11/08/2018    CL 71 11/08/2018    CO2 12 11/08/2018    BUN 14 11/08/2018    CREATININE 0.7 11/08/2018    GLUCOSE 102 11/08/2018

## 2018-12-14 ENCOUNTER — ANTI-COAG VISIT (OUTPATIENT)
Dept: FAMILY MEDICINE CLINIC | Age: 73
End: 2018-12-14

## 2018-12-14 ENCOUNTER — NURSE ONLY (OUTPATIENT)
Dept: FAMILY MEDICINE CLINIC | Age: 73
End: 2018-12-14
Payer: MEDICARE

## 2018-12-14 VITALS
HEART RATE: 69 BPM | OXYGEN SATURATION: 95 % | DIASTOLIC BLOOD PRESSURE: 74 MMHG | BODY MASS INDEX: 32.12 KG/M2 | WEIGHT: 193 LBS | SYSTOLIC BLOOD PRESSURE: 136 MMHG

## 2018-12-14 DIAGNOSIS — Z79.01 LONG TERM CURRENT USE OF ANTICOAGULANT THERAPY: ICD-10-CM

## 2018-12-14 LAB
INTERNATIONAL NORMALIZATION RATIO, POC: 1.8
PROTHROMBIN TIME, POC: ABNORMAL

## 2018-12-14 PROCEDURE — 93793 ANTICOAG MGMT PT WARFARIN: CPT | Performed by: FAMILY MEDICINE

## 2018-12-14 PROCEDURE — 85610 PROTHROMBIN TIME: CPT | Performed by: FAMILY MEDICINE

## 2018-12-20 ENCOUNTER — TELEPHONE (OUTPATIENT)
Dept: FAMILY MEDICINE CLINIC | Age: 73
End: 2018-12-20

## 2018-12-28 ENCOUNTER — ANTI-COAG VISIT (OUTPATIENT)
Dept: FAMILY MEDICINE CLINIC | Age: 73
End: 2018-12-28

## 2018-12-28 ENCOUNTER — NURSE ONLY (OUTPATIENT)
Dept: FAMILY MEDICINE CLINIC | Age: 73
End: 2018-12-28
Payer: MEDICARE

## 2018-12-28 VITALS
DIASTOLIC BLOOD PRESSURE: 80 MMHG | WEIGHT: 194 LBS | SYSTOLIC BLOOD PRESSURE: 124 MMHG | BODY MASS INDEX: 32.28 KG/M2 | HEART RATE: 80 BPM

## 2018-12-28 DIAGNOSIS — Z79.01 LONG TERM CURRENT USE OF ANTICOAGULANT THERAPY: ICD-10-CM

## 2018-12-28 LAB
INTERNATIONAL NORMALIZATION RATIO, POC: 2
PROTHROMBIN TIME, POC: NORMAL

## 2018-12-28 PROCEDURE — 93793 ANTICOAG MGMT PT WARFARIN: CPT | Performed by: FAMILY MEDICINE

## 2018-12-28 PROCEDURE — 85610 PROTHROMBIN TIME: CPT | Performed by: FAMILY MEDICINE

## 2018-12-31 ENCOUNTER — TELEPHONE (OUTPATIENT)
Dept: FAMILY MEDICINE CLINIC | Age: 73
End: 2018-12-31

## 2019-01-02 RX ORDER — AMIODARONE HYDROCHLORIDE 200 MG/1
200 TABLET ORAL 2 TIMES DAILY
Qty: 30 TABLET | Refills: 3 | Status: CANCELLED | OUTPATIENT
Start: 2019-01-02

## 2019-01-03 ENCOUNTER — TELEPHONE (OUTPATIENT)
Dept: FAMILY MEDICINE CLINIC | Age: 74
End: 2019-01-03

## 2019-01-03 DIAGNOSIS — E11.65 TYPE 2 DIABETES MELLITUS WITH HYPERGLYCEMIA, WITHOUT LONG-TERM CURRENT USE OF INSULIN (HCC): Primary | ICD-10-CM

## 2019-01-07 RX ORDER — LISINOPRIL 5 MG/1
5 TABLET ORAL DAILY
Qty: 30 TABLET | Refills: 5 | Status: SHIPPED | OUTPATIENT
Start: 2019-01-07 | End: 2019-07-30 | Stop reason: SDUPTHER

## 2019-01-08 ENCOUNTER — TELEPHONE (OUTPATIENT)
Dept: CARDIOLOGY CLINIC | Age: 74
End: 2019-01-08

## 2019-01-08 ENCOUNTER — OFFICE VISIT (OUTPATIENT)
Dept: CARDIOLOGY CLINIC | Age: 74
End: 2019-01-08
Payer: MEDICARE

## 2019-01-08 VITALS
WEIGHT: 197.8 LBS | BODY MASS INDEX: 32.92 KG/M2 | SYSTOLIC BLOOD PRESSURE: 120 MMHG | DIASTOLIC BLOOD PRESSURE: 70 MMHG | HEART RATE: 84 BPM

## 2019-01-08 DIAGNOSIS — I10 ESSENTIAL HYPERTENSION: Primary | ICD-10-CM

## 2019-01-08 DIAGNOSIS — I65.22 STENOSIS OF LEFT CAROTID ARTERY: ICD-10-CM

## 2019-01-08 DIAGNOSIS — E78.2 MIXED HYPERLIPIDEMIA: ICD-10-CM

## 2019-01-08 DIAGNOSIS — Z01.810 PREOP CARDIOVASCULAR EXAM: ICD-10-CM

## 2019-01-08 DIAGNOSIS — I35.0 AORTIC STENOSIS, SEVERE: ICD-10-CM

## 2019-01-08 DIAGNOSIS — I34.0 MODERATE MITRAL REGURGITATION: Chronic | ICD-10-CM

## 2019-01-08 PROCEDURE — 3017F COLORECTAL CA SCREEN DOC REV: CPT | Performed by: INTERNAL MEDICINE

## 2019-01-08 PROCEDURE — G8484 FLU IMMUNIZE NO ADMIN: HCPCS | Performed by: INTERNAL MEDICINE

## 2019-01-08 PROCEDURE — 1123F ACP DISCUSS/DSCN MKR DOCD: CPT | Performed by: INTERNAL MEDICINE

## 2019-01-08 PROCEDURE — 1101F PT FALLS ASSESS-DOCD LE1/YR: CPT | Performed by: INTERNAL MEDICINE

## 2019-01-08 PROCEDURE — 1036F TOBACCO NON-USER: CPT | Performed by: INTERNAL MEDICINE

## 2019-01-08 PROCEDURE — G8417 CALC BMI ABV UP PARAM F/U: HCPCS | Performed by: INTERNAL MEDICINE

## 2019-01-08 PROCEDURE — G8427 DOCREV CUR MEDS BY ELIG CLIN: HCPCS | Performed by: INTERNAL MEDICINE

## 2019-01-08 PROCEDURE — G8598 ASA/ANTIPLAT THER USED: HCPCS | Performed by: INTERNAL MEDICINE

## 2019-01-08 PROCEDURE — 99213 OFFICE O/P EST LOW 20 MIN: CPT | Performed by: INTERNAL MEDICINE

## 2019-01-08 PROCEDURE — 4040F PNEUMOC VAC/ADMIN/RCVD: CPT | Performed by: INTERNAL MEDICINE

## 2019-01-11 ENCOUNTER — NURSE ONLY (OUTPATIENT)
Dept: FAMILY MEDICINE CLINIC | Age: 74
End: 2019-01-11
Payer: MEDICARE

## 2019-01-11 VITALS
SYSTOLIC BLOOD PRESSURE: 132 MMHG | HEART RATE: 72 BPM | WEIGHT: 196.8 LBS | DIASTOLIC BLOOD PRESSURE: 84 MMHG | BODY MASS INDEX: 32.75 KG/M2

## 2019-01-11 DIAGNOSIS — Z79.01 LONG TERM CURRENT USE OF ANTICOAGULANT THERAPY: ICD-10-CM

## 2019-01-11 LAB
INTERNATIONAL NORMALIZATION RATIO, POC: 2
PROTHROMBIN TIME, POC: NORMAL

## 2019-01-11 PROCEDURE — 85610 PROTHROMBIN TIME: CPT | Performed by: FAMILY MEDICINE

## 2019-01-11 PROCEDURE — 93793 ANTICOAG MGMT PT WARFARIN: CPT | Performed by: FAMILY MEDICINE

## 2019-01-22 ENCOUNTER — TELEPHONE (OUTPATIENT)
Dept: FAMILY MEDICINE CLINIC | Age: 74
End: 2019-01-22

## 2019-01-22 ENCOUNTER — HOSPITAL ENCOUNTER (OUTPATIENT)
Dept: PREADMISSION TESTING | Age: 74
Discharge: HOME | End: 2019-01-26
Payer: MEDICARE

## 2019-01-22 VITALS
BODY MASS INDEX: 30.93 KG/M2 | OXYGEN SATURATION: 98 % | HEART RATE: 74 BPM | WEIGHT: 185.63 LBS | HEIGHT: 65 IN | SYSTOLIC BLOOD PRESSURE: 127 MMHG | DIASTOLIC BLOOD PRESSURE: 69 MMHG | RESPIRATION RATE: 16 BRPM

## 2019-01-22 LAB
ABO/RH: NORMAL
ABSOLUTE EOS #: 0.3 K/UL (ref 0–0.4)
ABSOLUTE IMMATURE GRANULOCYTE: ABNORMAL K/UL (ref 0–0.3)
ABSOLUTE LYMPH #: 2.5 K/UL (ref 1–4.8)
ABSOLUTE MONO #: 0.7 K/UL (ref 0.2–0.8)
ANION GAP SERPL CALCULATED.3IONS-SCNC: 15 MMOL/L (ref 9–17)
ANTIBODY SCREEN: NEGATIVE
ARM BAND NUMBER: NORMAL
BASOPHILS # BLD: 0 % (ref 0–2)
BASOPHILS ABSOLUTE: 0 K/UL (ref 0–0.2)
BILIRUBIN URINE: NEGATIVE
BUN BLDV-MCNC: 20 MG/DL (ref 8–23)
CHLORIDE BLD-SCNC: 98 MMOL/L (ref 98–107)
CO2: 23 MMOL/L (ref 20–31)
COLOR: YELLOW
COMMENT UA: ABNORMAL
CREAT SERPL-MCNC: 0.99 MG/DL (ref 0.7–1.2)
DIFFERENTIAL TYPE: ABNORMAL
EOSINOPHILS RELATIVE PERCENT: 4 % (ref 1–4)
EXPIRATION DATE: NORMAL
GFR AFRICAN AMERICAN: >60 ML/MIN
GFR NON-AFRICAN AMERICAN: >60 ML/MIN
GFR SERPL CREATININE-BSD FRML MDRD: NORMAL ML/MIN/{1.73_M2}
GFR SERPL CREATININE-BSD FRML MDRD: NORMAL ML/MIN/{1.73_M2}
GLUCOSE BLD-MCNC: 219 MG/DL (ref 70–99)
GLUCOSE URINE: ABNORMAL
HCT VFR BLD CALC: 38.2 % (ref 41–53)
HEMOGLOBIN: 12.7 G/DL (ref 13.5–17.5)
IMMATURE GRANULOCYTES: ABNORMAL %
INR BLD: 2.6
KETONES, URINE: NEGATIVE
LEUKOCYTE ESTERASE, URINE: NEGATIVE
LYMPHOCYTES # BLD: 27 % (ref 24–44)
MCH RBC QN AUTO: 29.3 PG (ref 26–34)
MCHC RBC AUTO-ENTMCNC: 33.3 G/DL (ref 31–37)
MCV RBC AUTO: 88 FL (ref 80–100)
MONOCYTES # BLD: 7 % (ref 1–7)
NITRITE, URINE: NEGATIVE
NRBC AUTOMATED: ABNORMAL PER 100 WBC
PARTIAL THROMBOPLASTIN TIME: 38.6 SEC (ref 23–31)
PDW BLD-RTO: 14.8 % (ref 11.5–14.5)
PH UA: 5.5 (ref 5–8)
PLATELET # BLD: 329 K/UL (ref 130–400)
PLATELET ESTIMATE: ABNORMAL
PMV BLD AUTO: 8.2 FL (ref 6–12)
POTASSIUM SERPL-SCNC: 4.9 MMOL/L (ref 3.7–5.3)
PROTEIN UA: NEGATIVE
PROTHROMBIN TIME: 25.3 SEC (ref 9.7–11.6)
RBC # BLD: 4.35 M/UL (ref 4.5–5.9)
RBC # BLD: ABNORMAL 10*6/UL
SEG NEUTROPHILS: 62 % (ref 36–66)
SEGMENTED NEUTROPHILS ABSOLUTE COUNT: 5.8 K/UL (ref 1.8–7.7)
SODIUM BLD-SCNC: 136 MMOL/L (ref 135–144)
SPECIFIC GRAVITY UA: 1.02 (ref 1–1.03)
TURBIDITY: CLEAR
URINE HGB: NEGATIVE
UROBILINOGEN, URINE: NORMAL
WBC # BLD: 9.4 K/UL (ref 3.5–11)
WBC # BLD: ABNORMAL 10*3/UL

## 2019-01-22 PROCEDURE — 85610 PROTHROMBIN TIME: CPT

## 2019-01-22 PROCEDURE — 86850 RBC ANTIBODY SCREEN: CPT

## 2019-01-22 PROCEDURE — 85025 COMPLETE CBC W/AUTO DIFF WBC: CPT

## 2019-01-22 PROCEDURE — 80051 ELECTROLYTE PANEL: CPT

## 2019-01-22 PROCEDURE — 82947 ASSAY GLUCOSE BLOOD QUANT: CPT

## 2019-01-22 PROCEDURE — 81003 URINALYSIS AUTO W/O SCOPE: CPT

## 2019-01-22 PROCEDURE — 84520 ASSAY OF UREA NITROGEN: CPT

## 2019-01-22 PROCEDURE — 86901 BLOOD TYPING SEROLOGIC RH(D): CPT

## 2019-01-22 PROCEDURE — 86900 BLOOD TYPING SEROLOGIC ABO: CPT

## 2019-01-22 PROCEDURE — 82565 ASSAY OF CREATININE: CPT

## 2019-01-22 PROCEDURE — 36415 COLL VENOUS BLD VENIPUNCTURE: CPT

## 2019-01-22 PROCEDURE — 85730 THROMBOPLASTIN TIME PARTIAL: CPT

## 2019-01-22 RX ORDER — CEFAZOLIN SODIUM 2 G/50ML
2 SOLUTION INTRAVENOUS ONCE
Status: CANCELLED | OUTPATIENT
Start: 2019-02-07

## 2019-01-28 ENCOUNTER — TELEPHONE (OUTPATIENT)
Dept: CARDIOLOGY CLINIC | Age: 74
End: 2019-01-28

## 2019-02-01 RX ORDER — AMIODARONE HYDROCHLORIDE 200 MG/1
200 TABLET ORAL DAILY
Qty: 90 TABLET | Refills: 3 | Status: SHIPPED | OUTPATIENT
Start: 2019-02-01 | End: 2019-07-09 | Stop reason: ALTCHOICE

## 2019-02-06 ENCOUNTER — ANESTHESIA EVENT (OUTPATIENT)
Dept: OPERATING ROOM | Age: 74
DRG: 039 | End: 2019-02-06
Payer: MEDICARE

## 2019-02-07 ENCOUNTER — HOSPITAL ENCOUNTER (INPATIENT)
Age: 74
LOS: 1 days | Discharge: HOME | DRG: 039 | End: 2019-02-08
Attending: SURGERY | Admitting: SURGERY
Payer: MEDICARE

## 2019-02-07 ENCOUNTER — ANESTHESIA (OUTPATIENT)
Dept: OPERATING ROOM | Age: 74
DRG: 039 | End: 2019-02-07
Payer: MEDICARE

## 2019-02-07 VITALS — OXYGEN SATURATION: 94 % | TEMPERATURE: 99 F | SYSTOLIC BLOOD PRESSURE: 122 MMHG | DIASTOLIC BLOOD PRESSURE: 69 MMHG

## 2019-02-07 DIAGNOSIS — I65.22 CAROTID STENOSIS, LEFT: Primary | ICD-10-CM

## 2019-02-07 PROBLEM — Z79.01 ANTICOAGULATED ON COUMADIN: Chronic | Status: ACTIVE | Noted: 2019-02-07

## 2019-02-07 PROBLEM — Z01.810 PREOP CARDIOVASCULAR EXAM: Status: RESOLVED | Noted: 2019-01-08 | Resolved: 2019-02-07

## 2019-02-07 LAB
GLUCOSE BLD-MCNC: 147 MG/DL (ref 75–110)
GLUCOSE BLD-MCNC: 161 MG/DL (ref 75–110)
INR BLD: 1.1
PARTIAL THROMBOPLASTIN TIME: 30.1 SEC (ref 23–31)
PROTHROMBIN TIME: 11.4 SEC (ref 9.7–11.6)

## 2019-02-07 PROCEDURE — 03CL0ZZ EXTIRPATION OF MATTER FROM LEFT INTERNAL CAROTID ARTERY, OPEN APPROACH: ICD-10-PCS | Performed by: SURGERY

## 2019-02-07 PROCEDURE — 88311 DECALCIFY TISSUE: CPT

## 2019-02-07 PROCEDURE — 6360000002 HC RX W HCPCS: Performed by: SURGERY

## 2019-02-07 PROCEDURE — 82947 ASSAY GLUCOSE BLOOD QUANT: CPT

## 2019-02-07 PROCEDURE — 03CJ0ZZ EXTIRPATION OF MATTER FROM LEFT COMMON CAROTID ARTERY, OPEN APPROACH: ICD-10-PCS | Performed by: SURGERY

## 2019-02-07 PROCEDURE — 7100000000 HC PACU RECOVERY - FIRST 15 MIN: Performed by: SURGERY

## 2019-02-07 PROCEDURE — 6360000002 HC RX W HCPCS: Performed by: NURSE ANESTHETIST, CERTIFIED REGISTERED

## 2019-02-07 PROCEDURE — 85730 THROMBOPLASTIN TIME PARTIAL: CPT

## 2019-02-07 PROCEDURE — 03UJ0KZ SUPPLEMENT LEFT COMMON CAROTID ARTERY WITH NONAUTOLOGOUS TISSUE SUBSTITUTE, OPEN APPROACH: ICD-10-PCS | Performed by: SURGERY

## 2019-02-07 PROCEDURE — 2500000003 HC RX 250 WO HCPCS: Performed by: NURSE ANESTHETIST, CERTIFIED REGISTERED

## 2019-02-07 PROCEDURE — 7100000001 HC PACU RECOVERY - ADDTL 15 MIN: Performed by: SURGERY

## 2019-02-07 PROCEDURE — 85610 PROTHROMBIN TIME: CPT

## 2019-02-07 PROCEDURE — 2580000003 HC RX 258: Performed by: ANESTHESIOLOGY

## 2019-02-07 PROCEDURE — 2060000000 HC ICU INTERMEDIATE R&B

## 2019-02-07 PROCEDURE — 35301 RECHANNELING OF ARTERY: CPT | Performed by: SURGERY

## 2019-02-07 PROCEDURE — 88304 TISSUE EXAM BY PATHOLOGIST: CPT

## 2019-02-07 PROCEDURE — 2580000003 HC RX 258: Performed by: NURSE ANESTHETIST, CERTIFIED REGISTERED

## 2019-02-07 PROCEDURE — 03UN0KZ SUPPLEMENT LEFT EXTERNAL CAROTID ARTERY WITH NONAUTOLOGOUS TISSUE SUBSTITUTE, OPEN APPROACH: ICD-10-PCS | Performed by: SURGERY

## 2019-02-07 PROCEDURE — 2720000010 HC SURG SUPPLY STERILE: Performed by: SURGERY

## 2019-02-07 PROCEDURE — 99223 1ST HOSP IP/OBS HIGH 75: CPT | Performed by: NURSE PRACTITIONER

## 2019-02-07 PROCEDURE — 6370000000 HC RX 637 (ALT 250 FOR IP): Performed by: SURGERY

## 2019-02-07 PROCEDURE — 3600000012 HC SURGERY LEVEL 2 ADDTL 15MIN: Performed by: SURGERY

## 2019-02-07 PROCEDURE — 3700000000 HC ANESTHESIA ATTENDED CARE: Performed by: SURGERY

## 2019-02-07 PROCEDURE — 2580000003 HC RX 258: Performed by: SURGERY

## 2019-02-07 PROCEDURE — 3600000002 HC SURGERY LEVEL 2 BASE: Performed by: SURGERY

## 2019-02-07 PROCEDURE — 2500000003 HC RX 250 WO HCPCS: Performed by: SURGERY

## 2019-02-07 PROCEDURE — 2709999900 HC NON-CHARGEABLE SUPPLY: Performed by: SURGERY

## 2019-02-07 PROCEDURE — 3700000001 HC ADD 15 MINUTES (ANESTHESIA): Performed by: SURGERY

## 2019-02-07 PROCEDURE — C1768 GRAFT, VASCULAR: HCPCS | Performed by: SURGERY

## 2019-02-07 PROCEDURE — 03CN0ZZ EXTIRPATION OF MATTER FROM LEFT EXTERNAL CAROTID ARTERY, OPEN APPROACH: ICD-10-PCS | Performed by: SURGERY

## 2019-02-07 PROCEDURE — 03UL0KZ SUPPLEMENT LEFT INTERNAL CAROTID ARTERY WITH NONAUTOLOGOUS TISSUE SUBSTITUTE, OPEN APPROACH: ICD-10-PCS | Performed by: SURGERY

## 2019-02-07 DEVICE — PATCH VASC W0.8XL8CM PERIPH BOV PERICARD N PVC N DEHP CRSS: Type: IMPLANTABLE DEVICE | Site: CAROTID | Status: FUNCTIONAL

## 2019-02-07 RX ORDER — ONDANSETRON 2 MG/ML
4 INJECTION INTRAMUSCULAR; INTRAVENOUS
Status: DISCONTINUED | OUTPATIENT
Start: 2019-02-07 | End: 2019-02-07 | Stop reason: HOSPADM

## 2019-02-07 RX ORDER — EPHEDRINE SULFATE/0.9% NACL/PF 50 MG/5 ML
SYRINGE (ML) INTRAVENOUS PRN
Status: DISCONTINUED | OUTPATIENT
Start: 2019-02-07 | End: 2019-02-07 | Stop reason: SDUPTHER

## 2019-02-07 RX ORDER — CEFAZOLIN SODIUM 2 G/50ML
2 SOLUTION INTRAVENOUS EVERY 8 HOURS
Status: COMPLETED | OUTPATIENT
Start: 2019-02-07 | End: 2019-02-08

## 2019-02-07 RX ORDER — LIDOCAINE HYDROCHLORIDE 10 MG/ML
1 INJECTION, SOLUTION EPIDURAL; INFILTRATION; INTRACAUDAL; PERINEURAL
Status: DISCONTINUED | OUTPATIENT
Start: 2019-02-08 | End: 2019-02-07 | Stop reason: HOSPADM

## 2019-02-07 RX ORDER — SODIUM CHLORIDE 0.9 % (FLUSH) 0.9 %
10 SYRINGE (ML) INJECTION EVERY 12 HOURS SCHEDULED
Status: DISCONTINUED | OUTPATIENT
Start: 2019-02-07 | End: 2019-02-07 | Stop reason: HOSPADM

## 2019-02-07 RX ORDER — HYDROCODONE BITARTRATE AND ACETAMINOPHEN 5; 325 MG/1; MG/1
1 TABLET ORAL EVERY 4 HOURS PRN
Status: DISCONTINUED | OUTPATIENT
Start: 2019-02-07 | End: 2019-02-08 | Stop reason: HOSPADM

## 2019-02-07 RX ORDER — DEXAMETHASONE SODIUM PHOSPHATE 10 MG/ML
INJECTION INTRAMUSCULAR; INTRAVENOUS PRN
Status: DISCONTINUED | OUTPATIENT
Start: 2019-02-07 | End: 2019-02-07 | Stop reason: SDUPTHER

## 2019-02-07 RX ORDER — DIPHENHYDRAMINE HYDROCHLORIDE 50 MG/ML
12.5 INJECTION INTRAMUSCULAR; INTRAVENOUS
Status: DISCONTINUED | OUTPATIENT
Start: 2019-02-07 | End: 2019-02-07 | Stop reason: HOSPADM

## 2019-02-07 RX ORDER — DEXTROSE MONOHYDRATE 50 MG/ML
100 INJECTION, SOLUTION INTRAVENOUS PRN
Status: DISCONTINUED | OUTPATIENT
Start: 2019-02-07 | End: 2019-02-08 | Stop reason: SDUPTHER

## 2019-02-07 RX ORDER — MORPHINE SULFATE 2 MG/ML
2 INJECTION, SOLUTION INTRAMUSCULAR; INTRAVENOUS
Status: DISCONTINUED | OUTPATIENT
Start: 2019-02-07 | End: 2019-02-08 | Stop reason: HOSPADM

## 2019-02-07 RX ORDER — FENTANYL CITRATE 50 UG/ML
25 INJECTION, SOLUTION INTRAMUSCULAR; INTRAVENOUS EVERY 5 MIN PRN
Status: DISCONTINUED | OUTPATIENT
Start: 2019-02-07 | End: 2019-02-07 | Stop reason: HOSPADM

## 2019-02-07 RX ORDER — GLIMEPIRIDE 2 MG/1
4 TABLET ORAL 2 TIMES DAILY
Status: DISCONTINUED | OUTPATIENT
Start: 2019-02-07 | End: 2019-02-08 | Stop reason: HOSPADM

## 2019-02-07 RX ORDER — SODIUM CHLORIDE 0.9 % (FLUSH) 0.9 %
10 SYRINGE (ML) INJECTION PRN
Status: DISCONTINUED | OUTPATIENT
Start: 2019-02-07 | End: 2019-02-08 | Stop reason: HOSPADM

## 2019-02-07 RX ORDER — PROPOFOL 10 MG/ML
INJECTION, EMULSION INTRAVENOUS PRN
Status: DISCONTINUED | OUTPATIENT
Start: 2019-02-07 | End: 2019-02-07 | Stop reason: SDUPTHER

## 2019-02-07 RX ORDER — HYDROMORPHONE HCL 110MG/55ML
0.5 PATIENT CONTROLLED ANALGESIA SYRINGE INTRAVENOUS EVERY 5 MIN PRN
Status: DISCONTINUED | OUTPATIENT
Start: 2019-02-07 | End: 2019-02-07 | Stop reason: HOSPADM

## 2019-02-07 RX ORDER — CEFAZOLIN SODIUM 2 G/50ML
2 SOLUTION INTRAVENOUS ONCE
Status: COMPLETED | OUTPATIENT
Start: 2019-02-07 | End: 2019-02-07

## 2019-02-07 RX ORDER — ROCURONIUM BROMIDE 10 MG/ML
INJECTION, SOLUTION INTRAVENOUS PRN
Status: DISCONTINUED | OUTPATIENT
Start: 2019-02-07 | End: 2019-02-07 | Stop reason: SDUPTHER

## 2019-02-07 RX ORDER — ACETAMINOPHEN 325 MG/1
650 TABLET ORAL EVERY 4 HOURS PRN
Status: DISCONTINUED | OUTPATIENT
Start: 2019-02-07 | End: 2019-02-08 | Stop reason: HOSPADM

## 2019-02-07 RX ORDER — DEXTROSE MONOHYDRATE 25 G/50ML
12.5 INJECTION, SOLUTION INTRAVENOUS PRN
Status: DISCONTINUED | OUTPATIENT
Start: 2019-02-07 | End: 2019-02-08 | Stop reason: SDUPTHER

## 2019-02-07 RX ORDER — SODIUM CHLORIDE, SODIUM LACTATE, POTASSIUM CHLORIDE, CALCIUM CHLORIDE 600; 310; 30; 20 MG/100ML; MG/100ML; MG/100ML; MG/100ML
INJECTION, SOLUTION INTRAVENOUS CONTINUOUS
Status: DISCONTINUED | OUTPATIENT
Start: 2019-02-07 | End: 2019-02-08 | Stop reason: HOSPADM

## 2019-02-07 RX ORDER — DEXAMETHASONE SODIUM PHOSPHATE 10 MG/ML
4 INJECTION INTRAMUSCULAR; INTRAVENOUS
Status: DISCONTINUED | OUTPATIENT
Start: 2019-02-07 | End: 2019-02-07 | Stop reason: HOSPADM

## 2019-02-07 RX ORDER — AMIODARONE HYDROCHLORIDE 200 MG/1
200 TABLET ORAL DAILY
Status: DISCONTINUED | OUTPATIENT
Start: 2019-02-07 | End: 2019-02-08 | Stop reason: HOSPADM

## 2019-02-07 RX ORDER — HEPARIN SODIUM 1000 [USP'U]/ML
INJECTION, SOLUTION INTRAVENOUS; SUBCUTANEOUS PRN
Status: DISCONTINUED | OUTPATIENT
Start: 2019-02-07 | End: 2019-02-07 | Stop reason: SDUPTHER

## 2019-02-07 RX ORDER — NICOTINE POLACRILEX 4 MG
15 LOZENGE BUCCAL PRN
Status: DISCONTINUED | OUTPATIENT
Start: 2019-02-07 | End: 2019-02-08 | Stop reason: SDUPTHER

## 2019-02-07 RX ORDER — HYDROCHLOROTHIAZIDE 25 MG/1
25 TABLET ORAL DAILY
Status: DISCONTINUED | OUTPATIENT
Start: 2019-02-07 | End: 2019-02-08 | Stop reason: HOSPADM

## 2019-02-07 RX ORDER — LIDOCAINE HYDROCHLORIDE 20 MG/ML
INJECTION, SOLUTION INFILTRATION; PERINEURAL PRN
Status: DISCONTINUED | OUTPATIENT
Start: 2019-02-07 | End: 2019-02-07 | Stop reason: SDUPTHER

## 2019-02-07 RX ORDER — GLYCOPYRROLATE 1 MG/5 ML
SYRINGE (ML) INTRAVENOUS PRN
Status: DISCONTINUED | OUTPATIENT
Start: 2019-02-07 | End: 2019-02-07 | Stop reason: SDUPTHER

## 2019-02-07 RX ORDER — NEOSTIGMINE METHYLSULFATE 5 MG/5 ML
SYRINGE (ML) INTRAVENOUS PRN
Status: DISCONTINUED | OUTPATIENT
Start: 2019-02-07 | End: 2019-02-07 | Stop reason: SDUPTHER

## 2019-02-07 RX ORDER — MEPERIDINE HYDROCHLORIDE 50 MG/ML
12.5 INJECTION INTRAMUSCULAR; INTRAVENOUS; SUBCUTANEOUS EVERY 5 MIN PRN
Status: DISCONTINUED | OUTPATIENT
Start: 2019-02-07 | End: 2019-02-07 | Stop reason: HOSPADM

## 2019-02-07 RX ORDER — SODIUM CHLORIDE 0.9 % (FLUSH) 0.9 %
10 SYRINGE (ML) INJECTION EVERY 12 HOURS SCHEDULED
Status: DISCONTINUED | OUTPATIENT
Start: 2019-02-07 | End: 2019-02-08 | Stop reason: HOSPADM

## 2019-02-07 RX ORDER — OXYCODONE HYDROCHLORIDE AND ACETAMINOPHEN 5; 325 MG/1; MG/1
1 TABLET ORAL PRN
Status: DISCONTINUED | OUTPATIENT
Start: 2019-02-07 | End: 2019-02-07 | Stop reason: HOSPADM

## 2019-02-07 RX ORDER — LABETALOL HYDROCHLORIDE 5 MG/ML
5 INJECTION, SOLUTION INTRAVENOUS EVERY 10 MIN PRN
Status: DISCONTINUED | OUTPATIENT
Start: 2019-02-07 | End: 2019-02-07 | Stop reason: HOSPADM

## 2019-02-07 RX ORDER — FENTANYL CITRATE 50 UG/ML
INJECTION, SOLUTION INTRAMUSCULAR; INTRAVENOUS PRN
Status: DISCONTINUED | OUTPATIENT
Start: 2019-02-07 | End: 2019-02-07 | Stop reason: SDUPTHER

## 2019-02-07 RX ORDER — HYDRALAZINE HYDROCHLORIDE 20 MG/ML
5 INJECTION INTRAMUSCULAR; INTRAVENOUS EVERY 10 MIN PRN
Status: DISCONTINUED | OUTPATIENT
Start: 2019-02-07 | End: 2019-02-07 | Stop reason: HOSPADM

## 2019-02-07 RX ORDER — SODIUM CHLORIDE 0.9 % (FLUSH) 0.9 %
10 SYRINGE (ML) INJECTION PRN
Status: DISCONTINUED | OUTPATIENT
Start: 2019-02-07 | End: 2019-02-07 | Stop reason: HOSPADM

## 2019-02-07 RX ORDER — LISINOPRIL 5 MG/1
5 TABLET ORAL DAILY
Status: DISCONTINUED | OUTPATIENT
Start: 2019-02-07 | End: 2019-02-08 | Stop reason: HOSPADM

## 2019-02-07 RX ORDER — OXYCODONE HYDROCHLORIDE AND ACETAMINOPHEN 5; 325 MG/1; MG/1
2 TABLET ORAL PRN
Status: DISCONTINUED | OUTPATIENT
Start: 2019-02-07 | End: 2019-02-07 | Stop reason: HOSPADM

## 2019-02-07 RX ORDER — ONDANSETRON 4 MG/1
4 TABLET, ORALLY DISINTEGRATING ORAL EVERY 6 HOURS PRN
Status: DISCONTINUED | OUTPATIENT
Start: 2019-02-07 | End: 2019-02-08 | Stop reason: HOSPADM

## 2019-02-07 RX ORDER — M-VIT,TX,IRON,MINS/CALC/FOLIC 27MG-0.4MG
1 TABLET ORAL
Status: DISCONTINUED | OUTPATIENT
Start: 2019-02-08 | End: 2019-02-08 | Stop reason: HOSPADM

## 2019-02-07 RX ORDER — PROTAMINE SULFATE 10 MG/ML
INJECTION, SOLUTION INTRAVENOUS PRN
Status: DISCONTINUED | OUTPATIENT
Start: 2019-02-07 | End: 2019-02-07 | Stop reason: SDUPTHER

## 2019-02-07 RX ORDER — SODIUM CHLORIDE, SODIUM LACTATE, POTASSIUM CHLORIDE, CALCIUM CHLORIDE 600; 310; 30; 20 MG/100ML; MG/100ML; MG/100ML; MG/100ML
INJECTION, SOLUTION INTRAVENOUS CONTINUOUS
Status: DISCONTINUED | OUTPATIENT
Start: 2019-02-08 | End: 2019-02-07

## 2019-02-07 RX ORDER — WARFARIN SODIUM 2 MG/1
4 TABLET ORAL DAILY
Status: DISCONTINUED | OUTPATIENT
Start: 2019-02-07 | End: 2019-02-08 | Stop reason: HOSPADM

## 2019-02-07 RX ORDER — ONDANSETRON 2 MG/ML
4 INJECTION INTRAMUSCULAR; INTRAVENOUS EVERY 6 HOURS PRN
Status: DISCONTINUED | OUTPATIENT
Start: 2019-02-07 | End: 2019-02-08 | Stop reason: HOSPADM

## 2019-02-07 RX ORDER — SODIUM CHLORIDE 9 MG/ML
INJECTION, SOLUTION INTRAVENOUS CONTINUOUS
Status: DISCONTINUED | OUTPATIENT
Start: 2019-02-08 | End: 2019-02-07

## 2019-02-07 RX ORDER — SODIUM CHLORIDE, SODIUM LACTATE, POTASSIUM CHLORIDE, CALCIUM CHLORIDE 600; 310; 30; 20 MG/100ML; MG/100ML; MG/100ML; MG/100ML
INJECTION, SOLUTION INTRAVENOUS CONTINUOUS PRN
Status: DISCONTINUED | OUTPATIENT
Start: 2019-02-07 | End: 2019-02-07 | Stop reason: SDUPTHER

## 2019-02-07 RX ORDER — PANTOPRAZOLE SODIUM 40 MG/1
40 TABLET, DELAYED RELEASE ORAL
Status: DISCONTINUED | OUTPATIENT
Start: 2019-02-08 | End: 2019-02-08 | Stop reason: HOSPADM

## 2019-02-07 RX ORDER — ONDANSETRON 2 MG/ML
INJECTION INTRAMUSCULAR; INTRAVENOUS PRN
Status: DISCONTINUED | OUTPATIENT
Start: 2019-02-07 | End: 2019-02-07 | Stop reason: SDUPTHER

## 2019-02-07 RX ORDER — MORPHINE SULFATE 4 MG/ML
4 INJECTION, SOLUTION INTRAMUSCULAR; INTRAVENOUS
Status: DISCONTINUED | OUTPATIENT
Start: 2019-02-07 | End: 2019-02-08 | Stop reason: HOSPADM

## 2019-02-07 RX ORDER — HYDROCODONE BITARTRATE AND ACETAMINOPHEN 5; 325 MG/1; MG/1
2 TABLET ORAL EVERY 4 HOURS PRN
Status: DISCONTINUED | OUTPATIENT
Start: 2019-02-07 | End: 2019-02-08 | Stop reason: HOSPADM

## 2019-02-07 RX ORDER — ONDANSETRON 2 MG/ML
4 INJECTION INTRAMUSCULAR; INTRAVENOUS EVERY 6 HOURS PRN
Status: DISCONTINUED | OUTPATIENT
Start: 2019-02-07 | End: 2019-02-07 | Stop reason: SDUPTHER

## 2019-02-07 RX ADMIN — METFORMIN HYDROCHLORIDE 1000 MG: 500 TABLET ORAL at 20:33

## 2019-02-07 RX ADMIN — DEXAMETHASONE SODIUM PHOSPHATE 10 MG: 10 INJECTION INTRAMUSCULAR; INTRAVENOUS at 13:41

## 2019-02-07 RX ADMIN — ONDANSETRON 4 MG: 2 INJECTION INTRAMUSCULAR; INTRAVENOUS at 15:15

## 2019-02-07 RX ADMIN — AMIODARONE HYDROCHLORIDE 200 MG: 200 TABLET ORAL at 20:30

## 2019-02-07 RX ADMIN — HEPARIN SODIUM 7000 UNITS: 1000 INJECTION, SOLUTION INTRAVENOUS; SUBCUTANEOUS at 14:04

## 2019-02-07 RX ADMIN — SODIUM CHLORIDE, POTASSIUM CHLORIDE, SODIUM LACTATE AND CALCIUM CHLORIDE: 600; 310; 30; 20 INJECTION, SOLUTION INTRAVENOUS at 11:02

## 2019-02-07 RX ADMIN — CEFAZOLIN SODIUM 2 G: 2 SOLUTION INTRAVENOUS at 20:25

## 2019-02-07 RX ADMIN — FENTANYL CITRATE 25 MCG: 50 INJECTION, SOLUTION INTRAMUSCULAR; INTRAVENOUS at 15:26

## 2019-02-07 RX ADMIN — ROCURONIUM BROMIDE 50 MG: 10 INJECTION, SOLUTION INTRAVENOUS at 12:39

## 2019-02-07 RX ADMIN — ROCURONIUM BROMIDE 10 MG: 10 INJECTION, SOLUTION INTRAVENOUS at 13:55

## 2019-02-07 RX ADMIN — PROPOFOL 20 MG: 10 INJECTION, EMULSION INTRAVENOUS at 15:22

## 2019-02-07 RX ADMIN — FENTANYL CITRATE 50 MCG: 50 INJECTION, SOLUTION INTRAMUSCULAR; INTRAVENOUS at 13:30

## 2019-02-07 RX ADMIN — SODIUM CHLORIDE, SODIUM LACTATE, POTASSIUM CHLORIDE, CALCIUM CHLORIDE: 600; 310; 30; 20 INJECTION, SOLUTION INTRAVENOUS at 12:54

## 2019-02-07 RX ADMIN — ROCURONIUM BROMIDE 10 MG: 10 INJECTION, SOLUTION INTRAVENOUS at 14:30

## 2019-02-07 RX ADMIN — PROTAMINE SULFATE 15 MG: 10 INJECTION, SOLUTION INTRAVENOUS at 15:06

## 2019-02-07 RX ADMIN — Medication 10 MG: at 12:45

## 2019-02-07 RX ADMIN — Medication 3 MG: at 15:13

## 2019-02-07 RX ADMIN — CEFAZOLIN SODIUM 2 G: 2 SOLUTION INTRAVENOUS at 13:05

## 2019-02-07 RX ADMIN — Medication 0.6 MG: at 15:13

## 2019-02-07 RX ADMIN — CEFAZOLIN SODIUM 60 G: 2 SOLUTION INTRAVENOUS at 14:09

## 2019-02-07 RX ADMIN — Medication 5 MG: at 13:05

## 2019-02-07 RX ADMIN — HYDROCHLOROTHIAZIDE 25 MG: 25 TABLET ORAL at 20:33

## 2019-02-07 RX ADMIN — FENTANYL CITRATE 50 MCG: 50 INJECTION, SOLUTION INTRAMUSCULAR; INTRAVENOUS at 12:39

## 2019-02-07 RX ADMIN — GLIMEPIRIDE 4 MG: 2 TABLET ORAL at 20:29

## 2019-02-07 RX ADMIN — Medication 10 MG: at 12:49

## 2019-02-07 RX ADMIN — ASPIRIN 325 MG: 325 TABLET, DELAYED RELEASE ORAL at 20:29

## 2019-02-07 RX ADMIN — PROPOFOL 170 MG: 10 INJECTION, EMULSION INTRAVENOUS at 12:39

## 2019-02-07 RX ADMIN — LIDOCAINE HYDROCHLORIDE 100 MG: 20 INJECTION, SOLUTION INFILTRATION; PERINEURAL at 12:39

## 2019-02-07 RX ADMIN — Medication 5 MG: at 13:14

## 2019-02-07 ASSESSMENT — PULMONARY FUNCTION TESTS
PIF_VALUE: 23
PIF_VALUE: 23
PIF_VALUE: 2
PIF_VALUE: 0
PIF_VALUE: 23
PIF_VALUE: 1
PIF_VALUE: 24
PIF_VALUE: 22
PIF_VALUE: 24
PIF_VALUE: 22
PIF_VALUE: 24
PIF_VALUE: 1
PIF_VALUE: 22
PIF_VALUE: 24
PIF_VALUE: 22
PIF_VALUE: 26
PIF_VALUE: 23
PIF_VALUE: 25
PIF_VALUE: 14
PIF_VALUE: 2
PIF_VALUE: 23
PIF_VALUE: 1
PIF_VALUE: 25
PIF_VALUE: 22
PIF_VALUE: 24
PIF_VALUE: 22
PIF_VALUE: 23
PIF_VALUE: 22
PIF_VALUE: 18
PIF_VALUE: 23
PIF_VALUE: 24
PIF_VALUE: 22
PIF_VALUE: 22
PIF_VALUE: 23
PIF_VALUE: 22
PIF_VALUE: 22
PIF_VALUE: 23
PIF_VALUE: 24
PIF_VALUE: 24
PIF_VALUE: 27
PIF_VALUE: 23
PIF_VALUE: 24
PIF_VALUE: 22
PIF_VALUE: 22
PIF_VALUE: 23
PIF_VALUE: 27
PIF_VALUE: 22
PIF_VALUE: 23
PIF_VALUE: 22
PIF_VALUE: 24
PIF_VALUE: 24
PIF_VALUE: 0
PIF_VALUE: 22
PIF_VALUE: 22
PIF_VALUE: 26
PIF_VALUE: 25
PIF_VALUE: 22
PIF_VALUE: 1
PIF_VALUE: 22
PIF_VALUE: 2
PIF_VALUE: 22
PIF_VALUE: 23
PIF_VALUE: 25
PIF_VALUE: 23
PIF_VALUE: 23
PIF_VALUE: 22
PIF_VALUE: 22
PIF_VALUE: 2
PIF_VALUE: 25
PIF_VALUE: 22
PIF_VALUE: 24
PIF_VALUE: 23
PIF_VALUE: 23
PIF_VALUE: 24
PIF_VALUE: 27
PIF_VALUE: 1
PIF_VALUE: 24
PIF_VALUE: 23
PIF_VALUE: 2
PIF_VALUE: 22
PIF_VALUE: 22
PIF_VALUE: 26
PIF_VALUE: 23
PIF_VALUE: 22
PIF_VALUE: 27
PIF_VALUE: 22
PIF_VALUE: 22
PIF_VALUE: 23
PIF_VALUE: 22
PIF_VALUE: 22
PIF_VALUE: 25
PIF_VALUE: 23
PIF_VALUE: 24
PIF_VALUE: 22
PIF_VALUE: 23
PIF_VALUE: 0
PIF_VALUE: 25
PIF_VALUE: 1
PIF_VALUE: 1
PIF_VALUE: 24
PIF_VALUE: 24
PIF_VALUE: 22
PIF_VALUE: 25
PIF_VALUE: 25
PIF_VALUE: 0
PIF_VALUE: 27
PIF_VALUE: 3
PIF_VALUE: 23
PIF_VALUE: 20
PIF_VALUE: 22
PIF_VALUE: 0
PIF_VALUE: 22
PIF_VALUE: 23
PIF_VALUE: 23
PIF_VALUE: 1
PIF_VALUE: 0
PIF_VALUE: 26
PIF_VALUE: 25
PIF_VALUE: 18
PIF_VALUE: 23
PIF_VALUE: 2
PIF_VALUE: 26
PIF_VALUE: 21
PIF_VALUE: 23
PIF_VALUE: 22
PIF_VALUE: 23
PIF_VALUE: 22
PIF_VALUE: 23
PIF_VALUE: 22
PIF_VALUE: 23
PIF_VALUE: 23
PIF_VALUE: 25
PIF_VALUE: 24
PIF_VALUE: 22
PIF_VALUE: 24
PIF_VALUE: 23
PIF_VALUE: 1
PIF_VALUE: 23
PIF_VALUE: 25
PIF_VALUE: 22
PIF_VALUE: 24
PIF_VALUE: 24
PIF_VALUE: 22
PIF_VALUE: 22
PIF_VALUE: 24
PIF_VALUE: 23
PIF_VALUE: 2
PIF_VALUE: 22
PIF_VALUE: 21
PIF_VALUE: 22
PIF_VALUE: 22
PIF_VALUE: 3
PIF_VALUE: 25
PIF_VALUE: 22
PIF_VALUE: 24
PIF_VALUE: 23
PIF_VALUE: 26
PIF_VALUE: 29
PIF_VALUE: 22
PIF_VALUE: 22
PIF_VALUE: 1
PIF_VALUE: 22
PIF_VALUE: 23
PIF_VALUE: 22
PIF_VALUE: 24
PIF_VALUE: 22
PIF_VALUE: 25
PIF_VALUE: 23
PIF_VALUE: 23
PIF_VALUE: 22
PIF_VALUE: 2
PIF_VALUE: 27
PIF_VALUE: 26
PIF_VALUE: 22
PIF_VALUE: 1
PIF_VALUE: 26
PIF_VALUE: 20
PIF_VALUE: 23
PIF_VALUE: 23

## 2019-02-07 ASSESSMENT — PAIN SCALES - GENERAL
PAINLEVEL_OUTOF10: 0

## 2019-02-07 ASSESSMENT — PAIN - FUNCTIONAL ASSESSMENT
PAIN_FUNCTIONAL_ASSESSMENT: ACTIVITIES ARE NOT PREVENTED
PAIN_FUNCTIONAL_ASSESSMENT: 0-10

## 2019-02-08 VITALS
HEART RATE: 68 BPM | WEIGHT: 186.9 LBS | RESPIRATION RATE: 16 BRPM | BODY MASS INDEX: 31.14 KG/M2 | HEIGHT: 65 IN | DIASTOLIC BLOOD PRESSURE: 63 MMHG | SYSTOLIC BLOOD PRESSURE: 125 MMHG | TEMPERATURE: 98.1 F | OXYGEN SATURATION: 97 %

## 2019-02-08 DIAGNOSIS — K21.9 GASTROESOPHAGEAL REFLUX DISEASE, ESOPHAGITIS PRESENCE NOT SPECIFIED: ICD-10-CM

## 2019-02-08 DIAGNOSIS — E11.65 TYPE 2 DIABETES MELLITUS WITH HYPERGLYCEMIA, WITHOUT LONG-TERM CURRENT USE OF INSULIN (HCC): ICD-10-CM

## 2019-02-08 PROBLEM — E83.42 HYPOMAGNESEMIA: Status: ACTIVE | Noted: 2019-02-08

## 2019-02-08 LAB
ANION GAP SERPL CALCULATED.3IONS-SCNC: 17 MMOL/L (ref 9–17)
BUN BLDV-MCNC: 15 MG/DL (ref 8–23)
BUN/CREAT BLD: 15 (ref 9–20)
CALCIUM SERPL-MCNC: 9.1 MG/DL (ref 8.6–10.4)
CHLORIDE BLD-SCNC: 97 MMOL/L (ref 98–107)
CO2: 23 MMOL/L (ref 20–31)
COMMENT: NORMAL
CREAT SERPL-MCNC: 1 MG/DL (ref 0.7–1.2)
ESTIMATED AVERAGE GLUCOSE: 151 MG/DL
GFR AFRICAN AMERICAN: >60 ML/MIN
GFR NON-AFRICAN AMERICAN: >60 ML/MIN
GFR SERPL CREATININE-BSD FRML MDRD: ABNORMAL ML/MIN/{1.73_M2}
GFR SERPL CREATININE-BSD FRML MDRD: ABNORMAL ML/MIN/{1.73_M2}
GLUCOSE BLD-MCNC: 115 MG/DL (ref 75–110)
GLUCOSE BLD-MCNC: 132 MG/DL (ref 70–99)
GLUCOSE BLD-MCNC: 139 MG/DL (ref 75–110)
HBA1C MFR BLD: 6.9 % (ref 4–6)
HCT VFR BLD CALC: 33 % (ref 41–53)
HEMOGLOBIN: 10.9 G/DL (ref 13.5–17.5)
INR BLD: 1.1
MAGNESIUM: 1.1 MG/DL (ref 1.6–2.6)
MCH RBC QN AUTO: 29.1 PG (ref 26–34)
MCHC RBC AUTO-ENTMCNC: 33.2 G/DL (ref 31–37)
MCV RBC AUTO: 87.6 FL (ref 80–100)
NRBC AUTOMATED: ABNORMAL PER 100 WBC
PDW BLD-RTO: 14.7 % (ref 11.5–14.5)
PHOSPHORUS: 3.9 MG/DL (ref 2.5–4.5)
PLATELET # BLD: 272 K/UL (ref 130–400)
PMV BLD AUTO: 8.5 FL (ref 6–12)
POTASSIUM SERPL-SCNC: 4.6 MMOL/L (ref 3.7–5.3)
PROTHROMBIN TIME: 11.5 SEC (ref 9.7–11.6)
RBC # BLD: 3.77 M/UL (ref 4.5–5.9)
SODIUM BLD-SCNC: 137 MMOL/L (ref 135–144)
WBC # BLD: 10.5 K/UL (ref 3.5–11)

## 2019-02-08 PROCEDURE — 6360000002 HC RX W HCPCS: Performed by: INTERNAL MEDICINE

## 2019-02-08 PROCEDURE — 85610 PROTHROMBIN TIME: CPT

## 2019-02-08 PROCEDURE — 82947 ASSAY GLUCOSE BLOOD QUANT: CPT

## 2019-02-08 PROCEDURE — 6370000000 HC RX 637 (ALT 250 FOR IP): Performed by: SURGERY

## 2019-02-08 PROCEDURE — 83036 HEMOGLOBIN GLYCOSYLATED A1C: CPT

## 2019-02-08 PROCEDURE — 80048 BASIC METABOLIC PNL TOTAL CA: CPT

## 2019-02-08 PROCEDURE — 6360000002 HC RX W HCPCS: Performed by: SURGERY

## 2019-02-08 PROCEDURE — 83735 ASSAY OF MAGNESIUM: CPT

## 2019-02-08 PROCEDURE — 85027 COMPLETE CBC AUTOMATED: CPT

## 2019-02-08 PROCEDURE — 84100 ASSAY OF PHOSPHORUS: CPT

## 2019-02-08 PROCEDURE — 99232 SBSQ HOSP IP/OBS MODERATE 35: CPT | Performed by: INTERNAL MEDICINE

## 2019-02-08 PROCEDURE — 36415 COLL VENOUS BLD VENIPUNCTURE: CPT

## 2019-02-08 RX ORDER — MAGNESIUM SULFATE 1 G/100ML
1 INJECTION INTRAVENOUS PRN
Status: DISCONTINUED | OUTPATIENT
Start: 2019-02-08 | End: 2019-02-08 | Stop reason: HOSPADM

## 2019-02-08 RX ORDER — OMEPRAZOLE 20 MG/1
20 CAPSULE, DELAYED RELEASE ORAL DAILY
Qty: 90 CAPSULE | Refills: 3 | Status: SHIPPED | OUTPATIENT
Start: 2019-02-08 | End: 2019-05-23 | Stop reason: SINTOL

## 2019-02-08 RX ORDER — POTASSIUM CHLORIDE 20MEQ/15ML
40 LIQUID (ML) ORAL PRN
Status: DISCONTINUED | OUTPATIENT
Start: 2019-02-08 | End: 2019-02-08 | Stop reason: HOSPADM

## 2019-02-08 RX ORDER — HYDROCODONE BITARTRATE AND ACETAMINOPHEN 5; 325 MG/1; MG/1
1 TABLET ORAL EVERY 4 HOURS PRN
Qty: 20 TABLET | Refills: 0 | Status: SHIPPED | OUTPATIENT
Start: 2019-02-08 | End: 2019-02-13

## 2019-02-08 RX ORDER — NICOTINE POLACRILEX 4 MG
15 LOZENGE BUCCAL PRN
Status: DISCONTINUED | OUTPATIENT
Start: 2019-02-08 | End: 2019-02-08 | Stop reason: HOSPADM

## 2019-02-08 RX ORDER — DEXTROSE MONOHYDRATE 50 MG/ML
100 INJECTION, SOLUTION INTRAVENOUS PRN
Status: DISCONTINUED | OUTPATIENT
Start: 2019-02-08 | End: 2019-02-08 | Stop reason: HOSPADM

## 2019-02-08 RX ORDER — POTASSIUM CHLORIDE 20 MEQ/1
40 TABLET, EXTENDED RELEASE ORAL PRN
Status: DISCONTINUED | OUTPATIENT
Start: 2019-02-08 | End: 2019-02-08 | Stop reason: HOSPADM

## 2019-02-08 RX ORDER — DEXTROSE MONOHYDRATE 25 G/50ML
12.5 INJECTION, SOLUTION INTRAVENOUS PRN
Status: DISCONTINUED | OUTPATIENT
Start: 2019-02-08 | End: 2019-02-08 | Stop reason: HOSPADM

## 2019-02-08 RX ORDER — POTASSIUM CHLORIDE 7.45 MG/ML
10 INJECTION INTRAVENOUS PRN
Status: DISCONTINUED | OUTPATIENT
Start: 2019-02-08 | End: 2019-02-08 | Stop reason: HOSPADM

## 2019-02-08 RX ADMIN — AMIODARONE HYDROCHLORIDE 200 MG: 200 TABLET ORAL at 08:58

## 2019-02-08 RX ADMIN — ASPIRIN 325 MG: 325 TABLET, DELAYED RELEASE ORAL at 08:58

## 2019-02-08 RX ADMIN — METOPROLOL TARTRATE 25 MG: 25 TABLET ORAL at 08:58

## 2019-02-08 RX ADMIN — METFORMIN HYDROCHLORIDE 1000 MG: 500 TABLET ORAL at 08:58

## 2019-02-08 RX ADMIN — WARFARIN SODIUM 4 MG: 2 TABLET ORAL at 08:58

## 2019-02-08 RX ADMIN — MAGNESIUM SULFATE HEPTAHYDRATE 1 G: 1 INJECTION, SOLUTION INTRAVENOUS at 08:51

## 2019-02-08 RX ADMIN — MULTIPLE VITAMINS W/ MINERALS TAB 1 TABLET: TAB at 08:58

## 2019-02-08 RX ADMIN — CEFAZOLIN SODIUM 2 G: 2 SOLUTION INTRAVENOUS at 04:03

## 2019-02-08 RX ADMIN — HYDROCHLOROTHIAZIDE 25 MG: 25 TABLET ORAL at 08:58

## 2019-02-08 RX ADMIN — LISINOPRIL 5 MG: 5 TABLET ORAL at 08:58

## 2019-02-08 RX ADMIN — MAGNESIUM SULFATE HEPTAHYDRATE 1 G: 1 INJECTION, SOLUTION INTRAVENOUS at 07:21

## 2019-02-08 RX ADMIN — GLIMEPIRIDE 4 MG: 2 TABLET ORAL at 08:58

## 2019-02-08 RX ADMIN — MAGNESIUM SULFATE HEPTAHYDRATE 1 G: 1 INJECTION, SOLUTION INTRAVENOUS at 10:59

## 2019-02-08 RX ADMIN — PIOGLITAZONE 45 MG: 30 TABLET ORAL at 08:57

## 2019-02-08 RX ADMIN — PANTOPRAZOLE SODIUM 40 MG: 40 TABLET, DELAYED RELEASE ORAL at 07:21

## 2019-02-08 ASSESSMENT — PAIN SCALES - GENERAL
PAINLEVEL_OUTOF10: 0

## 2019-02-09 ENCOUNTER — CARE COORDINATION (OUTPATIENT)
Dept: CASE MANAGEMENT | Age: 74
End: 2019-02-09

## 2019-02-09 DIAGNOSIS — I65.22 CAROTID STENOSIS, LEFT: Primary | ICD-10-CM

## 2019-02-09 PROCEDURE — 1111F DSCHRG MED/CURRENT MED MERGE: CPT | Performed by: FAMILY MEDICINE

## 2019-02-11 ENCOUNTER — TELEPHONE (OUTPATIENT)
Dept: FAMILY MEDICINE CLINIC | Age: 74
End: 2019-02-11

## 2019-02-11 LAB — SURGICAL PATHOLOGY REPORT: NORMAL

## 2019-02-12 ENCOUNTER — TELEPHONE (OUTPATIENT)
Dept: CARDIOVASCULAR ICU | Age: 74
End: 2019-02-12

## 2019-02-12 ENCOUNTER — ANTI-COAG VISIT (OUTPATIENT)
Dept: FAMILY MEDICINE CLINIC | Age: 74
End: 2019-02-12

## 2019-02-14 ENCOUNTER — CARE COORDINATION (OUTPATIENT)
Dept: CASE MANAGEMENT | Age: 74
End: 2019-02-14

## 2019-02-15 ENCOUNTER — OFFICE VISIT (OUTPATIENT)
Dept: FAMILY MEDICINE CLINIC | Age: 74
End: 2019-02-15
Payer: MEDICARE

## 2019-02-15 ENCOUNTER — ANTI-COAG VISIT (OUTPATIENT)
Dept: FAMILY MEDICINE CLINIC | Age: 74
End: 2019-02-15
Payer: MEDICARE

## 2019-02-15 ENCOUNTER — ANTI-COAG VISIT (OUTPATIENT)
Dept: FAMILY MEDICINE CLINIC | Age: 74
End: 2019-02-15

## 2019-02-15 VITALS
HEIGHT: 65 IN | SYSTOLIC BLOOD PRESSURE: 128 MMHG | WEIGHT: 191.6 LBS | OXYGEN SATURATION: 98 % | RESPIRATION RATE: 18 BRPM | HEART RATE: 67 BPM | BODY MASS INDEX: 31.92 KG/M2 | DIASTOLIC BLOOD PRESSURE: 84 MMHG

## 2019-02-15 DIAGNOSIS — Z79.01 LONG TERM CURRENT USE OF ANTICOAGULANT THERAPY: ICD-10-CM

## 2019-02-15 DIAGNOSIS — Z80.42 FAMILY HISTORY OF PROSTATE CANCER: ICD-10-CM

## 2019-02-15 DIAGNOSIS — Z95.2 AORTIC VALVE PROSTHESIS PRESENT: ICD-10-CM

## 2019-02-15 DIAGNOSIS — E11.9 TYPE 2 DIABETES MELLITUS WITHOUT COMPLICATION, WITHOUT LONG-TERM CURRENT USE OF INSULIN (HCC): Primary | ICD-10-CM

## 2019-02-15 DIAGNOSIS — E11.65 TYPE 2 DIABETES MELLITUS WITH HYPERGLYCEMIA, WITHOUT LONG-TERM CURRENT USE OF INSULIN (HCC): ICD-10-CM

## 2019-02-15 DIAGNOSIS — E78.2 MIXED HYPERLIPIDEMIA: ICD-10-CM

## 2019-02-15 DIAGNOSIS — I10 ESSENTIAL HYPERTENSION: ICD-10-CM

## 2019-02-15 DIAGNOSIS — I65.22 STENOSIS OF LEFT CAROTID ARTERY: ICD-10-CM

## 2019-02-15 DIAGNOSIS — Z79.01 ANTICOAGULATED ON COUMADIN: Chronic | ICD-10-CM

## 2019-02-15 DIAGNOSIS — K21.9 GASTROESOPHAGEAL REFLUX DISEASE, ESOPHAGITIS PRESENCE NOT SPECIFIED: ICD-10-CM

## 2019-02-15 LAB
CHOLESTEROL/HDL RATIO: 7.5 RATIO
CHOLESTEROL: 337 MG/DL
HBA1C MFR BLD: 6.8 %
HDL, DIRECT: 45 MG/DL
INTERNATIONAL NORMALIZATION RATIO, POC: 2.1
LDL CHOLESTEROL CALCULATED: 234.2 MG/DL
PROTHROMBIN TIME, POC: NORMAL
TRIGL SERPL-MCNC: 289 MG/DL
VLDLC SERPL CALC-MCNC: 58 MG/DL

## 2019-02-15 PROCEDURE — 1123F ACP DISCUSS/DSCN MKR DOCD: CPT | Performed by: FAMILY MEDICINE

## 2019-02-15 PROCEDURE — 4040F PNEUMOC VAC/ADMIN/RCVD: CPT | Performed by: FAMILY MEDICINE

## 2019-02-15 PROCEDURE — 83036 HEMOGLOBIN GLYCOSYLATED A1C: CPT | Performed by: FAMILY MEDICINE

## 2019-02-15 PROCEDURE — G8598 ASA/ANTIPLAT THER USED: HCPCS | Performed by: FAMILY MEDICINE

## 2019-02-15 PROCEDURE — 3044F HG A1C LEVEL LT 7.0%: CPT | Performed by: FAMILY MEDICINE

## 2019-02-15 PROCEDURE — 85610 PROTHROMBIN TIME: CPT | Performed by: FAMILY MEDICINE

## 2019-02-15 PROCEDURE — 2022F DILAT RTA XM EVC RTNOPTHY: CPT | Performed by: FAMILY MEDICINE

## 2019-02-15 PROCEDURE — 1036F TOBACCO NON-USER: CPT | Performed by: FAMILY MEDICINE

## 2019-02-15 PROCEDURE — 3017F COLORECTAL CA SCREEN DOC REV: CPT | Performed by: FAMILY MEDICINE

## 2019-02-15 PROCEDURE — G8484 FLU IMMUNIZE NO ADMIN: HCPCS | Performed by: FAMILY MEDICINE

## 2019-02-15 PROCEDURE — 1111F DSCHRG MED/CURRENT MED MERGE: CPT | Performed by: FAMILY MEDICINE

## 2019-02-15 PROCEDURE — 93793 ANTICOAG MGMT PT WARFARIN: CPT | Performed by: FAMILY MEDICINE

## 2019-02-15 PROCEDURE — G8427 DOCREV CUR MEDS BY ELIG CLIN: HCPCS | Performed by: FAMILY MEDICINE

## 2019-02-15 PROCEDURE — G8417 CALC BMI ABV UP PARAM F/U: HCPCS | Performed by: FAMILY MEDICINE

## 2019-02-15 PROCEDURE — 1101F PT FALLS ASSESS-DOCD LE1/YR: CPT | Performed by: FAMILY MEDICINE

## 2019-02-15 PROCEDURE — 99214 OFFICE O/P EST MOD 30 MIN: CPT | Performed by: FAMILY MEDICINE

## 2019-02-15 ASSESSMENT — PATIENT HEALTH QUESTIONNAIRE - PHQ9
1. LITTLE INTEREST OR PLEASURE IN DOING THINGS: 1
SUM OF ALL RESPONSES TO PHQ QUESTIONS 1-9: 1
SUM OF ALL RESPONSES TO PHQ9 QUESTIONS 1 & 2: 1
2. FEELING DOWN, DEPRESSED OR HOPELESS: 0
SUM OF ALL RESPONSES TO PHQ QUESTIONS 1-9: 1

## 2019-02-19 ENCOUNTER — CARE COORDINATION (OUTPATIENT)
Dept: CASE MANAGEMENT | Age: 74
End: 2019-02-19

## 2019-02-22 ENCOUNTER — CARE COORDINATION (OUTPATIENT)
Dept: CASE MANAGEMENT | Age: 74
End: 2019-02-22

## 2019-02-24 ASSESSMENT — ENCOUNTER SYMPTOMS
SHORTNESS OF BREATH: 0
COUGH: 0
ABDOMINAL PAIN: 0
WHEEZING: 0
ABDOMINAL DISTENTION: 0
BACK PAIN: 1
BLOOD IN STOOL: 0

## 2019-02-27 ENCOUNTER — TELEPHONE (OUTPATIENT)
Dept: FAMILY MEDICINE CLINIC | Age: 74
End: 2019-02-27

## 2019-03-01 ENCOUNTER — OFFICE VISIT (OUTPATIENT)
Dept: FAMILY MEDICINE CLINIC | Age: 74
End: 2019-03-01
Payer: MEDICARE

## 2019-03-01 VITALS
WEIGHT: 197 LBS | DIASTOLIC BLOOD PRESSURE: 70 MMHG | OXYGEN SATURATION: 97 % | SYSTOLIC BLOOD PRESSURE: 130 MMHG | HEART RATE: 73 BPM | BODY MASS INDEX: 32.78 KG/M2

## 2019-03-01 DIAGNOSIS — R53.83 FATIGUE, UNSPECIFIED TYPE: ICD-10-CM

## 2019-03-01 DIAGNOSIS — E78.2 MIXED HYPERLIPIDEMIA: ICD-10-CM

## 2019-03-01 DIAGNOSIS — R55 VASOMOTOR INSTABILITY: Primary | ICD-10-CM

## 2019-03-01 DIAGNOSIS — Z12.5 SCREENING PSA (PROSTATE SPECIFIC ANTIGEN): ICD-10-CM

## 2019-03-01 LAB
SCREENING PSA: 0.84 NG/ML
T3 FREE: NORMAL
T4 FREE: 1.92 NG/DL
TESTOSTERONE TOTAL: NORMAL
TSH SERPL DL<=0.05 MIU/L-ACNC: 1.43 MIU/ML

## 2019-03-01 PROCEDURE — G8417 CALC BMI ABV UP PARAM F/U: HCPCS | Performed by: FAMILY MEDICINE

## 2019-03-01 PROCEDURE — G8598 ASA/ANTIPLAT THER USED: HCPCS | Performed by: FAMILY MEDICINE

## 2019-03-01 PROCEDURE — 1123F ACP DISCUSS/DSCN MKR DOCD: CPT | Performed by: FAMILY MEDICINE

## 2019-03-01 PROCEDURE — 1101F PT FALLS ASSESS-DOCD LE1/YR: CPT | Performed by: FAMILY MEDICINE

## 2019-03-01 PROCEDURE — G8484 FLU IMMUNIZE NO ADMIN: HCPCS | Performed by: FAMILY MEDICINE

## 2019-03-01 PROCEDURE — 4040F PNEUMOC VAC/ADMIN/RCVD: CPT | Performed by: FAMILY MEDICINE

## 2019-03-01 PROCEDURE — 1111F DSCHRG MED/CURRENT MED MERGE: CPT | Performed by: FAMILY MEDICINE

## 2019-03-01 PROCEDURE — 99214 OFFICE O/P EST MOD 30 MIN: CPT | Performed by: FAMILY MEDICINE

## 2019-03-01 PROCEDURE — 3017F COLORECTAL CA SCREEN DOC REV: CPT | Performed by: FAMILY MEDICINE

## 2019-03-01 PROCEDURE — 1036F TOBACCO NON-USER: CPT | Performed by: FAMILY MEDICINE

## 2019-03-01 PROCEDURE — G8427 DOCREV CUR MEDS BY ELIG CLIN: HCPCS | Performed by: FAMILY MEDICINE

## 2019-03-01 RX ORDER — ROSUVASTATIN CALCIUM 20 MG/1
20 TABLET, COATED ORAL DAILY
COMMUNITY
End: 2019-04-05 | Stop reason: SDUPTHER

## 2019-03-01 ASSESSMENT — ENCOUNTER SYMPTOMS
WHEEZING: 0
BLOOD IN STOOL: 0
ABDOMINAL PAIN: 0
SHORTNESS OF BREATH: 0
COUGH: 0
BACK PAIN: 1

## 2019-03-14 ENCOUNTER — OFFICE VISIT (OUTPATIENT)
Dept: VASCULAR SURGERY | Age: 74
End: 2019-03-14

## 2019-03-14 VITALS
DIASTOLIC BLOOD PRESSURE: 70 MMHG | HEIGHT: 65 IN | SYSTOLIC BLOOD PRESSURE: 128 MMHG | WEIGHT: 197 LBS | BODY MASS INDEX: 32.82 KG/M2 | HEART RATE: 96 BPM

## 2019-03-14 DIAGNOSIS — I65.21 CAROTID STENOSIS, ASYMPTOMATIC, RIGHT: Primary | ICD-10-CM

## 2019-03-14 DIAGNOSIS — Z98.890 HISTORY OF LEFT-SIDED CAROTID ENDARTERECTOMY: ICD-10-CM

## 2019-03-14 PROCEDURE — 99024 POSTOP FOLLOW-UP VISIT: CPT | Performed by: SURGERY

## 2019-03-15 ENCOUNTER — ANTI-COAG VISIT (OUTPATIENT)
Dept: FAMILY MEDICINE CLINIC | Age: 74
End: 2019-03-15

## 2019-03-15 ENCOUNTER — NURSE ONLY (OUTPATIENT)
Dept: FAMILY MEDICINE CLINIC | Age: 74
End: 2019-03-15
Payer: MEDICARE

## 2019-03-15 VITALS
DIASTOLIC BLOOD PRESSURE: 82 MMHG | HEART RATE: 69 BPM | BODY MASS INDEX: 32.45 KG/M2 | WEIGHT: 195 LBS | SYSTOLIC BLOOD PRESSURE: 164 MMHG

## 2019-03-15 DIAGNOSIS — Z79.01 LONG TERM CURRENT USE OF ANTICOAGULANT THERAPY: ICD-10-CM

## 2019-03-15 LAB
INTERNATIONAL NORMALIZATION RATIO, POC: 2.4
PROTHROMBIN TIME, POC: NORMAL

## 2019-03-15 PROCEDURE — 85610 PROTHROMBIN TIME: CPT | Performed by: FAMILY MEDICINE

## 2019-04-05 DIAGNOSIS — E78.2 MIXED HYPERLIPIDEMIA: Primary | ICD-10-CM

## 2019-04-05 RX ORDER — ROSUVASTATIN CALCIUM 20 MG/1
20 TABLET, COATED ORAL DAILY
Qty: 90 TABLET | Refills: 3 | Status: SHIPPED | OUTPATIENT
Start: 2019-04-05 | End: 2020-01-17

## 2019-04-05 NOTE — TELEPHONE ENCOUNTER
Nita Diamond is calling to request a refill on the following medication(s):  Requested Prescriptions     Pending Prescriptions Disp Refills    rosuvastatin (CRESTOR) 20 MG tablet 90 tablet 3     Sig: Take 1 tablet by mouth daily       Last Visit Date (If Applicable):  8/8/2068    Next Visit Date:    4/12/2019

## 2019-04-12 ENCOUNTER — ANTI-COAG VISIT (OUTPATIENT)
Dept: FAMILY MEDICINE CLINIC | Age: 74
End: 2019-04-12

## 2019-04-12 ENCOUNTER — NURSE ONLY (OUTPATIENT)
Dept: FAMILY MEDICINE CLINIC | Age: 74
End: 2019-04-12
Payer: MEDICARE

## 2019-04-12 VITALS
WEIGHT: 196 LBS | HEART RATE: 71 BPM | BODY MASS INDEX: 32.62 KG/M2 | SYSTOLIC BLOOD PRESSURE: 157 MMHG | DIASTOLIC BLOOD PRESSURE: 85 MMHG

## 2019-04-12 DIAGNOSIS — Z79.01 LONG TERM CURRENT USE OF ANTICOAGULANT THERAPY: ICD-10-CM

## 2019-04-12 LAB
INTERNATIONAL NORMALIZATION RATIO, POC: 2.8
PROTHROMBIN TIME, POC: NORMAL

## 2019-04-12 PROCEDURE — 85610 PROTHROMBIN TIME: CPT | Performed by: FAMILY MEDICINE

## 2019-04-12 PROCEDURE — 93793 ANTICOAG MGMT PT WARFARIN: CPT | Performed by: FAMILY MEDICINE

## 2019-04-22 DIAGNOSIS — Z95.2 AORTIC VALVE PROSTHESIS PRESENT: ICD-10-CM

## 2019-04-22 RX ORDER — WARFARIN SODIUM 4 MG/1
4 TABLET ORAL DAILY
Qty: 30 TABLET | Refills: 3 | Status: SHIPPED | OUTPATIENT
Start: 2019-04-22 | End: 2019-05-20 | Stop reason: SDUPTHER

## 2019-04-22 NOTE — TELEPHONE ENCOUNTER
Aleida Leal is calling to request a refill on the following medication(s):  Requested Prescriptions     Pending Prescriptions Disp Refills    warfarin (COUMADIN) 4 MG tablet 30 tablet 3     Sig: Take 1 tablet by mouth daily       Last Visit Date (If Applicable):  3/2/2896    Next Visit Date:    5/10/2019

## 2019-04-29 ENCOUNTER — TELEPHONE (OUTPATIENT)
Dept: FAMILY MEDICINE CLINIC | Age: 74
End: 2019-04-29

## 2019-04-29 DIAGNOSIS — E11.65 TYPE 2 DIABETES MELLITUS WITH HYPERGLYCEMIA, WITHOUT LONG-TERM CURRENT USE OF INSULIN (HCC): ICD-10-CM

## 2019-04-30 DIAGNOSIS — E11.65 TYPE 2 DIABETES MELLITUS WITH HYPERGLYCEMIA, WITHOUT LONG-TERM CURRENT USE OF INSULIN (HCC): ICD-10-CM

## 2019-04-30 NOTE — TELEPHONE ENCOUNTER
[facility] is requesting a refill on the following medication(s):  Requested Prescriptions     Pending Prescriptions Disp Refills    metFORMIN (GLUCOPHAGE) 500 MG tablet 60 tablet 0     Sig: Take 2 tablets by mouth 2 times daily (with meals)       Last Visit Date (If Applicable):  Visit date not found    Next Visit Date:    Visit date not found

## 2019-05-03 DIAGNOSIS — E11.65 TYPE 2 DIABETES MELLITUS WITH HYPERGLYCEMIA, WITHOUT LONG-TERM CURRENT USE OF INSULIN (HCC): ICD-10-CM

## 2019-05-06 RX ORDER — HYDROCHLOROTHIAZIDE 25 MG/1
TABLET ORAL
Qty: 30 TABLET | Refills: 0 | Status: SHIPPED | OUTPATIENT
Start: 2019-05-06 | End: 2019-05-30 | Stop reason: SDUPTHER

## 2019-05-06 NOTE — TELEPHONE ENCOUNTER
James Fay is calling to request a refill on the following medication(s):  Requested Prescriptions     Pending Prescriptions Disp Refills    hydrochlorothiazide (HYDRODIURIL) 25 MG tablet [Pharmacy Med Name: HYDROCHLOROTHIAZIDE 25MG TABLETS] 30 tablet 0     Sig: TAKE 1 TABLET BY MOUTH EVERY DAY       Last Visit Date (If Applicable):  5/9/6229    Next Visit Date:    5/10/2019

## 2019-05-10 ENCOUNTER — ANTI-COAG VISIT (OUTPATIENT)
Dept: FAMILY MEDICINE CLINIC | Age: 74
End: 2019-05-10

## 2019-05-20 DIAGNOSIS — E11.65 TYPE 2 DIABETES MELLITUS WITH HYPERGLYCEMIA, WITHOUT LONG-TERM CURRENT USE OF INSULIN (HCC): ICD-10-CM

## 2019-05-20 DIAGNOSIS — E11.65 TYPE 2 DIABETES MELLITUS WITH HYPERGLYCEMIA, WITHOUT LONG-TERM CURRENT USE OF INSULIN (HCC): Primary | ICD-10-CM

## 2019-05-20 DIAGNOSIS — Z95.2 AORTIC VALVE PROSTHESIS PRESENT: ICD-10-CM

## 2019-05-20 RX ORDER — GLIMEPIRIDE 4 MG/1
4 TABLET ORAL 2 TIMES DAILY
Qty: 30 TABLET | Refills: 3 | Status: SHIPPED | OUTPATIENT
Start: 2019-05-20 | End: 2019-05-20 | Stop reason: SDUPTHER

## 2019-05-20 RX ORDER — WARFARIN SODIUM 4 MG/1
4 TABLET ORAL DAILY
Qty: 30 TABLET | Refills: 3 | Status: SHIPPED | OUTPATIENT
Start: 2019-05-20 | End: 2019-05-20 | Stop reason: SDUPTHER

## 2019-05-21 LAB
CHOLESTEROL/HDL RATIO: 3.9 RATIO (ref 0–4.5)
CHOLESTEROL: 179 MG/DL (ref 50–200)
HDL, DIRECT: 46 MG/DL (ref 36–68)
LDL CHOLESTEROL CALCULATED: 85.2 MG/DL (ref 0–160)
TRIGL SERPL-MCNC: 239 MG/DL (ref 10–250)
VLDLC SERPL CALC-MCNC: 48 MG/DL (ref 0–40)

## 2019-05-21 RX ORDER — WARFARIN SODIUM 4 MG/1
4 TABLET ORAL DAILY
Qty: 90 TABLET | Refills: 3 | Status: SHIPPED | OUTPATIENT
Start: 2019-05-21 | End: 2019-06-20 | Stop reason: ALTCHOICE

## 2019-05-21 RX ORDER — GLIMEPIRIDE 4 MG/1
TABLET ORAL
Qty: 180 TABLET | Refills: 3 | Status: SHIPPED | OUTPATIENT
Start: 2019-05-21 | End: 2019-09-26 | Stop reason: SINTOL

## 2019-05-21 NOTE — TELEPHONE ENCOUNTER
Jocelyn Deutsch is calling to request a refill on the following medication(s):  Requested Prescriptions     Pending Prescriptions Disp Refills    glimepiride (AMARYL) 4 MG tablet [Pharmacy Med Name: GLIMEPIRIDE 4MG TABLETS] 180 tablet 3     Sig: TAKE 1 TABLET BY MOUTH TWICE DAILY    warfarin (COUMADIN) 4 MG tablet [Pharmacy Med Name: WARFARIN SOD 4MG TABLETS (BLUE)] 90 tablet 3     Sig: TAKE 1 TABLET BY MOUTH DAILY       Last Visit Date (If Applicable):  2/8/7124    Next Visit Date:    5/23/2019

## 2019-05-23 ENCOUNTER — OFFICE VISIT (OUTPATIENT)
Dept: FAMILY MEDICINE CLINIC | Age: 74
End: 2019-05-23
Payer: MEDICARE

## 2019-05-23 VITALS
SYSTOLIC BLOOD PRESSURE: 112 MMHG | BODY MASS INDEX: 30.62 KG/M2 | OXYGEN SATURATION: 97 % | WEIGHT: 184 LBS | DIASTOLIC BLOOD PRESSURE: 70 MMHG | HEART RATE: 73 BPM

## 2019-05-23 DIAGNOSIS — Z80.42 FAMILY HISTORY OF PROSTATE CANCER: ICD-10-CM

## 2019-05-23 DIAGNOSIS — Z79.01 LONG TERM CURRENT USE OF ANTICOAGULANT THERAPY: ICD-10-CM

## 2019-05-23 DIAGNOSIS — N40.1 BPH WITH URINARY OBSTRUCTION: ICD-10-CM

## 2019-05-23 DIAGNOSIS — M25.551 HIP PAIN, RIGHT: ICD-10-CM

## 2019-05-23 DIAGNOSIS — K21.9 GASTROESOPHAGEAL REFLUX DISEASE, ESOPHAGITIS PRESENCE NOT SPECIFIED: ICD-10-CM

## 2019-05-23 DIAGNOSIS — M54.40 LOW BACK PAIN WITH SCIATICA, SCIATICA LATERALITY UNSPECIFIED, UNSPECIFIED BACK PAIN LATERALITY, UNSPECIFIED CHRONICITY: ICD-10-CM

## 2019-05-23 DIAGNOSIS — I10 ESSENTIAL HYPERTENSION: ICD-10-CM

## 2019-05-23 DIAGNOSIS — Z12.5 SCREENING PSA (PROSTATE SPECIFIC ANTIGEN): ICD-10-CM

## 2019-05-23 DIAGNOSIS — E78.2 MIXED HYPERLIPIDEMIA: ICD-10-CM

## 2019-05-23 DIAGNOSIS — N13.8 BPH WITH URINARY OBSTRUCTION: ICD-10-CM

## 2019-05-23 DIAGNOSIS — E11.65 TYPE 2 DIABETES MELLITUS WITH HYPERGLYCEMIA, WITHOUT LONG-TERM CURRENT USE OF INSULIN (HCC): Primary | ICD-10-CM

## 2019-05-23 DIAGNOSIS — R55 VASOMOTOR INSTABILITY: ICD-10-CM

## 2019-05-23 LAB
HBA1C MFR BLD: 12.2 %
INTERNATIONAL NORMALIZATION RATIO, POC: 2.5
PROTHROMBIN TIME, POC: NORMAL
SCREENING PSA: 0.49 NG/ML (ref 0–4)

## 2019-05-23 PROCEDURE — 1036F TOBACCO NON-USER: CPT | Performed by: FAMILY MEDICINE

## 2019-05-23 PROCEDURE — 4040F PNEUMOC VAC/ADMIN/RCVD: CPT | Performed by: FAMILY MEDICINE

## 2019-05-23 PROCEDURE — G8598 ASA/ANTIPLAT THER USED: HCPCS | Performed by: FAMILY MEDICINE

## 2019-05-23 PROCEDURE — 2022F DILAT RTA XM EVC RTNOPTHY: CPT | Performed by: FAMILY MEDICINE

## 2019-05-23 PROCEDURE — 3046F HEMOGLOBIN A1C LEVEL >9.0%: CPT | Performed by: FAMILY MEDICINE

## 2019-05-23 PROCEDURE — 93793 ANTICOAG MGMT PT WARFARIN: CPT | Performed by: FAMILY MEDICINE

## 2019-05-23 PROCEDURE — G8417 CALC BMI ABV UP PARAM F/U: HCPCS | Performed by: FAMILY MEDICINE

## 2019-05-23 PROCEDURE — 3017F COLORECTAL CA SCREEN DOC REV: CPT | Performed by: FAMILY MEDICINE

## 2019-05-23 PROCEDURE — 99214 OFFICE O/P EST MOD 30 MIN: CPT | Performed by: FAMILY MEDICINE

## 2019-05-23 PROCEDURE — 1123F ACP DISCUSS/DSCN MKR DOCD: CPT | Performed by: FAMILY MEDICINE

## 2019-05-23 PROCEDURE — G8427 DOCREV CUR MEDS BY ELIG CLIN: HCPCS | Performed by: FAMILY MEDICINE

## 2019-05-23 PROCEDURE — 83036 HEMOGLOBIN GLYCOSYLATED A1C: CPT | Performed by: FAMILY MEDICINE

## 2019-05-23 PROCEDURE — 85610 PROTHROMBIN TIME: CPT | Performed by: FAMILY MEDICINE

## 2019-05-23 RX ORDER — TAMSULOSIN HYDROCHLORIDE 0.4 MG/1
0.4 CAPSULE ORAL DAILY
Qty: 30 CAPSULE | Refills: 5 | Status: SHIPPED | OUTPATIENT
Start: 2019-05-23 | End: 2019-05-23 | Stop reason: SDUPTHER

## 2019-05-23 ASSESSMENT — ENCOUNTER SYMPTOMS
BACK PAIN: 1
COUGH: 0
ABDOMINAL DISTENTION: 0
BLOOD IN STOOL: 0
WHEEZING: 0
SHORTNESS OF BREATH: 0
ABDOMINAL PAIN: 0

## 2019-05-23 NOTE — TELEPHONE ENCOUNTER
Sabrina Garcia is calling to request a refill on the following medication(s):  Requested Prescriptions     Pending Prescriptions Disp Refills    tamsulosin (FLOMAX) 0.4 MG capsule [Pharmacy Med Name: TAMSULOSIN 0.4MG CAPSULES] 90 capsule 5     Sig: TAKE 1 CAPSULE BY MOUTH DAILY       Last Visit Date (If Applicable):  0/06/2773    Next Visit Date:    6/4/2019

## 2019-05-23 NOTE — PROGRESS NOTES
[address] [facility]  [address]  Dept: [phone]  DeptFax: [phone]    Heriberto Herrera is G10 y.o. male who presents today for his medical conditions/complaints as noted below. Heriberto Herrera is c/o of 3 Month Follow-Up (pt reports he got a sharp pain on his right hip and was unable to walk for two days. would like to discuss his medications and side effects and would like to get off some); Diabetes; Hyperlipidemia; Hypertension; and Office Visit for Anticoagulation Management      HPI:     HPI  Patient is here for routine 3 month checkup his problems include but not limited to      Carotid stenosis  The patient has just recently undergone a left carotid endarterectomy for a greater than 70% stenosis. Done by Dr. Lisbet Coy in February this year. Recently seen. In March. He has mild right carotid stenosis which is being followed now once yearly      Diabetes  Remembering that the patient is needle phobic. He absolutely refuses to check his blood sugars. He remains on glimepiride 4 mg daily, metformin 1000 mg twice daily, and Actos 45 mg daily. He tolerates all his medicines without problems. No polyuria, polyphagia or polydipsia. No edema or shortness of breath. last hemoglobin A1c was 6.8 3 months ago. Unfortunately his HbA1c is 12.2  He is on an ACE and does take aspirin. 81 mg ;      Hypertension  He does not check his blood pressures. Blood pressure for us is 112/74  He remains on hydrochlorothiazide, lisinopril 5, and metoprolol tartrate 25. He voices no complaints of chest pain or chest pressure. No edema. No shortness of breath or dyspnea on exertion.       Aortic valve replacement  This was done October 30, 2018. It is curious to me that he remains on warfarin. His INR today is 2.5 ; Apparently, he is taking 4 mg a day. except tuesdays  However, I was under the impression that since this is a bioprosthetic valve he was to tablet 3    metoprolol tartrate (LOPRESSOR) 25 MG tablet TAKE 1 TABLET BY MOUTH TWICE DAILY 180 tablet 3    amiodarone (CORDARONE) 200 MG tablet Take 1 tablet by mouth daily 90 tablet 3    lisinopril (PRINIVIL;ZESTRIL) 5 MG tablet Take 1 tablet by mouth daily 30 tablet 5    pioglitazone (ACTOS) 45 MG tablet Take 1 tablet by mouth daily 90 tablet 3    blood glucose monitor strips Test 1 times a day & as needed for symptoms of irregular blood glucose. 100 strip 3    FIBER ADULT GUMMIES PO Take by mouth      aspirin 81 MG tablet Take 1 tablet by mouth daily Pt.will stop 10-30 -18 AM for OR (Patient taking differently: Take 81 mg by mouth daily ) 30 tablet 3    clotrimazole-betamethasone (LOTRISONE) 1-0.05 % cream Apply topically 2 times daily (Patient taking differently: Apply topically 2 times daily as needed ) 1 Tube 1    Multiple Vitamins-Minerals (THERAPEUTIC MULTIVITAMIN-MINERALS) tablet Take 1 tablet by mouth daily (with breakfast) 30 tablet 3     No current facility-administered medications for this visit.       Allergies   Allergen Reactions    Invokana [Canagliflozin]      dizziness    Januvia [Sitagliptin]      dizziness       Health Maintenance   Topic Date Due    DTaP/Tdap/Td vaccine (1 - Tdap) 11/15/1963    Shingles Vaccine (1 of 2) 11/15/1994    Pneumococcal 65+ years Vaccine (1 of 2 - PCV13) 11/15/2009    Diabetic retinal exam  08/24/2018    Diabetic foot exam  02/19/2019    A1C test (Diabetic or Prediabetic)  08/23/2019    Flu vaccine (Season Ended) 09/01/2019    Diabetic microalbuminuria test  12/03/2019    Potassium monitoring  02/08/2020    Creatinine monitoring  02/08/2020    TSH testing  03/01/2020    Lipid screen  05/21/2020    Colon cancer screen colonoscopy  10/09/2027    AAA screen  Completed       Lab Results   Component Value Date    K 4.6 02/08/2019    CREATININE 1.00 02/08/2019    AST 29 10/26/2018    ALT 25 10/26/2018    TSH 1.43 03/01/2019    HCT 33.0 02/08/2019 LABA1C 12.2 05/23/2019    GLUCOSE 132 02/08/2019    GLUCOSE 102 11/08/2018    MG 1.1 02/08/2019    CALCIUM 9.1 02/08/2019      Lab Results   Component Value Date    CHOL 179 05/21/2019    CHOL 209 04/13/2017    TRIG 239 05/21/2019    HDL 42 04/13/2017       Subjective:      Review of Systems   Constitutional: Negative for chills, diaphoresis and fever. HENT: Negative. Eyes: Negative for visual disturbance. Respiratory: Negative for cough, shortness of breath and wheezing. Cardiovascular: Negative for chest pain, palpitations and leg swelling. Gastrointestinal: Negative for abdominal distention, abdominal pain and blood in stool. Endocrine: Negative for polydipsia, polyphagia and polyuria. Genitourinary: Positive for frequency and urgency. Negative for difficulty urinating, dysuria, flank pain and hematuria. Musculoskeletal: Positive for arthralgias, back pain and gait problem. Skin: Negative for wound. Hematological: Negative for adenopathy. Bruises/bleeds easily (due to warfarin). Objective:     /70   Pulse 73   Wt 184 lb (83.5 kg)   SpO2 97%   BMI 30.62 kg/m²     Physical Exam   Constitutional: He appears well-developed and well-nourished. HENT:   Head: Normocephalic and atraumatic. Nose: Nose normal.   Mouth/Throat: No posterior oropharyngeal edema or posterior oropharyngeal erythema. Neck: Carotid bruit is not present. No thyromegaly present. Cardiovascular: Normal rate, regular rhythm, S1 normal and S2 normal.  No extrasystoles are present. Exam reveals distant heart sounds. Murmur heard. soft systolic murmur over the precordium   Pulmonary/Chest: He has decreased breath sounds. He has no wheezes. He has no rhonchi. He has no rales. Chest wall is not dull to percussion. Abdominal: Soft. Bowel sounds are normal. There is no hepatosplenomegaly. Musculoskeletal: He exhibits no edema or tenderness. Right hip: He exhibits decreased range of motion.  He exhibits no tenderness and no bony tenderness. Lumbar back: He exhibits decreased range of motion. He exhibits no tenderness. Neurological:   Wide-based antalgic gait       Assessment:      Diagnosis Orders   1. Type 2 diabetes mellitus with hyperglycemia, without long-term current use of insulin ([facility])  POCT glycosylated hemoglobin (Hb A1C)    Dulaglutide 0.75 MG/0.5ML SOPN    Ambulatory referral to Diabetic Education   2. Mixed hyperlipidemia     3. Vasomotor instability     4. Gastroesophageal reflux disease, esophagitis presence not specified     5. Essential hypertension     6. Low back pain with sciatica, sciatica laterality unspecified, unspecified back pain laterality, unspecified chronicity     7. Family history of prostate cancer     8. Long term current use of anticoagulant therapy  POCT INR   9. Hip pain, right  XR HIP RIGHT (2-3 VIEWS)   10. Screening PSA (prostate specific antigen)  PSA Screening   11. BPH with urinary obstruction  DISCONTINUED: tamsulosin (FLOMAX) 0.4 MG capsule            POC Testing Results (If Applicable):  Results for POC orders placed in visit on 05/23/19   POCT glycosylated hemoglobin (Hb A1C)   Result Value Ref Range    Hemoglobin A1C 12.2 %   POCT INR   Result Value Ref Range    INR 2.5     Protime         Plan:     Last check patient's hemoglobin A1c was much more acceptable however this is probably due to his surgeries and recuperation rather than anything he did. We have to get some better control. Though he is needle phobic we will try to get him to use trulicity. A prescription is given. He'll pick that up to the pharmacy. He will see diabetic education. Review administration perhaps he will get some dietary advice. Regards to his frequent urination we will begin tamsulosin. Check a PSA. It is unclear if his frequent urination entirely due to his presumed BPH. Hyperglycemia may play a role obviously. We will x-ray his right hip.   Suspect he does

## 2019-05-24 RX ORDER — TAMSULOSIN HYDROCHLORIDE 0.4 MG/1
0.4 CAPSULE ORAL DAILY
Qty: 90 CAPSULE | Refills: 5 | Status: SHIPPED | OUTPATIENT
Start: 2019-05-24 | End: 2020-08-10 | Stop reason: SDUPTHER

## 2019-05-31 RX ORDER — HYDROCHLOROTHIAZIDE 25 MG/1
TABLET ORAL
Qty: 30 TABLET | Refills: 0 | Status: SHIPPED | OUTPATIENT
Start: 2019-05-31 | End: 2019-06-03 | Stop reason: SDUPTHER

## 2019-05-31 NOTE — TELEPHONE ENCOUNTER
Randolph Martinez is calling to request a refill on the following medication(s):  Requested Prescriptions     Pending Prescriptions Disp Refills    hydrochlorothiazide (HYDRODIURIL) 25 MG tablet [Pharmacy Med Name: HYDROCHLOROTHIAZIDE 25MG TABLETS] 30 tablet 0     Sig: TAKE 1 TABLET BY MOUTH EVERY DAY       Last Visit Date (If Applicable):  6/83/7957    Next Visit Date:    6/4/2019

## 2019-06-03 RX ORDER — HYDROCHLOROTHIAZIDE 25 MG/1
TABLET ORAL
Qty: 30 TABLET | Refills: 0 | Status: SHIPPED | OUTPATIENT
Start: 2019-06-03 | End: 2019-06-13 | Stop reason: SDUPTHER

## 2019-06-03 NOTE — TELEPHONE ENCOUNTER
[facility] is requesting a refill on the following medication(s):  Requested Prescriptions     Pending Prescriptions Disp Refills    hydrochlorothiazide (HYDRODIURIL) 25 MG tablet 30 tablet 0       Last Visit Date (If Applicable):  3/64/56058    Next Visit Date:    6/20/2019

## 2019-06-04 ENCOUNTER — OFFICE VISIT (OUTPATIENT)
Dept: FAMILY MEDICINE CLINIC | Age: 74
End: 2019-06-04
Payer: MEDICARE

## 2019-06-04 VITALS
SYSTOLIC BLOOD PRESSURE: 112 MMHG | DIASTOLIC BLOOD PRESSURE: 60 MMHG | HEART RATE: 68 BPM | RESPIRATION RATE: 12 BRPM | WEIGHT: 189 LBS | BODY MASS INDEX: 31.45 KG/M2

## 2019-06-04 DIAGNOSIS — Z71.89 DIABETES EDUCATION, ENCOUNTER FOR: ICD-10-CM

## 2019-06-04 DIAGNOSIS — E66.9 CLASS 1 OBESITY WITH BODY MASS INDEX (BMI) OF 31.0 TO 31.9 IN ADULT, UNSPECIFIED OBESITY TYPE, UNSPECIFIED WHETHER SERIOUS COMORBIDITY PRESENT: ICD-10-CM

## 2019-06-04 DIAGNOSIS — R80.9 MICROALBUMINURIA: ICD-10-CM

## 2019-06-04 DIAGNOSIS — E78.2 MIXED HYPERLIPIDEMIA: ICD-10-CM

## 2019-06-04 DIAGNOSIS — E11.65 TYPE 2 DIABETES MELLITUS WITH HYPERGLYCEMIA, WITHOUT LONG-TERM CURRENT USE OF INSULIN (HCC): Primary | ICD-10-CM

## 2019-06-04 DIAGNOSIS — I10 ESSENTIAL HYPERTENSION: ICD-10-CM

## 2019-06-04 LAB
CHP ED QC CHECK: ABNORMAL
GLUCOSE BLD-MCNC: 396 MG/DL

## 2019-06-04 PROCEDURE — 3046F HEMOGLOBIN A1C LEVEL >9.0%: CPT | Performed by: NURSE PRACTITIONER

## 2019-06-04 PROCEDURE — 1036F TOBACCO NON-USER: CPT | Performed by: NURSE PRACTITIONER

## 2019-06-04 PROCEDURE — G8427 DOCREV CUR MEDS BY ELIG CLIN: HCPCS | Performed by: NURSE PRACTITIONER

## 2019-06-04 PROCEDURE — 82962 GLUCOSE BLOOD TEST: CPT | Performed by: NURSE PRACTITIONER

## 2019-06-04 PROCEDURE — 1123F ACP DISCUSS/DSCN MKR DOCD: CPT | Performed by: NURSE PRACTITIONER

## 2019-06-04 PROCEDURE — G8598 ASA/ANTIPLAT THER USED: HCPCS | Performed by: NURSE PRACTITIONER

## 2019-06-04 PROCEDURE — 2022F DILAT RTA XM EVC RTNOPTHY: CPT | Performed by: NURSE PRACTITIONER

## 2019-06-04 PROCEDURE — G8417 CALC BMI ABV UP PARAM F/U: HCPCS | Performed by: NURSE PRACTITIONER

## 2019-06-04 PROCEDURE — 4040F PNEUMOC VAC/ADMIN/RCVD: CPT | Performed by: NURSE PRACTITIONER

## 2019-06-04 PROCEDURE — 99205 OFFICE O/P NEW HI 60 MIN: CPT | Performed by: NURSE PRACTITIONER

## 2019-06-04 PROCEDURE — 3017F COLORECTAL CA SCREEN DOC REV: CPT | Performed by: NURSE PRACTITIONER

## 2019-06-04 RX ORDER — GLUCOSAMINE HCL/CHONDROITIN SU 500-400 MG
CAPSULE ORAL DAILY
Qty: 50 STRIP | Refills: 3 | Status: SHIPPED | OUTPATIENT
Start: 2019-06-04 | End: 2020-02-04

## 2019-06-04 RX ORDER — LANCETS 30 GAUGE
1 EACH MISCELLANEOUS DAILY
Qty: 50 EACH | Refills: 3 | Status: SHIPPED | OUTPATIENT
Start: 2019-06-04 | End: 2020-07-21 | Stop reason: SDUPTHER

## 2019-06-04 ASSESSMENT — ENCOUNTER SYMPTOMS
BLURRED VISION: 1
SHORTNESS OF BREATH: 0
RESPIRATORY NEGATIVE: 1
ABDOMINAL PAIN: 0
DIARRHEA: 0
VISUAL CHANGE: 0

## 2019-06-04 NOTE — PROGRESS NOTES
[address] [facility] INTERNAL MED  [address] [facility]  [phone]        HISTORY:    Ashley Andrea presents today for evaluation and management of:  Chief Complaint   Patient presents with    Diabetes       Diabetes   He presents for his initial diabetic visit. He has type 2 diabetes mellitus. No MedicAlert identification noted. His disease course has been worsening. Hypoglycemia symptoms include dizziness, sweats and tremors. Pertinent negatives for hypoglycemia include no confusion, headaches or seizures. (States he will have dizziness and then eat something with symptom improvement. ) Associated symptoms include blurred vision, fatigue, foot paresthesias and polyuria. Pertinent negatives for diabetes include no chest pain, no foot ulcerations, no polydipsia, no polyphagia, no visual change, no weakness and no weight loss. There are no hypoglycemic complications. Symptoms are worsening. There are no diabetic complications. Risk factors for coronary artery disease include diabetes mellitus, dyslipidemia, family history, hypertension, male sex, obesity and sedentary lifestyle. Current diabetic treatment includes oral agent (triple therapy). He is compliant with treatment all of the time. His weight is stable. He is following a generally unhealthy diet. When asked about meal planning, he reported none. He has not had a previous visit with a dietitian. He never participates in exercise. Frequency home blood tests: does not have a meter at home. An ACE inhibitor/angiotensin II receptor blocker is being taken. He does not see a podiatrist.Eye exam is not current. Patient is here for initial diabetic education. His A1C changed from 6.8% to 12.2 %. He denies any changes to his medications or major changes to his diet. Was seen at follow up visit with Dr. Rob Hutton who added Trulicity. He is here today for instructions on its use. Patient states it was $500 co-pay. each by Does not apply route daily 50 each 3    hydrochlorothiazide (HYDRODIURIL) 25 MG tablet TAKE 1 TABLET BY MOUTH EVERY DAY 30 tablet 0    tamsulosin (FLOMAX) 0.4 MG capsule TAKE 1 CAPSULE BY MOUTH DAILY 90 capsule 5    Dulaglutide 0.75 MG/0.5ML SOPN Inject 0.75 mg into the skin once a week 2 pen 5    glimepiride (AMARYL) 4 MG tablet TAKE 1 TABLET BY MOUTH TWICE DAILY 180 tablet 3    warfarin (COUMADIN) 4 MG tablet TAKE 1 TABLET BY MOUTH DAILY 90 tablet 3    metFORMIN (GLUCOPHAGE) 500 MG tablet Take 2 tablets by mouth 2 times daily (with meals) 60 tablet 0    rosuvastatin (CRESTOR) 20 MG tablet Take 1 tablet by mouth daily 90 tablet 3    metoprolol tartrate (LOPRESSOR) 25 MG tablet TAKE 1 TABLET BY MOUTH TWICE DAILY 180 tablet 3    amiodarone (CORDARONE) 200 MG tablet Take 1 tablet by mouth daily 90 tablet 3    lisinopril (PRINIVIL;ZESTRIL) 5 MG tablet Take 1 tablet by mouth daily 30 tablet 5    pioglitazone (ACTOS) 45 MG tablet Take 1 tablet by mouth daily 90 tablet 3    blood glucose monitor strips Test 1 times a day & as needed for symptoms of irregular blood glucose. 100 strip 3    FIBER ADULT GUMMIES PO Take by mouth      aspirin 81 MG tablet Take 1 tablet by mouth daily Pt.will stop 10-30 -18 AM for OR (Patient taking differently: Take 81 mg by mouth daily ) 30 tablet 3    clotrimazole-betamethasone (LOTRISONE) 1-0.05 % cream Apply topically 2 times daily (Patient taking differently: Apply topically 2 times daily as needed ) 1 Tube 1    Multiple Vitamins-Minerals (THERAPEUTIC MULTIVITAMIN-MINERALS) tablet Take 1 tablet by mouth daily (with breakfast) 30 tablet 3     No current facility-administered medications for this visit. Review ofSymptoms:  Review of Systems   Constitutional: Positive for fatigue. Negative for unexpected weight change and weight loss. Eyes: Positive for blurred vision. Negative for visual disturbance. Respiratory: Negative. Negative for shortness of breath. Cardiovascular: Negative for chest pain and leg swelling. Gastrointestinal: Negative for abdominal pain and diarrhea. Endocrine: Positive for polyuria. Negative for polydipsia and polyphagia. Genitourinary: Negative. Musculoskeletal: Negative. Skin: Negative for rash and wound. Neurological: Positive for dizziness and tremors. Negative for seizures, weakness and headaches. Psychiatric/Behavioral: Negative. Negative for confusion and decreased concentration. Theremainder of a complete 14-point review of systems is negative. Vital Signs: /60 (Site: Right Upper Arm, Position: Sitting, Cuff Size: Medium Adult)   Pulse 68   Resp 12   Wt 189 lb (85.7 kg)   BMI 31.45 kg/m²      Wt Readings from Last 3 Encounters:   06/04/19 189 lb (85.7 kg)   05/23/19 184 lb (83.5 kg)   05/10/19 194 lb 12.8 oz (88.4 kg)     Body mass index is 31.45 kg/m².   LABS:  Hemoglobin A1C   Date Value Ref Range Status   05/23/2019 12.2 % Final   02/15/2019 6.8 % Final     Lab Results   Component Value Date    LABMICR 3.5 (H) 12/03/2018     Lab Results   Component Value Date     02/08/2019    K 4.6 02/08/2019    CL 97 (L) 02/08/2019    CO2 23 02/08/2019    BUN 15 02/08/2019    CREATININE 1.00 02/08/2019    GLUCOSE 396 06/04/2019    CALCIUM 9.1 02/08/2019    PROT 7.1 10/26/2018    LABALBU 4.2 10/26/2018    BILITOT 0.42 10/26/2018    ALKPHOS 47 10/26/2018    AST 29 10/26/2018    ALT 25 10/26/2018    LABGLOM >60 02/08/2019    GFRAA >60 02/08/2019     Lab Results   Component Value Date    CHOL 179 05/21/2019    CHOL 337 (H) 02/15/2019    CHOL 146 02/19/2018     Lab Results   Component Value Date    TRIG 239 05/21/2019    TRIG 289 (H) 02/15/2019    TRIG 191 02/19/2018     Lab Results   Component Value Date    HDL 42 04/13/2017     Lab Results   Component Value Date    LDLCALC 85.2 05/21/2019    LDLCALC 234.2 (H) 02/15/2019    LDLCALC 60.8 02/19/2018     Lab Results   Component Value Date    VLDL 48 (H) discussed injection sites, and how to properly dispose of needles. Patient verbally repeated correct steps and preformed a return demonstration using  pen. Patient was given handouts on medication injection administration steps, sites of injection administration, diabetic diet information, and diabetic log book. Specific medication education was discussed. Patient advised to check blood sugar 1 time daily, record in diabetic log book. Patient's questions answered and he verbalized understanding. Advised him to call with any questions or problems. Discussed signs and symptoms of hyper/hypoglycemia and how to treat. Encouraged 150 minutes of physical activity per week. Follow a low carbohydrate diet consuming 45 grams of carbohydrates at breakfast, lunch and dinner with two separate snacks [id number] grams of carbohydrates. Encouraged at least 7 hours of sleep. The patient was informed of the goals of diabetes management. This can only be accomplished by watching their diet and exercise levels. We certainly use medicines to help attain these goals. The consequences of not controlling blood sugars were discussed. These include blindness, heart disease, stroke, kidney disease, and possibly need for dialysis. They were told to be careful with their foot care as diabetics often have nerve damage, infections and risk for limb amutations . They also need a dilated eye exam yearly. We discussed the issues of diet, exercise, medication, complication avoidance, reviewed the signs and symptoms of diabetes, hypoglycemic episodes, significance of HbA1C.           3. Class 1 obesity with body mass index (BMI) of 31.0 to 31.9 in adult, unspecified obesity type, unspecified whether serious comorbidity present  Reduce calories and increase physical activity to achieve a slow and steady weight loss to improve blood pressure, cholesterol and diabetes.        4. Mixed hyperlipidemia  stable, lipid panel reviewed LDL 85

## 2019-06-04 NOTE — PATIENT INSTRUCTIONS
Start testing blood sugar once daily     Call insurance about preferred brand of Trulicity   ------examples may be Ozempic, bydureon or Antoinette Pinky  ------ask what your copay will be for Trulicity next month    Take trulicity every Tuesday keep in the refrigerator     Patient Education        dulaglutide  Pronunciation:  [id number] [address]  Brand:  Trulicity Pen  What is the most important information I should know about dulaglutide? You should not use dulaglutide if you have Multiple Endocrine Neoplasia syndrome type 2 (MEN 2), or a personal or family history of medullary thyroid carcinoma (a type of thyroid cancer). Do not use dulaglutide if you are in a state of diabetic ketoacidosis (call your doctor for treatment with insulin). In animal studies, dulaglutide caused thyroid tumors or thyroid cancer. It is not known whether these effects would occur in people using regular doses. Ask your doctor about your risk. Call your doctor at once if you have signs of a thyroid tumor, such as swelling or a lump in your neck, trouble swallowing, a hoarse voice, or shortness of breath. What is dulaglutide? Dulaglutide is an injectable diabetes medicine that helps control blood sugar levels. Dulaglutide is used together with diet and exercise to improve blood sugar control in adults with type 2 diabetes mellitus. Dulaglutide is usually given after other diabetes medicines have been tried without success. This medicine is not for treating type 1 diabetes. Dulaglutide may also be used for purposes not listed in this medication guide. What should I discuss with my healthcare provider before using dulaglutide? You should not use dulaglutide if you are allergic to it, or if you have:  · multiple endocrine neoplasia type 2 (tumors in your glands);  · a personal or family history of medullary thyroid carcinoma (a type of thyroid cancer); or  · diabetic ketoacidosis (call your doctor for treatment with insulin).   To make sure dulaglutide is safe for you, tell your doctor if you have:  · a history of pancreatitis;  · a stomach or intestinal disorder (especially if you also have kidney disease);  · gastroesophageal reflux disease (GERD) or slow digestion;  · liver or kidney disease;  · if you also use insulin or oral diabetes medicine; or  · if you have been sick with vomiting or diarrhea. In animal studies, dulaglutide caused thyroid tumors or thyroid cancer. It is not known whether these effects would occur in people using regular doses. Ask your doctor about your risk. It is not known whether this medicine will harm an unborn baby. Tell your doctor if you are pregnant or plan to become pregnant. It is not known whether dulaglutide passes into breast milk or if it could harm a nursing baby. Tell your doctor if you are breast-feeding a baby. Dulaglutide is not approved for use by anyone younger than 25years old. How should I use dulaglutide? Follow all directions on your prescription label. Do not use this medicine in larger or smaller amounts or for longer than recommended. Dulaglutide comes with patient instructions for safe and effective use. Follow these directions carefully. Ask your doctor or pharmacist if you have any questions. Dulaglutide is injected under the skin. You will be shown how to use injections at home. Do not self-inject this medicine if you do not understand how to give the injection and properly dispose of used needles and syringes. You may use dulaglutide with or without food. Dulaglutide is usually given only one time per week. Use the medicine on the same day each week at the same time of day. If needed, you may change your dosing day, but allow at least 3 days to pass between doses. Use a different place on your stomach, thigh, or upper arm each time you give the injection. Your care provider will show you the best places on your body to inject the medication.  Do not inject into the same place two times in a row. Low blood sugar (hypoglycemia) can happen to everyone who has diabetes. Symptoms include headache, hunger, sweating, irritability, dizziness, nausea, fast heart rate, and feeling anxious or shaky. To quickly treat low blood sugar, always keep a fast-acting source of sugar with you such as fruit juice, hard candy, crackers, raisins, or non-diet soda. Your doctor can prescribe a glucagon emergency injection kit to use in case you have severe hypoglycemia and cannot eat or drink. Be sure your family and close friends know how to give you this injection in an emergency. Also watch for signs of high blood sugar (hyperglycemia) such as increased thirst or urination, blurred vision, headache, and tiredness. Blood sugar levels can be affected by stress, illness, surgery, exercise, alcohol use, or skipping meals. Ask your doctor before changing your dose or medication schedule. Each single-use injection pen or prefilled syringe is for one use only. Throw away after one use, even if there is still some medicine left in it after injecting your dose. Follow any state or local laws about throwing away used needles and syringes. Use a puncture-proof \"sharps\" disposal container (ask your pharmacist where to get one and how to throw it away). Keep this container out of the reach of children and pets. Dulaglutide is only part of a complete treatment program that may also include diet, exercise, weight control, regular blood sugar testing, and special medical care. Follow your doctor's instructions very closely. Store injection pens or prefilled syringes in the refrigerator, protected from light. Throw away any dulaglutide not used before the expiration date on the medicine label. You may also store injection pens or prefilled syringes at room temperature for up to 14 days before use. Do not freeze dulaglutide, and throw away the medicine if it has become frozen. What happens if I miss a dose?   If your next dose is 3 or more days away, use the missed dose as soon as you remember. Skip the missed dose if your next dose is less than 3 days away. Do not use extra medicine to make up the missed dose. Do not use this medicine twice within a 72-hour period. What happens if I overdose? Seek emergency medical attention or call the Poison Help line at 1-518.427.4624. Overdose symptoms may include severe nausea and vomiting. What should I avoid while using dulaglutide? Never share an injection pen or prefilled syringe with another person, even if the needle has been changed. Sharing these devices can allow infections or disease to pass from one person to another. What are the possible side effects of dulaglutide? Get emergency medical help if you have signs of an allergic reaction: hives; difficult breathing; swelling of your face, lips, tongue, or throat. Call your doctor at once if you have:  · symptoms of pancreatitis --severe pain in your upper stomach spreading to your back, nausea and vomiting, fast heart rate;  · signs of a thyroid tumor --swelling or a lump in your neck, trouble swallowing, a hoarse voice, or if you feel short of breath;  · low blood sugar --headache, hunger, weakness, sweating, confusion, irritability, dizziness, fast heart rate, or feeling jittery; or  · signs of a kidney problem --little or no urinating; painful or difficult urination; swelling in your feet or ankles; feeling tired or short of breath. Common side effects may include:  · stomach pain, indigestion;  · nausea, diarrhea, vomiting; or  · loss of appetite. This is not a complete list of side effects and others may occur. Call your doctor for medical advice about side effects. You may report side effects to FDA at 4-861-HVM-8409. What other drugs will affect dulaglutide? Some drugs can affect how well dulaglutide controls your blood sugar, and dulaglutide may affect how other drugs work.   Tell your doctor about all your current medicines and any you start or stop using, especially:  · all diabetes medications you use; and  · all medicines you take by mouth. This list is not complete. Other drugs may interact with dulaglutide, including prescription and over-the-counter medicines, vitamins, and herbal products. Not all possible interactions are listed in this medication guide. Where can I get more information? Your pharmacist can provide more information about dulaglutide. Remember, keep this and all other medicines out of the reach of children, never share your medicines with others, and use this medication only for the indication prescribed. Every effort has been made to ensure that the information provided by [facility] is accurate, up-to-date, and complete, but no guarantee is made to that effect. Drug information contained herein may be time sensitive. [facility] information has been compiled for use by healthcare practitioners and consumers in the [place] and therefore [facility] does not warrant that uses outside of the [place] are appropriate, unless specifically indicated otherwise. [facility]'s drug information does not endorse drugs, diagnose patients or recommend therapy. [facility] drug information is an informational resource designed to assist licensed healthcare practitioners in caring for their patients and/or to serve consumers viewing this service as a supplement to, and not a substitute for, the expertise, skill, knowledge and judgment of healthcare practitioners. The absence of a warning for a given drug or drug combination in no way should be construed to indicate that the drug or drug combination is safe, effective or appropriate for any given patient. [facility] does not assume any responsibility for any aspect of healthcare administered with the aid of information [facility] provides.  The information contained herein is not intended to cover all possible uses, directions, precautions, warnings, drug interactions, allergic reactions, or adverse effects. If you have questions about the drugs you are taking, check with your doctor, nurse or pharmacist.  Copyright 4502-3844 [address] [place]. Version: 1.03. Revision date: 2/6/2017. Care instructions adapted under license by [facility] ([facility]). If you have questions about a medical condition or this instruction, always ask your healthcare professional. [place] [address] any warranty or liability for your use of this information.

## 2019-06-13 DIAGNOSIS — I10 ESSENTIAL HYPERTENSION: Primary | ICD-10-CM

## 2019-06-13 RX ORDER — HYDROCHLOROTHIAZIDE 25 MG/1
TABLET ORAL
Qty: 90 TABLET | Refills: 3 | Status: SHIPPED | OUTPATIENT
Start: 2019-06-13 | End: 2019-09-26 | Stop reason: SINTOL

## 2019-06-18 ENCOUNTER — OFFICE VISIT (OUTPATIENT)
Dept: DIABETES SERVICES | Age: 74
End: 2019-06-18
Payer: MEDICARE

## 2019-06-18 ENCOUNTER — TELEPHONE (OUTPATIENT)
Dept: INTERNAL MEDICINE | Age: 74
End: 2019-06-18

## 2019-06-18 VITALS
RESPIRATION RATE: 16 BRPM | DIASTOLIC BLOOD PRESSURE: 64 MMHG | HEART RATE: 68 BPM | BODY MASS INDEX: 32.4 KG/M2 | WEIGHT: 194.7 LBS | SYSTOLIC BLOOD PRESSURE: 104 MMHG

## 2019-06-18 DIAGNOSIS — E78.2 MIXED HYPERLIPIDEMIA: ICD-10-CM

## 2019-06-18 DIAGNOSIS — E11.65 TYPE 2 DIABETES MELLITUS WITH HYPERGLYCEMIA, WITHOUT LONG-TERM CURRENT USE OF INSULIN (HCC): ICD-10-CM

## 2019-06-18 DIAGNOSIS — E66.9 CLASS 1 OBESITY WITH BODY MASS INDEX (BMI) OF 31.0 TO 31.9 IN ADULT, UNSPECIFIED OBESITY TYPE, UNSPECIFIED WHETHER SERIOUS COMORBIDITY PRESENT: ICD-10-CM

## 2019-06-18 DIAGNOSIS — E11.9 COMPREHENSIVE DIABETIC FOOT EXAMINATION, TYPE 2 DM, ENCOUNTER FOR (HCC): ICD-10-CM

## 2019-06-18 DIAGNOSIS — Z71.89 DIABETES EDUCATION, ENCOUNTER FOR: ICD-10-CM

## 2019-06-18 DIAGNOSIS — I10 ESSENTIAL HYPERTENSION: ICD-10-CM

## 2019-06-18 DIAGNOSIS — E11.65 TYPE 2 DIABETES MELLITUS WITH HYPERGLYCEMIA, WITHOUT LONG-TERM CURRENT USE OF INSULIN (HCC): Primary | ICD-10-CM

## 2019-06-18 PROCEDURE — 4040F PNEUMOC VAC/ADMIN/RCVD: CPT | Performed by: NURSE PRACTITIONER

## 2019-06-18 PROCEDURE — 1036F TOBACCO NON-USER: CPT | Performed by: NURSE PRACTITIONER

## 2019-06-18 PROCEDURE — 3017F COLORECTAL CA SCREEN DOC REV: CPT | Performed by: NURSE PRACTITIONER

## 2019-06-18 PROCEDURE — 2022F DILAT RTA XM EVC RTNOPTHY: CPT | Performed by: NURSE PRACTITIONER

## 2019-06-18 PROCEDURE — G8417 CALC BMI ABV UP PARAM F/U: HCPCS | Performed by: NURSE PRACTITIONER

## 2019-06-18 PROCEDURE — G8427 DOCREV CUR MEDS BY ELIG CLIN: HCPCS | Performed by: NURSE PRACTITIONER

## 2019-06-18 PROCEDURE — 99215 OFFICE O/P EST HI 40 MIN: CPT | Performed by: NURSE PRACTITIONER

## 2019-06-18 PROCEDURE — 1123F ACP DISCUSS/DSCN MKR DOCD: CPT | Performed by: NURSE PRACTITIONER

## 2019-06-18 PROCEDURE — 3046F HEMOGLOBIN A1C LEVEL >9.0%: CPT | Performed by: NURSE PRACTITIONER

## 2019-06-18 PROCEDURE — G8598 ASA/ANTIPLAT THER USED: HCPCS | Performed by: NURSE PRACTITIONER

## 2019-06-18 RX ORDER — GLUCOSAMINE HCL/CHONDROITIN SU 500-400 MG
1 CAPSULE ORAL DAILY
Qty: 50 STRIP | Refills: 3 | Status: CANCELLED | OUTPATIENT
Start: 2019-06-18

## 2019-06-18 RX ORDER — ADHESIVE BANDAGE 3/4"
1 BANDAGE TOPICAL DAILY
Qty: 1 EACH | Refills: 0 | Status: SHIPPED | OUTPATIENT
Start: 2019-06-18 | End: 2019-06-18 | Stop reason: SDUPTHER

## 2019-06-18 RX ORDER — LANCETS 30 GAUGE
1 EACH MISCELLANEOUS DAILY
Qty: 50 EACH | Refills: 3 | Status: CANCELLED | OUTPATIENT
Start: 2019-06-18

## 2019-06-18 ASSESSMENT — ENCOUNTER SYMPTOMS
BLURRED VISION: 1
DIARRHEA: 0
RESPIRATORY NEGATIVE: 1
ABDOMINAL PAIN: 0
VISUAL CHANGE: 0
SHORTNESS OF BREATH: 0

## 2019-06-18 NOTE — PROGRESS NOTES
[facility] INTERNAL MED  [address] [facility]  [phone]        HISTORY:    Jonh Banegas presents today for evaluation and management of:  Chief Complaint   Patient presents with    Diabetes       Diabetes   He presents for his follow-up diabetic visit. He has type 2 diabetes mellitus. Hypoglycemia symptoms include dizziness. Pertinent negatives for hypoglycemia include no confusion, headaches, seizures or tremors. Associated symptoms include blurred vision, fatigue, foot paresthesias and polydipsia. Pertinent negatives for diabetes include no chest pain, no foot ulcerations, no polyphagia, no visual change, no weakness and no weight loss. There are no hypoglycemic complications. Symptoms are stable. Diabetic complications include heart disease and peripheral neuropathy. Pertinent negatives for diabetic complications include no CVA. Risk factors for coronary artery disease include diabetes mellitus, dyslipidemia, family history, hypertension, male sex, obesity and sedentary lifestyle. He is compliant with treatment most of the time. His weight is stable. He is following a generally unhealthy diet. He has not had a previous visit with a dietitian. He never participates in exercise. Home blood sugar record trend: does not test, do to cost of meter. An ACE inhibitor/angiotensin II receptor blocker is being taken. Eye exam is not current. Patient presents today with son for follow up diabetes education. He has been taking Trulicity but is concerned about refill cost. He also has not picked up he meter do to cost. He has been having some\"dizzy\" spells that last a few hours in the morning. He has not made much change to his diet and is not drinking a lot of fluids. Current Diabetic Medications  amaryl 4 mg BID, Actos 45 mg daily, metformin 5041 mg BID Trulicity . 75 mg weekly. He also takes a daily asa    DKA episodes: 0    Obesity- Working on weight loss. High cholesterol-  Takes crestor and denies any adverse effects with its use. Watches diet and exercise. Hypertension-  Takes lisinopril and lopressor and denies any adverse effects with their use. Watches diet and exercise. Denies any chest pain, dizziness or edema. Follows with cardiology:Yes. Past Medical History:   Diagnosis Date    Aortic stenosis     Degenerative disc disease, lumbar     Diabetes mellitus (HCC)     PCP Dr. Obie Goodman Diverticulosis of sigmoid colon     DIA (dyspnea on exertion)     Full dentures     GERD (gastroesophageal reflux disease)     Heart murmur     Dr. Kirsty Dumas / [facility]    Hyperlipidemia     Hypertension     Kidney stone     Mitral regurgitation     Obesity     Tubular adenoma of colon     refuses repeat colonoscopy    Wears glasses     Yeast infection      Family History   Problem Relation Age of Onset    Cancer Father         prostate    Alzheimer's Disease Father     Heart Disease Neg Hx     Diabetes Neg Hx      Social History     Tobacco Use    Smoking status: Former Smoker     Packs/day: 1.50     Years: 40.00     Pack years: 60.00     Types: Cigarettes     Last attempt to quit:      Years since quittin.4    Smokeless tobacco: Never Used   Substance Use Topics    Alcohol use: No    Drug use: No     Allergies   Allergen Reactions    Invokana [Canagliflozin]      dizziness    Januvia [Sitagliptin]      dizziness       MEDICATIONS:  Current Outpatient Medications   Medication Sig Dispense Refill    Blood Pressure Monitoring (BLOOD PRESSURE CUFF) MISC 1 Device by Does not apply route daily 1 each 0    hydrochlorothiazide (HYDRODIURIL) 25 MG tablet TAKE 1 TABLET BY MOUTH EVERY DAY 90 tablet 3    Blood Glucose Monitoring Suppl KIT 1 kit by Does not apply route daily 1 kit 0    blood glucose monitor strips by Other route daily Test 1 times a day & as needed for symptoms of irregular blood glucose.  50 strip 3    Negative for abdominal pain and diarrhea. Endocrine: Positive for polydipsia. Negative for polyphagia. Genitourinary: Negative. Musculoskeletal: Negative. Skin: Negative for rash and wound. Neurological: Positive for dizziness. Negative for tremors, seizures, weakness and headaches. Psychiatric/Behavioral: Negative. Negative for confusion and decreased concentration. Theremainder of a complete 14-point review of systems is negative. Vital Signs: /64 (Site: Left Upper Arm, Position: Sitting, Cuff Size: Medium Adult)   Pulse 68   Resp 16   Wt 194 lb 11.2 oz (88.3 kg)   BMI 32.40 kg/m²      Wt Readings from Last 3 Encounters:   06/18/19 194 lb 11.2 oz (88.3 kg)   06/04/19 189 lb (85.7 kg)   05/23/19 184 lb (83.5 kg)     Body mass index is 32.4 kg/m².   LABS:  Hemoglobin A1C   Date Value Ref Range Status   05/23/2019 12.2 % Final   02/15/2019 6.8 % Final     Lab Results   Component Value Date    LABMICR 3.5 (H) 12/03/2018     Lab Results   Component Value Date     02/08/2019    K 4.6 02/08/2019    CL 97 (L) 02/08/2019    CO2 23 02/08/2019    BUN 15 02/08/2019    CREATININE 1.00 02/08/2019    GLUCOSE 396 06/04/2019    CALCIUM 9.1 02/08/2019    PROT 7.1 10/26/2018    LABALBU 4.2 10/26/2018    BILITOT 0.42 10/26/2018    ALKPHOS 47 10/26/2018    AST 29 10/26/2018    ALT 25 10/26/2018    LABGLOM >60 02/08/2019    GFRAA >60 02/08/2019     Lab Results   Component Value Date    CHOL 179 05/21/2019    CHOL 337 (H) 02/15/2019    CHOL 146 02/19/2018     Lab Results   Component Value Date    TRIG 239 05/21/2019    TRIG 289 (H) 02/15/2019    TRIG 191 02/19/2018     Lab Results   Component Value Date    HDL 42 04/13/2017     Lab Results   Component Value Date    LDLCALC 85.2 05/21/2019    LDLCALC 234.2 (H) 02/15/2019    LDLCALC 60.8 02/19/2018     Lab Results   Component Value Date    VLDL 48 (H) 05/21/2019    VLDL 58 (H) 02/15/2019    VLDL 38 02/19/2018     Lab Results   Component Value Date CHOLHDLRATIO 3.9 2019    CHOLHDLRATIO 7.5 (H) 02/15/2019    CHOLHDLRATIO 3.1 2018           Physical Exam   Constitutional: He is oriented to person, place, and time. He appears well-developed and well-nourished. Eyes: Pupils are equal, round, and reactive to light. Cardiovascular: Normal rate, regular rhythm and intact distal pulses. Pulmonary/Chest: Effort normal and breath sounds normal.   Musculoskeletal: He exhibits no edema (to lower extremities). Neurological: He is alert and oriented to person, place, and time. No sensory deficit. Skin: Skin is warm, dry and intact. No lesion (no lipohypertrophy) and no rash noted. Psychiatric: He has a normal mood and affect. His speech is normal and behavior is normal. Judgment and thought content normal. Cognition and memory are normal.       ASSESSMENT/PLAN:     Diagnosis Orders   1. Type 2 diabetes mellitus with hyperglycemia, without long-term current use of insulin ([facility])  Microalbumin, Ur    Blood Pressure Monitoring (BLOOD PRESSURE CUFF) MISC   2. Diabetes education, encounter for     3. Comprehensive diabetic foot examination, type 2 DM, encounter for ([facility]     4. Class 1 obesity with body mass index (BMI) of 31.0 to 31.9 in adult, unspecified obesity type, unspecified whether serious comorbidity present  Blood Pressure Monitoring (BLOOD PRESSURE CUFF) MISC   5. BMI 32.0-32.9,adult     6. Mixed hyperlipidemia  Blood Pressure Monitoring (BLOOD PRESSURE CUFF) MISC   7.  Essential hypertension  Blood Pressure Monitoring (BLOOD PRESSURE CUFF) MISC     Orders Placed This Encounter   Procedures    Microalbumin, Ur     Orders Placed This Encounter   Medications    Blood Pressure Monitoring (BLOOD PRESSURE CUFF) MISC     Si Device by Does not apply route daily     Dispense:  1 each     Refill:  0     Requested Prescriptions     Signed Prescriptions Disp Refills    Blood Pressure Monitoring (BLOOD PRESSURE CUFF) MISC 1 each 0     Si Device by Does not apply route daily       1. Type 2 diabetes mellitus with hyperglycemia, without long-term current use of insulin ([facility]  2. Diabetes education, encounter for  3. Comprehensive diabetic foot examination, type 2 DM, encounter for ([facility]  - Unstable  HbA1C goal is less than 7% and blood sugars are worsening.  - Encouraged patient to check BS 1 times per day. Fasting blood glucose goal is 70-130mg/dl and postprandial blood sugar goal is less than 180 mg/dl. -Diabetic foot exam due now will complete at next visit. -Diabetic retinal exam reviewed and is current?:No encouraged patient to schedule exam and have note faxed. - Labs reviewed includes: most recent A1C if 12.2% crt 1.0 GFR>60 micoralbumin eleveated . Repeat labs ordered Yes due in A1C in august and microalbumin due now.   -We discussed in great detail dietary modifications they can make to better improve their blood sugars.  -I will call insurance to see cost of meter and Trulicity we may need to change to preferred brand of GLP-1  -patient will work on diet and hydration. Follow up in one month with blood sugar log. He will notify me if he needs samples of Trulicity. Discussed signs and symptoms of hyper/hypoglycemia and how to treat. Encouraged 150 minutes of physical activity per week. Follow a low carbohydrate diet consuming 45 grams of carbohydrates at breakfast, lunch and dinner with two separate snacks [id number] grams of carbohydrates. Encouraged at least 7 hours of sleep. The patient was informed of the goals of diabetes management. This can only be accomplished by watching their diet and exercise levels. We certainly use medicines to help attain these goals. The consequences of not controlling blood sugars were discussed. These include blindness, heart disease, stroke, kidney disease, and possibly need for dialysis.  They were told to be careful with their foot care as diabetics often have nerve damage, infections and risk for limb amutations . They also need a dilated eye exam yearly. We discussed the issues of diet, exercise, medication, complication avoidance, reviewed the signs and symptoms of diabetes, hypoglycemic episodes, significance of HbA1C.     4. Class 1 obesity with body mass index (BMI) of 31.0 to 31.9 in adult, unspecified obesity type, unspecified whether serious comorbidity present  Reduce calories and increase physical activity to achieve a slow and steady weight loss to improve blood pressure, cholesterol and diabetes. 5. Mixed hyperlipidemia  abnormal, lipid panel reviewed, trig 239 continue current medications. Diet and exercise      6. Essential hypertension   stable, continue current medications. Diet and exercise Seek emergent care if chest pain develops. Monitor blood pressure at home and discuss medication adjustment if needed with cardiology at next appointment on 7/9/19. Stay well hydrated. Answered all patient questions. Agrees to follow plan of care and to follow up in 1 months, sooner if needed. Call office if unexplained blood sugars less than 70 occur or above 400. Call office or access MyChart with any further questions or concerns. Be sure to bring glucometer/food log at next appointment. Patient was seen with total face to face time of 60 minutes. More than 50%  of this visit was counseling and education regarding his diabetes.     Electronically signed by PAIGE Arellano CNP on 6/18/2019 at 1:19 PM      (Please note that portions of this note were completed with a voice-recognition program. Efforts were made to edit the dictation but occasionally words are mis-transcribed.)

## 2019-06-20 ENCOUNTER — OFFICE VISIT (OUTPATIENT)
Dept: FAMILY MEDICINE CLINIC | Age: 74
End: 2019-06-20
Payer: MEDICARE

## 2019-06-20 VITALS
HEART RATE: 64 BPM | SYSTOLIC BLOOD PRESSURE: 114 MMHG | WEIGHT: 194 LBS | DIASTOLIC BLOOD PRESSURE: 64 MMHG | OXYGEN SATURATION: 97 % | BODY MASS INDEX: 32.28 KG/M2

## 2019-06-20 DIAGNOSIS — Z95.2 AORTIC VALVE PROSTHESIS PRESENT: ICD-10-CM

## 2019-06-20 DIAGNOSIS — E11.65 TYPE 2 DIABETES MELLITUS WITH HYPERGLYCEMIA, WITHOUT LONG-TERM CURRENT USE OF INSULIN (HCC): Primary | ICD-10-CM

## 2019-06-20 DIAGNOSIS — M25.551 HIP PAIN, RIGHT: ICD-10-CM

## 2019-06-20 DIAGNOSIS — Z79.01 LONG TERM CURRENT USE OF ANTICOAGULANT THERAPY: ICD-10-CM

## 2019-06-20 DIAGNOSIS — I10 ESSENTIAL HYPERTENSION: ICD-10-CM

## 2019-06-20 PROCEDURE — G8417 CALC BMI ABV UP PARAM F/U: HCPCS | Performed by: FAMILY MEDICINE

## 2019-06-20 PROCEDURE — 4040F PNEUMOC VAC/ADMIN/RCVD: CPT | Performed by: FAMILY MEDICINE

## 2019-06-20 PROCEDURE — 1123F ACP DISCUSS/DSCN MKR DOCD: CPT | Performed by: FAMILY MEDICINE

## 2019-06-20 PROCEDURE — G8598 ASA/ANTIPLAT THER USED: HCPCS | Performed by: FAMILY MEDICINE

## 2019-06-20 PROCEDURE — 3046F HEMOGLOBIN A1C LEVEL >9.0%: CPT | Performed by: FAMILY MEDICINE

## 2019-06-20 PROCEDURE — G8427 DOCREV CUR MEDS BY ELIG CLIN: HCPCS | Performed by: FAMILY MEDICINE

## 2019-06-20 PROCEDURE — 2022F DILAT RTA XM EVC RTNOPTHY: CPT | Performed by: FAMILY MEDICINE

## 2019-06-20 PROCEDURE — 99214 OFFICE O/P EST MOD 30 MIN: CPT | Performed by: FAMILY MEDICINE

## 2019-06-20 PROCEDURE — 3017F COLORECTAL CA SCREEN DOC REV: CPT | Performed by: FAMILY MEDICINE

## 2019-06-20 PROCEDURE — 1036F TOBACCO NON-USER: CPT | Performed by: FAMILY MEDICINE

## 2019-06-20 ASSESSMENT — ENCOUNTER SYMPTOMS
BACK PAIN: 1
BLOOD IN STOOL: 0
WHEEZING: 0
COUGH: 0
ABDOMINAL PAIN: 0
ABDOMINAL DISTENTION: 0
SHORTNESS OF BREATH: 0

## 2019-06-20 NOTE — PROGRESS NOTES
there is a family history of prostate cancer. Aortic valve replacement  This was done October 30, 2018. Trinidad Dawn continues on and coagulation with warfarin. Regularly through this office. Currently taking 4 mg every day except Tuesdays and Saturdays. Once again I was under the impression that since this is a bioprosthetic valve he was to be on warfarin for about 3 months after the aortic valve repair. But I cannot could not document that recommendation from cardiology or cardio thoracic surgery and the notes. Hypertension  He does not check his blood pressures.  Blood pressure for us is 114/74 He remains on hydrochlorothiazide, lisinopril 5, and metoprolol tartrate 25.  He voices no complaints of chest pain or chest pressure. No edema. No shortness of breath or dyspnea on exertion. BP Readings from Last 3 Encounters:   06/20/19 114/64   06/18/19 104/64   06/04/19 112/60            (goal 120/80)    Past Medical History:   Diagnosis Date    Aortic stenosis     Degenerative disc disease, lumbar     Diabetes mellitus ([facility] [address]     PCP Dr. Ana Man Diverticulosis of sigmoid colon     DIA (dyspnea on exertion)     Full dentures     GERD (gastroesophageal reflux disease)     Heart murmur     Dr. Jono Bradshaw / [facility]    Hyperlipidemia     Hypertension     Kidney stone     Mitral regurgitation     Obesity     Tubular adenoma of colon 2007    refuses repeat colonoscopy    Wears glasses     Yeast infection       Past Surgical History:   Procedure Laterality Date    AORTIC VALVE REPLACEMENT  11/2018    CARDIAC CATHETERIZATION  03/01/2018    CARDIAC CATHETERIZATION      Before 2008. No stents placed per pt.     CAROTID ENDARTERECTOMY Left 02/07/2019    LEFT CAROTID ENDARTERECTOMY WITH VASCUGUARD PATCH (Left Neck)    CAROTID ENDARTERECTOMY Left 2/7/2019    LEFT CAROTID ENDARTERECTOMY WITH VASCUGUARD PATCH performed by Ulises Ramirez MD at [address] [place] [address] Left 02/2019 Dr Nathan Robin  2005    for hemoccult positive stool    COLONOSCOPY  10/2017    diverticuli; Dr Sinai Jean Baptiste      umbilical hernia   [address] Left 1971    softball injury    [place] REPLACEMENT OF MITRAL VALVE N/A 10/30/2018    AORTIC  VALVE REPLACEMENT 23 MM INTUITY VALVE, CHUNG OCONNOR, DOREEN performed [facility] History   Problem Relation Age of Onset    Cancer Father         prostate    Alzheimer's Disease Father     Heart Disease Neg Hx     Diabetes Neg Hx      Social History     Tobacco Use    Smoking status: Former Smoker     Packs/day: 1.50     Years: 40.00     Pack years: 60.00     Types: Cigarettes     Last attempt to quit:      Years since quittin.4    Smokeless tobacco: Never Used   Substance Use Topics    Alcohol use: No        Current Outpatient Medications   Medication Sig Dispense Refill    hydrochlorothiazide (HYDRODIURIL) 25 MG tablet TAKE 1 TABLET BY MOUTH EVERY DAY 90 tablet 3    tamsulosin (FLOMAX) 0.4 MG capsule TAKE 1 CAPSULE BY MOUTH DAILY 90 capsule 5    Dulaglutide 0.75 MG/0.5ML SOPN Inject 0.75 mg into the skin once a week 2 pen 5    glimepiride (AMARYL) 4 MG tablet TAKE 1 TABLET BY MOUTH TWICE DAILY 180 tablet 3    metFORMIN (GLUCOPHAGE) 500 MG tablet Take 2 tablets by mouth 2 times daily (with meals) 60 tablet 0    rosuvastatin (CRESTOR) 20 MG tablet Take 1 tablet by mouth daily 90 tablet 3    metoprolol tartrate (LOPRESSOR) 25 MG tablet TAKE 1 TABLET BY MOUTH TWICE DAILY 180 tablet 3    amiodarone (CORDARONE) 200 MG tablet Take 1 tablet by mouth daily 90 tablet 3    lisinopril (PRINIVIL;ZESTRIL) 5 MG tablet Take 1 tablet by mouth daily 30 tablet 5    pioglitazone (ACTOS) 45 MG tablet Take 1 tablet by mouth daily 90 tablet 3    FIBER ADULT GUMMIES PO Take by mouth      aspirin 81 MG tablet Take 1 tablet by mouth daily Pt.will stop 10-30 -18 AM for OR (Patient taking differently: Take 81 mg by mouth daily ) 30 tablet 3    clotrimazole-betamethasone (LOTRISONE) 1-0.05 % cream Apply topically 2 times daily (Patient taking differently: Apply topically 2 times daily as needed ) 1 Tube 1    Multiple Vitamins-Minerals (THERAPEUTIC MULTIVITAMIN-MINERALS) tablet Take 1 tablet by mouth daily (with breakfast) 30 tablet 3    Blood Pressure Monitoring (BLOOD PRESSURE CUFF) MISC 1 Device by Does not apply route daily 1 each 0    Blood Glucose Monitoring Suppl KIT 1 kit by Does not apply route daily 1 kit 0    blood glucose monitor strips by Other route daily Test 1 times a day & as needed for symptoms of irregular blood glucose. 50 strip 3    Lancets MISC 1 each by Does not apply route daily 50 each 3    blood glucose monitor strips Test 1 times a day & as needed for symptoms of irregular blood glucose. 100 strip 3     No current facility-administered medications for this visit.       Allergies   Allergen Reactions    Invokana [Canagliflozin]      dizziness    Januvia [Sitagliptin]      dizziness       Health Maintenance   Topic Date Due    DTaP/Tdap/Td vaccine (1 - Tdap) 11/15/1963    Shingles Vaccine (1 of 2) 11/15/1994    Annual Wellness Visit (AWV)  11/15/2007    Pneumococcal 65+ years Vaccine (1 of 2 - PCV13) 11/15/2009    Diabetic retinal exam  08/24/2018    A1C test (Diabetic or Prediabetic)  08/23/2019    Flu vaccine (Season Ended) 09/01/2019    Diabetic microalbuminuria test  12/03/2019    Potassium monitoring  02/08/2020    Creatinine monitoring  02/08/2020    TSH testing  03/01/2020    Lipid screen  05/21/2020    Diabetic foot exam  06/18/2020    Colon cancer screen colonoscopy  10/09/2027    AAA screen  Completed       Lab Results   Component Value Date    K 4.6 02/08/2019    CREATININE 1.00 02/08/2019    AST 29 10/26/2018    ALT 25 10/26/2018    TSH 1.43 03/01/2019    HCT 33.0 02/08/2019    LABA1C 12.2 05/23/2019    GLUCOSE 396 06/04/2019    GLUCOSE 102 11/08/2018 MG 1.1 02/08/2019    CALCIUM 9.1 02/08/2019      Lab Results   Component Value Date    CHOL 179 05/21/2019    CHOL 209 04/13/2017    TRIG 239 05/21/2019    HDL 42 04/13/2017       Subjective:      Review of Systems   Constitutional: Negative for chills, diaphoresis and fever. HENT: Negative. Eyes: Negative for visual disturbance. Respiratory: Negative for cough, shortness of breath and wheezing. Cardiovascular: Negative for chest pain, palpitations and leg swelling. Gastrointestinal: Negative for abdominal distention, abdominal pain and blood in stool. Endocrine: Negative for polydipsia, polyphagia and polyuria. Genitourinary: Positive for frequency and urgency. Negative for difficulty urinating, dysuria, flank pain and hematuria. Musculoskeletal: Positive for arthralgias, back pain and gait problem. Skin: Negative for wound. Hematological: Negative for adenopathy. Bruises/bleeds easily (due to warfarin). Objective:     /64 (Site: Left Upper Arm, Position: Sitting, Cuff Size: Large Adult)   Pulse 64   Wt 194 lb (88 kg)   SpO2 97%   BMI 32.28 kg/m²     Physical Exam   Constitutional: He appears well-developed and well-nourished. HENT:   Head: Normocephalic and atraumatic. Nose: Nose normal.   Mouth/Throat: No posterior oropharyngeal edema or posterior oropharyngeal erythema. Neck: Carotid bruit is not present. No thyromegaly present. Cardiovascular: Normal rate, regular rhythm, S1 normal and S2 normal.  No extrasystoles are present. Exam reveals distant heart sounds. Murmur heard. soft systolic murmur over the precordium   Pulmonary/Chest: He has decreased breath sounds. He has no wheezes. He has no rhonchi. He has no rales. Chest wall is not dull to percussion. Abdominal: Soft. Bowel sounds are normal. There is no hepatosplenomegaly. Musculoskeletal: He exhibits no edema or tenderness.         Right hip: He exhibits normal range of motion (internal rotation is full when seated ; no pain), no tenderness and no bony tenderness. Lumbar back: He exhibits decreased range of motion. He exhibits no tenderness. Neurological:   Wide-based antalgic gait; straight leg raising is negative bilateral , supine        Assessment:      Diagnosis Orders   1. Type 2 diabetes mellitus with hyperglycemia, without long-term current use of insulin ([address]     2. Essential hypertension     3. Long term current use of anticoagulant therapy     4. Aortic valve prosthesis present     5. Hip pain, right              POC Testing Results (If Applicable):  No results found for this visit on 06/20/19. Plan:     Follow-up with diabetic education. Continue Trulicity. Encouraged to begin self glucose monitoring. At this point in time it appears that his back is more the origin of his discomfort in his hip. Offered referral back to pain management. Offered MRI. But he wants to wait. Needs to pay off of his bills. I will have her discontinue his warfarin at this time. As we did find statement from [facility] that the plan was to continue anticoagulation for 3 months. He should follow-up with Dr. Romel Murcia scheduled in July. Reminded. With that I would like him to ask about whether or not he can take amiodarone should take amiodarone    Orders Given:  No orders of the defined types were placed in this encounter. Prescriptions:    No orders of the defined types were placed in this encounter. Return in about 3 months (around 9/20/2019) for Medicare Annual Wellness Visit (AWV). Electronically signed by Ben Grant MD on6/20/2019. **This report has been created using voice recognition software. It may contain minor errors which are inherent in voice recognition technology. **

## 2019-06-28 ENCOUNTER — ANTI-COAG VISIT (OUTPATIENT)
Dept: FAMILY MEDICINE CLINIC | Age: 74
End: 2019-06-28

## 2019-07-02 RX ORDER — ADHESIVE BANDAGE 3/4"
1 BANDAGE TOPICAL DAILY
Qty: 1 EACH | Refills: 0 | Status: SHIPPED | OUTPATIENT
Start: 2019-07-02

## 2019-07-09 ENCOUNTER — OFFICE VISIT (OUTPATIENT)
Dept: CARDIOLOGY CLINIC | Age: 74
End: 2019-07-09
Payer: MEDICARE

## 2019-07-09 VITALS
HEIGHT: 65 IN | HEART RATE: 72 BPM | DIASTOLIC BLOOD PRESSURE: 64 MMHG | BODY MASS INDEX: 33.32 KG/M2 | WEIGHT: 200 LBS | SYSTOLIC BLOOD PRESSURE: 114 MMHG

## 2019-07-09 DIAGNOSIS — Z95.2 S/P AVR: Primary | ICD-10-CM

## 2019-07-09 PROCEDURE — 1123F ACP DISCUSS/DSCN MKR DOCD: CPT | Performed by: INTERNAL MEDICINE

## 2019-07-09 PROCEDURE — 99214 OFFICE O/P EST MOD 30 MIN: CPT | Performed by: INTERNAL MEDICINE

## 2019-07-09 PROCEDURE — G8598 ASA/ANTIPLAT THER USED: HCPCS | Performed by: INTERNAL MEDICINE

## 2019-07-09 PROCEDURE — 3017F COLORECTAL CA SCREEN DOC REV: CPT | Performed by: INTERNAL MEDICINE

## 2019-07-09 PROCEDURE — G8427 DOCREV CUR MEDS BY ELIG CLIN: HCPCS | Performed by: INTERNAL MEDICINE

## 2019-07-09 PROCEDURE — G8417 CALC BMI ABV UP PARAM F/U: HCPCS | Performed by: INTERNAL MEDICINE

## 2019-07-09 PROCEDURE — 1036F TOBACCO NON-USER: CPT | Performed by: INTERNAL MEDICINE

## 2019-07-09 PROCEDURE — 4040F PNEUMOC VAC/ADMIN/RCVD: CPT | Performed by: INTERNAL MEDICINE

## 2019-07-09 NOTE — PROGRESS NOTES
of left carotid artery    Carotid stenosis, asymptomatic, right    Anticoagulated on Coumadin    DIA (dyspnea on exertion)    Obesity    Type 2 diabetes mellitus without complication, without long-term current use of insulin (HCC)    Hypomagnesemia    History of left-sided carotid endarterectomy       RECOMMENDATIONS:  DC AMIODARONE  REGULAR EXERCISE  CALORIE RESTRICTION  WEIGHT LOSS  CONTINUE CURRENT TREATMENT    [facility] [address] Cardiology Consult           [phone]

## 2019-07-16 ENCOUNTER — OFFICE VISIT (OUTPATIENT)
Dept: DIABETES SERVICES | Age: 74
End: 2019-07-16
Payer: MEDICARE

## 2019-07-16 VITALS
DIASTOLIC BLOOD PRESSURE: 74 MMHG | BODY MASS INDEX: 33.61 KG/M2 | HEART RATE: 64 BPM | WEIGHT: 202 LBS | RESPIRATION RATE: 16 BRPM | OXYGEN SATURATION: 98 % | SYSTOLIC BLOOD PRESSURE: 122 MMHG

## 2019-07-16 DIAGNOSIS — E78.2 MIXED HYPERLIPIDEMIA: ICD-10-CM

## 2019-07-16 DIAGNOSIS — I10 ESSENTIAL HYPERTENSION: ICD-10-CM

## 2019-07-16 DIAGNOSIS — E11.9 TYPE 2 DIABETES MELLITUS WITHOUT COMPLICATION, WITHOUT LONG-TERM CURRENT USE OF INSULIN (HCC): Primary | ICD-10-CM

## 2019-07-16 DIAGNOSIS — Z71.89 DIABETES EDUCATION, ENCOUNTER FOR: ICD-10-CM

## 2019-07-16 PROCEDURE — 3017F COLORECTAL CA SCREEN DOC REV: CPT | Performed by: NURSE PRACTITIONER

## 2019-07-16 PROCEDURE — G8417 CALC BMI ABV UP PARAM F/U: HCPCS | Performed by: NURSE PRACTITIONER

## 2019-07-16 PROCEDURE — 99215 OFFICE O/P EST HI 40 MIN: CPT | Performed by: NURSE PRACTITIONER

## 2019-07-16 PROCEDURE — G8598 ASA/ANTIPLAT THER USED: HCPCS | Performed by: NURSE PRACTITIONER

## 2019-07-16 PROCEDURE — 2022F DILAT RTA XM EVC RTNOPTHY: CPT | Performed by: NURSE PRACTITIONER

## 2019-07-16 PROCEDURE — G8427 DOCREV CUR MEDS BY ELIG CLIN: HCPCS | Performed by: NURSE PRACTITIONER

## 2019-07-16 PROCEDURE — 3046F HEMOGLOBIN A1C LEVEL >9.0%: CPT | Performed by: NURSE PRACTITIONER

## 2019-07-16 PROCEDURE — 4040F PNEUMOC VAC/ADMIN/RCVD: CPT | Performed by: NURSE PRACTITIONER

## 2019-07-16 PROCEDURE — 1036F TOBACCO NON-USER: CPT | Performed by: NURSE PRACTITIONER

## 2019-07-16 PROCEDURE — 1123F ACP DISCUSS/DSCN MKR DOCD: CPT | Performed by: NURSE PRACTITIONER

## 2019-07-16 ASSESSMENT — ENCOUNTER SYMPTOMS
BLURRED VISION: 0
VISUAL CHANGE: 0

## 2019-07-16 NOTE — PROGRESS NOTES
MG/0.5ML SOPN Inject 0.75 mg into the skin once a week 2 pen 5    glimepiride (AMARYL) 4 MG tablet TAKE 1 TABLET BY MOUTH TWICE DAILY 180 tablet 3    metFORMIN (GLUCOPHAGE) 500 MG tablet Take 2 tablets by mouth 2 times daily (with meals) 60 tablet 0    rosuvastatin (CRESTOR) 20 MG tablet Take 1 tablet by mouth daily 90 tablet 3    metoprolol tartrate (LOPRESSOR) 25 MG tablet TAKE 1 TABLET BY MOUTH TWICE DAILY 180 tablet 3    lisinopril (PRINIVIL;ZESTRIL) 5 MG tablet Take 1 tablet by mouth daily 30 tablet 5    pioglitazone (ACTOS) 45 MG tablet Take 1 tablet by mouth daily 90 tablet 3    blood glucose monitor strips Test 1 times a day & as needed for symptoms of irregular blood glucose. 100 strip 3    FIBER ADULT GUMMIES PO Take by mouth      aspirin 81 MG tablet Take 1 tablet by mouth daily Pt.will stop 10-30 -18 AM for OR (Patient taking differently: Take 81 mg by mouth daily ) 30 tablet 3    clotrimazole-betamethasone (LOTRISONE) 1-0.05 % cream Apply topically 2 times daily (Patient taking differently: Apply topically 2 times daily as needed ) 1 Tube 1    Multiple Vitamins-Minerals (THERAPEUTIC MULTIVITAMIN-MINERALS) tablet Take 1 tablet by mouth daily (with breakfast) 30 tablet 3     No current facility-administered medications for this visit. Review ofSymptoms:  Review of Systems   Constitutional: Negative for fatigue and weight loss. Eyes: Negative for blurred vision. Cardiovascular: Negative for chest pain. Endocrine: Negative for polydipsia, polyphagia and polyuria. Neurological: Negative for weakness. Theremainder of a complete 14-point review of systems is negative.        Vital Signs: /74 (Site: Left Upper Arm, Position: Sitting, Cuff Size: Medium Adult)   Pulse 64   Resp 16   Wt 202 lb (91.6 kg)   SpO2 98%   BMI 33.61 kg/m²      Wt Readings from Last 3 Encounters:   07/16/19 202 lb (91.6 kg)   07/09/19 200 lb (90.7 kg)   06/20/19 194 lb (88 kg)     Body mass index is minutes. More than 50%  of this visit was counseling and education regarding his diabetes.     Electronically signed by PAIGE Lux CNP on 7/16/2019 at 1:55 PM      (Please note that portions of this note were completed with a voice-recognition program. Efforts were made to edit the dictation but occasionally words are mis-transcribed.)

## 2019-07-22 ENCOUNTER — OFFICE VISIT (OUTPATIENT)
Dept: OPHTHALMOLOGY | Age: 74
End: 2019-07-22
Payer: MEDICARE

## 2019-07-22 DIAGNOSIS — H35.373 EPIRETINAL MEMBRANE (ERM) OF BOTH EYES: ICD-10-CM

## 2019-07-22 DIAGNOSIS — H35.033 HYPERTENSIVE RETINOPATHY OF BOTH EYES: ICD-10-CM

## 2019-07-22 DIAGNOSIS — Z87.81: Primary | ICD-10-CM

## 2019-07-22 DIAGNOSIS — E11.9 DIABETES TYPE 2, NO OCULAR INVOLVEMENT (HCC): ICD-10-CM

## 2019-07-22 DIAGNOSIS — H25.813 COMBINED FORMS OF AGE-RELATED CATARACT OF BOTH EYES: ICD-10-CM

## 2019-07-22 DIAGNOSIS — Z98.890: Primary | ICD-10-CM

## 2019-07-22 PROCEDURE — G8417 CALC BMI ABV UP PARAM F/U: HCPCS | Performed by: OPHTHALMOLOGY

## 2019-07-22 PROCEDURE — 4040F PNEUMOC VAC/ADMIN/RCVD: CPT | Performed by: OPHTHALMOLOGY

## 2019-07-22 PROCEDURE — 92250 FUNDUS PHOTOGRAPHY W/I&R: CPT | Performed by: OPHTHALMOLOGY

## 2019-07-22 PROCEDURE — G8598 ASA/ANTIPLAT THER USED: HCPCS | Performed by: OPHTHALMOLOGY

## 2019-07-22 PROCEDURE — G8427 DOCREV CUR MEDS BY ELIG CLIN: HCPCS | Performed by: OPHTHALMOLOGY

## 2019-07-22 PROCEDURE — 1123F ACP DISCUSS/DSCN MKR DOCD: CPT | Performed by: OPHTHALMOLOGY

## 2019-07-22 PROCEDURE — 1036F TOBACCO NON-USER: CPT | Performed by: OPHTHALMOLOGY

## 2019-07-22 PROCEDURE — 99204 OFFICE O/P NEW MOD 45 MIN: CPT | Performed by: OPHTHALMOLOGY

## 2019-07-22 PROCEDURE — 3046F HEMOGLOBIN A1C LEVEL >9.0%: CPT | Performed by: OPHTHALMOLOGY

## 2019-07-22 PROCEDURE — 3017F COLORECTAL CA SCREEN DOC REV: CPT | Performed by: OPHTHALMOLOGY

## 2019-07-22 PROCEDURE — 2024F 7 FLD RTA PHOTO EVC RTNOPTHY: CPT | Performed by: OPHTHALMOLOGY

## 2019-07-22 RX ORDER — PHENYLEPHRINE HYDROCHLORIDE 100 MG/ML
1 SOLUTION/ DROPS OPHTHALMIC ONCE
Status: COMPLETED | OUTPATIENT
Start: 2019-07-22 | End: 2019-07-22

## 2019-07-22 RX ORDER — CYCLOPENTOLATE HYDROCHLORIDE 10 MG/ML
1 SOLUTION/ DROPS OPHTHALMIC ONCE
Status: COMPLETED | OUTPATIENT
Start: 2019-07-22 | End: 2019-07-22

## 2019-07-22 RX ADMIN — PHENYLEPHRINE HYDROCHLORIDE 1 DROP: 100 SOLUTION/ DROPS OPHTHALMIC at 14:05

## 2019-07-22 RX ADMIN — CYCLOPENTOLATE HYDROCHLORIDE 1 DROP: 10 SOLUTION/ DROPS OPHTHALMIC at 14:04

## 2019-07-22 ASSESSMENT — VISUAL ACUITY
OS_PH_CC: 20/20
OS_CC: 20/25
METHOD: SNELLEN - LINEAR
CORRECTION_TYPE: GLASSES

## 2019-07-22 ASSESSMENT — ENCOUNTER SYMPTOMS
GASTROINTESTINAL NEGATIVE: 0
ALLERGIC/IMMUNOLOGIC NEGATIVE: 0
EYES NEGATIVE: 0
RESPIRATORY NEGATIVE: 0

## 2019-07-22 ASSESSMENT — CONF VISUAL FIELD
OS_NORMAL: 1
OD_NORMAL: 1

## 2019-07-22 ASSESSMENT — SLIT LAMP EXAM - LIDS
COMMENTS: MILD BLEPHARITIS. MILD MGD
COMMENTS: MILD BLEPHARITIS. MILD MGD

## 2019-07-22 ASSESSMENT — TONOMETRY
OS_IOP_MMHG: 19
OD_IOP_MMHG: 21
IOP_METHOD: NON-CONTACT AIR PUFF

## 2019-07-29 DIAGNOSIS — E11.65 TYPE 2 DIABETES MELLITUS WITH HYPERGLYCEMIA, WITHOUT LONG-TERM CURRENT USE OF INSULIN (HCC): ICD-10-CM

## 2019-07-29 NOTE — TELEPHONE ENCOUNTER
Alli is requesting a refill on the following medication(s):  Requested Prescriptions     Pending Prescriptions Disp Refills    lisinopril (PRINIVIL;ZESTRIL) 5 MG tablet 30 tablet 5     Sig: Take 1 tablet by mouth daily       Last Visit Date (If Applicable):  Visit date not found    Next Visit Date:    Visit date not found

## 2019-07-30 DIAGNOSIS — E11.65 TYPE 2 DIABETES MELLITUS WITH HYPERGLYCEMIA, WITHOUT LONG-TERM CURRENT USE OF INSULIN (HCC): ICD-10-CM

## 2019-07-30 RX ORDER — LISINOPRIL 5 MG/1
5 TABLET ORAL DAILY
Qty: 30 TABLET | Refills: 5 | Status: SHIPPED | OUTPATIENT
Start: 2019-07-30 | End: 2020-01-21 | Stop reason: SDUPTHER

## 2019-08-28 DIAGNOSIS — E11.65 TYPE 2 DIABETES MELLITUS WITH HYPERGLYCEMIA, WITHOUT LONG-TERM CURRENT USE OF INSULIN (HCC): ICD-10-CM

## 2019-09-26 ENCOUNTER — OFFICE VISIT (OUTPATIENT)
Dept: FAMILY MEDICINE CLINIC | Age: 74
End: 2019-09-26
Payer: MEDICARE

## 2019-09-26 VITALS
HEART RATE: 97 BPM | SYSTOLIC BLOOD PRESSURE: 100 MMHG | OXYGEN SATURATION: 84 % | DIASTOLIC BLOOD PRESSURE: 60 MMHG | BODY MASS INDEX: 35.16 KG/M2 | WEIGHT: 211 LBS | HEIGHT: 65 IN

## 2019-09-26 DIAGNOSIS — Z00.00 ROUTINE GENERAL MEDICAL EXAMINATION AT A HEALTH CARE FACILITY: Primary | ICD-10-CM

## 2019-09-26 DIAGNOSIS — K21.9 GASTROESOPHAGEAL REFLUX DISEASE, ESOPHAGITIS PRESENCE NOT SPECIFIED: ICD-10-CM

## 2019-09-26 DIAGNOSIS — E78.2 MIXED HYPERLIPIDEMIA: ICD-10-CM

## 2019-09-26 DIAGNOSIS — H35.033 HYPERTENSIVE RETINOPATHY OF BOTH EYES: ICD-10-CM

## 2019-09-26 DIAGNOSIS — Z80.42 FAMILY HISTORY OF PROSTATE CANCER: ICD-10-CM

## 2019-09-26 DIAGNOSIS — M51.36 DEGENERATIVE DISC DISEASE, LUMBAR: ICD-10-CM

## 2019-09-26 DIAGNOSIS — M48.07 SPINAL STENOSIS OF LUMBOSACRAL REGION: ICD-10-CM

## 2019-09-26 DIAGNOSIS — H35.373 EPIRETINAL MEMBRANE (ERM) OF BOTH EYES: ICD-10-CM

## 2019-09-26 DIAGNOSIS — Z95.2 AORTIC VALVE REPLACED: ICD-10-CM

## 2019-09-26 DIAGNOSIS — I10 ESSENTIAL HYPERTENSION: ICD-10-CM

## 2019-09-26 DIAGNOSIS — E11.65 TYPE 2 DIABETES MELLITUS WITH HYPERGLYCEMIA, WITHOUT LONG-TERM CURRENT USE OF INSULIN (HCC): ICD-10-CM

## 2019-09-26 LAB — HBA1C MFR BLD: 6.2 %

## 2019-09-26 PROCEDURE — 2024F 7 FLD RTA PHOTO EVC RTNOPTHY: CPT | Performed by: FAMILY MEDICINE

## 2019-09-26 PROCEDURE — G8598 ASA/ANTIPLAT THER USED: HCPCS | Performed by: FAMILY MEDICINE

## 2019-09-26 PROCEDURE — G8417 CALC BMI ABV UP PARAM F/U: HCPCS | Performed by: FAMILY MEDICINE

## 2019-09-26 PROCEDURE — 4040F PNEUMOC VAC/ADMIN/RCVD: CPT | Performed by: FAMILY MEDICINE

## 2019-09-26 PROCEDURE — 1036F TOBACCO NON-USER: CPT | Performed by: FAMILY MEDICINE

## 2019-09-26 PROCEDURE — 1123F ACP DISCUSS/DSCN MKR DOCD: CPT | Performed by: FAMILY MEDICINE

## 2019-09-26 PROCEDURE — G8427 DOCREV CUR MEDS BY ELIG CLIN: HCPCS | Performed by: FAMILY MEDICINE

## 2019-09-26 PROCEDURE — 99213 OFFICE O/P EST LOW 20 MIN: CPT | Performed by: FAMILY MEDICINE

## 2019-09-26 PROCEDURE — G0438 PPPS, INITIAL VISIT: HCPCS | Performed by: FAMILY MEDICINE

## 2019-09-26 PROCEDURE — 83036 HEMOGLOBIN GLYCOSYLATED A1C: CPT | Performed by: FAMILY MEDICINE

## 2019-09-26 PROCEDURE — 3017F COLORECTAL CA SCREEN DOC REV: CPT | Performed by: FAMILY MEDICINE

## 2019-09-26 PROCEDURE — 3044F HG A1C LEVEL LT 7.0%: CPT | Performed by: FAMILY MEDICINE

## 2019-09-26 ASSESSMENT — ENCOUNTER SYMPTOMS
ABDOMINAL DISTENTION: 0
COUGH: 0
WHEEZING: 0
BACK PAIN: 1
BLOOD IN STOOL: 0
ABDOMINAL PAIN: 0
SHORTNESS OF BREATH: 1

## 2019-09-26 ASSESSMENT — PATIENT HEALTH QUESTIONNAIRE - PHQ9
SUM OF ALL RESPONSES TO PHQ QUESTIONS 1-9: 0
SUM OF ALL RESPONSES TO PHQ QUESTIONS 1-9: 0

## 2019-09-26 ASSESSMENT — LIFESTYLE VARIABLES: HOW OFTEN DO YOU HAVE A DRINK CONTAINING ALCOHOL: 0

## 2019-09-26 NOTE — PROGRESS NOTES
doorways, halls and stairs free of clutter?: Yes  Do you always fasten your seatbelt when you are in a car?: Yes  Safety Interventions:  ·     Personalized Preventive Plan   Current Health Maintenance Status    There is no immunization history on file for this patient. Health Maintenance   Topic Date Due    DTaP/Tdap/Td vaccine (1 - Tdap) 11/15/1963    Shingles Vaccine (1 of 2) 11/15/1994    Annual Wellness Visit (AWV)  05/29/2019    Pneumococcal 65+ years Vaccine (1 of 2 - PCV13) 09/16/2020 (Originally 11/15/2009)    Flu vaccine (1) 09/26/2020 (Originally 9/1/2019)    Diabetic microalbuminuria test  12/03/2019    Potassium monitoring  02/08/2020    Creatinine monitoring  02/08/2020    Lipid screen  05/21/2020    A1C test (Diabetic or Prediabetic)  05/23/2020    Diabetic foot exam  07/16/2020    Diabetic retinal exam  07/22/2020    Colon cancer screen colonoscopy  10/09/2027    AAA screen  Completed     Recommendations for Preventive Services Due: see orders and patient instructions/AVS.  . Recommended screening schedule for the next 5-10 years is provided to the patient in written form: see Patient Instructions/AVS.    Erica Kyle was seen today for medicare awv and diabetes. Diagnoses and all orders for this visit:    Routine general medical examination at a health care facility    Essential hypertension    Gastroesophageal reflux disease, esophagitis presence not specified    Type 2 diabetes mellitus with hyperglycemia, without long-term current use of insulin (HCC)  -     POCT glycosylated hemoglobin (Hb A1C)  -     metFORMIN (GLUCOPHAGE) 500 MG tablet;  Take 1 tablet by mouth 2 times daily (with meals)    Aortic valve replaced    Epiretinal membrane (ERM) of both eyes    Family history of prostate cancer    Hypertensive retinopathy of both eyes    Mixed hyperlipidemia    Spinal stenosis of lumbosacral region  -     Rain Chatterjee MD, Pain Management, [place]    Degenerative disc disease, lumbar  -     Vivian - Rafal Fuentes MD, Pain Management, [place]

## 2019-09-26 NOTE — PROGRESS NOTES
(Originally 11/15/2009)    Flu vaccine (1) 09/26/2020 (Originally 9/1/2019)    Diabetic microalbuminuria test  12/03/2019    Potassium monitoring  02/08/2020    Creatinine monitoring  02/08/2020    Lipid screen  05/21/2020    A1C test (Diabetic or Prediabetic)  05/23/2020    Diabetic foot exam  07/16/2020    Diabetic retinal exam  07/22/2020    Colon cancer screen colonoscopy  10/09/2027    AAA screen  Completed       Lab Results   Component Value Date    K 4.6 02/08/2019    CREATININE 1.00 02/08/2019    AST 29 10/26/2018    ALT 25 10/26/2018    TSH 1.43 03/01/2019    HCT 33.0 02/08/2019    LABA1C 6.2 09/26/2019    GLUCOSE 396 06/04/2019    GLUCOSE 102 11/08/2018    MG 1.1 02/08/2019    CALCIUM 9.1 02/08/2019      Lab Results   Component Value Date    CHOL 179 05/21/2019    CHOL 209 04/13/2017    TRIG 239 05/21/2019    HDL 42 04/13/2017       Subjective:      Review of Systems   Constitutional: Negative for chills, diaphoresis and fever. HENT: Negative. Eyes: Negative for visual disturbance. Respiratory: Positive for shortness of breath. Negative for cough and wheezing. Cardiovascular: Negative for chest pain, palpitations and leg swelling. Gastrointestinal: Negative for abdominal distention, abdominal pain and blood in stool. Endocrine: Negative for polydipsia, polyphagia and polyuria. Genitourinary: Negative for dysuria and hematuria. Musculoskeletal: Positive for arthralgias, back pain and gait problem. Skin: Negative for wound. Hematological: Negative for adenopathy. Bruises/bleeds easily (due to warfarin). Objective:     /60 (Site: Left Upper Arm, Position: Sitting, Cuff Size: Large Adult)   Pulse 97   Ht 5' 5\" (1.651 m)   Wt 211 lb (95.7 kg)   SpO2 (!) 84%   BMI 35.11 kg/m²     Physical Exam   Constitutional: He appears well-developed and well-nourished. HENT:   Head: Normocephalic and atraumatic.    Nose: Nose normal.   Mouth/Throat: No posterior oropharyngeal

## 2019-10-15 ENCOUNTER — OFFICE VISIT (OUTPATIENT)
Dept: PAIN MANAGEMENT | Age: 74
End: 2019-10-15
Payer: MEDICARE

## 2019-10-15 VITALS
HEART RATE: 62 BPM | DIASTOLIC BLOOD PRESSURE: 78 MMHG | BODY MASS INDEX: 33.42 KG/M2 | WEIGHT: 200.6 LBS | SYSTOLIC BLOOD PRESSURE: 136 MMHG | RESPIRATION RATE: 16 BRPM | HEIGHT: 65 IN

## 2019-10-15 DIAGNOSIS — M47.816 LUMBAR FACET JOINT SYNDROME: ICD-10-CM

## 2019-10-15 DIAGNOSIS — M54.06 PANNICULITIS INVOLVING LUMBAR REGION: Primary | ICD-10-CM

## 2019-10-15 PROCEDURE — 3017F COLORECTAL CA SCREEN DOC REV: CPT | Performed by: NURSE PRACTITIONER

## 2019-10-15 PROCEDURE — G8598 ASA/ANTIPLAT THER USED: HCPCS | Performed by: NURSE PRACTITIONER

## 2019-10-15 PROCEDURE — 99213 OFFICE O/P EST LOW 20 MIN: CPT | Performed by: NURSE PRACTITIONER

## 2019-10-15 PROCEDURE — G8427 DOCREV CUR MEDS BY ELIG CLIN: HCPCS | Performed by: NURSE PRACTITIONER

## 2019-10-15 PROCEDURE — 4040F PNEUMOC VAC/ADMIN/RCVD: CPT | Performed by: NURSE PRACTITIONER

## 2019-10-15 PROCEDURE — 1123F ACP DISCUSS/DSCN MKR DOCD: CPT | Performed by: NURSE PRACTITIONER

## 2019-10-15 PROCEDURE — G8484 FLU IMMUNIZE NO ADMIN: HCPCS | Performed by: NURSE PRACTITIONER

## 2019-10-15 PROCEDURE — 1036F TOBACCO NON-USER: CPT | Performed by: NURSE PRACTITIONER

## 2019-10-15 PROCEDURE — G8417 CALC BMI ABV UP PARAM F/U: HCPCS | Performed by: NURSE PRACTITIONER

## 2019-10-18 ENCOUNTER — TELEPHONE (OUTPATIENT)
Dept: PAIN MANAGEMENT | Age: 74
End: 2019-10-18

## 2019-10-18 PROBLEM — M54.06 PANNICULITIS INVOLVING LUMBAR REGION: Status: ACTIVE | Noted: 2019-10-18

## 2019-10-18 PROBLEM — M47.816 LUMBAR FACET JOINT SYNDROME: Status: ACTIVE | Noted: 2019-10-18

## 2019-10-18 ASSESSMENT — ENCOUNTER SYMPTOMS
ABDOMINAL PAIN: 0
SHORTNESS OF BREATH: 0
BACK PAIN: 1

## 2019-11-14 ENCOUNTER — APPOINTMENT (OUTPATIENT)
Dept: GENERAL RADIOLOGY | Age: 74
End: 2019-11-14
Attending: PHYSICAL MEDICINE & REHABILITATION
Payer: MEDICARE

## 2019-11-14 ENCOUNTER — ANESTHESIA (OUTPATIENT)
Dept: OPERATING ROOM | Age: 74
End: 2019-11-14
Payer: MEDICARE

## 2019-11-14 ENCOUNTER — HOSPITAL ENCOUNTER (OUTPATIENT)
Age: 74
Setting detail: OUTPATIENT SURGERY
Discharge: HOME | End: 2019-11-14
Attending: PHYSICAL MEDICINE & REHABILITATION | Admitting: PHYSICAL MEDICINE & REHABILITATION
Payer: MEDICARE

## 2019-11-14 ENCOUNTER — ANESTHESIA EVENT (OUTPATIENT)
Dept: OPERATING ROOM | Age: 74
End: 2019-11-14
Payer: MEDICARE

## 2019-11-14 VITALS
BODY MASS INDEX: 32.95 KG/M2 | RESPIRATION RATE: 18 BRPM | WEIGHT: 193 LBS | SYSTOLIC BLOOD PRESSURE: 142 MMHG | OXYGEN SATURATION: 97 % | TEMPERATURE: 98 F | DIASTOLIC BLOOD PRESSURE: 81 MMHG | HEART RATE: 80 BPM | HEIGHT: 64 IN

## 2019-11-14 VITALS
SYSTOLIC BLOOD PRESSURE: 115 MMHG | DIASTOLIC BLOOD PRESSURE: 64 MMHG | TEMPERATURE: 98.2 F | OXYGEN SATURATION: 92 % | RESPIRATION RATE: 14 BRPM

## 2019-11-14 LAB — GLUCOSE BLD-MCNC: 380 MG/DL (ref 75–110)

## 2019-11-14 PROCEDURE — 2500000003 HC RX 250 WO HCPCS: Performed by: NURSE ANESTHETIST, CERTIFIED REGISTERED

## 2019-11-14 PROCEDURE — 3600000056 HC PAIN LEVEL 4 BASE: Performed by: PHYSICAL MEDICINE & REHABILITATION

## 2019-11-14 PROCEDURE — 2580000003 HC RX 258: Performed by: PHYSICAL MEDICINE & REHABILITATION

## 2019-11-14 PROCEDURE — 64495 INJ PARAVERT F JNT L/S 3 LEV: CPT | Performed by: PHYSICAL MEDICINE & REHABILITATION

## 2019-11-14 PROCEDURE — 64493 INJ PARAVERT F JNT L/S 1 LEV: CPT | Performed by: PHYSICAL MEDICINE & REHABILITATION

## 2019-11-14 PROCEDURE — 6360000002 HC RX W HCPCS: Performed by: NURSE ANESTHETIST, CERTIFIED REGISTERED

## 2019-11-14 PROCEDURE — 7100000011 HC PHASE II RECOVERY - ADDTL 15 MIN: Performed by: PHYSICAL MEDICINE & REHABILITATION

## 2019-11-14 PROCEDURE — 64494 INJ PARAVERT F JNT L/S 2 LEV: CPT | Performed by: PHYSICAL MEDICINE & REHABILITATION

## 2019-11-14 PROCEDURE — 3209999900 FLUORO FOR SURGICAL PROCEDURES

## 2019-11-14 PROCEDURE — 3700000000 HC ANESTHESIA ATTENDED CARE: Performed by: PHYSICAL MEDICINE & REHABILITATION

## 2019-11-14 PROCEDURE — 7100000010 HC PHASE II RECOVERY - FIRST 15 MIN: Performed by: PHYSICAL MEDICINE & REHABILITATION

## 2019-11-14 PROCEDURE — 82947 ASSAY GLUCOSE BLOOD QUANT: CPT

## 2019-11-14 PROCEDURE — 2709999900 HC NON-CHARGEABLE SUPPLY: Performed by: PHYSICAL MEDICINE & REHABILITATION

## 2019-11-14 RX ORDER — SODIUM CHLORIDE 0.9 % (FLUSH) 0.9 %
10 SYRINGE (ML) INJECTION EVERY 12 HOURS SCHEDULED
Status: DISCONTINUED | OUTPATIENT
Start: 2019-11-14 | End: 2019-11-14 | Stop reason: HOSPADM

## 2019-11-14 RX ORDER — SODIUM CHLORIDE 0.9 % (FLUSH) 0.9 %
10 SYRINGE (ML) INJECTION PRN
Status: DISCONTINUED | OUTPATIENT
Start: 2019-11-14 | End: 2019-11-14 | Stop reason: HOSPADM

## 2019-11-14 RX ORDER — SODIUM CHLORIDE, SODIUM LACTATE, POTASSIUM CHLORIDE, CALCIUM CHLORIDE 600; 310; 30; 20 MG/100ML; MG/100ML; MG/100ML; MG/100ML
INJECTION, SOLUTION INTRAVENOUS CONTINUOUS
Status: DISCONTINUED | OUTPATIENT
Start: 2019-11-14 | End: 2019-11-14 | Stop reason: HOSPADM

## 2019-11-14 RX ORDER — SODIUM CHLORIDE, SODIUM LACTATE, POTASSIUM CHLORIDE, CALCIUM CHLORIDE 600; 310; 30; 20 MG/100ML; MG/100ML; MG/100ML; MG/100ML
INJECTION, SOLUTION INTRAVENOUS CONTINUOUS
Status: DISCONTINUED | OUTPATIENT
Start: 2019-11-14 | End: 2019-11-14 | Stop reason: SDUPTHER

## 2019-11-14 RX ORDER — PROPOFOL 10 MG/ML
INJECTION, EMULSION INTRAVENOUS CONTINUOUS PRN
Status: DISCONTINUED | OUTPATIENT
Start: 2019-11-14 | End: 2019-11-14 | Stop reason: SDUPTHER

## 2019-11-14 RX ORDER — LIDOCAINE HYDROCHLORIDE 20 MG/ML
INJECTION, SOLUTION EPIDURAL; INFILTRATION; INTRACAUDAL; PERINEURAL PRN
Status: DISCONTINUED | OUTPATIENT
Start: 2019-11-14 | End: 2019-11-14 | Stop reason: SDUPTHER

## 2019-11-14 RX ADMIN — PROPOFOL 250 MCG/KG/MIN: 10 INJECTION, EMULSION INTRAVENOUS at 10:26

## 2019-11-14 RX ADMIN — LIDOCAINE HYDROCHLORIDE 100 MG: 20 INJECTION, SOLUTION EPIDURAL; INFILTRATION; INTRACAUDAL; PERINEURAL at 10:26

## 2019-11-14 RX ADMIN — SODIUM CHLORIDE, POTASSIUM CHLORIDE, SODIUM LACTATE AND CALCIUM CHLORIDE: 600; 310; 30; 20 INJECTION, SOLUTION INTRAVENOUS at 09:56

## 2019-11-14 RX ADMIN — PHENYLEPHRINE HYDROCHLORIDE 300 MCG: 10 INJECTION INTRAVENOUS at 10:34

## 2019-11-14 ASSESSMENT — PAIN SCALES - GENERAL
PAINLEVEL_OUTOF10: 0

## 2019-11-14 ASSESSMENT — PAIN DESCRIPTION - DESCRIPTORS: DESCRIPTORS: CONSTANT

## 2019-11-14 ASSESSMENT — PAIN - FUNCTIONAL ASSESSMENT: PAIN_FUNCTIONAL_ASSESSMENT: 0-10

## 2019-11-14 ASSESSMENT — ENCOUNTER SYMPTOMS: SHORTNESS OF BREATH: 1

## 2019-12-12 ENCOUNTER — HOSPITAL ENCOUNTER (OUTPATIENT)
Age: 74
Setting detail: OUTPATIENT SURGERY
Discharge: HOME | End: 2019-12-12
Attending: PHYSICAL MEDICINE & REHABILITATION | Admitting: PHYSICAL MEDICINE & REHABILITATION
Payer: MEDICARE

## 2019-12-12 LAB — GLUCOSE BLD-MCNC: 408 MG/DL (ref 75–110)

## 2019-12-12 PROCEDURE — 82947 ASSAY GLUCOSE BLOOD QUANT: CPT

## 2019-12-12 PROCEDURE — 2580000003 HC RX 258: Performed by: PHYSICAL MEDICINE & REHABILITATION

## 2019-12-12 RX ORDER — SODIUM CHLORIDE, SODIUM LACTATE, POTASSIUM CHLORIDE, CALCIUM CHLORIDE 600; 310; 30; 20 MG/100ML; MG/100ML; MG/100ML; MG/100ML
INJECTION, SOLUTION INTRAVENOUS CONTINUOUS
Status: DISCONTINUED | OUTPATIENT
Start: 2019-12-12 | End: 2019-12-12 | Stop reason: HOSPADM

## 2019-12-12 RX ADMIN — SODIUM CHLORIDE, POTASSIUM CHLORIDE, SODIUM LACTATE AND CALCIUM CHLORIDE: 600; 310; 30; 20 INJECTION, SOLUTION INTRAVENOUS at 09:45

## 2019-12-13 ENCOUNTER — TELEPHONE (OUTPATIENT)
Dept: PAIN MANAGEMENT | Age: 74
End: 2019-12-13

## 2019-12-13 NOTE — TELEPHONE ENCOUNTER
The patient was supposed to have a Bilateral L2  L3 L4 L5 Confirmatory Medial BB yesterday 12/13/19. Abdiel Erickson from [facility] called and stated that the patient left without having the procedure done because he reported that his sugars were high. Will call the patient later in the day to see if he would like to reschedule the procedure.

## 2019-12-16 ENCOUNTER — HOSPITAL ENCOUNTER (OUTPATIENT)
Age: 74
Setting detail: SPECIMEN
Discharge: HOME | End: 2019-12-16
Payer: MEDICARE

## 2019-12-16 ENCOUNTER — OFFICE VISIT (OUTPATIENT)
Dept: FAMILY MEDICINE CLINIC | Age: 74
End: 2019-12-16
Payer: MEDICARE

## 2019-12-16 VITALS
WEIGHT: 185 LBS | BODY MASS INDEX: 31.58 KG/M2 | HEART RATE: 78 BPM | OXYGEN SATURATION: 98 % | SYSTOLIC BLOOD PRESSURE: 122 MMHG | DIASTOLIC BLOOD PRESSURE: 64 MMHG | HEIGHT: 64 IN

## 2019-12-16 DIAGNOSIS — E11.65 TYPE 2 DIABETES MELLITUS WITH HYPERGLYCEMIA, WITHOUT LONG-TERM CURRENT USE OF INSULIN (HCC): ICD-10-CM

## 2019-12-16 DIAGNOSIS — Z80.42 FAMILY HISTORY OF PROSTATE CANCER: ICD-10-CM

## 2019-12-16 DIAGNOSIS — I10 ESSENTIAL HYPERTENSION: ICD-10-CM

## 2019-12-16 DIAGNOSIS — K21.9 GASTROESOPHAGEAL REFLUX DISEASE, ESOPHAGITIS PRESENCE NOT SPECIFIED: ICD-10-CM

## 2019-12-16 DIAGNOSIS — I10 ESSENTIAL HYPERTENSION: Primary | ICD-10-CM

## 2019-12-16 DIAGNOSIS — E83.42 HYPOMAGNESEMIA: ICD-10-CM

## 2019-12-16 DIAGNOSIS — E78.2 MIXED HYPERLIPIDEMIA: ICD-10-CM

## 2019-12-16 DIAGNOSIS — E11.9 TYPE 2 DIABETES MELLITUS WITHOUT COMPLICATION, WITHOUT LONG-TERM CURRENT USE OF INSULIN (HCC): ICD-10-CM

## 2019-12-16 DIAGNOSIS — M54.40 LOW BACK PAIN WITH SCIATICA, SCIATICA LATERALITY UNSPECIFIED, UNSPECIFIED BACK PAIN LATERALITY, UNSPECIFIED CHRONICITY: ICD-10-CM

## 2019-12-16 LAB
ABSOLUTE EOS #: 0.2 K/UL (ref 0–0.4)
ABSOLUTE IMMATURE GRANULOCYTE: ABNORMAL K/UL (ref 0–0.3)
ABSOLUTE LYMPH #: 1.7 K/UL (ref 1–4.8)
ABSOLUTE MONO #: 0.6 K/UL (ref 0.1–1.2)
ANION GAP SERPL CALCULATED.3IONS-SCNC: 16 MMOL/L (ref 9–17)
BASOPHILS # BLD: 1 % (ref 0–2)
BASOPHILS ABSOLUTE: 0.1 K/UL (ref 0–0.2)
BUN BLDV-MCNC: 12 MG/DL (ref 8–23)
BUN/CREAT BLD: 12 (ref 9–20)
CALCIUM SERPL-MCNC: 10 MG/DL (ref 8.6–10.4)
CHLORIDE BLD-SCNC: 95 MMOL/L (ref 98–107)
CHOLESTEROL/HDL RATIO: 3.6
CHOLESTEROL: 126 MG/DL
CO2: 19 MMOL/L (ref 20–31)
CREAT SERPL-MCNC: 1.03 MG/DL (ref 0.7–1.2)
DIFFERENTIAL TYPE: ABNORMAL
EOSINOPHILS RELATIVE PERCENT: 3 % (ref 1–8)
GFR AFRICAN AMERICAN: >60 ML/MIN
GFR NON-AFRICAN AMERICAN: >60 ML/MIN
GFR SERPL CREATININE-BSD FRML MDRD: ABNORMAL ML/MIN/{1.73_M2}
GFR SERPL CREATININE-BSD FRML MDRD: ABNORMAL ML/MIN/{1.73_M2}
GLUCOSE BLD-MCNC: 562 MG/DL (ref 70–99)
HBA1C MFR BLD: 11.8 %
HCT VFR BLD CALC: 40 % (ref 41–53)
HDLC SERPL-MCNC: 35 MG/DL
HEMOGLOBIN: 13.5 G/DL (ref 13.5–17.5)
IMMATURE GRANULOCYTES: ABNORMAL %
LDL CHOLESTEROL: 29 MG/DL (ref 0–130)
LYMPHOCYTES # BLD: 19 % (ref 15–43)
MCH RBC QN AUTO: 30.8 PG (ref 26–34)
MCHC RBC AUTO-ENTMCNC: 33.8 G/DL (ref 31–37)
MCV RBC AUTO: 91.2 FL (ref 80–100)
MONOCYTES # BLD: 7 % (ref 6–14)
NRBC AUTOMATED: ABNORMAL PER 100 WBC
PDW BLD-RTO: 13.9 % (ref 11–14.5)
PLATELET # BLD: 222 K/UL (ref 140–450)
PLATELET ESTIMATE: ABNORMAL
PMV BLD AUTO: 10 FL (ref 6–12)
POTASSIUM SERPL-SCNC: 4.8 MMOL/L (ref 3.7–5.3)
RBC # BLD: 4.38 M/UL (ref 4.5–5.9)
RBC # BLD: ABNORMAL 10*6/UL
SEG NEUTROPHILS: 70 % (ref 44–74)
SEGMENTED NEUTROPHILS ABSOLUTE COUNT: 6.4 K/UL (ref 1.8–7.7)
SODIUM BLD-SCNC: 130 MMOL/L (ref 135–144)
TRIGL SERPL-MCNC: 310 MG/DL
VLDLC SERPL CALC-MCNC: ABNORMAL MG/DL (ref 1–30)
WBC # BLD: 9.1 K/UL (ref 3.5–11)
WBC # BLD: ABNORMAL 10*3/UL

## 2019-12-16 PROCEDURE — 80048 BASIC METABOLIC PNL TOTAL CA: CPT

## 2019-12-16 PROCEDURE — 3017F COLORECTAL CA SCREEN DOC REV: CPT | Performed by: FAMILY MEDICINE

## 2019-12-16 PROCEDURE — 85025 COMPLETE CBC W/AUTO DIFF WBC: CPT

## 2019-12-16 PROCEDURE — 3046F HEMOGLOBIN A1C LEVEL >9.0%: CPT | Performed by: FAMILY MEDICINE

## 2019-12-16 PROCEDURE — 99214 OFFICE O/P EST MOD 30 MIN: CPT | Performed by: FAMILY MEDICINE

## 2019-12-16 PROCEDURE — 83036 HEMOGLOBIN GLYCOSYLATED A1C: CPT | Performed by: FAMILY MEDICINE

## 2019-12-16 PROCEDURE — 1123F ACP DISCUSS/DSCN MKR DOCD: CPT | Performed by: FAMILY MEDICINE

## 2019-12-16 PROCEDURE — 1036F TOBACCO NON-USER: CPT | Performed by: FAMILY MEDICINE

## 2019-12-16 PROCEDURE — 80061 LIPID PANEL: CPT

## 2019-12-16 PROCEDURE — 36415 COLL VENOUS BLD VENIPUNCTURE: CPT

## 2019-12-16 PROCEDURE — G8427 DOCREV CUR MEDS BY ELIG CLIN: HCPCS | Performed by: FAMILY MEDICINE

## 2019-12-16 PROCEDURE — G8417 CALC BMI ABV UP PARAM F/U: HCPCS | Performed by: FAMILY MEDICINE

## 2019-12-16 PROCEDURE — G8598 ASA/ANTIPLAT THER USED: HCPCS | Performed by: FAMILY MEDICINE

## 2019-12-16 PROCEDURE — G8484 FLU IMMUNIZE NO ADMIN: HCPCS | Performed by: FAMILY MEDICINE

## 2019-12-16 PROCEDURE — 2024F 7 FLD RTA PHOTO EVC RTNOPTHY: CPT | Performed by: FAMILY MEDICINE

## 2019-12-16 PROCEDURE — 4040F PNEUMOC VAC/ADMIN/RCVD: CPT | Performed by: FAMILY MEDICINE

## 2019-12-16 RX ORDER — GLIMEPIRIDE 4 MG/1
TABLET ORAL
Qty: 90 TABLET | Refills: 0 | Status: SHIPPED | OUTPATIENT
Start: 2019-12-16 | End: 2020-03-17

## 2019-12-16 RX ORDER — PIOGLITAZONEHYDROCHLORIDE 30 MG/1
30 TABLET ORAL DAILY
Qty: 90 TABLET | Refills: 0 | Status: SHIPPED | OUTPATIENT
Start: 2019-12-16 | End: 2020-03-16

## 2019-12-16 RX ORDER — PIOGLITAZONEHYDROCHLORIDE 30 MG/1
30 TABLET ORAL DAILY
Qty: 30 TABLET | Refills: 3 | Status: SHIPPED | OUTPATIENT
Start: 2019-12-16 | End: 2019-12-16 | Stop reason: SDUPTHER

## 2019-12-16 RX ORDER — GLIMEPIRIDE 4 MG/1
4 TABLET ORAL
Qty: 30 TABLET | Refills: 3 | Status: SHIPPED | OUTPATIENT
Start: 2019-12-16 | End: 2019-12-16 | Stop reason: SDUPTHER

## 2019-12-16 RX ORDER — BLOOD-GLUCOSE METER
1 KIT MISCELLANEOUS DAILY
Qty: 1 KIT | Refills: 0 | Status: SHIPPED | OUTPATIENT
Start: 2019-12-16 | End: 2020-02-04

## 2019-12-16 ASSESSMENT — ENCOUNTER SYMPTOMS
SHORTNESS OF BREATH: 1
ABDOMINAL DISTENTION: 0
COUGH: 0
ABDOMINAL PAIN: 0
WHEEZING: 0
BLOOD IN STOOL: 0
BACK PAIN: 1

## 2020-01-07 ENCOUNTER — OFFICE VISIT (OUTPATIENT)
Dept: FAMILY MEDICINE CLINIC | Age: 75
End: 2020-01-07
Payer: MEDICARE

## 2020-01-07 VITALS
OXYGEN SATURATION: 97 % | BODY MASS INDEX: 32.96 KG/M2 | SYSTOLIC BLOOD PRESSURE: 124 MMHG | HEART RATE: 74 BPM | DIASTOLIC BLOOD PRESSURE: 76 MMHG | WEIGHT: 192 LBS

## 2020-01-07 LAB
CHP ED QC CHECK: ABNORMAL
GLUCOSE BLD-MCNC: 247 MG/DL

## 2020-01-07 PROCEDURE — 82962 GLUCOSE BLOOD TEST: CPT | Performed by: FAMILY MEDICINE

## 2020-01-07 PROCEDURE — 3046F HEMOGLOBIN A1C LEVEL >9.0%: CPT | Performed by: FAMILY MEDICINE

## 2020-01-07 PROCEDURE — 99213 OFFICE O/P EST LOW 20 MIN: CPT | Performed by: FAMILY MEDICINE

## 2020-01-07 PROCEDURE — G8484 FLU IMMUNIZE NO ADMIN: HCPCS | Performed by: FAMILY MEDICINE

## 2020-01-07 PROCEDURE — G8417 CALC BMI ABV UP PARAM F/U: HCPCS | Performed by: FAMILY MEDICINE

## 2020-01-07 PROCEDURE — 4040F PNEUMOC VAC/ADMIN/RCVD: CPT | Performed by: FAMILY MEDICINE

## 2020-01-07 PROCEDURE — 1123F ACP DISCUSS/DSCN MKR DOCD: CPT | Performed by: FAMILY MEDICINE

## 2020-01-07 PROCEDURE — 1036F TOBACCO NON-USER: CPT | Performed by: FAMILY MEDICINE

## 2020-01-07 PROCEDURE — 3017F COLORECTAL CA SCREEN DOC REV: CPT | Performed by: FAMILY MEDICINE

## 2020-01-07 PROCEDURE — G8427 DOCREV CUR MEDS BY ELIG CLIN: HCPCS | Performed by: FAMILY MEDICINE

## 2020-01-07 PROCEDURE — 2022F DILAT RTA XM EVC RTNOPTHY: CPT | Performed by: FAMILY MEDICINE

## 2020-01-07 RX ORDER — OMEPRAZOLE 20 MG/1
CAPSULE, DELAYED RELEASE ORAL
Refills: 3 | COMMUNITY
Start: 2019-10-16 | End: 2020-03-30

## 2020-01-07 RX ORDER — WARFARIN SODIUM 4 MG/1
TABLET ORAL
Refills: 3 | COMMUNITY
Start: 2019-10-16 | End: 2020-06-29 | Stop reason: SDUPTHER

## 2020-01-07 RX ORDER — AMIODARONE HYDROCHLORIDE 200 MG/1
TABLET ORAL
Refills: 1 | COMMUNITY
Start: 2019-10-16 | End: 2020-05-01 | Stop reason: SDUPTHER

## 2020-01-07 ASSESSMENT — ENCOUNTER SYMPTOMS
COUGH: 0
BACK PAIN: 1
WHEEZING: 0
ABDOMINAL DISTENTION: 0
BLOOD IN STOOL: 0
ABDOMINAL PAIN: 0
SHORTNESS OF BREATH: 0

## 2020-01-07 ASSESSMENT — PATIENT HEALTH QUESTIONNAIRE - PHQ9
SUM OF ALL RESPONSES TO PHQ QUESTIONS 1-9: 0
1. LITTLE INTEREST OR PLEASURE IN DOING THINGS: 0
2. FEELING DOWN, DEPRESSED OR HOPELESS: 0
SUM OF ALL RESPONSES TO PHQ QUESTIONS 1-9: 0
SUM OF ALL RESPONSES TO PHQ9 QUESTIONS 1 & 2: 0

## 2020-01-07 NOTE — PROGRESS NOTES
[address] [address] [address]  Dept: [phone]  DeptFax: [phone]    Candie Busby is I61 y.o. male who presents today for his medical conditions/complaints as noted below. Candie Busby is c/o of Follow-up (diabetes and pt was asked to bring his trulicity pen in to demonstrate how he does his injection due to very elevated bs)      HPI:     HPI    Patient follows up. At my request.  After his last visit on his labs his pain glucose was 526 although he was asymptomatic. I was concerned that he was using his trulicity correctly but wondering if he was reusing the same pen. Once again he does not check his sugars. Needle phobic. He does say after a phone call that he has changed his diet some. Cut out the sweets. Asymptomatic. Denies polyuria polyphagia polydipsia denies visual changes. BP Readings from Last 3 Encounters:   01/07/20 124/76   12/16/19 122/64   11/14/19 (!) 142/81            (goal 120/80)    Past Medical History:   Diagnosis Date    Aortic stenosis     Aortic valve replacement 2019    Degenerative disc disease, lumbar     Diabetes mellitus (HCC)     PCP Dr. Jose J Rich Diverticulosis of sigmoid colon     GERD (gastroesophageal reflux disease)     Hyperlipidemia     Hypertension     Kidney stone     Mitral regurgitation     Obesity       Past Surgical History:   Procedure Laterality Date    ANESTHESIA NERVE BLOCK Bilateral 11/14/2019    Bilateral L3 L4 L5 Diagnostic Medial BB performed by Bahman Dennis MD at [address]  11/2018    CARDIAC CATHETERIZATION  03/01/2018    CARDIAC CATHETERIZATION      Before 2008. No stents placed per pt.     CAROTID ENDARTERECTOMY Left 2/7/2019    LEFT CAROTID ENDARTERECTOMY WITH VASCUGUARD PATCH performed [facility] Anna Marie Headley [facility]  2005    for hemoccult positive stool    COLONOSCOPY  10/2017 diverticuli; Dr Rickey Romero      umbilical hernia   [address] Left 1971    softball injury    [place] REPLACEMENT OF MITRAL VALVE N/A 10/30/2018    AORTIC  VALVE REPLACEMENT 23 MM DIMAITY VALVE, CHUNG OCONNOR, DOREEN performed by Val Troncoso MD at [facility] History   Problem Relation Age of Onset    Cancer Father         prostate    Alzheimer's Disease Father     Heart Disease Neg Hx     Diabetes Neg Hx     Glaucoma Neg Hx     Macular Degen Neg Hx      Social History     Tobacco Use    Smoking status: Former Smoker     Packs/day: 1.50     Years: 40.00     Pack years: 60.00     Types: Cigarettes     Last attempt to quit:      Years since quittin.0    Smokeless tobacco: Never Used   Substance Use Topics    Alcohol use: No        Current Outpatient Medications   Medication Sig Dispense Refill    warfarin (COUMADIN) 4 MG tablet TK 1 T PO D  3    amiodarone (CORDARONE) 200 MG tablet TK 1 T PO DAILY  1    omeprazole (PRILOSEC) 20 MG delayed release capsule TK 1 C PO D  3    metFORMIN (GLUCOPHAGE) 500 MG tablet Take 2 tablets by mouth 2 times daily (with meals) 120 tablet 5    Dulaglutide 0.75 MG/0.5ML SOPN Inject 1.5 mg into the skin once a week 4 pen 5    glucose monitoring kit (FREESTYLE) monitoring kit 1 kit by Does not apply route daily 1 kit 0    glimepiride (AMARYL) 4 MG tablet TAKE 1 TABLET BY MOUTH EVERY MORNING BEFORE BREAKFAST 90 tablet 0    pioglitazone (ACTOS) 30 MG tablet TAKE 1 TABLET BY MOUTH DAILY 90 tablet 0    lisinopril (PRINIVIL;ZESTRIL) 5 MG tablet Take 1 tablet by mouth daily 30 tablet 5    Blood Glucose Monitoring Suppl KIT 1 kit by Does not apply route daily 1 kit 0    Blood Pressure Monitoring (BLOOD PRESSURE CUFF) MISC 1 Device by Does not apply route daily 1 each 0    blood glucose monitor strips by Other route daily Test 1 times a day & as needed for symptoms of irregular blood glucose.  50 strip 3    Lancets MISC 1 each Otherwise he is really doing better. His random glucose this morning is 247 which are not optimal is an improvement. He will continue to follow-up with cardiology and vascular surgery. He may recheck with me in 3 months sooner if any problems    Orders Given:  Orders Placed This Encounter   Procedures    POCT Glucose     Prescriptions:    No orders of the defined types were placed in this encounter. Return in about 3 months (around 4/7/2020). Electronically signed by Anthony Cameron MD on1/7/2020. **This report has been created using voice recognition software. It may contain minor errors which are inherent in voice recognition technology. **

## 2020-01-14 ENCOUNTER — OFFICE VISIT (OUTPATIENT)
Dept: CARDIOLOGY CLINIC | Age: 75
End: 2020-01-14
Payer: MEDICARE

## 2020-01-14 VITALS
SYSTOLIC BLOOD PRESSURE: 102 MMHG | HEART RATE: 68 BPM | BODY MASS INDEX: 33.03 KG/M2 | WEIGHT: 192.4 LBS | DIASTOLIC BLOOD PRESSURE: 62 MMHG

## 2020-01-14 PROCEDURE — 99214 OFFICE O/P EST MOD 30 MIN: CPT | Performed by: INTERNAL MEDICINE

## 2020-01-14 PROCEDURE — 2022F DILAT RTA XM EVC RTNOPTHY: CPT | Performed by: INTERNAL MEDICINE

## 2020-01-14 PROCEDURE — G8417 CALC BMI ABV UP PARAM F/U: HCPCS | Performed by: INTERNAL MEDICINE

## 2020-01-14 PROCEDURE — 3046F HEMOGLOBIN A1C LEVEL >9.0%: CPT | Performed by: INTERNAL MEDICINE

## 2020-01-14 PROCEDURE — G8427 DOCREV CUR MEDS BY ELIG CLIN: HCPCS | Performed by: INTERNAL MEDICINE

## 2020-01-14 PROCEDURE — 4040F PNEUMOC VAC/ADMIN/RCVD: CPT | Performed by: INTERNAL MEDICINE

## 2020-01-14 PROCEDURE — G8484 FLU IMMUNIZE NO ADMIN: HCPCS | Performed by: INTERNAL MEDICINE

## 2020-01-14 PROCEDURE — 1123F ACP DISCUSS/DSCN MKR DOCD: CPT | Performed by: INTERNAL MEDICINE

## 2020-01-14 PROCEDURE — 3017F COLORECTAL CA SCREEN DOC REV: CPT | Performed by: INTERNAL MEDICINE

## 2020-01-14 PROCEDURE — 1036F TOBACCO NON-USER: CPT | Performed by: INTERNAL MEDICINE

## 2020-01-14 NOTE — PROGRESS NOTES
Today's Date: 1/14/2020  Patient Name: Sin Redding  Patient's age: 75 y. o., 1945      HPI and CC:    The patient is a 75 y.o.  male is in the office for f/u, Patient has been doing well cardiac wise, no new cardiac complaints. He denies angina, PND/Orthopnea. Past Medical History:   has a past medical history of Aortic stenosis, Degenerative disc disease, lumbar, Diabetes mellitus ([address] Diverticulosis of sigmoid colon, GERD (gastroesophageal reflux disease), Hyperlipidemia, Hypertension, Kidney stone, Mitral regurgitation, and Obesity. Past Surgical History:   has a past surgical history that includes Orbital fracture repair (Left, 1971); hernia repair; Cardiac catheterization (03/01/2018); Colonoscopy (2005); Colonoscopy (10/2017); pr replacement of mitral valve (N/A, 10/30/2018); Aortic valve replacement (11/2018); Cardiac catheterization; Carotid endarterectomy (Left, 2/7/2019); and Anesthesia Nerve Block (Bilateral, 11/14/2019). Home Medications:    Prior to Admission medications    Medication Sig Start Date End Date Taking?  Authorizing Provider   warfarin (COUMADIN) 4 MG tablet TK 1 T PO D 10/16/19  Yes Historical Provider, MD   amiodarone (CORDARONE) 200 MG tablet TK 1 T PO DAILY 10/16/19  Yes Historical Provider, MD   omeprazole (PRILOSEC) 20 MG delayed release capsule TK 1 C PO D 10/16/19  Yes Historical Provider, MD   metFORMIN (GLUCOPHAGE) 500 MG tablet Take 2 tablets by mouth 2 times daily (with meals) 12/16/19  Yes Robin Pearson MD   Dulaglutide 0.75 MG/0.5ML SOPN Inject 1.5 mg into the skin once a week 12/16/19  Yes Robin Pearson MD   glucose monitoring kit (FREESTYLE) monitoring kit 1 kit by Does not apply route daily 12/16/19  Yes Robin Pearson MD   glimepiride (AMARYL) 4 MG tablet TAKE 1 TABLET BY MOUTH EVERY MORNING BEFORE BREAKFAST 12/16/19  Yes Robin Pearson MD   pioglitazone (ACTOS) 30 MG tablet TAKE 1 TABLET BY MOUTH DAILY 12/16/19  Yes Edwardo Thorne loss or vertigo. No mouth sores or sore throat. · Cardiovascular: No chest pain, no dyspnea, no chf like symptoms  · Respiratory: No previous pulmonary problems  · Gastrointestinal: No abdominal pain, appetite loss, blood in stools. No change in bowel or bladder habits. · Genitourinary: No dysuria, trouble voiding, or hematuria. · Musculoskeletal:  No gait disturbance, No weakness or joint complaints. · Integumentary: No rash or pruritis. · Neurological: No headache, diplopia, change in muscle strength, numbness or tingling. No change in gait, balance, coordination, mood, affect, memory, mentation, behavior. · Psychiatric: No new anxiety or depression. · Endocrine: No temperature intolerance. No excessive thirst, fluid intake, or urination. No tremor. · Hematologic/Lymphatic: No abnormal bruising or bleeding, blood clots or swollen lymph nodes. · Allergic/Immunologic: No nasal congestion or hives. PHYSICAL EXAM:      /62   Pulse 68   Wt 192 lb 6.4 oz (87.3 kg)   BMI 33.03 kg/m²    General appearance: alert and cooperative with exam  HEENT: Head: Normocephalic, no lesions, without obvious abnormality. Eyes: PERRL, EOMI  Ears: Not obvious deformations or lack of hearing  Neck: no carotid bruit, no JVD  Lungs: clear to auscultation bilaterally  Heart: regular rate and rhythm, S1, S2 normal, no murmur, click, rub or gallop  Abdomen: soft, non-tender; bowel sounds normal; no masses,  no organomegaly  Extremities: extremities normal, atraumatic, no cyanosis or edema  Neurologic: Mental status: Alert, oriented, thought content appropriate  Skin: WNL for age and condition, no obvious rashes or leasions      Cardiac data:     Labs:     CBC: No results for input(s): WBC, HGB, HCT, PLT in the last 72 hours. BMP: No results for input(s): NA, K, CO2, BUN, CREATININE, LABGLOM, GLUCOSE in the last 72 hours. PT/INR: No results for input(s): PROTIME, INR in the last 72 hours.   FASTING LIPID PANEL:  Lab

## 2020-01-21 ENCOUNTER — TELEPHONE (OUTPATIENT)
Dept: DIABETES SERVICES | Age: 75
End: 2020-01-21

## 2020-01-21 RX ORDER — LISINOPRIL 5 MG/1
5 TABLET ORAL DAILY
Qty: 30 TABLET | Refills: 0 | Status: SHIPPED | OUTPATIENT
Start: 2020-01-21 | End: 2020-01-27

## 2020-01-27 RX ORDER — LISINOPRIL 5 MG/1
5 TABLET ORAL DAILY
Qty: 90 TABLET | Refills: 3 | Status: SHIPPED | OUTPATIENT
Start: 2020-01-27 | End: 2020-06-08 | Stop reason: ALTCHOICE

## 2020-01-28 ENCOUNTER — HOSPITAL ENCOUNTER (OUTPATIENT)
Age: 75
Setting detail: SPECIMEN
Discharge: HOME | End: 2020-01-28
Payer: MEDICARE

## 2020-01-28 PROCEDURE — 82043 UR ALBUMIN QUANTITATIVE: CPT

## 2020-01-28 PROCEDURE — 82570 ASSAY OF URINE CREATININE: CPT

## 2020-01-29 LAB
CREATININE URINE: 63.6 MG/DL (ref 39–259)
MICROALBUMIN/CREAT 24H UR: 18 MG/L
MICROALBUMIN/CREAT UR-RTO: 28 MCG/MG CREAT

## 2020-02-04 ENCOUNTER — OFFICE VISIT (OUTPATIENT)
Dept: DIABETES SERVICES | Age: 75
End: 2020-02-04
Payer: MEDICARE

## 2020-02-04 VITALS
WEIGHT: 197 LBS | SYSTOLIC BLOOD PRESSURE: 122 MMHG | DIASTOLIC BLOOD PRESSURE: 66 MMHG | HEART RATE: 98 BPM | RESPIRATION RATE: 20 BRPM | BODY MASS INDEX: 33.81 KG/M2

## 2020-02-04 LAB
CHP ED QC CHECK: ABNORMAL
GLUCOSE BLD-MCNC: 247 MG/DL

## 2020-02-04 PROCEDURE — 99215 OFFICE O/P EST HI 40 MIN: CPT | Performed by: NURSE PRACTITIONER

## 2020-02-04 PROCEDURE — G8427 DOCREV CUR MEDS BY ELIG CLIN: HCPCS | Performed by: NURSE PRACTITIONER

## 2020-02-04 PROCEDURE — 1036F TOBACCO NON-USER: CPT | Performed by: NURSE PRACTITIONER

## 2020-02-04 PROCEDURE — G8417 CALC BMI ABV UP PARAM F/U: HCPCS | Performed by: NURSE PRACTITIONER

## 2020-02-04 PROCEDURE — G8484 FLU IMMUNIZE NO ADMIN: HCPCS | Performed by: NURSE PRACTITIONER

## 2020-02-04 PROCEDURE — 4040F PNEUMOC VAC/ADMIN/RCVD: CPT | Performed by: NURSE PRACTITIONER

## 2020-02-04 PROCEDURE — 1123F ACP DISCUSS/DSCN MKR DOCD: CPT | Performed by: NURSE PRACTITIONER

## 2020-02-04 PROCEDURE — 3017F COLORECTAL CA SCREEN DOC REV: CPT | Performed by: NURSE PRACTITIONER

## 2020-02-04 PROCEDURE — 3046F HEMOGLOBIN A1C LEVEL >9.0%: CPT | Performed by: NURSE PRACTITIONER

## 2020-02-04 PROCEDURE — 2022F DILAT RTA XM EVC RTNOPTHY: CPT | Performed by: NURSE PRACTITIONER

## 2020-02-04 PROCEDURE — 82962 GLUCOSE BLOOD TEST: CPT | Performed by: NURSE PRACTITIONER

## 2020-02-04 PROCEDURE — 99214 OFFICE O/P EST MOD 30 MIN: CPT

## 2020-02-04 ASSESSMENT — ENCOUNTER SYMPTOMS
BLURRED VISION: 0
RESPIRATORY NEGATIVE: 1
SHORTNESS OF BREATH: 0
DIARRHEA: 0
VISUAL CHANGE: 0
ABDOMINAL PAIN: 0

## 2020-02-04 NOTE — PATIENT INSTRUCTIONS
Increase trulicity to 1.5 mg once weekly I have sent in a new script.  Ok to increase once you are out of current pens  Watch carb intake   Stay well hydrated  Think about applying for \"extra help\" or patient assistance with drug company for help with cost of Trulicity     Random blood sugar today 247

## 2020-02-04 NOTE — PROGRESS NOTES
[address] [facility] INTERNAL  [address], [address]  [facility] [address] [phone]        HISTORY:    Dora Apple presents today for evaluation and management of:  Chief Complaint   Patient presents with    Diabetes       Diabetes   He presents for his follow-up diabetic visit. He has type 2 diabetes mellitus. No MedicAlert identification noted. His disease course has been stable. There are no hypoglycemic associated symptoms. Pertinent negatives for hypoglycemia include no confusion, dizziness, headaches, seizures or tremors. Pertinent negatives for diabetes include no blurred vision, no chest pain, no fatigue, no foot paresthesias, no foot ulcerations, no polydipsia, no polyphagia, no polyuria, no visual change, no weakness and no weight loss. There are no hypoglycemic complications. Diabetic complications include heart disease. Risk factors for coronary artery disease include diabetes mellitus, dyslipidemia, family history, hypertension, obesity and sedentary lifestyle. Current diabetic treatment includes oral agent (dual therapy). He is compliant with treatment all of the time. His weight is stable. He is following a diabetic diet. When asked about meal planning, he reported none. He has not had a previous visit with a dietitian. He never participates in exercise. An ACE inhibitor/angiotensin II receptor blocker is being taken. He does not see a podiatrist.Eye exam is not current. Interval History:  7/6/19  Patient is here for follow up diabetes education. He has been able to continue his Trulicity. He did  a glucometer from the pharmacy but the wrong brand was supplied and was not compatible with the test strips and lancets.      2/4/2020  He is here for follow up diabetes management. He is still not testing his blood sugar. I did confirm with the pharmacy that he is taking . 75 mg once weekly.  Pt states he has made a medication, complication avoidance, reviewed the signs and symptoms of diabetes, hypoglycemic episodes, significance of HbA1C.       3. Mixed hyperlipidemia  stable, lipid panel reviewed, continue current medications. Diet and exercise      4. Essential hypertension   stable, continue current medications. Diet and exercise Seek emergent care if chest pain develops. Answered all patient questions. Agrees to follow plan of care and to follow up in 3 months, sooner if needed. Call office if unexplained blood sugars less than 70 occur or above 400. Call office or access MyChart with any further questions or concerns. Be sure to bring glucometer/food log at next appointment. Patient was seen with total face to face time of 45 minutes. More than 50%  of this visit was counseling and education regarding his diabetes.     Electronically signed by PAIGE Crandall CNP on 2/4/2020 at 12:58 PM      (Please note that portions of this note were completed with a voice-recognition program. Efforts were made to edit the dictation but occasionally words are mis-transcribed.)

## 2020-03-13 ENCOUNTER — HOSPITAL ENCOUNTER (OUTPATIENT)
Dept: INTERVENTIONAL RADIOLOGY/VASCULAR | Age: 75
Discharge: HOME | End: 2020-03-15
Payer: MEDICARE

## 2020-03-13 PROCEDURE — 93880 EXTRACRANIAL BILAT STUDY: CPT

## 2020-03-16 RX ORDER — PIOGLITAZONEHYDROCHLORIDE 30 MG/1
30 TABLET ORAL DAILY
Qty: 90 TABLET | Refills: 0 | Status: SHIPPED | OUTPATIENT
Start: 2020-03-16 | End: 2020-03-30

## 2020-03-16 NOTE — TELEPHONE ENCOUNTER
Maliha Bates is requesting a refill on the following medication(s):  Requested Prescriptions     Pending Prescriptions Disp Refills    pioglitazone (ACTOS) 30 MG tablet [Pharmacy Med Name: PIOGLITAZONE 30MG TABLETS] 90 tablet 0     Sig: TAKE 1 TABLET BY MOUTH DAILY       Last Visit Date (If Applicable):  8/0/0254    Next Visit Date:    4/7/2020

## 2020-03-17 RX ORDER — GLIMEPIRIDE 4 MG/1
TABLET ORAL
Qty: 90 TABLET | Refills: 0 | Status: SHIPPED | OUTPATIENT
Start: 2020-03-17 | End: 2020-03-30

## 2020-03-30 RX ORDER — PIOGLITAZONEHYDROCHLORIDE 30 MG/1
30 TABLET ORAL DAILY
Qty: 90 TABLET | Refills: 0 | Status: SHIPPED | OUTPATIENT
Start: 2020-03-30 | End: 2020-06-29 | Stop reason: SDUPTHER

## 2020-03-30 RX ORDER — OMEPRAZOLE 20 MG/1
CAPSULE, DELAYED RELEASE ORAL
Qty: 90 CAPSULE | Refills: 0 | Status: SHIPPED | OUTPATIENT
Start: 2020-03-30 | End: 2020-06-29 | Stop reason: SDUPTHER

## 2020-03-30 RX ORDER — GLIMEPIRIDE 4 MG/1
TABLET ORAL
Qty: 90 TABLET | Refills: 0 | Status: SHIPPED | OUTPATIENT
Start: 2020-03-30 | End: 2020-06-29 | Stop reason: SDUPTHER

## 2020-04-01 ENCOUNTER — TELEPHONE (OUTPATIENT)
Dept: FAMILY MEDICINE CLINIC | Age: 75
End: 2020-04-01

## 2020-05-01 ENCOUNTER — TELEPHONE (OUTPATIENT)
Dept: DIABETES SERVICES | Age: 75
End: 2020-05-01

## 2020-05-01 NOTE — TELEPHONE ENCOUNTER
He could be having low blood sugars but difficult to tell for sure since he is not testing. My recommendation would be to get the testing supplies and start testing. Keep track of numbers and we can further discuss at his next cristi on 5/5/2020. Which looks like we need to get changes to a virtual visit.

## 2020-05-04 RX ORDER — AMIODARONE HYDROCHLORIDE 200 MG/1
200 TABLET ORAL DAILY
Qty: 90 TABLET | Refills: 3 | Status: SHIPPED | OUTPATIENT
Start: 2020-05-04

## 2020-05-05 ENCOUNTER — TELEPHONE (OUTPATIENT)
Dept: INTERNAL MEDICINE | Age: 75
End: 2020-05-05

## 2020-05-07 ENCOUNTER — TELEPHONE (OUTPATIENT)
Dept: CARDIOLOGY | Age: 75
End: 2020-05-07

## 2020-05-07 NOTE — TELEPHONE ENCOUNTER
Pt daughter called stating the pt picked up warfarin from the pharmacy when he went to get the amiodarone prescription. Warfarin was not recently ordered and there is a contradiction between the 2 meds that increases chance of bleeding. Pt daughter states the pt has been taking the warfarin since 3/29/20 and she was not aware it was in his medication box and has been putting them in the pill box for him. Pt had dose for today, they will hold morning dose for short time tomorrow morning so they can get answer from Dr Marquis Mays when he gets in. Should he be taking warfarin and aniodarone both?  Or was the warfarin stopped at some point in the past?    Dileep Chino  [phone]

## 2020-05-08 NOTE — TELEPHONE ENCOUNTER
I spoke to daughter. She is aware of coumadin protocols. We reviewed. She chooses to bring pt to [facility] clinic. Ref placed per Dr. Sandra Wayne instructions. Bluford Meckel knows to bring father as soon as possible. She will await to hear from the Coumadin clinic once ref is received.

## 2020-05-09 ENCOUNTER — HOSPITAL ENCOUNTER (OUTPATIENT)
Dept: PHARMACY | Age: 75
Setting detail: THERAPIES SERIES
Discharge: HOME | End: 2020-05-09
Payer: MEDICARE

## 2020-05-09 LAB
INR BLD: 3.5
PROTIME: 41.6 SECONDS

## 2020-05-09 PROCEDURE — 85610 PROTHROMBIN TIME: CPT

## 2020-05-09 PROCEDURE — 99211 OFF/OP EST MAY X REQ PHY/QHP: CPT

## 2020-05-09 PROCEDURE — 36416 COLLJ CAPILLARY BLOOD SPEC: CPT

## 2020-05-09 PROCEDURE — 99212 OFFICE O/P EST SF 10 MIN: CPT

## 2020-05-14 ENCOUNTER — VIRTUAL VISIT (OUTPATIENT)
Dept: VASCULAR SURGERY | Age: 75
End: 2020-05-14
Payer: MEDICARE

## 2020-05-14 PROCEDURE — 99442 PR PHYS/QHP TELEPHONE EVALUATION 11-20 MIN: CPT | Performed by: SURGERY

## 2020-05-14 NOTE — PROGRESS NOTES
capsule TAKE 1 CAPSULE BY MOUTH DAILY Yes Max Tom MD   blood glucose monitor strips Test 1 times a day & as needed for symptoms of irregular blood glucose. Yes Max Tom MD   aspirin 81 MG tablet Take 1 tablet by mouth daily Pt.will stop 10-30 -18 AM for OR  Patient taking differently: Take 81 mg by mouth daily  Yes Chanda Hodgkin, PA   Multiple Vitamins-Minerals (THERAPEUTIC MULTIVITAMIN-MINERALS) tablet Take 1 tablet by mouth daily (with breakfast) Yes Chanda Hodgkin, PA       Social History     Tobacco Use    Smoking status: Former Smoker     Packs/day: 1.50     Years: 40.00     Pack years: 60.00     Types: Cigarettes     Last attempt to quit:      Years since quittin.3    Smokeless tobacco: Never Used   Substance Use Topics    Alcohol use: No    Drug use: No        Allergies   Allergen Reactions    Invokana [Canagliflozin]      dizziness    Januvia [Sitagliptin]      dizziness   ,   Past Medical History:   Diagnosis Date    Aortic stenosis     Aortic valve replacement     Degenerative disc disease, lumbar     [facility]     PCP Dr. Lulú Fenton Diverticulosis of sigmoid colon     GERD (gastroesophageal reflux disease)     Hyperlipidemia     Hypertension     Kidney stone     Mitral regurgitation     Obesity    ,   Past Surgical History:   Procedure Laterality Date    ANESTHESIA NERVE BLOCK Bilateral 2019    Bilateral L3 L4 L5 Diagnostic Medial BB performed by Ivan Logan MD at [address]  2018    CARDIAC CATHETERIZATION  2018    CARDIAC CATHETERIZATION      Before . No stents placed per pt.     CAROTID ENDARTERECTOMY Left 2019    LEFT CAROTID ENDARTERECTOMY WITH VASCUGUARD PATCH performed by Licha Sutherland MD at [place] [address]  2005    for hemoccult positive stool    COLONOSCOPY  10/2017    diverticuli; Dr Jazmyn Stockton      umbilical hernia   [address] necessary. Patient identification was verified at the start of the visit: Yes    Total time spent on this encounter: 12 minutes    Services were provided through a video synchronous discussion virtually to substitute for in-person clinic visit. Patient and provider were located at their individual homes. --Clara De Leon MD on 5/14/2020 at 1:24 PM    An electronic signature was used to authenticate this note.

## 2020-06-05 ENCOUNTER — HOSPITAL ENCOUNTER (OUTPATIENT)
Dept: PHARMACY | Age: 75
Setting detail: THERAPIES SERIES
Discharge: HOME | End: 2020-06-05
Payer: MEDICARE

## 2020-06-05 LAB
INR BLD: 2.3
PROTIME: 27.4 SECONDS

## 2020-06-05 PROCEDURE — 85610 PROTHROMBIN TIME: CPT

## 2020-06-05 PROCEDURE — 36416 COLLJ CAPILLARY BLOOD SPEC: CPT

## 2020-06-05 PROCEDURE — 99211 OFF/OP EST MAY X REQ PHY/QHP: CPT

## 2020-06-08 ENCOUNTER — OFFICE VISIT (OUTPATIENT)
Dept: FAMILY MEDICINE CLINIC | Age: 75
End: 2020-06-08
Payer: MEDICARE

## 2020-06-08 VITALS
WEIGHT: 219.4 LBS | SYSTOLIC BLOOD PRESSURE: 110 MMHG | OXYGEN SATURATION: 96 % | DIASTOLIC BLOOD PRESSURE: 64 MMHG | HEART RATE: 72 BPM | BODY MASS INDEX: 37.66 KG/M2

## 2020-06-08 PROBLEM — I36.1 NONRHEUMATIC TRICUSPID VALVE REGURGITATION: Status: ACTIVE | Noted: 2020-06-08

## 2020-06-08 LAB — HBA1C MFR BLD: 6 %

## 2020-06-08 PROCEDURE — 99201 HC NEW PT, E/M LEVEL 1: CPT | Performed by: FAMILY MEDICINE

## 2020-06-08 PROCEDURE — 3017F COLORECTAL CA SCREEN DOC REV: CPT | Performed by: FAMILY MEDICINE

## 2020-06-08 PROCEDURE — G8417 CALC BMI ABV UP PARAM F/U: HCPCS | Performed by: FAMILY MEDICINE

## 2020-06-08 PROCEDURE — 1123F ACP DISCUSS/DSCN MKR DOCD: CPT | Performed by: FAMILY MEDICINE

## 2020-06-08 PROCEDURE — G8427 DOCREV CUR MEDS BY ELIG CLIN: HCPCS | Performed by: FAMILY MEDICINE

## 2020-06-08 PROCEDURE — 3044F HG A1C LEVEL LT 7.0%: CPT | Performed by: FAMILY MEDICINE

## 2020-06-08 PROCEDURE — 1036F TOBACCO NON-USER: CPT | Performed by: FAMILY MEDICINE

## 2020-06-08 PROCEDURE — 4040F PNEUMOC VAC/ADMIN/RCVD: CPT | Performed by: FAMILY MEDICINE

## 2020-06-08 PROCEDURE — 2022F DILAT RTA XM EVC RTNOPTHY: CPT | Performed by: FAMILY MEDICINE

## 2020-06-08 PROCEDURE — 83036 HEMOGLOBIN GLYCOSYLATED A1C: CPT | Performed by: FAMILY MEDICINE

## 2020-06-08 PROCEDURE — 99214 OFFICE O/P EST MOD 30 MIN: CPT | Performed by: FAMILY MEDICINE

## 2020-06-08 RX ORDER — LISINOPRIL AND HYDROCHLOROTHIAZIDE 12.5; 1 MG/1; MG/1
1 TABLET ORAL DAILY
Qty: 30 TABLET | Refills: 1 | Status: SHIPPED | OUTPATIENT
Start: 2020-06-08 | End: 2020-07-25

## 2020-06-08 ASSESSMENT — ENCOUNTER SYMPTOMS
WHEEZING: 0
COUGH: 0
SHORTNESS OF BREATH: 1

## 2020-06-08 NOTE — PROGRESS NOTES
[id number] [facility], [address] [place] [address], [id number]  Dept: [phone]  Dept Fax: [phone]      Delmi Martin is a 75 y.o. male who presents today for his medical conditions/complaints as noted below. Chief Complaint   Patient presents with    Leg Swelling       HPI:     HPI  Pt here to get established, prior was seen with [facility]  Leg swelling SOB, dizziness - can be while seated watching TV  Off and on for past 6 months lower extremities    Had aortic valve replacement/fibrillation and when taken off water pills and noted return. Current Outpatient Medications   Medication Sig Dispense Refill    lisinopril-hydroCHLOROthiazide (PRINZIDE;ZESTORETIC) 10-12.5 MG per tablet Take 1 tablet by mouth daily 30 tablet 1    Elastic Bandages & Supports (JOBST KNEE HIGH COMPRESSION SM) MISC 1 each by Does not apply route daily as needed (leg swelling/CHF) 2 each 1    metFORMIN (GLUCOPHAGE) 500 MG tablet Take 2 tablets by mouth 2 times daily (with meals) 120 tablet 0    amiodarone (CORDARONE) 200 MG tablet Take 1 tablet by mouth daily 90 tablet 3    omeprazole (PRILOSEC) 20 MG delayed release capsule TAKE 1 CAPSULE BY MOUTH DAILY 90 capsule 0    pioglitazone (ACTOS) 30 MG tablet TAKE 1 TABLET BY MOUTH DAILY 90 tablet 0    glimepiride (AMARYL) 4 MG tablet TAKE 1 TABLET BY MOUTH EVERY MORNING BEFORE BREAKFAST 90 tablet 0    metoprolol tartrate (LOPRESSOR) 25 MG tablet TAKE 1 TABLET BY MOUTH TWICE DAILY 180 tablet 3    Dulaglutide 1.5 MG/0.5ML SOPN Inject 1.5 mg into the skin once a week 4 pen 3    rosuvastatin (CRESTOR) 20 MG tablet TAKE 1 TABLET BY MOUTH DAILY.  90 tablet 3    warfarin (COUMADIN) 4 MG tablet TK 1 T PO D  3    Blood Glucose Monitoring Suppl KIT 1 kit by Does not apply route daily 1 kit 0    Blood Pressure Monitoring (BLOOD PRESSURE CUFF) MISC 1 Device by Does not apply route daily 1 each 0    Lancets MISC 1 each by Does not apply route daily No respiratory distress. Musculoskeletal:         General: No swelling. Lymphadenopathy:      Cervical: No cervical adenopathy. Neurological:      Mental Status: He is alert. Psychiatric:         Mood and Affect: Mood normal.         Thought Content: Thought content normal.         Assessment:   The primary encounter diagnosis was Type 2 diabetes mellitus with hyperglycemia, without long-term current use of insulin ([address] Diagnoses of Aortic valve replaced, Essential hypertension, Nonrheumatic tricuspid valve regurgitation, Nonrheumatic mitral valve regurgitation, and Pedal edema were also pertinent to this visit. Plan:     Patient Instructions   Increase fluids at least 64 oz fluid daily not including coffee, tea and alcohol   Elevate legs 20 minutes above chest 2-3 times daily  Compression stockings on in am and off at night        No follow-ups on file.     Orders Placed This Encounter   Procedures    POCT glycosylated hemoglobin (Hb A1C)        Orders Placed This Encounter   Medications    lisinopril-hydroCHLOROthiazide (PRINZIDE;ZESTORETIC) 10-12.5 MG per tablet     Sig: Take 1 tablet by mouth daily     Dispense:  30 tablet     Refill:  1     In place of 5 mg lisinopril    Elastic Bandages & Supports (JOBST KNEE HIGH COMPRESSION SM) MISC     Si each by Does not apply route daily as needed (leg swelling/CHF)     Dispense:  2 each     Refill:  1          Electronically signed by Laxmi Hoover MD on 2020 at 3:35 PM

## 2020-06-29 RX ORDER — OMEPRAZOLE 20 MG/1
CAPSULE, DELAYED RELEASE ORAL
Qty: 90 CAPSULE | Refills: 1 | Status: SHIPPED | OUTPATIENT
Start: 2020-06-29 | End: 2020-12-28

## 2020-06-29 RX ORDER — WARFARIN SODIUM 4 MG/1
TABLET ORAL
Qty: 90 TABLET | Refills: 1 | Status: SHIPPED | OUTPATIENT
Start: 2020-06-29 | End: 2020-12-28

## 2020-06-29 RX ORDER — PIOGLITAZONEHYDROCHLORIDE 30 MG/1
30 TABLET ORAL DAILY
Qty: 90 TABLET | Refills: 0 | Status: SHIPPED | OUTPATIENT
Start: 2020-06-29 | End: 2020-07-13

## 2020-06-29 RX ORDER — GLIMEPIRIDE 4 MG/1
TABLET ORAL
Qty: 90 TABLET | Refills: 1 | Status: SHIPPED | OUTPATIENT
Start: 2020-06-29 | End: 2020-12-31

## 2020-06-29 NOTE — TELEPHONE ENCOUNTER
Alayna Guzman is calling to request a refill on the following medication(s):  Requested Prescriptions     Pending Prescriptions Disp Refills    glimepiride (AMARYL) 4 MG tablet 90 tablet 1     Sig: TAKE 1 TABLET BY MOUTH EVERY MORNING BEFORE BREAKFAST    pioglitazone (ACTOS) 30 MG tablet 90 tablet 0     Sig: Take 1 tablet by mouth daily    warfarin (COUMADIN) 4 MG tablet 30 tablet 3     Sig: TK 1 T PO D    omeprazole (PRILOSEC) 20 MG delayed release capsule 90 capsule 1     Sig: TAKE 1 CAPSULE BY MOUTH DAILY       Last Visit Date (If Applicable):  1/4/5871    Next Visit Date:    7/8/2020

## 2020-07-03 ENCOUNTER — HOSPITAL ENCOUNTER (OUTPATIENT)
Dept: PHARMACY | Age: 75
Setting detail: THERAPIES SERIES
Discharge: HOME | End: 2020-07-03
Payer: MEDICARE

## 2020-07-03 LAB
INR BLD: 3
PROTIME: 35.5 SECONDS

## 2020-07-03 PROCEDURE — 99211 OFF/OP EST MAY X REQ PHY/QHP: CPT

## 2020-07-03 PROCEDURE — 85610 PROTHROMBIN TIME: CPT

## 2020-07-03 PROCEDURE — 36416 COLLJ CAPILLARY BLOOD SPEC: CPT

## 2020-07-03 NOTE — PROGRESS NOTES
ANTICOAGULATION SERVICE    Date of Clinic Visit:  0/3/4011    Erik Jaramillo is a 75 y.o. male who presents to clinic today (curbside visit) for anticoagulation monitoring and adjustment. Recent INR Results:  Internal QC passed  Lab Results   Component Value Date    INR 3 07/03/2020    INR 2.3 06/05/2020       Current Warfarin Dosage:  Dosing Plan  As of 7/3/2020    TTR:   96.3 % (1.5 mo)   Full warfarin instructions:   2 mg every Sat; 4 mg all other days               Assessment/Plan:    Continue current regimen as INR remains stable. Recheck 4 weeks. Next Clinic Appointment:  Return date  As of 7/3/2020    TTR:   96.3 % (1.5 mo)   Next INR check:   7/31/2020             Please call [facility] at [phone] with any questions. Thanks! LM Loja [facility]  Anticoagulation Service Pharmacist  7/3/2020 10:33 AM     CLINICAL PHARMACY CONSULT: MED RECONCILIATION/REVIEW ADDENDUM    For Pharmacy Admin Tracking Only    PHSO: Yes  Total # of Interventions Recommended: 0    - Maintenance Safety Lab Monitoring #: 1  Recommended intervention potential cost savings:   Accepted intervention potential cost savings:    Total Interventions Accepted: 0  Time Spent (min): 15    Lauren Aranda, [address] [place] [address]

## 2020-07-21 ENCOUNTER — OFFICE VISIT (OUTPATIENT)
Dept: DIABETES SERVICES | Age: 75
End: 2020-07-21
Payer: MEDICARE

## 2020-07-21 VITALS
DIASTOLIC BLOOD PRESSURE: 64 MMHG | RESPIRATION RATE: 16 BRPM | SYSTOLIC BLOOD PRESSURE: 120 MMHG | BODY MASS INDEX: 37.93 KG/M2 | HEART RATE: 72 BPM | WEIGHT: 221 LBS

## 2020-07-21 PROCEDURE — 3017F COLORECTAL CA SCREEN DOC REV: CPT | Performed by: NURSE PRACTITIONER

## 2020-07-21 PROCEDURE — 1036F TOBACCO NON-USER: CPT | Performed by: NURSE PRACTITIONER

## 2020-07-21 PROCEDURE — 99214 OFFICE O/P EST MOD 30 MIN: CPT | Performed by: NURSE PRACTITIONER

## 2020-07-21 PROCEDURE — G8427 DOCREV CUR MEDS BY ELIG CLIN: HCPCS | Performed by: NURSE PRACTITIONER

## 2020-07-21 PROCEDURE — 4040F PNEUMOC VAC/ADMIN/RCVD: CPT | Performed by: NURSE PRACTITIONER

## 2020-07-21 PROCEDURE — G8417 CALC BMI ABV UP PARAM F/U: HCPCS | Performed by: NURSE PRACTITIONER

## 2020-07-21 PROCEDURE — 3044F HG A1C LEVEL LT 7.0%: CPT | Performed by: NURSE PRACTITIONER

## 2020-07-21 PROCEDURE — 1123F ACP DISCUSS/DSCN MKR DOCD: CPT | Performed by: NURSE PRACTITIONER

## 2020-07-21 PROCEDURE — 2022F DILAT RTA XM EVC RTNOPTHY: CPT | Performed by: NURSE PRACTITIONER

## 2020-07-21 RX ORDER — GLUCOSAMINE HCL/CHONDROITIN SU 500-400 MG
CAPSULE ORAL DAILY
Qty: 50 STRIP | Refills: 3 | Status: SHIPPED | OUTPATIENT
Start: 2020-07-21 | End: 2020-08-13 | Stop reason: SDUPTHER

## 2020-07-21 RX ORDER — LANCETS 30 GAUGE
1 EACH MISCELLANEOUS DAILY
Qty: 50 EACH | Refills: 3 | Status: SHIPPED | OUTPATIENT
Start: 2020-07-21

## 2020-07-21 NOTE — PATIENT INSTRUCTIONS
Stop actos   Test blood sugar 3 times per week   You can stop in office if you need to. Work on [employer]. Call social security to apply for \"extra help\" for medication cost  Or we can apply for patient asstance with [email or web address] for Heidi Wetzel nurse practitioner

## 2020-07-21 NOTE — PROGRESS NOTES
[address] [facility] INTERNAL [address], [address]  [facility] [address] [phone]        HISTORY:    Paris Ross presents today for evaluation and management of:  Chief Complaint   Patient presents with    Diabetes       Diabetes   He presents for his follow-up diabetic visit. He has type 2 diabetes mellitus. His disease course has been fluctuating. Hypoglycemia symptoms include hunger and tremors. Pertinent negatives for hypoglycemia include no confusion, dizziness, headaches or seizures. (Frequently at night and mid day. ) There are no diabetic associated symptoms. Pertinent negatives for diabetes include no chest pain, no polydipsia, no polyphagia and no polyuria. There are no hypoglycemic complications. Symptoms are stable. Diabetic complications include heart disease. Pertinent negatives for diabetic complications include no CVA, nephropathy, peripheral neuropathy or retinopathy. Risk factors for coronary artery disease include diabetes mellitus, dyslipidemia, family history, hypertension, male sex, obesity, sedentary lifestyle and stress. Current diabetic treatment includes oral agent (monotherapy). He is compliant with treatment all of the time. His weight is stable. He is following a generally unhealthy diet. When asked about meal planning, he reported none. He has not had a previous visit with a dietitian. He rarely participates in exercise. An ACE inhibitor/angiotensin II receptor blocker is being taken. He sees a podiatrist.Eye exam is current. Interval History:    Current Diabetic Medications  amaryl 4 mg daily , Actos 30 mg daily, metformin 4076 mg BID Trulicity 1.5 mg weekly. He also takes a daily asa    DKA episodes: 0    7/6/19  Patient is here for follow up diabetes education. He has been able to continue his Trulicity.  He did  a glucometer from the pharmacy but the wrong brand was supplied and was not compatible with the test strips and lancets.      2020  He is here for follow up diabetes management. He is still not testing his blood sugar. I did confirm with the pharmacy that he is taking . 75 mg once weekly. Pt states he has made a lot of changes in his diet eating mostly non starchy veggies. However today he has a mini cake and frosty. 20     Diet: Healthy processed foods  Exercise: None  BS testing: Refuses to test blood sugars at home due to fear  Issues: Is complaining of symptoms of hypoglycemia frequently during sleeping and midday. High cholesterol-  Takes crestor and denies any adverse effects with its use.  Watches diet and exercise.      Hypertension-  Takes lisinopril and lopressor and denies any adverse effects with their use. Watches diet and exercise. Denies any chest pain, dizziness or edema.  Follows with cardiology:Yes.     Obesity- Working on weight loss.       Past Medical History:   Diagnosis Date    Aortic stenosis     Aortic valve replacement 2019    Degenerative disc disease, lumbar     Diabetes mellitus (HCC)     PCP Dr. Narvaez Meals Diverticulosis of sigmoid colon     GERD (gastroesophageal reflux disease)     Hyperlipidemia     Hypertension     Kidney stone     Mitral regurgitation     Obesity      Family History   Problem Relation Age of Onset    Cancer Father         prostate    Alzheimer's Disease Father     Heart Disease Neg Hx     Diabetes Neg Hx     Glaucoma Neg Hx     Macular Degen Neg Hx      Social History     Tobacco Use    Smoking status: Former Smoker     Packs/day: 1.50     Years: 40.00     Pack years: 60.00     Types: Cigarettes     Last attempt to quit:      Years since quittin.5    Smokeless tobacco: Never Used   Substance Use Topics    Alcohol use: No    Drug use: No     Allergies   Allergen Reactions    Invokana [Canagliflozin]      dizziness    Januvia [Sitagliptin]      dizziness       MEDICATIONS:  Current Outpatient Medications   Medication Sig Dispense Refill    blood glucose monitor strips by Other route daily Test 1 times a day & as needed for symptoms of irregular blood glucose. 50 strip 3    Blood Glucose Monitoring Suppl KIT 1 kit by Does not apply route daily 1 kit 3    Lancets MISC 1 each by Does not apply route daily 50 each 3    metFORMIN (GLUCOPHAGE) 500 MG tablet TAKE 2 TABLETS BY MOUTH TWICE DAILY WITH MEALS 360 tablet 0    glimepiride (AMARYL) 4 MG tablet TAKE 1 TABLET BY MOUTH EVERY MORNING BEFORE BREAKFAST 90 tablet 1    warfarin (COUMADIN) 4 MG tablet TK 1 T PO D 90 tablet 1    omeprazole (PRILOSEC) 20 MG delayed release capsule TAKE 1 CAPSULE BY MOUTH DAILY 90 capsule 1    lisinopril-hydroCHLOROthiazide (PRINZIDE;ZESTORETIC) 10-12.5 MG per tablet Take 1 tablet by mouth daily 30 tablet 1    Elastic Bandages & Supports (JOBST KNEE HIGH COMPRESSION SM) MISC 1 each by Does not apply route daily as needed (leg swelling/CHF) 2 each 1    amiodarone (CORDARONE) 200 MG tablet Take 1 tablet by mouth daily 90 tablet 3    metoprolol tartrate (LOPRESSOR) 25 MG tablet TAKE 1 TABLET BY MOUTH TWICE DAILY 180 tablet 3    Dulaglutide 1.5 MG/0.5ML SOPN Inject 1.5 mg into the skin once a week 4 pen 3    rosuvastatin (CRESTOR) 20 MG tablet TAKE 1 TABLET BY MOUTH DAILY. 90 tablet 3    Blood Pressure Monitoring (BLOOD PRESSURE CUFF) MISC 1 Device by Does not apply route daily 1 each 0    tamsulosin (FLOMAX) 0.4 MG capsule TAKE 1 CAPSULE BY MOUTH DAILY 90 capsule 5    blood glucose monitor strips Test 1 times a day & as needed for symptoms of irregular blood glucose.  100 strip 3    aspirin 81 MG tablet Take 1 tablet by mouth daily Pt.will stop 10-30 -18 AM for OR (Patient taking differently: Take 81 mg by mouth daily ) 30 tablet 3    Multiple Vitamins-Minerals (THERAPEUTIC MULTIVITAMIN-MINERALS) tablet Take 1 tablet by mouth daily (with breakfast) 30 tablet 3     No current facility-administered Results   Component Value Date    LDLCHOLESTEROL 29 2019    LDLCALC 85.2 2019    LDLCALC 234.2 (H) 02/15/2019    LDLCALC 60.8 2018     Lab Results   Component Value Date    VLDL NOT REPORTED (H) 2019    VLDL 48 (H) 2019    VLDL 58 (H) 02/15/2019     Lab Results   Component Value Date    CHOLHDLRATIO 3.6 2019    CHOLHDLRATIO 3.9 2019    CHOLHDLRATIO 7.5 (H) 02/15/2019           Physical Exam  Constitutional:       Appearance: He is well-developed. Eyes:      Pupils: Pupils are equal, round, and reactive to light. Cardiovascular:      Rate and Rhythm: Normal rate and regular rhythm. Pulmonary:      Effort: Pulmonary effort is normal.      Breath sounds: Normal breath sounds. Skin:     General: Skin is warm and dry. Findings: No lesion (no lipohypertrophy) or rash. Neurological:      Mental Status: He is alert and oriented to person, place, and time. Sensory: No sensory deficit. Psychiatric:         Speech: Speech normal.         Behavior: Behavior normal.         Thought Content: Thought content normal.         Judgment: Judgment normal.         ASSESSMENT/PLAN:     Diagnosis Orders   1. Type 2 diabetes mellitus without complication, without long-term current use of insulin ([facility])  blood glucose monitor strips    Blood Glucose Monitoring Suppl KIT    Lancets MISC   2. Diabetes education, encounter for     3. Mixed hyperlipidemia     4. Essential hypertension     5. BMI 37.0-37.9, adult     6. Class 2 severe obesity due to excess calories with serious comorbidity and body mass index (BMI) of 37.0 to 37.9 in adult [facility])       No orders of the defined types were placed in this encounter. Orders Placed This Encounter   Medications    blood glucose monitor strips     Sig: by Other route daily Test 1 times a day & as needed for symptoms of irregular blood glucose.      Dispense:  50 strip     Refill:  3    Blood Glucose Monitoring Suppl KIT     Si kit by Does not apply route daily     Dispense:  1 kit     Refill:  3    Lancets MISC     Si each by Does not apply route daily     Dispense:  50 each     Refill:  3     Requested Prescriptions     Signed Prescriptions Disp Refills    blood glucose monitor strips 50 strip 3     Sig: by Other route daily Test 1 times a day & as needed for symptoms of irregular blood glucose.  Blood Glucose Monitoring Suppl KIT 1 kit 3     Si kit by Does not apply route daily    Lancets MISC 50 each 3     Si each by Does not apply route daily       1. Type 2 diabetes mellitus without complication, without long-term current use of insulin ([facility] [address]  2. Diabetes education, encounter for    - blood glucose monitor strips; by Other route daily Test 1 times a day & as needed for symptoms of irregular blood glucose. Dispense: 50 strip; Refill: 3  - Blood Glucose Monitoring Suppl KIT; 1 kit by Does not apply route daily  Dispense: 1 kit; Refill: 3  - Lancets MISC; 1 each by Does not apply route daily  Dispense: 50 each; Refill: 3    - Stable  HbA1C goal is less than 7% and blood sugars are improving.  - Encouraged patient to check BS 2 times per week. Fasting blood glucose goal is 70-130mg/dl and postprandial blood sugar goal is less than 180 mg/dl. -Diabetic foot exam up-to-date: No  -Diabetic retinal exam up-to-date: No  - Labs reviewed includes: Most recent A1c significantly improved to 6.0% however patient is experiencing symptoms of hypoglycemia but is not testing blood sugars at home. Repeat labs due in 3 months.   -We discussed in great detail dietary modifications they can make to better improve their blood sugars. -follow up diabetes education completed, all questions answered. Patient Instructions   Stop actos   Test blood sugar 3 times per week   You can stop in office if you need to. Work on [employer].      Call social security to apply for \"extra help\" for medication cost  Or we can apply for patient asstance with [email or web address] john Gordon      Annika Stan nurse practitioner           Discussed signs and symptoms of hyper/hypoglycemia and how to treat. Encouraged 150 minutes of physical activity per week. Follow a low carbohydrate diet consuming 45 grams of carbohydrates at breakfast, lunch and dinner with two separate snacks [id number] grams of carbohydrates. Encouraged at least 7 hours of sleep. The patient was informed of the goals of diabetes management. This can only be accomplished by watching their diet and exercise levels. We certainly use medicines to help attain these goals. The consequences of not controlling blood sugars were discussed. These include blindness, heart disease, stroke, kidney disease, and possibly need for dialysis. They were told to be careful with their foot care as diabetics often have nerve damage, infections and risk for limb amutations . They also need a dilated eye exam yearly. We discussed the issues of diet, exercise, medication, complication avoidance, reviewed the signs and symptoms of diabetes, hypoglycemic episodes, significance of HbA1C.       3. Mixed hyperlipidemia  stable, lipid panel reviewed, continue current medications. Diet and exercise      4. Essential hypertension   stable, continue current medications. Diet and exercise Seek emergent care if chest pain develops. 5. BMI 37.0-37.9, adult  6. Class 2 severe obesity due to excess calories with serious comorbidity and body mass index (BMI) of 37.0 to 37.9 in adult [facility])  Reduce calories and increase physical activity to achieve a slow and steady weight loss to improve blood pressure, cholesterol and diabetes. Answered all patient questions. Agrees to follow plan of care and to follow up in 3 months, sooner if needed. Call office if unexplained blood sugars less than 70 occur or above 400. Call office or access [employer] with any further questions or concerns.  Be sure to bring glucometer/food log at next

## 2020-07-22 ASSESSMENT — ENCOUNTER SYMPTOMS
SHORTNESS OF BREATH: 0
RESPIRATORY NEGATIVE: 1
ABDOMINAL PAIN: 0
DIARRHEA: 0

## 2020-07-24 NOTE — TELEPHONE ENCOUNTER
dianna is requesting a refill on the following medication(s):  Requested Prescriptions     Pending Prescriptions Disp Refills    lisinopril-hydroCHLOROthiazide (PRINZIDE;ZESTORETIC) 10-12.5 MG per tablet [Pharmacy Med Name: LISINOPRIL-HCTZ 10/12.5MG TABLETS] 30 tablet 1     Sig: TAKE 1 TABLET BY MOUTH DAILY       Last Visit Date (If Applicable):  0/7/4499    Next Visit Date:    Visit date not found

## 2020-07-25 RX ORDER — LISINOPRIL AND HYDROCHLOROTHIAZIDE 12.5; 1 MG/1; MG/1
1 TABLET ORAL DAILY
Qty: 30 TABLET | Refills: 1 | Status: SHIPPED | OUTPATIENT
Start: 2020-07-25 | End: 2020-07-27 | Stop reason: SDUPTHER

## 2020-07-27 RX ORDER — LISINOPRIL AND HYDROCHLOROTHIAZIDE 12.5; 1 MG/1; MG/1
1 TABLET ORAL DAILY
Qty: 30 TABLET | Refills: 1 | Status: SHIPPED | OUTPATIENT
Start: 2020-07-27 | End: 2020-08-03

## 2020-07-27 NOTE — TELEPHONE ENCOUNTER
uQinn Mills is calling to request a refill on the following medication(s):  Requested Prescriptions     Pending Prescriptions Disp Refills    lisinopril-hydroCHLOROthiazide (PRINZIDE;ZESTORETIC) 10-12.5 MG per tablet 30 tablet 1     Sig: Take 1 tablet by mouth daily       Last Visit Date (If Applicable):  0/3/0285    Next Visit Date:    Visit date not found

## 2020-07-31 ENCOUNTER — HOSPITAL ENCOUNTER (OUTPATIENT)
Dept: PHARMACY | Age: 75
Setting detail: THERAPIES SERIES
Discharge: HOME | End: 2020-07-31
Payer: MEDICARE

## 2020-07-31 LAB
INR BLD: 3.1
PROTIME: 37.2 SECONDS

## 2020-07-31 PROCEDURE — 99211 OFF/OP EST MAY X REQ PHY/QHP: CPT

## 2020-07-31 PROCEDURE — 85610 PROTHROMBIN TIME: CPT

## 2020-07-31 PROCEDURE — 36416 COLLJ CAPILLARY BLOOD SPEC: CPT

## 2020-07-31 NOTE — PROGRESS NOTES
ANTICOAGULATION SERVICE    Date of Clinic Visit:  6/68/2569    Gagan Gonzalez is a 75 y.o. male who presents to clinic today (curbside visit) for anticoagulation monitoring and adjustment. Recent INR Results:  Internal QC passed  Lab Results   Component Value Date    INR 3.1 07/31/2020    INR 3 07/03/2020       Current Warfarin Dosage:  Dosing Plan  As of 7/31/2020    TTR:   59.6 % (2.4 mo)   Full warfarin instructions:   7/31: 2 mg; Otherwise 2 mg every Sat; 4 mg all other days               Assessment/Plan:    Continue current regimen as INR remains stable. INR just above range today. I will decrease dose x 1 day then back on regular dose. Recheck 4 weeks. Next Clinic Appointment:  Return date  As of 7/31/2020    TTR:   59.6 % (2.4 mo)   Next INR check:   8/28/2020             Please call [facility] at [phone] with any questions. Thanks! Bud Rowe [facility]  Anticoagulation Service Pharmacist  7/31/2020 10:12 AM     CLINICAL PHARMACY CONSULT: MED RECONCILIATION/REVIEW ADDENDUM    For Pharmacy Admin Tracking Only    PHSO: Yes  Total # of Interventions Recommended: 0    - Maintenance Safety Lab Monitoring #: 1  Recommended intervention potential cost savings:   Accepted intervention potential cost savings:    Total Interventions Accepted: 0  Time Spent (min): 15    Bud Rowe, [address] [place] [address]

## 2020-08-10 RX ORDER — TAMSULOSIN HYDROCHLORIDE 0.4 MG/1
0.4 CAPSULE ORAL DAILY
Qty: 90 CAPSULE | Refills: 5 | Status: SHIPPED | OUTPATIENT
Start: 2020-08-10 | End: 2021-09-28

## 2020-08-11 RX ORDER — DULAGLUTIDE 0.75 MG/.5ML
INJECTION, SOLUTION SUBCUTANEOUS
Qty: 2 ML | OUTPATIENT
Start: 2020-08-11

## 2020-08-11 NOTE — TELEPHONE ENCOUNTER
Pt returned call- says he does not have any Trulicity left and he threw the box away. States \"She started me on the lower dose and then increased it. \"  [address] [place] [address] approved a script 8/10/20- Dulaglutide 1.5 mg/ 0.5 ml weekly #4 pens, 3 refills. Pt reports \"the pharmacy said it will cost me $500.00. I don't understand why. I just pain $500.00 a couple months ago. \"   Informed pt that office can supply samples if needed.   He will call office back if there is any issues with cost.

## 2020-08-13 RX ORDER — GLUCOSAMINE HCL/CHONDROITIN SU 500-400 MG
CAPSULE ORAL DAILY
Qty: 50 STRIP | Refills: 3 | Status: SHIPPED | OUTPATIENT
Start: 2020-08-13 | End: 2020-09-02 | Stop reason: SDUPTHER

## 2020-08-13 NOTE — TELEPHONE ENCOUNTER
Patient sees Julienne Alvarenga, she is on vacation this week if you could send in the rx for the patient

## 2020-09-02 RX ORDER — GLUCOSAMINE HCL/CHONDROITIN SU 500-400 MG
CAPSULE ORAL DAILY
Qty: 50 STRIP | Refills: 3 | Status: SHIPPED | OUTPATIENT
Start: 2020-09-02

## 2020-09-03 ENCOUNTER — HOSPITAL ENCOUNTER (OUTPATIENT)
Dept: PHARMACY | Age: 75
Setting detail: THERAPIES SERIES
Discharge: HOME | End: 2020-09-03
Payer: MEDICARE

## 2020-09-03 LAB
INR BLD: 4
PROTIME: 48.1 SECONDS

## 2020-09-03 PROCEDURE — 36416 COLLJ CAPILLARY BLOOD SPEC: CPT

## 2020-09-03 PROCEDURE — 85610 PROTHROMBIN TIME: CPT

## 2020-09-03 PROCEDURE — 99211 OFF/OP EST MAY X REQ PHY/QHP: CPT

## 2020-09-28 NOTE — TELEPHONE ENCOUNTER
dianna is requesting a refill on the following medication(s):  Requested Prescriptions     Pending Prescriptions Disp Refills    metFORMIN (GLUCOPHAGE) 500 MG tablet [Pharmacy Med Name: METFORMIN 500MG TABLETS] 360 tablet 0     Sig: TAKE 2 TABLETS BY MOUTH TWICE DAILY WITH MEALS       Last Visit Date (If Applicable):  3/7/8166    Next Visit Date:    Visit date not found

## 2020-09-28 NOTE — TELEPHONE ENCOUNTER
Refill sent. Please ensure patient follow up appointment for some time this year to review medical issues including diabetes.    Thanks

## 2020-10-01 ENCOUNTER — HOSPITAL ENCOUNTER (OUTPATIENT)
Dept: PHARMACY | Age: 75
Setting detail: THERAPIES SERIES
Discharge: HOME | End: 2020-10-01
Payer: MEDICARE

## 2020-10-01 LAB
INR BLD: 2.8
PROTIME: 34 SECONDS

## 2020-10-01 PROCEDURE — 36416 COLLJ CAPILLARY BLOOD SPEC: CPT

## 2020-10-01 PROCEDURE — 99211 OFF/OP EST MAY X REQ PHY/QHP: CPT

## 2020-10-01 PROCEDURE — 85610 PROTHROMBIN TIME: CPT

## 2020-10-06 ENCOUNTER — OFFICE VISIT (OUTPATIENT)
Dept: DIABETES SERVICES | Age: 75
End: 2020-10-06
Payer: MEDICARE

## 2020-10-06 VITALS
RESPIRATION RATE: 16 BRPM | WEIGHT: 216 LBS | BODY MASS INDEX: 37.08 KG/M2 | SYSTOLIC BLOOD PRESSURE: 130 MMHG | DIASTOLIC BLOOD PRESSURE: 86 MMHG

## 2020-10-06 LAB — HBA1C MFR BLD: 6.4 %

## 2020-10-06 PROCEDURE — G8427 DOCREV CUR MEDS BY ELIG CLIN: HCPCS | Performed by: NURSE PRACTITIONER

## 2020-10-06 PROCEDURE — 99214 OFFICE O/P EST MOD 30 MIN: CPT | Performed by: NURSE PRACTITIONER

## 2020-10-06 PROCEDURE — 4040F PNEUMOC VAC/ADMIN/RCVD: CPT | Performed by: NURSE PRACTITIONER

## 2020-10-06 PROCEDURE — G8417 CALC BMI ABV UP PARAM F/U: HCPCS | Performed by: NURSE PRACTITIONER

## 2020-10-06 PROCEDURE — 3044F HG A1C LEVEL LT 7.0%: CPT | Performed by: NURSE PRACTITIONER

## 2020-10-06 PROCEDURE — 2022F DILAT RTA XM EVC RTNOPTHY: CPT | Performed by: NURSE PRACTITIONER

## 2020-10-06 PROCEDURE — G8484 FLU IMMUNIZE NO ADMIN: HCPCS | Performed by: NURSE PRACTITIONER

## 2020-10-06 PROCEDURE — 83036 HEMOGLOBIN GLYCOSYLATED A1C: CPT | Performed by: NURSE PRACTITIONER

## 2020-10-06 PROCEDURE — 1036F TOBACCO NON-USER: CPT | Performed by: NURSE PRACTITIONER

## 2020-10-06 PROCEDURE — 1123F ACP DISCUSS/DSCN MKR DOCD: CPT | Performed by: NURSE PRACTITIONER

## 2020-10-06 PROCEDURE — 3017F COLORECTAL CA SCREEN DOC REV: CPT | Performed by: NURSE PRACTITIONER

## 2020-10-06 ASSESSMENT — ENCOUNTER SYMPTOMS
SHORTNESS OF BREATH: 0
ABDOMINAL PAIN: 0
DIARRHEA: 0
RESPIRATORY NEGATIVE: 1

## 2020-10-25 PROBLEM — E11.9 TYPE 2 DIABETES MELLITUS (HCC): Status: ACTIVE | Noted: 2017-05-11

## 2020-10-25 PROBLEM — M79.3 PANNICULITIS: Status: ACTIVE | Noted: 2019-10-18

## 2020-10-25 PROBLEM — Z95.2 HISTORY OF AORTIC VALVE REPLACEMENT: Status: ACTIVE | Noted: 2017-05-04

## 2020-10-25 PROBLEM — Z98.890 HISTORY OF CAROTID ENDARTERECTOMY: Status: ACTIVE | Noted: 2019-03-14

## 2020-10-25 PROBLEM — I65.29 CAROTID ARTERY STENOSIS: Status: ACTIVE | Noted: 2018-11-09

## 2020-10-25 NOTE — PROGRESS NOTES
[facility]  [address] [place]  Dept: [phone]  Dept Fax: [phone]    Chief Complaint   Patient presents with    Follow-up     former pt of dr Steven Hardin no issues or complaints    Hyperlipidemia    Hypertension    Diabetes     HPI:   Patient presents today to establish care. He does not monitor his blood pressure or blood sugar at home. He follows with JAYSHREE Sethi for diabetes management. Last A1c on 10/6/2020 resulted at 6.4. He denies having any symptoms of GERD. States he is doing well with taking omeprazole daily. Previously followed with pain management for lower back pain. Previously had injections which seems to help for a few days but states it is too expensive to continue. Amber Matos is a 75 y.o. male who presents for follow-up of hypertension, diabetes, and hyperlipidemia. He indicates that he is feeling well and denies any symptoms referable to his elevated blood pressure or diabetes. Specifically denies chest pain, palpitations, dyspnea, peripheral edema, thirst, frequent urination, and blurred vision. Current medication regimen is as listed below. He denies any side effects of medication, and has been compliant, taking it regularly. Last eye exam: 7/22/19, he will schedule and appointment. Diabetic complications include: CAD    Hyperlipidemia   This is a chronic problem. The current episode started more than 1 year ago. The problem is controlled. Recent lipid tests were reviewed and are variable. Exacerbating diseases include diabetes and obesity. Factors aggravating his hyperlipidemia include beta blockers (Former smoker). Pertinent negatives include no chest pain, focal sensory loss, focal weakness, leg pain, myalgias or shortness of breath. Current antihyperlipidemic treatment includes statins. The current treatment provides significant improvement of lipids.  Compliance problems include adherence to exercise and adherence to diet. Risk factors for coronary artery disease include dyslipidemia, family history, obesity, male sex, hypertension, diabetes mellitus and a sedentary lifestyle. Treatment Adherence:   Medication compliance:  compliant all of the time  Diet compliance:  compliant most of the time  Weight trend: stable  Current exercise: no regular exercise  Barriers: lack of motivation    Follows with cardiology (Dr. Lula Valladares) Aortic stenosis, s/p bioprosthetic AVR on 10/30/2018. Follows with vascular surgery (Dr. Curtis Ruiz): PVD/carotid stenosis. The 10-year ASCVD risk score (Osmany Scott, et al., 2013) is: 48.7%    Values used to calculate the score:      Age: 75 years      Sex: Male      Is Non- : No      Diabetic: Yes      Tobacco smoker: No      Systolic Blood Pressure: 162 mmHg      Is BP treated: Yes      HDL Cholesterol: 44 mg/dL      Total Cholesterol: 154 mg/dL      BP Readings from Last 3 Encounters:   10/26/20 134/70   10/06/20 130/86   07/21/20 120/64        Wt Readings from Last 3 Encounters:   10/26/20 208 lb 11.2 oz (94.7 kg)   10/06/20 216 lb (98 kg)   07/21/20 221 lb (100.2 kg)        Current Outpatient Medications   Medication Sig Dispense Refill    lisinopril-hydroCHLOROthiazide (PRINZIDE;ZESTORETIC) 10-12.5 MG per tablet Take 1 tablet by mouth daily 90 tablet 1    metFORMIN (GLUCOPHAGE) 500 MG tablet TAKE 2 TABLETS BY MOUTH TWICE DAILY WITH MEALS 360 tablet 0    blood glucose monitor strips by Other route daily Test 1 times a day & as needed for symptoms of irregular blood glucose. 50 strip 3    Blood Glucose Monitoring Suppl KIT Meter as covered by insurance also test strips and lancets.  1 kit 3    Dulaglutide 1.5 MG/0.5ML SOPN Inject 1.5 mg into the skin once a week 4 pen 3    tamsulosin (FLOMAX) 0.4 MG capsule Take 1 capsule by mouth daily 90 capsule 5    Lancets MISC 1 each by Does not apply route daily 50 each 3    glimepiride (AMARYL) 4 MG tablet TAKE 1 TABLET BY MOUTH EVERY MORNING BEFORE BREAKFAST 90 tablet 1    warfarin (COUMADIN) 4 MG tablet TK 1 T PO D 90 tablet 1    omeprazole (PRILOSEC) 20 MG delayed release capsule TAKE 1 CAPSULE BY MOUTH DAILY 90 capsule 1    Elastic Bandages & Supports (JOBST KNEE HIGH COMPRESSION SM) MISC 1 each by Does not apply route daily as needed (leg swelling/CHF) 2 each 1    amiodarone (CORDARONE) 200 MG tablet Take 1 tablet by mouth daily 90 tablet 3    metoprolol tartrate (LOPRESSOR) 25 MG tablet TAKE 1 TABLET BY MOUTH TWICE DAILY 180 tablet 3    rosuvastatin (CRESTOR) 20 MG tablet TAKE 1 TABLET BY MOUTH DAILY. 90 tablet 3    Blood Pressure Monitoring (BLOOD PRESSURE CUFF) MISC 1 Device by Does not apply route daily 1 each 0    blood glucose monitor strips Test 1 times a day & as needed for symptoms of irregular blood glucose. 100 strip 3    aspirin 81 MG tablet Take 1 tablet by mouth daily Pt.will stop 10-30 -18 AM for OR (Patient taking differently: Take 81 mg by mouth daily ) 30 tablet 3    Multiple Vitamins-Minerals (THERAPEUTIC MULTIVITAMIN-MINERALS) tablet Take 1 tablet by mouth daily (with breakfast) 30 tablet 3     No current facility-administered medications for this visit. Past Medical History:   Diagnosis Date    Aortic stenosis     Aortic valve replacement 2019    Degenerative disc disease, lumbar     Diabetes mellitus (HCC)     PCP Dr. Diane Rodriguez Diverticulosis of sigmoid colon     GERD (gastroesophageal reflux disease)     Hyperlipidemia     Hypertension     Kidney stone     Mitral regurgitation     Obesity      Past Surgical History:   Procedure Laterality Date    ANESTHESIA NERVE BLOCK Bilateral 11/14/2019    Bilateral L3 L4 L5 Diagnostic Medial BB performed by Reese Trivedi MD at [address]  11/2018    CARDIAC CATHETERIZATION  03/01/2018    CARDIAC CATHETERIZATION      Before 2008. No stents placed per pt.     CAROTID ENDARTERECTOMY Left 2019    LEFT CAROTID ENDARTERECTOMY WITH VASCUGUARD PATCH performed by Alcides Naylor MD at [address]      for hemoccult positive stool    COLONOSCOPY  10/2017    diverticuli; Dr Montesinos Felix      umbilical hernia    [address]    softball injury    AR REPLACEMENT OF MITRAL VALVE N/A 10/30/2018    AORTIC  VALVE REPLACEMENT 23 MM INTUITY VALVE, CHUNG OCONNOR, DOREEN performed [facility]egas [facility] History   Problem Relation Age of Onset    Cancer Father         prostate    Alzheimer's Disease Father     Heart Disease Neg Hx     Diabetes Neg Hx     Glaucoma Neg Hx     Macular Degen Neg Hx      Social History     Socioeconomic History    Marital status:       Spouse name: Not on file    Number of children: Not on file    Years of education: Not on file    Highest education level: Not on file   Occupational History    Not on file   Social Needs    Financial resource strain: Not on file    Food insecurity     Worry: Not on file     Inability: Not on file    Transportation needs     Medical: Not on file     Non-medical: Not on file   Tobacco Use    Smoking status: Former Smoker     Packs/day: 1.50     Years: 40.00     Pack years: 60.00     Types: Cigarettes     Last attempt to quit:      Years since quittin.8    Smokeless tobacco: Never Used   Substance and Sexual Activity    Alcohol use: No    Drug use: No    Sexual activity: Not on file   Lifestyle    Physical activity     Days per week: Not on file     Minutes per session: Not on file    Stress: Not on file   Relationships    Social connections     Talks on phone: Not on file     Gets together: Not on file     Attends Oriental orthodox service: Not on file     Active member of club or organization: Not on file     Attends meetings of clubs or organizations: Not on file     Relationship status: Not on file    Intimate partner violence     Fear of current or ex partner: Not on file     Emotionally abused: Not on file     Physically abused: Not on file     Forced sexual activity: Not on file   Other Topics Concern    Not on file   Social History Narrative    Not on file     Allergies   Allergen Reactions    Invokana [Canagliflozin]      dizziness    Januvia [Sitagliptin]      dizziness       Patient Active Problem List   Diagnosis    Mixed hyperlipidemia    Essential hypertension    Esophageal reflux    Family history of prostate cancer    Aortic stenosis, severe    Low back pain with sciatica    Nonrheumatic mitral valve regurgitation    History of aortic valve replacement    Carotid artery stenosis    Anticoagulated on Coumadin    DIA (dyspnea on exertion)    Obesity    Type 2 diabetes mellitus ([address]    Hypomagnesemia    Hypertensive retinopathy of both eyes    Epiretinal membrane (ERM) of both eyes    Combined forms of age-related cataract of both eyes    Panniculitis    Lumbar facet joint syndrome    Nonrheumatic tricuspid valve regurgitation    History of carotid endarterectomy       Health Maintenance   Topic Date Due    DTaP/Tdap/Td vaccine (1 - Tdap) 11/15/1963    Shingles Vaccine (1 of 2) 11/15/1994    Diabetic retinal exam  07/22/2020    Flu vaccine (1) 10/06/2021 (Originally 9/1/2020)    Annual Wellness Visit (AWV)  11/03/2022 (Originally 5/29/2019)    Pneumococcal 65+ years Vaccine (1 of 1 - PPSV23) 10/26/2025 (Originally 11/15/2009)    Diabetic foot exam  10/06/2021    A1C test (Diabetic or Prediabetic)  10/06/2021    Lipid screen  10/27/2021    TSH testing  10/27/2021    Potassium monitoring  10/27/2021    Creatinine monitoring  10/27/2021    Colon cancer screen colonoscopy  10/09/2027    AAA screen  Completed    Hepatitis A vaccine  Aged Out    Hib vaccine  Aged Out    Meningococcal (ACWY) vaccine  Aged Out     Subjective:     Review of Systems   Constitutional: Negative for chills, diaphoresis and fever. HENT: Negative. Eyes: Negative. Respiratory: Negative for cough, shortness of breath and wheezing. Cardiovascular: Negative for chest pain and leg swelling. Gastrointestinal: Negative for abdominal pain, constipation, diarrhea and nausea. Endocrine: Negative for cold intolerance and heat intolerance. Genitourinary: Negative. Nocturia x2, doing well with Flomax. Musculoskeletal: Negative for arthralgias and myalgias. Skin: Negative. Allergic/Immunologic: Negative for environmental allergies. Neurological: Negative for focal weakness and light-headedness. Psychiatric/Behavioral: Negative for agitation, decreased concentration, dysphoric mood, self-injury, sleep disturbance and suicidal ideas. Objective:     Blood pressure 134/70, pulse 68, weight 208 lb 11.2 oz (94.7 kg), SpO2 96 %. Estimated body mass index is 35.82 kg/m² as calculated from the following:    Height as of 12/16/19: 5' 4\" (1.626 m). Weight as of this encounter: 208 lb 11.2 oz (94.7 kg). Physical Exam  Constitutional:       Appearance: Normal appearance. He is well-developed and well-groomed. HENT:      Head: Normocephalic. Nose: Nose normal.   Eyes:      Conjunctiva/sclera: Conjunctivae normal.      Pupils: Pupils are equal, round, and reactive to light. Neck:      Musculoskeletal: Neck supple. Thyroid: No thyromegaly. Vascular: No carotid bruit or JVD. Cardiovascular:      Rate and Rhythm: Normal rate and regular rhythm. Heart sounds: Normal heart sounds. Pulmonary:      Effort: Pulmonary effort is normal. No respiratory distress. Breath sounds: Normal breath sounds. No wheezing. Abdominal:      General: Bowel sounds are normal.      Palpations: Abdomen is soft. Tenderness: There is no abdominal tenderness. Musculoskeletal:      Right lower leg: No edema. Left lower leg: No edema.    Lymphadenopathy:      Cervical: No cervical adenopathy. Skin:     Capillary Refill: Capillary refill takes less than 2 seconds. Neurological:      Mental Status: He is alert and oriented to person, place, and time. Psychiatric:         Mood and Affect: Mood normal.         Behavior: Behavior is cooperative. PHQ Scores 1/7/2020 9/26/2019 2/15/2019 6/1/2018 5/10/2017   PHQ2 Score 0 0 1 0 0   PHQ9 Score 0 0 1 0 0     Interpretation of Total Score Depression Severity: 1-4 = Minimal depression, 5-9 = Mild depression, 10-14 = Moderate depression, 15-19 = Moderately severe depression, 20-27 = Severe depression     Lab Results   Component Value Date    LABA1C 6.4 10/06/2020    LABA1C 6.0 06/08/2020    LABA1C 11.8 12/16/2019     Lab Results   Component Value Date    LABMICR 28 (H) 01/28/2020    LDLCALC 85.2 05/21/2019    CREATININE 1.07 10/27/2020     Lab Results   Component Value Date    WBC 9.1 12/16/2019    HGB 13.5 12/16/2019    HCT 40.0 (L) 12/16/2019    MCV 91.2 12/16/2019     12/16/2019     Lab Results   Component Value Date     10/27/2020    K 4.7 10/27/2020    CL 99 10/27/2020    CO2 27 10/27/2020    BUN 22 10/27/2020    CREATININE 1.07 10/27/2020    GLUCOSE 170 (H) 10/27/2020    CALCIUM 9.8 10/27/2020    PROT 6.8 10/27/2020    LABALBU 4.2 10/27/2020    BILITOT 0.54 10/27/2020    ALKPHOS 63 10/27/2020    AST 37 10/27/2020    ALT 38 10/27/2020    LABGLOM >60 10/27/2020    GFRAA >60 10/27/2020     No results found for: PSA, PSADIA      Assessment:     1. Encounter to establish care    2. Essential hypertension    3. Type 2 diabetes mellitus with hyperglycemia, without long-term current use of insulin ([facility] [address]    4. Mixed hyperlipidemia    5. Hypertensive retinopathy of both eyes    6. Family history of prostate cancer    7. Nonrheumatic mitral valve regurgitation    8. Aortic stenosis, severe    9. Aortic valve replaced    10. Anticoagulated on Coumadin    11. Combined forms of age-related cataract of both eyes    12.  Gastroesophageal reflux disease, unspecified whether esophagitis present    13. DIA (dyspnea on exertion)    14. Lumbar facet joint syndrome    15. Nonrheumatic tricuspid valve regurgitation    16. Bilateral carotid artery stenosis    17. History of carotid endarterectomy          Plan:       Orders Placed This Encounter   Procedures    TSH With Reflex Ft4     Standing Status:   Future     Number of Occurrences:   1     Standing Expiration Date:   10/24/2021    Comprehensive Metabolic Panel     Standing Status:   Future     Number of Occurrences:   1     Standing Expiration Date:   10/26/2021    Lipid, Fasting     Standing Status:   Future     Number of Occurrences:   1     Standing Expiration Date:   10/26/2021        Patient given educational materials - see patient instructions. Encouraged healthy diet and routine exercise. Instructed to continue current medications. All patient questions answered. Ptvoiced understanding. Health Maintenance reviewed. Patient agreed with treatment plan. Follow up as directed. Return in about 6 months (around 4/26/2021).      Electronically signed by PAIGE Tomas CNP on 11/3/2020 at 11:03 AM.

## 2020-10-26 ENCOUNTER — OFFICE VISIT (OUTPATIENT)
Dept: FAMILY MEDICINE CLINIC | Age: 75
End: 2020-10-26
Payer: MEDICARE

## 2020-10-26 VITALS
OXYGEN SATURATION: 96 % | SYSTOLIC BLOOD PRESSURE: 134 MMHG | WEIGHT: 208.7 LBS | DIASTOLIC BLOOD PRESSURE: 70 MMHG | BODY MASS INDEX: 35.82 KG/M2 | HEART RATE: 68 BPM

## 2020-10-26 PROCEDURE — 99214 OFFICE O/P EST MOD 30 MIN: CPT

## 2020-10-26 PROCEDURE — 3017F COLORECTAL CA SCREEN DOC REV: CPT | Performed by: NURSE PRACTITIONER

## 2020-10-26 PROCEDURE — G8427 DOCREV CUR MEDS BY ELIG CLIN: HCPCS | Performed by: NURSE PRACTITIONER

## 2020-10-26 PROCEDURE — 99214 OFFICE O/P EST MOD 30 MIN: CPT | Performed by: NURSE PRACTITIONER

## 2020-10-26 PROCEDURE — 1123F ACP DISCUSS/DSCN MKR DOCD: CPT | Performed by: NURSE PRACTITIONER

## 2020-10-26 PROCEDURE — 1036F TOBACCO NON-USER: CPT | Performed by: NURSE PRACTITIONER

## 2020-10-26 PROCEDURE — 4040F PNEUMOC VAC/ADMIN/RCVD: CPT | Performed by: NURSE PRACTITIONER

## 2020-10-26 PROCEDURE — 2022F DILAT RTA XM EVC RTNOPTHY: CPT | Performed by: NURSE PRACTITIONER

## 2020-10-26 PROCEDURE — 3044F HG A1C LEVEL LT 7.0%: CPT | Performed by: NURSE PRACTITIONER

## 2020-10-26 PROCEDURE — G8484 FLU IMMUNIZE NO ADMIN: HCPCS | Performed by: NURSE PRACTITIONER

## 2020-10-26 PROCEDURE — G8417 CALC BMI ABV UP PARAM F/U: HCPCS | Performed by: NURSE PRACTITIONER

## 2020-10-27 ENCOUNTER — HOSPITAL ENCOUNTER (OUTPATIENT)
Age: 75
Setting detail: SPECIMEN
Discharge: HOME | End: 2020-10-27
Payer: MEDICARE

## 2020-10-27 LAB
ALBUMIN SERPL-MCNC: 4.2 G/DL (ref 3.5–5.2)
ALBUMIN/GLOBULIN RATIO: 1.6 (ref 1–2.5)
ALP BLD-CCNC: 63 U/L (ref 40–129)
ALT SERPL-CCNC: 38 U/L (ref 5–41)
ANION GAP SERPL CALCULATED.3IONS-SCNC: 10 MMOL/L (ref 9–17)
AST SERPL-CCNC: 37 U/L
BILIRUB SERPL-MCNC: 0.54 MG/DL (ref 0.3–1.2)
BUN BLDV-MCNC: 22 MG/DL (ref 8–23)
BUN/CREAT BLD: 21 (ref 9–20)
CALCIUM SERPL-MCNC: 9.8 MG/DL (ref 8.6–10.4)
CHLORIDE BLD-SCNC: 99 MMOL/L (ref 98–107)
CO2: 27 MMOL/L (ref 20–31)
CREAT SERPL-MCNC: 1.07 MG/DL (ref 0.7–1.2)
GFR AFRICAN AMERICAN: >60 ML/MIN
GFR NON-AFRICAN AMERICAN: >60 ML/MIN
GFR SERPL CREATININE-BSD FRML MDRD: ABNORMAL ML/MIN/{1.73_M2}
GFR SERPL CREATININE-BSD FRML MDRD: ABNORMAL ML/MIN/{1.73_M2}
GLUCOSE BLD-MCNC: 170 MG/DL (ref 70–99)
POTASSIUM SERPL-SCNC: 4.7 MMOL/L (ref 3.7–5.3)
SODIUM BLD-SCNC: 136 MMOL/L (ref 135–144)
TOTAL PROTEIN: 6.8 G/DL (ref 6.4–8.3)
TSH SERPL DL<=0.05 MIU/L-ACNC: 1.07 MIU/L (ref 0.3–5)

## 2020-10-27 PROCEDURE — 36415 COLL VENOUS BLD VENIPUNCTURE: CPT

## 2020-10-27 PROCEDURE — 84443 ASSAY THYROID STIM HORMONE: CPT

## 2020-10-27 PROCEDURE — 80053 COMPREHEN METABOLIC PANEL: CPT

## 2020-10-27 PROCEDURE — 80061 LIPID PANEL: CPT

## 2020-10-28 LAB
CHOLESTEROL, FASTING: 154 MG/DL
CHOLESTEROL/HDL RATIO: 3.5
HDLC SERPL-MCNC: 44 MG/DL
LDL CHOLESTEROL: 75 MG/DL (ref 0–130)
TRIGLYCERIDE, FASTING: 176 MG/DL
VLDLC SERPL CALC-MCNC: ABNORMAL MG/DL (ref 1–30)

## 2020-10-29 ENCOUNTER — HOSPITAL ENCOUNTER (OUTPATIENT)
Dept: PHARMACY | Age: 75
Setting detail: THERAPIES SERIES
Discharge: HOME | End: 2020-10-29
Payer: MEDICARE

## 2020-10-29 LAB
INR BLD: 3.9
PROTIME: 47.3 SECONDS

## 2020-10-29 PROCEDURE — 99211 OFF/OP EST MAY X REQ PHY/QHP: CPT

## 2020-10-29 PROCEDURE — 36416 COLLJ CAPILLARY BLOOD SPEC: CPT

## 2020-10-29 PROCEDURE — 85610 PROTHROMBIN TIME: CPT

## 2020-10-29 NOTE — PROGRESS NOTES
ANTICOAGULATION SERVICE    Date of Clinic Visit:  13/85/0146    Majo Montes De Oca is a 75 y.o. male who presents to clinic today for anticoagulation monitoring and adjustment. Curbside visit. Recent INR Results:  Internal QC passed  Lab Results   Component Value Date    INR 2.8 10/01/2020    INR 4 09/03/2020       Current Warfarin Dosage:  Dosing Plan  As of 10/29/2020    TTR:   32.7 % (5.4 mo)   Full warfarin instructions:   2 mg every Tue, Thu, Sat; 4 mg all other days               Assessment/Plan:    Modify warfarin dose as noted above: INR is elevated today. I will decrease weekly dose by 8% and recheck in 2 weeks. Next Clinic Appointment:  Return date  As of 10/29/2020    TTR:   35.7 % (4.5 mo)   Next INR check:                Please call [facility] at [phone] with any questions. Thanks! LM Talley [facility]  Anticoagulation Service Pharmacist  10/29/2020 12:55 PM     CLINICAL PHARMACY CONSULT: MED RECONCILIATION/REVIEW ADDENDUM    For Pharmacy Admin Tracking Only    PHSO: Yes  Total # of Interventions Recommended: 1  - Decreased Dose #: 1  - Maintenance Safety Lab Monitoring #: 1  Recommended intervention potential cost savings:   Accepted intervention potential cost savings:    Total Interventions Accepted: 1  Time Spent (min): 15    David Rome, [address] [place] [address]

## 2020-11-03 ASSESSMENT — ENCOUNTER SYMPTOMS
SHORTNESS OF BREATH: 0
EYES NEGATIVE: 1
COUGH: 0
DIARRHEA: 0
ABDOMINAL PAIN: 0
NAUSEA: 0
WHEEZING: 0
CONSTIPATION: 0

## 2020-11-12 ENCOUNTER — HOSPITAL ENCOUNTER (OUTPATIENT)
Dept: PHARMACY | Age: 75
Setting detail: THERAPIES SERIES
Discharge: HOME | End: 2020-11-12
Payer: MEDICARE

## 2020-11-12 LAB
INR BLD: 2.4
PROTIME: 29.1 SECONDS

## 2020-11-12 PROCEDURE — 36416 COLLJ CAPILLARY BLOOD SPEC: CPT

## 2020-11-12 PROCEDURE — 85610 PROTHROMBIN TIME: CPT

## 2020-11-12 PROCEDURE — 99211 OFF/OP EST MAY X REQ PHY/QHP: CPT

## 2020-11-12 NOTE — PROGRESS NOTES
ANTICOAGULATION SERVICE    Date of Clinic Visit:  50/73/5830    Leandrew Camper Edgardo Soulier is a 75 y.o. male who presents to clinic today for anticoagulation monitoring and adjustment. Curbside visit    Recent INR Results:  Internal QC passed  Lab Results   Component Value Date    INR 2.4 11/12/2020    INR 3.9 10/29/2020       Current Warfarin Dosage:  Dosing Plan  As of 11/12/2020    TTR:   33.3 % (5.9 mo)   Full warfarin instructions:   2 mg every Tue, Sat; 4 mg all other days               Assessment/Plan:    Modify warfarin dose as noted above: INR is back in range today but dropped significantly from 2 weeks ago after dose adjustment. I will increase dose back up to old dose as I believe current dose will drop INR too low. Next Clinic Appointment:  Return date  As of 11/12/2020    TTR:   33.3 % (5.9 mo)   Next INR check:   12/10/2020             Please call [facility]agulation [facility] at [phone] with any questions. Thanks! Antoinette Lord, [address] [facility]  Anticoagulation Service Pharmacist  11/12/2020 1:16 PM     CLINICAL PHARMACY CONSULT: MED RECONCILIATION/REVIEW ADDENDUM    For Pharmacy Admin Tracking Only    PHSO: Yes  Total # of Interventions Recommended: 1  - Increased Dose #: 1  - Maintenance Safety Lab Monitoring #: 1  Recommended intervention potential cost savings:   Accepted intervention potential cost savings:    Total Interventions Accepted: 1  Time Spent (min): 15    Antoinette Lord, [address] [facility]

## 2020-11-30 RX ORDER — DULAGLUTIDE 1.5 MG/.5ML
INJECTION, SOLUTION SUBCUTANEOUS
Qty: 2 ML | Refills: 3 | Status: SHIPPED | OUTPATIENT
Start: 2020-11-30 | End: 2021-03-16

## 2020-12-10 ENCOUNTER — HOSPITAL ENCOUNTER (OUTPATIENT)
Dept: PHARMACY | Age: 75
Setting detail: THERAPIES SERIES
Discharge: HOME | End: 2020-12-10
Payer: MEDICARE

## 2020-12-10 LAB
INR BLD: 3.5
PROTIME: 42.1 SECONDS

## 2020-12-10 PROCEDURE — 85610 PROTHROMBIN TIME: CPT

## 2020-12-10 PROCEDURE — 36416 COLLJ CAPILLARY BLOOD SPEC: CPT

## 2020-12-10 PROCEDURE — 99211 OFF/OP EST MAY X REQ PHY/QHP: CPT

## 2020-12-10 NOTE — PROGRESS NOTES
ANTICOAGULATION SERVICE    Date of Clinic Visit:  64/90/9668    Jeffry Bardales is a 67 y.o. male who presents to clinic today for anticoagulation monitoring and adjustment. Curbside visit    Recent INR Results:  Internal QC passed  Lab Results   Component Value Date    INR 2.4 11/12/2020    INR 3.9 10/29/2020       Current Warfarin Dosage:  Dosing Plan  As of 12/10/2020    TTR:   33.3 % (5.9 mo)               Assessment/Plan:    Modify warfarin dose as noted above: INR is high again today after increasing dose back up at last visit. I will decrease dose down 8%. If INR drops too much at next visit, I will order different strength tablets to make smaller adjustment in dose. Next Clinic Appointment:  Return date  As of 12/10/2020    TTR:   33.3 % (5.9 mo)   Next INR check:                Please call [facility] at [phone] with any questions. Thanks! LM Paez [facility]  Anticoagulation Service Pharmacist  12/10/2020 1:18 PM     CLINICAL PHARMACY CONSULT: MED RECONCILIATION/REVIEW ADDENDUM    For Pharmacy Admin Tracking Only    PHSO: Yes  Total # of Interventions Recommended: 1  - Decreased Dose #: 1  - Maintenance Safety Lab Monitoring #: 1  Recommended intervention potential cost savings:   Accepted intervention potential cost savings:    Total Interventions Accepted: 1  Time Spent (min): 15    Shakeel Kim, [address] [place] [address]

## 2020-12-28 RX ORDER — OMEPRAZOLE 20 MG/1
CAPSULE, DELAYED RELEASE ORAL
Qty: 90 CAPSULE | Refills: 1 | Status: SHIPPED | OUTPATIENT
Start: 2020-12-28 | End: 2021-06-29 | Stop reason: SDUPTHER

## 2020-12-28 RX ORDER — WARFARIN SODIUM 4 MG/1
TABLET ORAL
Qty: 90 TABLET | Refills: 1 | Status: SHIPPED | OUTPATIENT
Start: 2020-12-28 | End: 2021-07-06 | Stop reason: SDUPTHER

## 2020-12-28 NOTE — TELEPHONE ENCOUNTER
Hardy Juan is calling to request a refill on the following medication(s):  Requested Prescriptions     Pending Prescriptions Disp Refills    metFORMIN (GLUCOPHAGE) 500 MG tablet 120 tablet 0     Sig: Take 2 tablets by mouth 2 times daily (with meals)    rosuvastatin (CRESTOR) 20 MG tablet 90 tablet 3     Sig: Take 1 tablet by mouth daily       Last Visit Date (If Applicable):  76/17/4054    Next Visit Date:    3/10/0185    Sammie Horton is calling to request a refill on the following medication(s):  Requested Prescriptions     Pending Prescriptions Disp Refills    metFORMIN (GLUCOPHAGE) 500 MG tablet 120 tablet 0     Sig: Take 2 tablets by mouth 2 times daily (with meals)       Last Visit Date (If Applicable):  90/52/3866    Next Visit Date:    4/27/2021

## 2020-12-29 RX ORDER — ROSUVASTATIN CALCIUM 20 MG/1
20 TABLET, COATED ORAL DAILY
Qty: 90 TABLET | Refills: 3 | Status: SHIPPED | OUTPATIENT
Start: 2020-12-29 | End: 2021-10-01

## 2020-12-31 RX ORDER — GLIMEPIRIDE 4 MG/1
TABLET ORAL
Qty: 90 TABLET | Refills: 1 | Status: SHIPPED | OUTPATIENT
Start: 2020-12-31 | End: 2021-07-06 | Stop reason: SDUPTHER

## 2020-12-31 NOTE — TELEPHONE ENCOUNTER
Hardy Juan is requesting a refill on the following medication(s):  Requested Prescriptions     Pending Prescriptions Disp Refills    glimepiride (AMARYL) 4 MG tablet [Pharmacy Med Name: GLIMEPIRIDE 4MG TABLETS] 90 tablet 1     Sig: TAKE 1 TABLET BY MOUTH EVERY MORNING BEFORE BREAKFAST       Last Visit Date (If Applicable):  0/1/6206    Next Visit Date:    Visit date not found

## 2021-01-05 ENCOUNTER — OFFICE VISIT (OUTPATIENT)
Dept: DIABETES SERVICES | Age: 76
End: 2021-01-05
Payer: MEDICARE

## 2021-01-05 VITALS
WEIGHT: 210 LBS | RESPIRATION RATE: 16 BRPM | BODY MASS INDEX: 35.85 KG/M2 | DIASTOLIC BLOOD PRESSURE: 68 MMHG | HEIGHT: 64 IN | HEART RATE: 72 BPM | SYSTOLIC BLOOD PRESSURE: 124 MMHG

## 2021-01-05 DIAGNOSIS — E78.2 MIXED HYPERLIPIDEMIA: ICD-10-CM

## 2021-01-05 DIAGNOSIS — E11.9 TYPE 2 DIABETES MELLITUS WITHOUT COMPLICATION, WITHOUT LONG-TERM CURRENT USE OF INSULIN (HCC): Primary | ICD-10-CM

## 2021-01-05 DIAGNOSIS — E66.01 CLASS 2 SEVERE OBESITY DUE TO EXCESS CALORIES WITH SERIOUS COMORBIDITY AND BODY MASS INDEX (BMI) OF 37.0 TO 37.9 IN ADULT (HCC): ICD-10-CM

## 2021-01-05 DIAGNOSIS — I10 ESSENTIAL HYPERTENSION: ICD-10-CM

## 2021-01-05 DIAGNOSIS — Z71.89 DIABETES EDUCATION, ENCOUNTER FOR: ICD-10-CM

## 2021-01-05 PROCEDURE — 1036F TOBACCO NON-USER: CPT | Performed by: NURSE PRACTITIONER

## 2021-01-05 PROCEDURE — G8427 DOCREV CUR MEDS BY ELIG CLIN: HCPCS | Performed by: NURSE PRACTITIONER

## 2021-01-05 PROCEDURE — 1123F ACP DISCUSS/DSCN MKR DOCD: CPT | Performed by: NURSE PRACTITIONER

## 2021-01-05 PROCEDURE — 4040F PNEUMOC VAC/ADMIN/RCVD: CPT | Performed by: NURSE PRACTITIONER

## 2021-01-05 PROCEDURE — 83036 HEMOGLOBIN GLYCOSYLATED A1C: CPT | Performed by: NURSE PRACTITIONER

## 2021-01-05 PROCEDURE — G8484 FLU IMMUNIZE NO ADMIN: HCPCS | Performed by: NURSE PRACTITIONER

## 2021-01-05 PROCEDURE — G8417 CALC BMI ABV UP PARAM F/U: HCPCS | Performed by: NURSE PRACTITIONER

## 2021-01-05 PROCEDURE — 99214 OFFICE O/P EST MOD 30 MIN: CPT

## 2021-01-05 PROCEDURE — 99213 OFFICE O/P EST LOW 20 MIN: CPT | Performed by: NURSE PRACTITIONER

## 2021-01-05 ASSESSMENT — ENCOUNTER SYMPTOMS
SHORTNESS OF BREATH: 0
RESPIRATORY NEGATIVE: 1
DIARRHEA: 0
ABDOMINAL PAIN: 0

## 2021-01-05 NOTE — PROGRESS NOTES
[address], [address]  [facility] [address] [phone]        HISTORY:    Sae Henry presents today for evaluation and management of:  Chief Complaint   Patient presents with    Diabetes     3 month appt       Diabetes  He presents for his follow-up diabetic visit. He has type 2 diabetes mellitus. His disease course has been fluctuating. Hypoglycemia symptoms include hunger and tremors. Pertinent negatives for hypoglycemia include no confusion, dizziness, headaches or seizures. (Frequently at night and mid day. ) There are no diabetic associated symptoms. Pertinent negatives for diabetes include no chest pain, no polydipsia, no polyphagia and no polyuria. There are no hypoglycemic complications. Symptoms are stable. Diabetic complications include heart disease. Pertinent negatives for diabetic complications include no CVA, nephropathy, peripheral neuropathy or retinopathy. Risk factors for coronary artery disease include diabetes mellitus, dyslipidemia, family history, hypertension, male sex, obesity, sedentary lifestyle and stress. Current diabetic treatment includes oral agent (monotherapy). He is compliant with treatment all of the time. His weight is stable. He is following a generally unhealthy diet. When asked about meal planning, he reported none. He has not had a previous visit with a dietitian. He rarely participates in exercise. An ACE inhibitor/angiotensin II receptor blocker is being taken. He sees a podiatrist.Eye exam is current. Interval History:    Current Diabetic Medications  amaryl 4 mg daily, metformin 1755 mg BID Trulicity 1.5 mg weekly. He also takes a daily ASA    DKA episodes: 0    2/4/2020  He is here for follow up diabetes management. He is still not testing his blood sugar. I did confirm with the pharmacy that he is taking . 75 mg once weekly.  Pt states he has made a lot of changes in his diet eating mostly non starchy veggies. However today he has a mini cake and frosty.      20      Diet: Healthy processed foods  Exercise: None  BS testing: Refuses to test blood sugars at home due to fear  Issues: Is complaining of symptoms of hypoglycemia frequently during sleeping and midday.     10/06/20   At previous visit we stopped amaryl due to hypoglycemia but was refilled  by pcp   Diet:high salt diet   Exercise: none   BS testing: none  Issues: denies     21   At previous no changes were made. He does c/o of occasional shakiness but does not test bs and resolves quickly   Diet: unchanged   Exercise: none   BS testing: not testing   Issues: denies     High cholesterol-  Takes crestor and denies any adverse effects with its use.  Watches diet and exercise.      Hypertension-  Takes lisinopril and lopressor and denies any adverse effects with their use. Watches diet and exercise. Denies any chest pain, dizziness or edema.  Follows with cardiology:Yes.     Obesity- Working on weight loss.       Past Medical History:   Diagnosis Date    Aortic stenosis     Aortic valve replacement 2019    Degenerative disc disease, lumbar     Diabetes mellitus (HCC)     PCP Dr. Gertrudis Monterroso Diverticulosis of sigmoid colon     GERD (gastroesophageal reflux disease)     Hyperlipidemia     Hypertension     Kidney stone     Mitral regurgitation     Obesity      Family History   Problem Relation Age of Onset    Cancer Father         prostate    Alzheimer's Disease Father     Heart Disease Neg Hx     Diabetes Neg Hx     Glaucoma Neg Hx     Macular Degen Neg Hx      Social History     Tobacco Use    Smoking status: Former Smoker     Packs/day: 1.50     Years: 40.00     Pack years: 60.00     Types: Cigarettes     Quit date:      Years since quittin.0    Smokeless tobacco: Never Used   Substance Use Topics    Alcohol use: No    Drug use: No     Allergies   Allergen Reactions    Invokana [Canagliflozin]      dizziness    Januvia [Sitagliptin]      dizziness       MEDICATIONS:  Current Outpatient Medications   Medication Sig Dispense Refill    glimepiride (AMARYL) 4 MG tablet TAKE 1 TABLET BY MOUTH EVERY MORNING BEFORE BREAKFAST 90 tablet 1    metFORMIN (GLUCOPHAGE) 500 MG tablet Take 2 tablets by mouth 2 times daily (with meals) 120 tablet 3    rosuvastatin (CRESTOR) 20 MG tablet Take 1 tablet by mouth daily 90 tablet 3    warfarin (COUMADIN) 4 MG tablet TAKE 1 TABLET BY MOUTH EVERY DAY 90 tablet 1    omeprazole (PRILOSEC) 20 MG delayed release capsule TAKE 1 CAPSULE BY MOUTH DAILY 90 capsule 1    TRULICITY 1.5 SN/5.7HS SOPN ADMINISTER 0.5 ML UNDER THE SKIN 1 TIME A WEEK 2 mL 3    lisinopril-hydroCHLOROthiazide (PRINZIDE;ZESTORETIC) 10-12.5 MG per tablet Take 1 tablet by mouth daily 90 tablet 1    tamsulosin (FLOMAX) 0.4 MG capsule Take 1 capsule by mouth daily 90 capsule 5    amiodarone (CORDARONE) 200 MG tablet Take 1 tablet by mouth daily 90 tablet 3    metoprolol tartrate (LOPRESSOR) 25 MG tablet TAKE 1 TABLET BY MOUTH TWICE DAILY 180 tablet 3    aspirin 81 MG tablet Take 1 tablet by mouth daily Pt.will stop 10-30 -18 AM for OR (Patient taking differently: Take 81 mg by mouth daily ) 30 tablet 3    Multiple Vitamins-Minerals (THERAPEUTIC MULTIVITAMIN-MINERALS) tablet Take 1 tablet by mouth daily (with breakfast) 30 tablet 3    blood glucose monitor strips by Other route daily Test 1 times a day & as needed for symptoms of irregular blood glucose. 50 strip 3    Blood Glucose Monitoring Suppl KIT Meter as covered by insurance also test strips and lancets.  1 kit 3    Lancets MISC 1 each by Does not apply route daily 50 each 3    Elastic Bandages & Supports (JOBST KNEE HIGH COMPRESSION SM) MISC 1 each by Does not apply route daily as needed (leg swelling/CHF) 2 each 1    Blood Pressure Monitoring (BLOOD PRESSURE CUFF) MISC 1 Device by Does not apply route daily 1 each 0    blood glucose monitor strips Test 1 times a day & as needed for symptoms of irregular blood glucose. 100 strip 3     No current facility-administered medications for this visit. Review ofSymptoms:  Review of Systems   Constitutional: Negative for unexpected weight change. Eyes: Negative for visual disturbance. Respiratory: Negative. Negative for shortness of breath. Cardiovascular: Negative for chest pain and leg swelling. Gastrointestinal: Negative for abdominal pain and diarrhea. Endocrine: Negative for polydipsia, polyphagia and polyuria. Genitourinary: Negative. Musculoskeletal: Negative. Skin: Negative for rash and wound. Neurological: Positive for tremors. Negative for dizziness, seizures and headaches. Psychiatric/Behavioral: Negative. Negative for confusion and decreased concentration. Theremainder of a complete 14-point review of systems is negative. Vital Signs: /68 (Site: Left Upper Arm, Position: Sitting, Cuff Size: Medium Adult)   Pulse 72   Resp 16   Ht 5' 4\" (1.626 m)   Wt 210 lb (95.3 kg)   BMI 36.05 kg/m²      Wt Readings from Last 3 Encounters:   01/05/21 210 lb (95.3 kg)   10/26/20 208 lb 11.2 oz (94.7 kg)   10/06/20 216 lb (98 kg)     Body mass index is 36.05 kg/m².   LABS:  Hemoglobin A1C   Date Value Ref Range Status   10/06/2020 6.4 % Final   06/08/2020 6.0 % Final     Lab Results   Component Value Date    LABMICR 28 (H) 01/28/2020     Lab Results   Component Value Date     10/27/2020    K 4.7 10/27/2020    CL 99 10/27/2020    CO2 27 10/27/2020    BUN 22 10/27/2020    CREATININE 1.07 10/27/2020    GLUCOSE 170 (H) 10/27/2020    CALCIUM 9.8 10/27/2020    PROT 6.8 10/27/2020    LABALBU 4.2 10/27/2020    BILITOT 0.54 10/27/2020    ALKPHOS 63 10/27/2020    AST 37 10/27/2020    ALT 38 10/27/2020    LABGLOM >60 10/27/2020    GFRAA >60 10/27/2020     Lab Results   Component Value Date    CHOL 126 12/16/2019    CHOL 179 05/21/2019    CHOL 337 (H) 02/15/2019     Lab Results   Component Value Date    TRIG 310 (H) 12/16/2019    TRIG 239 05/21/2019    TRIG 289 (H) 02/15/2019     Lab Results   Component Value Date    HDL 44 10/27/2020    HDL 35 (L) 12/16/2019    HDL 42 04/13/2017     Lab Results   Component Value Date    LDLCHOLESTEROL 75 10/27/2020    LDLCHOLESTEROL 29 12/16/2019    LDLCALC 85.2 05/21/2019    LDLCALC 234.2 (H) 02/15/2019    LDLCALC 60.8 02/19/2018     Lab Results   Component Value Date    VLDL NOT REPORTED (H) 10/27/2020    VLDL NOT REPORTED (H) 12/16/2019    VLDL 48 (H) 05/21/2019     Lab Results   Component Value Date    CHOLHDLRATIO 3.5 10/27/2020    CHOLHDLRATIO 3.6 12/16/2019    CHOLHDLRATIO 3.9 05/21/2019           Physical Exam  Constitutional:       Appearance: He is well-developed. Eyes:      Pupils: Pupils are equal, round, and reactive to light. Cardiovascular:      Rate and Rhythm: Normal rate and regular rhythm. Pulmonary:      Effort: Pulmonary effort is normal.      Breath sounds: Normal breath sounds. Skin:     General: Skin is warm and dry. Findings: No lesion (no lipohypertrophy) or rash. Neurological:      Mental Status: He is alert and oriented to person, place, and time. Sensory: No sensory deficit. Psychiatric:         Speech: Speech normal.         Behavior: Behavior normal.         Thought Content: Thought content normal.         Judgment: Judgment normal.           ASSESSMENT/PLAN:     Diagnosis Orders   1. Type 2 diabetes mellitus without complication, without long-term current use of insulin ([address]     2. Diabetes education, encounter for     3. Mixed hyperlipidemia     4. Essential hypertension     5. BMI 37.0-37.9, adult     6. Class 2 severe obesity due to excess calories with serious comorbidity and body mass index (BMI) of 37.0 to 37.9 in adult [facility])       No orders of the defined types were placed in this encounter.     No orders of the defined types were placed in this encounter. Requested Prescriptions      No prescriptions requested or ordered in this encounter       1. Type 2 diabetes mellitus without complication, without long-term current use of insulin ([address]  2. Diabetes education, encounter for  - Stable  HbA1C goal is less than 7% and blood sugars show no change. - Encouraged patient to check BS 1 times per day. Fasting blood glucose goal is 70-130mg/dl and postprandial blood sugar goal is less than 180 mg/dl. -Diabetic foot exam up-to-date: Yes  -Diabetic retinal exam up-to-date: Yes  - Labs reviewed includes: most recent a1c 6.4% GFR >60. Repeat labs now.   -We discussed in great detail dietary modifications they can make to better improve their blood sugars. -follow up diabetes education completed, all questions answered.  -if a1c elevated we can increase trulicity if hypoglycemia occurs we will stop glimepiride. Patient Instructions   Stop by office in 1 week for a1c check           Discussed signs and symptoms of hyper/hypoglycemia and how to treat. Encouraged 150 minutes of physical activity per week. Follow a low carbohydrate diet consuming 45 grams of carbohydrates at breakfast, lunch and dinner with two separate snacks [id number] grams of carbohydrates. Encouraged at least 7 hours of sleep. The patient was informed of the goals of diabetes management. This can only be accomplished by watching their diet and exercise levels. We certainly use medicines to help attain these goals. The consequences of not controlling blood sugars were discussed. These include blindness, heart disease, stroke, kidney disease, and possibly need for dialysis. They were told to be careful with their foot care as diabetics often have nerve damage, infections and risk for limb amutations . They also need a dilated eye exam yearly.  We discussed the issues of diet, exercise, medication, complication avoidance, reviewed the signs and symptoms of diabetes, hypoglycemic episodes, significance of HbA1C.       3. Mixed hyperlipidemia  stable, lipid panel reviewed, continue current medications. Diet and exercise      4. Essential hypertension  stable, lipid panel reviewed, continue current medications. Diet and exercise      5. BMI 37.0-37.9, adult  6. Class 2 severe obesity due to excess calories with serious comorbidity and body mass index (BMI) of 37.0 to 37.9 in adult [facility])  Reduce calories and increase physical activity to achieve a slow and steady weight loss to improve blood pressure, cholesterol and diabetes. Answered all patient questions. Agrees to follow plan of care and to follow up in 3 months, sooner if needed. Call office if unexplained blood sugars less than 70 occur or above 400. Call office or access [employer]t with any further questions or concerns. Be sure to bring glucometer/food log at next appointment.        Total time spent reviewing chart, labs, counseling patient and documenting on the date of the encounter: 20 min     Electronically signed by PAIGE Yun CNP on 1/5/2021 at 12:27 PM      (Please note that portions of this note were completed with a voice-recognition program. Efforts were made to edit the dictation but occasionally words are mis-transcribed.)

## 2021-01-07 ENCOUNTER — HOSPITAL ENCOUNTER (OUTPATIENT)
Dept: PHARMACY | Age: 76
Setting detail: THERAPIES SERIES
Discharge: HOME | End: 2021-01-07
Payer: MEDICARE

## 2021-01-07 DIAGNOSIS — Z95.2 HISTORY OF AORTIC VALVE REPLACEMENT: ICD-10-CM

## 2021-01-07 DIAGNOSIS — Z79.01 ANTICOAGULATED ON COUMADIN: ICD-10-CM

## 2021-01-07 LAB
INR BLD: 1.2
PROTIME: 14.4 SECONDS

## 2021-01-07 PROCEDURE — 99211 OFF/OP EST MAY X REQ PHY/QHP: CPT

## 2021-01-07 PROCEDURE — 36416 COLLJ CAPILLARY BLOOD SPEC: CPT

## 2021-01-07 PROCEDURE — 85610 PROTHROMBIN TIME: CPT

## 2021-01-07 NOTE — PROGRESS NOTES
ANTICOAGULATION SERVICE    Date of Clinic Visit:  5/9/9817    Sofia Hurst is a 67 y.o. male who presents to clinic today for anticoagulation monitoring and adjustment. Recent INR Results:  Internal QC passed  Lab Results   Component Value Date    INR 1.2 01/07/2021    INR 3.5 12/10/2020       Current Warfarin Dosage:  Dosing Plan  As of 1/7/2021    TTR:  37.1 % (7.8 mo)   Full warfarin instructions:  2 mg every Tue, Sat; 4 mg all other days               Assessment/Plan:    Modify warfarin dose as noted above: INR is low today. Darian said he did not miss any doses. I decreased his dose at last visit due to high INR. I will increase weekly dose by 2 mg. Recheck 4 weeks. Next Clinic Appointment:  Return date  As of 1/7/2021    TTR:  37.1 % (7.8 mo)   Next INR check:  2/4/2021             Please call [facility] at [phone] with any questions. Thanks! LM Gupta [facility]  Anticoagulation Service Pharmacist  1/7/2021 1:25 PM     CLINICAL PHARMACY CONSULT: MED RECONCILIATION/REVIEW ADDENDUM    For Pharmacy Admin Tracking Only    PHSO: Yes  Total # of Interventions Recommended: 1  - Increased Dose #: 1  - Maintenance Safety Lab Monitoring #: 1  Recommended intervention potential cost savings:   Accepted intervention potential cost savings:    Total Interventions Accepted: 1  Time Spent (min): 15    Ruben Ojeda, [address] [place] [address]

## 2021-01-27 ENCOUNTER — NURSE ONLY (OUTPATIENT)
Dept: FAMILY MEDICINE CLINIC | Age: 76
End: 2021-01-27
Payer: MEDICARE

## 2021-01-27 DIAGNOSIS — E11.65 TYPE 2 DIABETES MELLITUS WITH HYPERGLYCEMIA, WITHOUT LONG-TERM CURRENT USE OF INSULIN (HCC): Primary | ICD-10-CM

## 2021-01-27 LAB — HBA1C MFR BLD: 7 %

## 2021-02-04 ENCOUNTER — HOSPITAL ENCOUNTER (OUTPATIENT)
Dept: PHARMACY | Age: 76
Setting detail: THERAPIES SERIES
Discharge: HOME | End: 2021-02-04
Payer: MEDICARE

## 2021-02-04 DIAGNOSIS — Z95.2 HISTORY OF AORTIC VALVE REPLACEMENT: ICD-10-CM

## 2021-02-04 DIAGNOSIS — Z79.01 ANTICOAGULATED ON COUMADIN: ICD-10-CM

## 2021-02-04 LAB
INR BLD: 1.2
PROTIME: 13.9 SECONDS

## 2021-02-04 PROCEDURE — 85610 PROTHROMBIN TIME: CPT

## 2021-02-04 PROCEDURE — 36416 COLLJ CAPILLARY BLOOD SPEC: CPT

## 2021-02-04 PROCEDURE — 99211 OFF/OP EST MAY X REQ PHY/QHP: CPT

## 2021-02-18 ENCOUNTER — HOSPITAL ENCOUNTER (OUTPATIENT)
Dept: PHARMACY | Age: 76
Setting detail: THERAPIES SERIES
Discharge: HOME | End: 2021-02-18
Payer: MEDICARE

## 2021-02-18 DIAGNOSIS — Z79.01 ANTICOAGULATED ON COUMADIN: ICD-10-CM

## 2021-02-18 DIAGNOSIS — Z95.2 HISTORY OF AORTIC VALVE REPLACEMENT: ICD-10-CM

## 2021-02-18 LAB
INR BLD: 1.9
PROTIME: 22.5 SECONDS

## 2021-02-18 NOTE — PROGRESS NOTES
ANTICOAGULATION SERVICE    Date of Clinic Visit:  9/46/2792    Marlo Brownlee is a 67 y.o. male who presents to clinic today for anticoagulation monitoring and adjustment. Recent INR Results:  Internal QC passed  Lab Results   Component Value Date    INR 1.9 02/18/2021    INR 1.2 02/04/2021       Current Warfarin Dosage:  Dosing Plan  As of 2/18/2021    TTR:  31.4 % (9.2 mo)   Full warfarin instructions:  2 mg every Tue; 4 mg all other days               Assessment/Plan:    Continue current regimen as INR remains stable. INR is just below range today but has increased from 1.2 to 1.9 in past two weeks after dose increase. I will continue same dose and anticipate INR will continue to increase into range. Recheck 2 weeks. Next Clinic Appointment:  Return date  As of 2/18/2021    TTR:  31.4 % (9.2 mo)   Next INR check:  3/4/2021             Please call [facility]agulation Clinic at [phone] with any questions. Thanks! Rashmi Liz [facility]  Anticoagulation Service Pharmacist  2/18/2021 12:44 PM     CLINICAL PHARMACY CONSULT: MED RECONCILIATION/REVIEW ADDENDUM    For Pharmacy Admin Tracking Only    PHSO: Yes  Total # of Interventions Recommended: 0    - Maintenance Safety Lab Monitoring #: 1  Recommended intervention potential cost savings:   Accepted intervention potential cost savings:    Total Interventions Accepted: 0  Time Spent (min): 15    Rashmi Liz, [address] [place] [address]

## 2021-03-09 ENCOUNTER — HOSPITAL ENCOUNTER (OUTPATIENT)
Dept: PHARMACY | Age: 76
Setting detail: THERAPIES SERIES
Discharge: HOME | End: 2021-03-09
Payer: MEDICARE

## 2021-03-09 DIAGNOSIS — Z79.01 ANTICOAGULATED ON COUMADIN: ICD-10-CM

## 2021-03-09 DIAGNOSIS — Z95.2 HISTORY OF AORTIC VALVE REPLACEMENT: ICD-10-CM

## 2021-03-09 LAB — INR BLD: 1

## 2021-03-09 PROCEDURE — 85610 PROTHROMBIN TIME: CPT

## 2021-03-09 PROCEDURE — 99211 OFF/OP EST MAY X REQ PHY/QHP: CPT

## 2021-03-09 PROCEDURE — 36416 COLLJ CAPILLARY BLOOD SPEC: CPT

## 2021-03-09 NOTE — PROGRESS NOTES
ANTICOAGULATION SERVICE    Date of Clinic Visit:  0/9/3685    Elis Boyd is a 67 y.o. male who presents to clinic today for anticoagulation monitoring and adjustment. Curbside visit    Recent INR Results:  Internal QC passed  Lab Results   Component Value Date    INR 1 03/09/2021    INR 1.9 02/18/2021       Current Warfarin Dosage:  Dosing Plan  As of 3/9/2021    TTR:  29.4 % (9.8 mo)   Full warfarin instructions:  3/9: 6 mg; Otherwise 2 mg every Tue; 4 mg all other days               Assessment/Plan:    Modify warfarin dose as noted above: INR is very low today. Davis Arambula has been doing his own medications recently. I asked him to go home and make sure he is taking his warfarin b/c his INR is very low. I gave extra 4 mg today then back on regular dose. Recheck next week to make sure it is increasing. Next Clinic Appointment:  Return date  As of 3/9/2021    TTR:  29.4 % (9.8 mo)   Next INR check:  3/18/2021             Please call [facility] at [phone] with any questions. Thanks! Dar Harris, [address] [facility]  Anticoagulation Service Pharmacist  3/9/2021 1:50 PM     CLINICAL PHARMACY CONSULT: MED RECONCILIATION/REVIEW ADDENDUM    For Pharmacy Admin Tracking Only    PHSO: Yes  Total # of Interventions Recommended: 1  - Increased Dose #: 1  - Maintenance Safety Lab Monitoring #: 1  Recommended intervention potential cost savings:   Accepted intervention potential cost savings:    Total Interventions Accepted: 1  Time Spent (min): 15    Dar Harris, [address] [place] [address]

## 2021-03-16 DIAGNOSIS — E11.9 TYPE 2 DIABETES MELLITUS WITHOUT COMPLICATION, WITHOUT LONG-TERM CURRENT USE OF INSULIN (HCC): ICD-10-CM

## 2021-03-16 RX ORDER — DULAGLUTIDE 1.5 MG/.5ML
INJECTION, SOLUTION SUBCUTANEOUS
Qty: 2 ML | Refills: 3 | Status: SHIPPED | OUTPATIENT
Start: 2021-03-16 | End: 2021-07-06

## 2021-03-18 ENCOUNTER — HOSPITAL ENCOUNTER (OUTPATIENT)
Dept: PHARMACY | Age: 76
Setting detail: THERAPIES SERIES
Discharge: HOME | End: 2021-03-18
Payer: MEDICARE

## 2021-03-18 DIAGNOSIS — Z95.2 HISTORY OF AORTIC VALVE REPLACEMENT: ICD-10-CM

## 2021-03-18 DIAGNOSIS — Z79.01 ANTICOAGULATED ON COUMADIN: ICD-10-CM

## 2021-03-18 LAB — INR BLD: 1.1

## 2021-03-18 PROCEDURE — 36416 COLLJ CAPILLARY BLOOD SPEC: CPT

## 2021-03-18 PROCEDURE — 99211 OFF/OP EST MAY X REQ PHY/QHP: CPT

## 2021-03-18 PROCEDURE — 85610 PROTHROMBIN TIME: CPT

## 2021-03-18 NOTE — PROGRESS NOTES
ANTICOAGULATION SERVICE    Date of Clinic Visit:  7/76/4388    Grayson Potts is a 67 y.o. male who presents to clinic today for anticoagulation monitoring and adjustment. Recent INR Results:  Internal QC passed  Lab Results   Component Value Date    INR 1.1 03/18/2021    INR 1 03/09/2021       Current Warfarin Dosage:  Dosing Plan  As of 3/18/2021    TTR:  28.5 % (10.1 mo)   Full warfarin instructions:  3/18: 6 mg; 3/19: 6 mg; Otherwise 2 mg every Tue; 4 mg all other days                 Assessment/Plan:    Modify warfarin dose as noted above: INR is very low again today. Darian said he thinks he has not been taking his warfarin. He has another blue pill he has been taking instead of his 4 mg warfarin pill. He is going to take medications to his daughter so she can take care of them. I will recheck in 2 weeks and give higher dose today and tomorrow to get INR back in range quicker. Next Clinic Appointment:  Return date  As of 3/18/2021    TTR:  28.5 % (10.1 mo)   Next INR check:  4/1/2021             Please call [facility] at [phone] with any questions. Thanks! Tracy Grissom [facility]  Anticoagulation Service Pharmacist  3/18/2021 1:23 PM     CLINICAL PHARMACY CONSULT: MED RECONCILIATION/REVIEW ADDENDUM    For Pharmacy Admin Tracking Only    PHSO: Yes  Total # of Interventions Recommended: 1  - Increased Dose #: 1  - Maintenance Safety Lab Monitoring #: 1  Recommended intervention potential cost savings:   Accepted intervention potential cost savings:    Total Interventions Accepted: 1  Time Spent (min): [address]

## 2021-03-18 NOTE — PATIENT INSTRUCTIONS
\"On day of next appointment, please screen for temperature and COVID-19 symptoms prior to you clinic appointment. If any symptoms present, please call [phone] to reschedule. \"

## 2021-03-22 DIAGNOSIS — I10 ESSENTIAL HYPERTENSION: Primary | ICD-10-CM

## 2021-03-22 NOTE — TELEPHONE ENCOUNTER
Medina Kathleen is calling to request a refill on the following medication(s):  Requested Prescriptions     Pending Prescriptions Disp Refills    metoprolol tartrate (LOPRESSOR) 25 MG tablet 180 tablet 3     Sig: TAKE 1 TABLET BY MOUTH TWICE DAILY       Last Visit Date (If Applicable):  09/00/5866    Next Visit Date:    4/27/2021

## 2021-04-01 ENCOUNTER — HOSPITAL ENCOUNTER (OUTPATIENT)
Dept: PHARMACY | Age: 76
Setting detail: THERAPIES SERIES
Discharge: HOME | End: 2021-04-01
Payer: MEDICARE

## 2021-04-01 DIAGNOSIS — Z79.01 ANTICOAGULATED ON COUMADIN: ICD-10-CM

## 2021-04-01 DIAGNOSIS — Z95.2 HISTORY OF AORTIC VALVE REPLACEMENT: ICD-10-CM

## 2021-04-01 LAB
INR BLD: 3.4
PROTIME: 40.4 SECONDS

## 2021-04-01 PROCEDURE — 99211 OFF/OP EST MAY X REQ PHY/QHP: CPT

## 2021-04-01 PROCEDURE — 85610 PROTHROMBIN TIME: CPT

## 2021-04-01 PROCEDURE — 36416 COLLJ CAPILLARY BLOOD SPEC: CPT

## 2021-04-01 NOTE — PROGRESS NOTES
ANTICOAGULATION SERVICE    Date of Clinic Visit:  0/3/5036    Celestine Lanes Flonnie Finger is a 67 y.o. male who presents to clinic today for anticoagulation monitoring and adjustment. Recent INR Results:  Internal QC passed  Lab Results   Component Value Date    INR 3.4 04/01/2021    INR 1.1 03/18/2021       Current Warfarin Dosage:  Dosing Plan  As of 4/1/2021    TTR:  29.2 % (10.6 mo)   Full warfarin instructions:  4/1: 2 mg; Otherwise 2 mg every Tue, Fri; 4 mg all other days               Assessment/Plan:    Modify warfarin dose as noted above: INR is slightly high today. Anderson Bass is now taking warfarin correctly. I will decrease weekly dose down 8% to old dose and give only 2 mg today. Next Clinic Appointment:  Return date  As of 4/1/2021    TTR:  29.2 % (10.6 mo)   Next INR check:  4/29/2021             Please call [facility] Anticoagulation Clinic at [phone] with any questions. Thanks! LM Kelly [facility]  Anticoagulation Service Pharmacist  4/1/2021 12:59 PM     CLINICAL PHARMACY CONSULT: MED RECONCILIATION/REVIEW ADDENDUM    For Pharmacy Admin Tracking Only    PHSO: Yes  Total # of Interventions Recommended: 1  - Decreased Dose #: 1  - Maintenance Safety Lab Monitoring #: 1  Recommended intervention potential cost savings:   Accepted intervention potential cost savings:    Total Interventions Accepted: 1  Time Spent (min): 15    Phillip Gudino, [address] [place] [address]

## 2021-04-06 ENCOUNTER — OFFICE VISIT (OUTPATIENT)
Dept: DIABETES SERVICES | Age: 76
End: 2021-04-06
Payer: MEDICARE

## 2021-04-06 VITALS
DIASTOLIC BLOOD PRESSURE: 82 MMHG | WEIGHT: 207 LBS | HEIGHT: 66 IN | HEART RATE: 64 BPM | SYSTOLIC BLOOD PRESSURE: 132 MMHG | BODY MASS INDEX: 33.27 KG/M2 | RESPIRATION RATE: 20 BRPM

## 2021-04-06 DIAGNOSIS — Z71.89 DIABETES EDUCATION, ENCOUNTER FOR: ICD-10-CM

## 2021-04-06 DIAGNOSIS — I10 ESSENTIAL HYPERTENSION: ICD-10-CM

## 2021-04-06 DIAGNOSIS — E66.09 CLASS 1 OBESITY DUE TO EXCESS CALORIES WITH SERIOUS COMORBIDITY AND BODY MASS INDEX (BMI) OF 33.0 TO 33.9 IN ADULT: ICD-10-CM

## 2021-04-06 DIAGNOSIS — E11.9 TYPE 2 DIABETES MELLITUS WITHOUT COMPLICATION, WITHOUT LONG-TERM CURRENT USE OF INSULIN (HCC): Primary | ICD-10-CM

## 2021-04-06 DIAGNOSIS — E78.2 MIXED HYPERLIPIDEMIA: ICD-10-CM

## 2021-04-06 PROCEDURE — G8427 DOCREV CUR MEDS BY ELIG CLIN: HCPCS | Performed by: NURSE PRACTITIONER

## 2021-04-06 PROCEDURE — 99214 OFFICE O/P EST MOD 30 MIN: CPT | Performed by: NURSE PRACTITIONER

## 2021-04-06 PROCEDURE — 1123F ACP DISCUSS/DSCN MKR DOCD: CPT | Performed by: NURSE PRACTITIONER

## 2021-04-06 PROCEDURE — 1036F TOBACCO NON-USER: CPT | Performed by: NURSE PRACTITIONER

## 2021-04-06 PROCEDURE — G8417 CALC BMI ABV UP PARAM F/U: HCPCS | Performed by: NURSE PRACTITIONER

## 2021-04-06 PROCEDURE — 3051F HG A1C>EQUAL 7.0%<8.0%: CPT | Performed by: NURSE PRACTITIONER

## 2021-04-06 PROCEDURE — 4040F PNEUMOC VAC/ADMIN/RCVD: CPT | Performed by: NURSE PRACTITIONER

## 2021-04-06 ASSESSMENT — ENCOUNTER SYMPTOMS
RESPIRATORY NEGATIVE: 1
DIARRHEA: 0
SHORTNESS OF BREATH: 0
ABDOMINAL PAIN: 0

## 2021-04-06 NOTE — PROGRESS NOTES
[address], [address]  [place] [address] [place] [address]  [phone]        HISTORY:    Mattie Olivia presents today for evaluation and management of:  Chief Complaint   Patient presents with    Diabetes     3 month follow up. Diabetes  He presents for his follow-up diabetic visit. He has type 2 diabetes mellitus. His disease course has been fluctuating. Hypoglycemia symptoms include hunger and tremors. Pertinent negatives for hypoglycemia include no confusion, dizziness, headaches or seizures. (Frequently at night and mid day. ) There are no diabetic associated symptoms. Pertinent negatives for diabetes include no chest pain, no polydipsia, no polyphagia and no polyuria. There are no hypoglycemic complications. Symptoms are stable. Diabetic complications include heart disease. Pertinent negatives for diabetic complications include no CVA, nephropathy, peripheral neuropathy or retinopathy. Risk factors for coronary artery disease include diabetes mellitus, dyslipidemia, family history, hypertension, male sex, obesity, sedentary lifestyle and stress. Current diabetic treatment includes oral agent (monotherapy). He is compliant with treatment all of the time. His weight is stable. He is following a generally unhealthy diet. When asked about meal planning, he reported none. He has not had a previous visit with a dietitian. He rarely participates in exercise. An ACE inhibitor/angiotensin II receptor blocker is being taken. He sees a podiatrist.Eye exam is current. Interval History:    Past DM Medications   Actos- therapy completed     Current Diabetic Medications  amaryl 4 mg daily, metformin 0356 mg BID Trulicity 1.5 mg weekly.  He also takes a daily ASA    DKA episodes: 0    07/22/20      Diet: Healthy processed foods  Exercise: None  BS testing: Refuses to test blood sugars at home due to fear  Issues: Is complaining of symptoms of hypoglycemia frequently during sleeping and midday.     10/06/20   At previous visit we stopped amaryl due to hypoglycemia but was refilled  by pcp   Diet:high salt diet   Exercise: none   BS testing: none  Issues: denies      21   At previous no changes were made. He does c/o of occasional shakiness but does not test bs and resolves quickly   Diet: unchanged   Exercise: none   BS testing: not testing   Issues: denies     21   At previous visit dm counseling was provided. He is compliant with medications. Diet: smaller frequent meals  Exercise: none   BS testing: none   Issues: denies     High cholesterol-  Takes crestor and denies any adverse effects with its use.  Watches diet and exercise.      Hypertension-  Takes lisinopril and lopressor and denies any adverse effects with their use. Watches diet and exercise. Denies any chest pain, dizziness or edema.  Follows with cardiology:Yes.       Obesity- Working on weight loss.  Trending down      Past Medical History:   Diagnosis Date    Aortic stenosis     Aortic valve replacement 2019    Degenerative disc disease, lumbar     Diabetes mellitus (HCC)     PCP Dr. Anderson Lagunas Diverticulosis of sigmoid colon     GERD (gastroesophageal reflux disease)     Hyperlipidemia     Hypertension     Kidney stone     Mitral regurgitation     Obesity      Family History   Problem Relation Age of Onset    Cancer Father         prostate    Alzheimer's Disease Father     Heart Disease Neg Hx     Diabetes Neg Hx     Glaucoma Neg Hx     Macular Degen Neg Hx      Social History     Tobacco Use    Smoking status: Former Smoker     Packs/day: 1.50     Years: 40.00     Pack years: 60.00     Types: Cigarettes     Quit date:      Years since quittin.2    Smokeless tobacco: Never Used   Substance Use Topics    Alcohol use: No    Drug use: No     Allergies   Allergen Reactions    Invokana [Canagliflozin] dizziness    Januvia [Sitagliptin]      dizziness       MEDICATIONS:  Current Outpatient Medications   Medication Sig Dispense Refill    metoprolol tartrate (LOPRESSOR) 25 MG tablet TAKE 1 TABLET BY MOUTH TWICE DAILY 180 tablet 3    TRULICITY 1.5 PG/7.8OS SOPN ADMINISTER 0.5 ML UNDER THE SKIN 1 TIME A WEEK 2 mL 3    glimepiride (AMARYL) 4 MG tablet TAKE 1 TABLET BY MOUTH EVERY MORNING BEFORE BREAKFAST 90 tablet 1    metFORMIN (GLUCOPHAGE) 500 MG tablet Take 2 tablets by mouth 2 times daily (with meals) 120 tablet 3    rosuvastatin (CRESTOR) 20 MG tablet Take 1 tablet by mouth daily 90 tablet 3    warfarin (COUMADIN) 4 MG tablet TAKE 1 TABLET BY MOUTH EVERY DAY 90 tablet 1    omeprazole (PRILOSEC) 20 MG delayed release capsule TAKE 1 CAPSULE BY MOUTH DAILY 90 capsule 1    lisinopril-hydroCHLOROthiazide (PRINZIDE;ZESTORETIC) 10-12.5 MG per tablet Take 1 tablet by mouth daily 90 tablet 1    tamsulosin (FLOMAX) 0.4 MG capsule Take 1 capsule by mouth daily 90 capsule 5    amiodarone (CORDARONE) 200 MG tablet Take 1 tablet by mouth daily 90 tablet 3    aspirin 81 MG tablet Take 1 tablet by mouth daily Pt.will stop 10-30 -18 AM for OR (Patient taking differently: Take 81 mg by mouth daily ) 30 tablet 3    Multiple Vitamins-Minerals (THERAPEUTIC MULTIVITAMIN-MINERALS) tablet Take 1 tablet by mouth daily (with breakfast) 30 tablet 3    blood glucose monitor strips by Other route daily Test 1 times a day & as needed for symptoms of irregular blood glucose. 50 strip 3    Blood Glucose Monitoring Suppl KIT Meter as covered by insurance also test strips and lancets.  1 kit 3    Lancets MISC 1 each by Does not apply route daily 50 each 3    Elastic Bandages & Supports (JOBST KNEE HIGH COMPRESSION SM) MISC 1 each by Does not apply route daily as needed (leg swelling/CHF) 2 each 1    Blood Pressure Monitoring (BLOOD PRESSURE CUFF) MISC 1 Device by Does not apply route daily 1 each 0    blood glucose monitor strips Test 1 times a day & as needed for symptoms of irregular blood glucose. 100 strip 3     No current facility-administered medications for this visit. Review ofSymptoms:  Review of Systems   Constitutional: Negative for unexpected weight change. Eyes: Negative for visual disturbance. Respiratory: Negative. Negative for shortness of breath. Cardiovascular: Negative for chest pain and leg swelling. Gastrointestinal: Negative for abdominal pain and diarrhea. Endocrine: Negative for polydipsia, polyphagia and polyuria. Genitourinary: Negative. Musculoskeletal: Negative. Skin: Negative for rash and wound. Neurological: Positive for tremors. Negative for dizziness, seizures and headaches. Psychiatric/Behavioral: Negative. Negative for confusion and decreased concentration. Theremainder of a complete 14-point review of systems is negative. Vital Signs: /82 (Site: Left Upper Arm, Position: Sitting, Cuff Size: Medium Adult)   Pulse 64   Resp 20   Ht 5' 6\" (1.676 m)   Wt 207 lb (93.9 kg)   BMI 33.41 kg/m²      Wt Readings from Last 3 Encounters:   04/06/21 207 lb (93.9 kg)   01/05/21 210 lb (95.3 kg)   10/26/20 208 lb 11.2 oz (94.7 kg)     Body mass index is 33.41 kg/m².   LABS:  Hemoglobin A1C   Date Value Ref Range Status   01/27/2021 7.0 % Final   10/06/2020 6.4 % Final     Lab Results   Component Value Date    LABMICR 28 (H) 01/28/2020     Lab Results   Component Value Date     10/27/2020    K 4.7 10/27/2020    CL 99 10/27/2020    CO2 27 10/27/2020    BUN 22 10/27/2020    CREATININE 1.07 10/27/2020    GLUCOSE 170 (H) 10/27/2020    CALCIUM 9.8 10/27/2020    PROT 6.8 10/27/2020    LABALBU 4.2 10/27/2020    BILITOT 0.54 10/27/2020    ALKPHOS 63 10/27/2020    AST 37 10/27/2020    ALT 38 10/27/2020    LABGLOM >60 10/27/2020    GFRAA >60 10/27/2020     Lab Results   Component Value Date    CHOL 126 12/16/2019    CHOL 179 05/21/2019    CHOL 337 (H) 02/15/2019     Lab Results   Component Value Date    TRIG 310 (H) 12/16/2019    TRIG 239 05/21/2019    TRIG 289 (H) 02/15/2019     Lab Results   Component Value Date    HDL 44 10/27/2020    HDL 35 (L) 12/16/2019    HDL 42 04/13/2017     Lab Results   Component Value Date    LDLCHOLESTEROL 75 10/27/2020    LDLCHOLESTEROL 29 12/16/2019    LDLCALC 85.2 05/21/2019    LDLCALC 234.2 (H) 02/15/2019    LDLCALC 60.8 02/19/2018     Lab Results   Component Value Date    VLDL NOT REPORTED (H) 10/27/2020    VLDL NOT REPORTED (H) 12/16/2019    VLDL 48 (H) 05/21/2019     Lab Results   Component Value Date    CHOLHDLRATIO 3.5 10/27/2020    CHOLHDLRATIO 3.6 12/16/2019    CHOLHDLRATIO 3.9 05/21/2019           Physical Exam  Constitutional:       Appearance: He is well-developed. Eyes:      Pupils: Pupils are equal, round, and reactive to light. Cardiovascular:      Rate and Rhythm: Normal rate and regular rhythm. Pulmonary:      Effort: Pulmonary effort is normal.      Breath sounds: Normal breath sounds. Skin:     General: Skin is warm and dry. Findings: No lesion (no lipohypertrophy) or rash. Neurological:      Mental Status: He is alert and oriented to person, place, and time. Sensory: No sensory deficit. Psychiatric:         Speech: Speech normal.         Behavior: Behavior normal.         Thought Content: Thought content normal.         Judgment: Judgment normal.           ASSESSMENT/PLAN:     Diagnosis Orders   1. Type 2 diabetes mellitus without complication, without long-term current use of insulin [facility]     2. Diabetes education, encounter for     3. Mixed hyperlipidemia     4. Essential hypertension     5. BMI 33.0-33.9,adult     6. Class 1 obesity due to excess calories with serious comorbidity and body mass index (BMI) of 33.0 to 33.9 in adult       No orders of the defined types were placed in this encounter. No orders of the defined types were placed in this encounter.     Requested Prescriptions      No prescriptions requested or ordered in this encounter       1. Type 2 diabetes mellitus without complication, without long-term current use of insulin ([facility]  2. Diabetes education, encounter for  - Stable  HbA1C goal is less than 7%. - Fasting blood glucose goal is 70-130mg/dl and postprandial blood sugar goal is less than 180 mg/dl. -Diabetic foot exam up-to-date: Yes  -Diabetic retinal exam up-to-date: Yes  - Labs reviewed includes: Most recent A1C 7.0%, Microalb/Crt. Ratio 28 mcg/mg creat and GFR >60 mL/min. Repeat labs due in 1 month.    -We discussed in great detail dietary modifications they can make to better improve their blood sugars. -follow up diabetes education completed, all questions answered. -declines home glucose monitoring.   -will increase trulicity if O7M not at goal and diarrhea improves. Patient Instructions   Work on whole foods, increase water intake   Stop in office for a1c lab in may- the lab will just be a fingerprick   Call office if you have issues with diarrhea. Discussed signs and symptoms of hyper/hypoglycemia and how to treat. Encouraged 150 minutes of physical activity per week. Follow a low carbohydrate diet. Encouraged at least 7 hours of sleep. The patient was informed of the goals of diabetes management. This can only be accomplished by watching their diet and exercise levels. We certainly use medicines to help attain these goals. The consequences of not controlling blood sugars were discussed. These include blindness, heart disease, stroke, kidney disease, and possibly need for dialysis. They were told to be careful with their foot care as diabetics often have nerve damage, infections and risk for limb amutations . They also need a dilated eye exam yearly.  We discussed the issues of diet, exercise, medication, complication avoidance, reviewed the signs and symptoms of diabetes, hypoglycemic episodes, significance of HbA1C.         3. Mixed hyperlipidemia  stable,

## 2021-04-26 DIAGNOSIS — E11.65 TYPE 2 DIABETES MELLITUS WITH HYPERGLYCEMIA, WITHOUT LONG-TERM CURRENT USE OF INSULIN (HCC): ICD-10-CM

## 2021-04-26 NOTE — TELEPHONE ENCOUNTER
Tali Whitley is calling to request a refill on the following medication(s):  Requested Prescriptions     Pending Prescriptions Disp Refills    metFORMIN (GLUCOPHAGE) 500 MG tablet 120 tablet 3     Sig: Take 2 tablets by mouth 2 times daily (with meals)       Last Visit Date (If Applicable):  68/71/0688    Next Visit Date:    4/27/2021

## 2021-04-27 ENCOUNTER — OFFICE VISIT (OUTPATIENT)
Dept: FAMILY MEDICINE CLINIC | Age: 76
End: 2021-04-27
Payer: MEDICARE

## 2021-04-27 VITALS
WEIGHT: 205 LBS | SYSTOLIC BLOOD PRESSURE: 130 MMHG | OXYGEN SATURATION: 96 % | DIASTOLIC BLOOD PRESSURE: 76 MMHG | HEART RATE: 64 BPM | BODY MASS INDEX: 33.09 KG/M2

## 2021-04-27 DIAGNOSIS — I36.1 NONRHEUMATIC TRICUSPID VALVE REGURGITATION: ICD-10-CM

## 2021-04-27 DIAGNOSIS — Z98.890 HISTORY OF CAROTID ENDARTERECTOMY: ICD-10-CM

## 2021-04-27 DIAGNOSIS — I34.0 NONRHEUMATIC MITRAL VALVE REGURGITATION: ICD-10-CM

## 2021-04-27 DIAGNOSIS — I73.9 PAD (PERIPHERAL ARTERY DISEASE) (HCC): ICD-10-CM

## 2021-04-27 DIAGNOSIS — I35.0 AORTIC STENOSIS, SEVERE: ICD-10-CM

## 2021-04-27 DIAGNOSIS — Z95.2 HISTORY OF AORTIC VALVE REPLACEMENT: ICD-10-CM

## 2021-04-27 DIAGNOSIS — Z80.42 FAMILY HISTORY OF PROSTATE CANCER: ICD-10-CM

## 2021-04-27 DIAGNOSIS — Z79.01 ANTICOAGULATED ON COUMADIN: ICD-10-CM

## 2021-04-27 DIAGNOSIS — J30.9 ALLERGIC RHINITIS, UNSPECIFIED SEASONALITY, UNSPECIFIED TRIGGER: ICD-10-CM

## 2021-04-27 DIAGNOSIS — H25.813 COMBINED FORMS OF AGE-RELATED CATARACT OF BOTH EYES: ICD-10-CM

## 2021-04-27 DIAGNOSIS — K21.9 GASTROESOPHAGEAL REFLUX DISEASE, UNSPECIFIED WHETHER ESOPHAGITIS PRESENT: ICD-10-CM

## 2021-04-27 DIAGNOSIS — E11.65 TYPE 2 DIABETES MELLITUS WITH HYPERGLYCEMIA, WITHOUT LONG-TERM CURRENT USE OF INSULIN (HCC): Primary | ICD-10-CM

## 2021-04-27 DIAGNOSIS — H35.033 HYPERTENSIVE RETINOPATHY OF BOTH EYES: ICD-10-CM

## 2021-04-27 DIAGNOSIS — E66.09 CLASS 1 OBESITY DUE TO EXCESS CALORIES WITH SERIOUS COMORBIDITY AND BODY MASS INDEX (BMI) OF 31.0 TO 31.9 IN ADULT: ICD-10-CM

## 2021-04-27 DIAGNOSIS — E78.2 MIXED HYPERLIPIDEMIA: ICD-10-CM

## 2021-04-27 DIAGNOSIS — I10 ESSENTIAL HYPERTENSION: ICD-10-CM

## 2021-04-27 LAB — HBA1C MFR BLD: 7.5 %

## 2021-04-27 PROCEDURE — 83036 HEMOGLOBIN GLYCOSYLATED A1C: CPT | Performed by: NURSE PRACTITIONER

## 2021-04-27 PROCEDURE — 99214 OFFICE O/P EST MOD 30 MIN: CPT | Performed by: NURSE PRACTITIONER

## 2021-04-27 PROCEDURE — G8417 CALC BMI ABV UP PARAM F/U: HCPCS | Performed by: NURSE PRACTITIONER

## 2021-04-27 PROCEDURE — 3051F HG A1C>EQUAL 7.0%<8.0%: CPT | Performed by: NURSE PRACTITIONER

## 2021-04-27 PROCEDURE — 1123F ACP DISCUSS/DSCN MKR DOCD: CPT | Performed by: NURSE PRACTITIONER

## 2021-04-27 PROCEDURE — 4040F PNEUMOC VAC/ADMIN/RCVD: CPT | Performed by: NURSE PRACTITIONER

## 2021-04-27 PROCEDURE — 1036F TOBACCO NON-USER: CPT | Performed by: NURSE PRACTITIONER

## 2021-04-27 PROCEDURE — G8427 DOCREV CUR MEDS BY ELIG CLIN: HCPCS | Performed by: NURSE PRACTITIONER

## 2021-04-27 RX ORDER — LORATADINE 10 MG/1
10 TABLET ORAL DAILY PRN
Qty: 30 TABLET | Refills: 0 | COMMUNITY
Start: 2021-04-27 | End: 2021-10-27 | Stop reason: SDUPTHER

## 2021-04-27 ASSESSMENT — ENCOUNTER SYMPTOMS
ABDOMINAL PAIN: 0
DIARRHEA: 1
SHORTNESS OF BREATH: 0
SORE THROAT: 0
EYES NEGATIVE: 1
COUGH: 0
CONSTIPATION: 0
WHEEZING: 0
RHINORRHEA: 1

## 2021-04-27 ASSESSMENT — PATIENT HEALTH QUESTIONNAIRE - PHQ9
2. FEELING DOWN, DEPRESSED OR HOPELESS: 0
SUM OF ALL RESPONSES TO PHQ QUESTIONS 1-9: 0
1. LITTLE INTEREST OR PLEASURE IN DOING THINGS: 0

## 2021-04-27 NOTE — PROGRESS NOTES
[address] [address] [address]  Dept: [phone]  Dept Fax: [phone]    Chief Complaint   Patient presents with    6 Month Follow-Up     does report some nocturia says gets up 5-6 times a night sometimes    Diabetes     bs ck denies increased urination or thirst numbness or tingling    Hypertension     denies chest pains dizziness leg edema, since Open Heart surgery has been sob    Hyperlipidemia       HPI:   Patient presents to the office for routine 6-month follow-up. He has not been monitoring his blood pressure at home. He does follow a low-salt diet. Diabetes is managed by PAULINE Church. Hypertension, diabetes, and hyperlipidemia  He indicates that he is feeling well and denies any symptoms referable to his elevated blood pressure or diabetes. Specifically denies chest pain, palpitations, dyspnea, peripheral edema, thirst, frequent urination, and blurred vision. Current medication regimen is as listed below. He denies any side effects of medication, and has been compliant, taking it regularly. Home glucose readings have NOT been monitored. Last eye exam: states up to date. Diabetic complications include: CAD    Hyperlipidemia:  No new myalgias or GI upset on rosuvastatin (Crestor). Treatment Adherence:   Medication compliance:  compliant most of the time  Diet compliance:  compliant most of the time  Weight trend: stable  Current exercise: no regular exercise  Barriers: lack of motivation    Follows with cardiology (Dr. Amanda Lainez) Aortic stenosis, s/p bioprosthetic AVR on 10/30/2018.     Follows with vascular surgery (Dr. Laura Osullivan): PVD/carotid stenosis.     The 10-year ASCVD risk score (Inés Mcmahon, et al., 2013) is: 49.4%    Values used to calculate the score:      Age: 67 years      Sex: Male      Is Non- : No      Diabetic: Yes      Tobacco smoker: No      Systolic Blood Pressure: 495 mmHg Is BP treated: Yes      HDL Cholesterol: 44 mg/dL      Total Cholesterol: 154 mg/dL    BP Readings from Last 3 Encounters:   04/27/21 130/76   04/06/21 132/82   01/05/21 124/68          (goal 120/80)    Pulse Readings from Last 3 Encounters:   04/27/21 64   04/06/21 64   01/05/21 72        Wt Readings from Last 3 Encounters:   04/27/21 205 lb (93 kg)   04/06/21 207 lb (93.9 kg)   01/05/21 210 lb (95.3 kg)       Past Medical History:   Diagnosis Date    Aortic stenosis     Aortic valve replacement 2019    Degenerative disc disease, lumbar     Diabetes mellitus ([facility]     PCP Dr. Augustine Lewis Diverticulosis of sigmoid colon     GERD (gastroesophageal reflux disease)     Hyperlipidemia     Hypertension     Kidney stone     Mitral regurgitation     Obesity       Past Surgical History:   Procedure Laterality Date    ANESTHESIA NERVE BLOCK Bilateral 11/14/2019    Bilateral L3 L4 L5 Diagnostic Medial BB performed by Isaias Huerta MD at [address]  11/2018    CARDIAC CATHETERIZATION  03/01/2018    CARDIAC CATHETERIZATION      Before 2008. No stents placed per pt.     CAROTID ENDARTERECTOMY Left 2/7/2019    LEFT CAROTID ENDARTERECTOMY WITH VASCUGUARD PATCH performed by Ana Hull MD at [place] [address]  2005    for hemoccult positive stool    COLONOSCOPY  10/2017    diverticuli; Dr Gwen Maxwell      umbilical hernia    [address] [facility] Left 1971    softball injury    MA REPLACEMENT OF MITRAL VALVE N/A 10/30/2018    AORTIC  VALVE REPLACEMENT 23 MM INTUITY VALVE, SWAN ELVIN, DOREEN performed by Estefania Strickland MD at [place] History   Problem Relation Age of Onset    Cancer Father         prostate    Alzheimer's Disease Father     Heart Disease Neg Hx     Diabetes Neg Hx     Glaucoma Neg Hx     Macular Degen Neg Hx        Social History     Tobacco Use    Smoking status: Former Smoker     Packs/day: 1.50 Years: 40.00     Pack years: 60.00     Types: Cigarettes     Quit date: 0     Years since quittin.3    Smokeless tobacco: Never Used   Substance Use Topics    Alcohol use: No        Current Outpatient Medications   Medication Sig Dispense Refill    loratadine (CLARITIN) 10 MG tablet Take 1 tablet by mouth daily as needed (allergies) 30 tablet 0    metFORMIN (GLUCOPHAGE) 500 MG tablet Take 2 tablets by mouth 2 times daily (with meals) 120 tablet 3    metoprolol tartrate (LOPRESSOR) 25 MG tablet TAKE 1 TABLET BY MOUTH TWICE DAILY 180 tablet 3    TRULICITY 1.5 LO/4.7EI SOPN ADMINISTER 0.5 ML UNDER THE SKIN 1 TIME A WEEK 2 mL 3    glimepiride (AMARYL) 4 MG tablet TAKE 1 TABLET BY MOUTH EVERY MORNING BEFORE BREAKFAST 90 tablet 1    rosuvastatin (CRESTOR) 20 MG tablet Take 1 tablet by mouth daily 90 tablet 3    warfarin (COUMADIN) 4 MG tablet TAKE 1 TABLET BY MOUTH EVERY DAY 90 tablet 1    omeprazole (PRILOSEC) 20 MG delayed release capsule TAKE 1 CAPSULE BY MOUTH DAILY 90 capsule 1    lisinopril-hydroCHLOROthiazide (PRINZIDE;ZESTORETIC) 10-12.5 MG per tablet Take 1 tablet by mouth daily 90 tablet 1    blood glucose monitor strips by Other route daily Test 1 times a day & as needed for symptoms of irregular blood glucose. 50 strip 3    Blood Glucose Monitoring Suppl KIT Meter as covered by insurance also test strips and lancets.  1 kit 3    tamsulosin (FLOMAX) 0.4 MG capsule Take 1 capsule by mouth daily 90 capsule 5    Lancets MISC 1 each by Does not apply route daily 50 each 3    Elastic Bandages & Supports (JOBST KNEE HIGH COMPRESSION SM) MISC 1 each by Does not apply route daily as needed (leg swelling/CHF) 2 each 1    amiodarone (CORDARONE) 200 MG tablet Take 1 tablet by mouth daily 90 tablet 3    Blood Pressure Monitoring (BLOOD PRESSURE CUFF) MISC 1 Device by Does not apply route daily 1 each 0    blood glucose monitor strips Test 1 times a day & as needed for symptoms of irregular blood Psychiatric/Behavioral: Negative for dysphoric mood and sleep disturbance. The patient is not nervous/anxious. Objective:     Vitals:    21 1034   BP: 130/76   Pulse: 64   SpO2: 96%   Weight: 205 lb (93 kg)        Estimated body mass index is 33.09 kg/m² as calculated from the following:    Height as of 21: 5' 6\" (1.676 m). Weight as of this encounter: 205 lb (93 kg). Physical Exam  Constitutional:       Appearance: Normal appearance. HENT:      Head: Normocephalic. Right Ear: Tympanic membrane and ear canal normal.      Left Ear: Tympanic membrane and ear canal normal.      Nose: Congestion and rhinorrhea present. Mouth/Throat:      Mouth: Mucous membranes are moist.      Pharynx: Posterior oropharyngeal erythema present. Eyes:      Conjunctiva/sclera: Conjunctivae normal.   Neck:      Musculoskeletal: Neck supple. Cardiovascular:      Rate and Rhythm: Normal rate and regular rhythm. Heart sounds: Murmur present. Pulmonary:      Effort: Pulmonary effort is normal.      Breath sounds: Normal breath sounds. No wheezing. Abdominal:      General: Bowel sounds are normal.      Palpations: Abdomen is soft. Musculoskeletal:      Right lower le+ Edema present. Left lower le+ Edema present. Lymphadenopathy:      Cervical: No cervical adenopathy. Neurological:      Mental Status: He is alert and oriented to person, place, and time. Gait: Gait abnormal (uses cane).    Psychiatric:         Mood and Affect: Mood normal.           PHQ Scores 2021 2020 2019 2/15/2019 2018 5/10/2017   PHQ2 Score 0 0 0 1 0 0   PHQ9 Score 0 0 0 1 0 0     Interpretation of Total Score Depression Severity: 1-4 = Minimal depression, 5-9 = Mild depression, 10-14 = Moderate depression, 15-19 = Moderately severe depression, 20-27 = Severe depression    Lab Results   Component Value Date    LABA1C 7.5 2021    LABA1C 7.0 2021    LABA1C 6.4 10/06/2020 Lab Results   Component Value Date    LABMICR 28 (H) 01/28/2020    LDLCALC 85.2 05/21/2019    CREATININE 1.07 10/27/2020       Lab Results   Component Value Date     10/27/2020    K 4.7 10/27/2020    CL 99 10/27/2020    CO2 27 10/27/2020    BUN 22 10/27/2020    CREATININE 1.07 10/27/2020    GLUCOSE 170 (H) 10/27/2020    CALCIUM 9.8 10/27/2020    PROT 6.8 10/27/2020    LABALBU 4.2 10/27/2020    BILITOT 0.54 10/27/2020    ALKPHOS 63 10/27/2020    AST 37 10/27/2020    ALT 38 10/27/2020    LABGLOM >60 10/27/2020    GFRAA >60 10/27/2020       Lab Results   Component Value Date    LABA1C 7.5 04/27/2021    LABA1C 7.0 01/27/2021    LABA1C 6.4 10/06/2020     Lab Results   Component Value Date    LABMICR 28 (H) 01/28/2020    CREATININE 1.07 10/27/2020     Lab Results   Component Value Date     10/27/2020    K 4.7 10/27/2020    CL 99 10/27/2020    CO2 27 10/27/2020    BUN 22 10/27/2020    CREATININE 1.07 10/27/2020    GLUCOSE 170 (H) 10/27/2020    CALCIUM 9.8 10/27/2020    CALCIUM 10.0 12/16/2019    CALCIUM 9.1 02/08/2019     Lab Results   Component Value Date    CHOL 126 12/16/2019    TRIG 310 (H) 12/16/2019    HDL 44 10/27/2020    LDLCALC 85.2 05/21/2019       Assessment:     Darian was seen today for 6 month follow-up, diabetes, hypertension and hyperlipidemia. Diagnoses and all orders for this visit:    Type 2 diabetes mellitus with hyperglycemia, without long-term current use of insulin (HCC)  -     POCT glycosylated hemoglobin (Hb A1C)    Essential hypertension    Mixed hyperlipidemia    PAD (peripheral artery disease) (HCC)    Anticoagulated on Coumadin    Allergic rhinitis, unspecified seasonality, unspecified trigger  -     loratadine (CLARITIN) 10 MG tablet;  Take 1 tablet by mouth daily as needed (allergies)    History of carotid endarterectomy    Nonrheumatic tricuspid valve regurgitation    Nonrheumatic mitral valve regurgitation    Aortic stenosis, severe    Family history of prostate cancer History of aortic valve replacement    Combined forms of age-related cataract of both eyes    Class 1 obesity due to excess calories with serious comorbidity and body mass index (BMI) of 31.0 to 31.9 in adult    Hypertensive retinopathy of both eyes    Gastroesophageal reflux disease, unspecified whether esophagitis present      Results for POC orders placed in visit on 04/27/21   POCT glycosylated hemoglobin (Hb A1C)   Result Value Ref Range    Hemoglobin A1C 7.5 %        Plan:     Orders Placed This Encounter   Procedures    POCT glycosylated hemoglobin (Hb A1C)     Orders Placed This Encounter   Medications    loratadine (CLARITIN) 10 MG tablet     Sig: Take 1 tablet by mouth daily as needed (allergies)     Dispense:  30 tablet     Refill:  0     Instructed patient to monitor blood sugars daily. Discussed increasing A1c and the need for better control through diet and exercise. Patient has a scheduled follow-up with PAULINE Aguiar for diabetes. Medication will need adjusted if sugars do not improve. He has not followed up with cardiology, instructed to call today to schedule an appointment. Continue current medication. Recheck in 6 months, sooner should new symptoms or problems arise. Patient given educational materials - see patient instructions. Discussed use, benefit, and side effects of prescribed medications. All patient questions answered. Pt voiced understanding. Health Maintenance reviewed. Instructed to continue current medications, diet and exercise. Patient agreed with treatment plan. Follow up as directed.      Electronically signed by PAIGE Vazquez CNP on 4/30/2021

## 2021-04-29 ENCOUNTER — HOSPITAL ENCOUNTER (OUTPATIENT)
Dept: PHARMACY | Age: 76
Setting detail: THERAPIES SERIES
Discharge: HOME | End: 2021-04-29
Payer: MEDICARE

## 2021-04-29 DIAGNOSIS — Z95.2 HISTORY OF AORTIC VALVE REPLACEMENT: ICD-10-CM

## 2021-04-29 DIAGNOSIS — Z79.01 ANTICOAGULATED ON COUMADIN: ICD-10-CM

## 2021-04-29 LAB
INR BLD: 3
PROTIME: 36.6 SECONDS

## 2021-04-29 PROCEDURE — 99211 OFF/OP EST MAY X REQ PHY/QHP: CPT

## 2021-04-29 PROCEDURE — 85610 PROTHROMBIN TIME: CPT

## 2021-04-29 PROCEDURE — 36416 COLLJ CAPILLARY BLOOD SPEC: CPT

## 2021-04-29 NOTE — PROGRESS NOTES
ANTICOAGULATION SERVICE    Date of Clinic Visit:  2/27/4573    Trini Jean is a 67 y.o. male who presents to clinic today for anticoagulation monitoring and adjustment. Recent INR Results:  Internal QC passed  Lab Results   Component Value Date    INR 3 04/29/2021    INR 3.4 04/01/2021       Current Warfarin Dosage:  Dosing Plan  As of 4/29/2021    TTR:  26.8 % (11.5 mo)   Full warfarin instructions:  4/29: 2 mg; Otherwise 2 mg every Tue, Fri; 4 mg all other days               Assessment/Plan:    Continue current regimen as INR remains stable. INR is back in range today after dose decrease as last visit. Recheck 4 weeks. Next Clinic Appointment:  Return date  As of 4/29/2021    TTR:  26.8 % (11.5 mo)   Next INR check:  5/27/2021             Please call [facility] at [phone] with any questions. Thanks!   Yahir Shah [facility]  Anticoagulation Service Pharmacist  4/29/2021 12:49 PM

## 2021-04-29 NOTE — PATIENT INSTRUCTIONS
\"On day of next appointment, please screen for temperature and COVID-19 symptoms prior to you clinic appointment. If any symptoms present, please call [phone] to reschedule. \"

## 2021-04-30 PROBLEM — J30.9 ALLERGIC RHINITIS: Status: ACTIVE | Noted: 2021-04-30

## 2021-05-04 ENCOUNTER — TELEPHONE (OUTPATIENT)
Dept: CARDIOLOGY | Age: 76
End: 2021-05-04

## 2021-05-04 ENCOUNTER — OFFICE VISIT (OUTPATIENT)
Dept: CARDIOLOGY | Age: 76
End: 2021-05-04
Payer: MEDICARE

## 2021-05-04 VITALS
WEIGHT: 205 LBS | DIASTOLIC BLOOD PRESSURE: 53 MMHG | HEART RATE: 66 BPM | SYSTOLIC BLOOD PRESSURE: 108 MMHG | BODY MASS INDEX: 32.95 KG/M2 | HEIGHT: 66 IN

## 2021-05-04 DIAGNOSIS — Z95.2 S/P AVR: Primary | ICD-10-CM

## 2021-05-04 DIAGNOSIS — I25.10 CORONARY ARTERY DISEASE INVOLVING NATIVE CORONARY ARTERY OF NATIVE HEART WITHOUT ANGINA PECTORIS: ICD-10-CM

## 2021-05-04 DIAGNOSIS — E66.01 CLASS 2 SEVERE OBESITY DUE TO EXCESS CALORIES WITH SERIOUS COMORBIDITY IN ADULT, UNSPECIFIED BMI (HCC): ICD-10-CM

## 2021-05-04 DIAGNOSIS — E11.9 CONTROLLED TYPE 2 DIABETES MELLITUS WITHOUT COMPLICATION, UNSPECIFIED WHETHER LONG TERM INSULIN USE (HCC): ICD-10-CM

## 2021-05-04 DIAGNOSIS — R06.02 SOB (SHORTNESS OF BREATH): ICD-10-CM

## 2021-05-04 DIAGNOSIS — I35.0 NONRHEUMATIC AORTIC VALVE STENOSIS: ICD-10-CM

## 2021-05-04 DIAGNOSIS — I10 HYPERTENSION, ESSENTIAL: ICD-10-CM

## 2021-05-04 DIAGNOSIS — I20.9 TYPICAL ANGINA (HCC): ICD-10-CM

## 2021-05-04 DIAGNOSIS — E78.2 MIXED HYPERLIPIDEMIA: ICD-10-CM

## 2021-05-04 PROCEDURE — 4040F PNEUMOC VAC/ADMIN/RCVD: CPT | Performed by: INTERNAL MEDICINE

## 2021-05-04 PROCEDURE — 99214 OFFICE O/P EST MOD 30 MIN: CPT | Performed by: INTERNAL MEDICINE

## 2021-05-04 PROCEDURE — 1123F ACP DISCUSS/DSCN MKR DOCD: CPT | Performed by: INTERNAL MEDICINE

## 2021-05-04 PROCEDURE — 93010 ELECTROCARDIOGRAM REPORT: CPT | Performed by: INTERNAL MEDICINE

## 2021-05-04 PROCEDURE — 1036F TOBACCO NON-USER: CPT | Performed by: INTERNAL MEDICINE

## 2021-05-04 PROCEDURE — 93005 ELECTROCARDIOGRAM TRACING: CPT | Performed by: INTERNAL MEDICINE

## 2021-05-04 PROCEDURE — G8427 DOCREV CUR MEDS BY ELIG CLIN: HCPCS | Performed by: INTERNAL MEDICINE

## 2021-05-04 PROCEDURE — 3051F HG A1C>EQUAL 7.0%<8.0%: CPT | Performed by: INTERNAL MEDICINE

## 2021-05-04 PROCEDURE — G8417 CALC BMI ABV UP PARAM F/U: HCPCS | Performed by: INTERNAL MEDICINE

## 2021-05-04 NOTE — PROGRESS NOTES
Today's Date: 5/4/2021  Patient Name: Mohinder Spaulding  Patient's age: 67 y. o., 1945      HPI and CC:    Mohinder Spaulding is stable from a cardiac standpoint, but feeling worse. Good functional capacity with significant decline in functional capacity due to chest pain and dyspnea on exertion. Resolves with rest. Worse with exertion. Has not been able to do as much as before. No PND, no syncope or pre-syncope, no orthopnea. Past Medical History:   has a past medical history of Aortic stenosis, Degenerative disc disease, lumbar, Diabetes mellitus ([address] Diverticulosis of sigmoid colon, GERD (gastroesophageal reflux disease), Hyperlipidemia, Hypertension, Kidney stone, Mitral regurgitation, and Obesity. Past Surgical History:   has a past surgical history that includes Orbital fracture repair (Left, 1971); hernia repair; Cardiac catheterization (03/01/2018); Colonoscopy (2005); Colonoscopy (10/2017); pr replacement of mitral valve (N/A, 10/30/2018); Aortic valve replacement (11/2018); Cardiac catheterization; Carotid endarterectomy (Left, 2/7/2019); and Anesthesia Nerve Block (Bilateral, 11/14/2019). Home Medications:    Prior to Admission medications    Medication Sig Start Date End Date Taking?  Authorizing Provider   loratadine (CLARITIN) 10 MG tablet Take 1 tablet by mouth daily as needed (allergies) 4/27/21  Yes PAIGE Ozuna CNP   metFORMIN (GLUCOPHAGE) 500 MG tablet Take 2 tablets by mouth 2 times daily (with meals) 4/26/21  Yes PAIGE Ozuna CNP   metoprolol tartrate (LOPRESSOR) 25 MG tablet TAKE 1 TABLET BY MOUTH TWICE DAILY 3/22/21  Yes PAIGE Ozuna CNP   TRULICITY 1.5 SW/5.8VN SOPN ADMINISTER 0.5 ML UNDER THE SKIN 1 TIME A WEEK 3/16/21  Yes PAIGE Witt CNP   glimepiride (AMARYL) 4 MG tablet TAKE 1 TABLET BY MOUTH EVERY MORNING BEFORE BREAKFAST 12/31/20  Yes PAIGE Ozuna CNP   rosuvastatin (CRESTOR) 20 MG tablet Take 1 tablet by mouth daily 12/29/20  Yes PAIGE Tidwell CNP   warfarin (COUMADIN) 4 MG tablet TAKE 1 TABLET BY MOUTH EVERY DAY 12/28/20  Yes PAIGE Tidwell CNP   omeprazole (PRILOSEC) 20 MG delayed release capsule TAKE 1 CAPSULE BY MOUTH DAILY 12/28/20  Yes PAIGE Tidwell CNP   lisinopril-hydroCHLOROthiazide (PRINZIDE;ZESTORETIC) 10-12.5 MG per tablet Take 1 tablet by mouth daily 10/5/20  Yes Paradise Bradford MD   tamsulosin (FLOMAX) 0.4 MG capsule Take 1 capsule by mouth daily 8/10/20  Yes Mitzy Alvarado MD   Lancets MISC 1 each by Does not apply route daily 7/21/20  Yes PAIGE Mancilla CNP   amiodarone (CORDARONE) 200 MG tablet Take 1 tablet by mouth daily 5/4/20  Yes Samantha Martines DO   aspirin 81 MG tablet Take 1 tablet by mouth daily Pt.will stop 10-30 -18 AM for OR  Patient taking differently: Take 81 mg by mouth daily  11/4/18  Yes Starla Apley, PA   Multiple Vitamins-Minerals (THERAPEUTIC MULTIVITAMIN-MINERALS) tablet Take 1 tablet by mouth daily (with breakfast) 11/4/18  Yes Starla Apley, PA   blood glucose monitor strips by Other route daily Test 1 times a day & as needed for symptoms of irregular blood glucose. 9/2/20   PAIGE Mancilla CNP   Blood Glucose Monitoring Suppl KIT Meter as covered by insurance also test strips and lancets. 9/2/20   PAIGE Mancilla CNP   Elastic Bandages & Supports (JOBST KNEE HIGH COMPRESSION SM) MISC 1 each by Does not apply route daily as needed (leg swelling/CHF) 6/8/20   Paradise Bradford MD   Blood Pressure Monitoring (BLOOD PRESSURE CUFF) MISC 1 Device by Does not apply route daily 7/2/19   PAIGE Mancilla CNP   blood glucose monitor strips Test 1 times a day & as needed for symptoms of irregular blood glucose. 11/29/18   Anai Sharp MD       Allergies:  Invokana [canagliflozin] and Januvia [sitagliptin]    Social History:   reports that he quit smoking about 23 years ago. His smoking use included cigarettes.  He has a 60.00 pack-year smoking history. He has never used smokeless tobacco. He reports that he does not drink alcohol or use drugs. REVIEW OF SYSTEMS:    · Constitutional: there has been no unanticipated weight loss. There's been No change in energy level, No change in activity level. · Eyes: No visual changes or diplopia. No scleral icterus. · ENT: No Headaches, hearing loss or vertigo. No mouth sores or sore throat. · Cardiovascular: No chest pain, no dyspnea, no chf like symptoms  · Respiratory: No previous pulmonary problems  · Gastrointestinal: No abdominal pain, appetite loss, blood in stools. No change in bowel or bladder habits. · Genitourinary: No dysuria, trouble voiding, or hematuria. · Musculoskeletal:  No gait disturbance, No weakness or joint complaints. · Integumentary: No rash or pruritis. · Neurological: No headache, diplopia, change in muscle strength, numbness or tingling. No change in gait, balance, coordination, mood, affect, memory, mentation, behavior. · Psychiatric: No new anxiety or depression. · Endocrine: No temperature intolerance. No excessive thirst, fluid intake, or urination. No tremor. · Hematologic/Lymphatic: No abnormal bruising or bleeding, blood clots or swollen lymph nodes. · Allergic/Immunologic: No nasal congestion or hives. PHYSICAL EXAM:      BP (!) 108/53   Pulse 66   Ht 5' 6\" (1.676 m)   Wt 205 lb (93 kg)   BMI 33.09 kg/m²    General appearance: alert and cooperative with exam  HEENT: Head: Normocephalic, no lesions, without obvious abnormality.   Eyes: PERRL, EOMI  Ears: Not obvious deformations or lack of hearing  Neck: no carotid bruit, no JVD  Lungs: clear to auscultation bilaterally  Heart: regular rate and rhythm, S1, S2 normal, no murmur, click, rub or gallop  Abdomen: soft, non-tender; bowel sounds normal; no masses,  no organomegaly  Extremities: extremities normal, atraumatic, no cyanosis or edema  Neurologic: Mental status: Alert, oriented, thought content appropriate  Skin: WNL for age and condition, no obvious rashes or leasions      Cardiac data:     Labs:     CBC: No results for input(s): WBC, HGB, HCT, PLT in the last 72 hours. BMP: No results for input(s): NA, K, CO2, BUN, CREATININE, LABGLOM, GLUCOSE in the last 72 hours. PT/INR: No results for input(s): PROTIME, INR in the last 72 hours. FASTING LIPID PANEL:  Lab Results   Component Value Date    HDL 44 10/27/2020    LDLCALC 85.2 05/21/2019    TRIG 310 12/16/2019     LIVER PROFILE:No results for input(s): AST, ALT, LABALBU in the last 72 hours. Patient Active Problem List   Diagnosis    Mixed hyperlipidemia    Essential hypertension    Esophageal reflux    Family history of prostate cancer    Aortic stenosis, severe    Low back pain with sciatica    Nonrheumatic mitral valve regurgitation    History of aortic valve replacement    Carotid artery stenosis    Anticoagulated on Coumadin    DIA (dyspnea on exertion)    Obesity    Type 2 diabetes mellitus (HCC)    Hypomagnesemia    Hypertensive retinopathy of both eyes    Epiretinal membrane (ERM) of both eyes    Combined forms of age-related cataract of both eyes    Panniculitis    Lumbar facet joint syndrome    Nonrheumatic tricuspid valve regurgitation    History of carotid endarterectomy    PAD (peripheral artery disease) [address]    Allergic rhinitis     IMPRESSION:  RECOMMENDATIONS:    CPOE and SOB- check stress and echo  Mild CAD on cardiac cath 3/1/2018- stable, asymptomatic  AS, S/P BIOPROSTHETIC AVR (23 mm [employer] valve on 10/30/18)  DM II- per pcp  HTN -stable, asymptomatic  HLP ON STATIN  OBESITY  PVD/CAROTID STENOSIS  REGULAR EXERCISE  CALORIE RESTRICTION  WEIGHT LOSS  CONTINUE CURRENT TREATMENT  Former smoker, discussed      Thank you for allowing me to participate in the care of this patient, please do not hesitate to call if you have any questions.     Antony Meek, [address] [facility]  [phone]

## 2021-05-04 NOTE — TELEPHONE ENCOUNTER
Pt's daughter Venecia Guerrero calling. She says she cannot come to office visit today due to cough and sore throat. Her concerns for Dr. April Miller:  1. She is concerned that pt will not report the following: On Friday 4/30, pt reported chest pain and SOB to his family. They rushed to his house and he said he was fine and would not let them call the ambulance. Hx CABG. Pt should have been notified that daughter called here. 2.  Pt is having a more difficult time getting around. Wondering about therapy. I referred her to pt's PCP, but she is wanting Dr. April Miller or nurse to encourage pt to be active. 3.  She reports pt is not eatting well. She agrees to contact pt's diabetic nurse educator.

## 2021-05-06 ENCOUNTER — HOSPITAL ENCOUNTER (OUTPATIENT)
Dept: INTERVENTIONAL RADIOLOGY/VASCULAR | Age: 76
Discharge: HOME | End: 2021-05-08
Payer: MEDICARE

## 2021-05-06 DIAGNOSIS — I10 ESSENTIAL HYPERTENSION: ICD-10-CM

## 2021-05-06 DIAGNOSIS — E11.9 TYPE 2 DIABETES MELLITUS WITHOUT COMPLICATION, WITHOUT LONG-TERM CURRENT USE OF INSULIN (HCC): ICD-10-CM

## 2021-05-06 DIAGNOSIS — I65.23 CAROTID STENOSIS, ASYMPTOMATIC, BILATERAL: ICD-10-CM

## 2021-05-06 PROCEDURE — 93880 EXTRACRANIAL BILAT STUDY: CPT

## 2021-05-06 RX ORDER — LISINOPRIL AND HYDROCHLOROTHIAZIDE 12.5; 1 MG/1; MG/1
1 TABLET ORAL DAILY
Qty: 90 TABLET | Refills: 1 | Status: SHIPPED | OUTPATIENT
Start: 2021-05-06 | End: 2021-09-25

## 2021-05-06 NOTE — TELEPHONE ENCOUNTER
Mohinder Spaulding is requesting a refill on the following medication(s):  Requested Prescriptions     Pending Prescriptions Disp Refills    lisinopril-hydroCHLOROthiazide (PRINZIDE;ZESTORETIC) 10-12.5 MG per tablet 90 tablet 1     Sig: Take 1 tablet by mouth daily       Last Visit Date (If Applicable):  7/0/4305    Next Visit Date:    Visit date not found

## 2021-05-13 ENCOUNTER — OFFICE VISIT (OUTPATIENT)
Dept: VASCULAR SURGERY | Age: 76
End: 2021-05-13
Payer: MEDICARE

## 2021-05-13 VITALS
SYSTOLIC BLOOD PRESSURE: 122 MMHG | BODY MASS INDEX: 32.3 KG/M2 | WEIGHT: 201 LBS | HEIGHT: 66 IN | HEART RATE: 74 BPM | DIASTOLIC BLOOD PRESSURE: 70 MMHG

## 2021-05-13 DIAGNOSIS — Z98.890 HISTORY OF LEFT-SIDED CAROTID ENDARTERECTOMY: ICD-10-CM

## 2021-05-13 DIAGNOSIS — I65.21 ASYMPTOMATIC STENOSIS OF RIGHT CAROTID ARTERY: Primary | ICD-10-CM

## 2021-05-13 PROCEDURE — G8417 CALC BMI ABV UP PARAM F/U: HCPCS | Performed by: SURGERY

## 2021-05-13 PROCEDURE — 99214 OFFICE O/P EST MOD 30 MIN: CPT | Performed by: SURGERY

## 2021-05-13 PROCEDURE — G8427 DOCREV CUR MEDS BY ELIG CLIN: HCPCS | Performed by: SURGERY

## 2021-05-13 PROCEDURE — 4040F PNEUMOC VAC/ADMIN/RCVD: CPT | Performed by: SURGERY

## 2021-05-13 PROCEDURE — 1036F TOBACCO NON-USER: CPT | Performed by: SURGERY

## 2021-05-13 PROCEDURE — 1123F ACP DISCUSS/DSCN MKR DOCD: CPT | Performed by: SURGERY

## 2021-05-13 NOTE — PROGRESS NOTES
[address] [facility]  [facility] VASCULAR SURG [address]  [address] [facility] [id number] [phone]    Stephanie Squibb  [birth year]  67 y.o.male       Chief Complaint:  Chief Complaint   Patient presents with    Carotid Disease     yearly carotid       History of present Illness:  Pt is here today for evaluation and treatment of his carotid stenosis. Previously underwent a left carotid endarterectomy in February 2019 and has been doing well. On questioning me denies any amaurosis fugax, lateralizing symptoms or claudication. Recent carotid duplex performed on May 6, 2021 shows mild plaque involving the right carotid bifurcation without evidence of significant stenosis. Left carotid bifurcation appears widely patent post endarterectomy without evidence of recurrent stenosis. Presently he is on Coumadin and aspirin daily. He is scheduled to undergo a cardiac stress test in the near future. Past Medical History:  has a past medical history of Aortic stenosis, Degenerative disc disease, lumbar, Diabetes mellitus ([address] Diverticulosis of sigmoid colon, GERD (gastroesophageal reflux disease), Hyperlipidemia, Hypertension, Kidney stone, Mitral regurgitation, and Obesity. Past Surgical History:   Past Surgical History:   Procedure Laterality Date    ANESTHESIA NERVE BLOCK Bilateral 11/14/2019    Bilateral L3 L4 L5 Diagnostic Medial BB performed by Sandra Vazquez MD at [address] [facility]  11/2018    CARDIAC CATHETERIZATION  03/01/2018    CARDIAC CATHETERIZATION      Before 2008. No stents placed per pt.     CAROTID ENDARTERECTOMY Left 2/7/2019    LEFT CAROTID ENDARTERECTOMY WITH VASCUGUARD PATCH performed by Ceirra Maxwell MD at [address] [place] [address]  2005    for hemoccult positive stool    COLONOSCOPY  10/2017    diverticuli; Dr Bazan Nearing      umbilical hernia    ORBITAL 3    glimepiride (AMARYL) 4 MG tablet, TAKE 1 TABLET BY MOUTH EVERY MORNING BEFORE BREAKFAST, Disp: 90 tablet, Rfl: 1    rosuvastatin (CRESTOR) 20 MG tablet, Take 1 tablet by mouth daily, Disp: 90 tablet, Rfl: 3    warfarin (COUMADIN) 4 MG tablet, TAKE 1 TABLET BY MOUTH EVERY DAY, Disp: 90 tablet, Rfl: 1    omeprazole (PRILOSEC) 20 MG delayed release capsule, TAKE 1 CAPSULE BY MOUTH DAILY, Disp: 90 capsule, Rfl: 1    blood glucose monitor strips, by Other route daily Test 1 times a day & as needed for symptoms of irregular blood glucose., Disp: 50 strip, Rfl: 3    Blood Glucose Monitoring Suppl KIT, Meter as covered by insurance also test strips and lancets. , Disp: 1 kit, Rfl: 3    tamsulosin (FLOMAX) 0.4 MG capsule, Take 1 capsule by mouth daily, Disp: 90 capsule, Rfl: 5    Lancets MISC, 1 each by Does not apply route daily, Disp: 50 each, Rfl: 3    Elastic Bandages & Supports (JOBST KNEE HIGH COMPRESSION SM) MISC, 1 each by Does not apply route daily as needed (leg swelling/CHF), Disp: 2 each, Rfl: 1    amiodarone (CORDARONE) 200 MG tablet, Take 1 tablet by mouth daily, Disp: 90 tablet, Rfl: 3    Blood Pressure Monitoring (BLOOD PRESSURE CUFF) MISC, 1 Device by Does not apply route daily, Disp: 1 each, Rfl: 0    blood glucose monitor strips, Test 1 times a day & as needed for symptoms of irregular blood glucose., Disp: 100 strip, Rfl: 3    aspirin 81 MG tablet, Take 1 tablet by mouth daily Pt.will stop 10-30 -18 AM for OR (Patient taking differently: Take 81 mg by mouth daily ), Disp: 30 tablet, Rfl: 3    Multiple Vitamins-Minerals (THERAPEUTIC MULTIVITAMIN-MINERALS) tablet, Take 1 tablet by mouth daily (with breakfast), Disp: 30 tablet, Rfl: 3    Vital Signs:  Vitals:    05/13/21 1018   BP: 122/70   Pulse: 74       Physical Exam:  General appearance - alert, well appearing and in no acute distress  Mental status - oriented to person, place and time with normal affect  Head - normocephalic and atraumatic  Eyes - pupils equal and reactive, extraocular eye movements intact, conjunctiva clear  Ears - hearing appears to be intact  Nose - no drainage noted  Mouth - mucous membranes moist  Neck - supple, no carotid bruits, thyroid not palpable, no JVD, left neck incision well-healed  Chest - clear to auscultation, normal effort  Heart - normal rate, regular rhythm, no murmurs  Abdomen - soft, non-tender, non-distended, bowel sounds present all four quadrants, no masses, hepatomegaly, splenomegaly or aortic enlargement  Neurological - normal speech, no focal findings or movement disorder noted, cranial nerves II through XII grossly intact  Extremities - peripheral pulses palpable, no pedal edema or calf pain with palpation  Skin - no gross lesions, rashes, or induration noted      Labs:   Lab Results   Component Value Date    WBC 9.1 12/16/2019    HGB 13.5 12/16/2019    HCT 40.0 12/16/2019    MCV 91.2 12/16/2019     12/16/2019     Lab Results   Component Value Date     10/27/2020    K 4.7 10/27/2020    CL 99 10/27/2020    CO2 27 10/27/2020    BUN 22 10/27/2020    CREATININE 1.07 10/27/2020    GLUCOSE 170 10/27/2020    GLUCOSE 102 11/08/2018    CALCIUM 9.8 10/27/2020     Lab Results   Component Value Date    ALKPHOS 63 10/27/2020    ALT 38 10/27/2020    AST 37 10/27/2020    PROT 6.8 10/27/2020    BILITOT 0.54 10/27/2020    LABALBU 4.2 10/27/2020     No results found for: LACTA  No results found for: AMYLASE  No results found for: LIPASE  Lab Results   Component Value Date    INR 3 04/29/2021         Assessment:    1. Asymptomatic stenosis of right carotid artery    2. History of left-sided carotid endarterectomy        Plan:   1. Overall he is doing well from a vascular standpoint and I have recommended that he continue taking Coumadin and aspirin daily. 2. Repeat carotid duplex in 1 year with a follow-up appointment at the [address] [place] [address] clinic.     Orders Placed This Encounter   Procedures     DUP CAROTID BILATERAL           Electronically signed by Bryce Muñiz MD on 5/13/2021 at 10:45 AM     Copy sent to Dr. Juarez Holland, APRN - CNP

## 2021-05-14 ENCOUNTER — TELEPHONE (OUTPATIENT)
Dept: FAMILY MEDICINE CLINIC | Age: 76
End: 2021-05-14

## 2021-05-17 NOTE — TELEPHONE ENCOUNTER
He can hold metformin and glimepiride the morning of test once test is completed he can resume as normal.

## 2021-05-25 ENCOUNTER — HOSPITAL ENCOUNTER (OUTPATIENT)
Dept: NUCLEAR MEDICINE | Age: 76
Discharge: HOME | End: 2021-05-27
Payer: MEDICARE

## 2021-05-25 ENCOUNTER — HOSPITAL ENCOUNTER (OUTPATIENT)
Dept: NON INVASIVE DIAGNOSTICS | Age: 76
Discharge: HOME | End: 2021-05-25
Payer: MEDICARE

## 2021-05-25 DIAGNOSIS — R06.02 SOB (SHORTNESS OF BREATH): ICD-10-CM

## 2021-05-25 DIAGNOSIS — I20.9 TYPICAL ANGINA (HCC): ICD-10-CM

## 2021-05-25 DIAGNOSIS — Z95.2 S/P AVR: ICD-10-CM

## 2021-05-25 LAB
LV EF: 58 %
LVEF MODALITY: NORMAL

## 2021-05-25 PROCEDURE — 93017 CV STRESS TEST TRACING ONLY: CPT

## 2021-05-25 PROCEDURE — 6360000002 HC RX W HCPCS: Performed by: INTERNAL MEDICINE

## 2021-05-25 PROCEDURE — 3430000000 HC RX DIAGNOSTIC RADIOPHARMACEUTICAL: Performed by: INTERNAL MEDICINE

## 2021-05-25 PROCEDURE — 93306 TTE W/DOPPLER COMPLETE: CPT

## 2021-05-25 PROCEDURE — A9500 TC99M SESTAMIBI: HCPCS | Performed by: INTERNAL MEDICINE

## 2021-05-25 PROCEDURE — 78452 HT MUSCLE IMAGE SPECT MULT: CPT

## 2021-05-25 RX ADMIN — REGADENOSON 0.4 MG: 0.08 INJECTION, SOLUTION INTRAVENOUS at 14:34

## 2021-05-25 RX ADMIN — TETRAKIS(2-METHOXYISOBUTYLISOCYANIDE)COPPER(I) TETRAFLUOROBORATE 30 MILLICURIE: 1 INJECTION, POWDER, LYOPHILIZED, FOR SOLUTION INTRAVENOUS at 15:08

## 2021-05-25 RX ADMIN — TETRAKIS(2-METHOXYISOBUTYLISOCYANIDE)COPPER(I) TETRAFLUOROBORATE 10 MILLICURIE: 1 INJECTION, POWDER, LYOPHILIZED, FOR SOLUTION INTRAVENOUS at 15:07

## 2021-05-25 NOTE — PROGRESS NOTES
Patient Name:  Bc Mckeon MRN:  [id number]   :  1945  Age:  67 y.o. Sex: male   Ordering Provider: Deborah Greco DO  Referring Provider: PAIGE Bai CNP  Primary Care Provider:  PAIGE Bai CNP     Indications: Abnormal EKG, SOB, Chest Pain     Medications:     Current Outpatient Medications:     lisinopril-hydroCHLOROthiazide (PRINZIDE;ZESTORETIC) 10-12.5 MG per tablet, Take 1 tablet by mouth daily, Disp: 90 tablet, Rfl: 1    loratadine (CLARITIN) 10 MG tablet, Take 1 tablet by mouth daily as needed (allergies), Disp: 30 tablet, Rfl: 0    metFORMIN (GLUCOPHAGE) 500 MG tablet, Take 2 tablets by mouth 2 times daily (with meals), Disp: 120 tablet, Rfl: 3    metoprolol tartrate (LOPRESSOR) 25 MG tablet, TAKE 1 TABLET BY MOUTH TWICE DAILY, Disp: 180 tablet, Rfl: 3    TRULICITY 1.5 IO/7.1XF SOPN, ADMINISTER 0.5 ML UNDER THE SKIN 1 TIME A WEEK, Disp: 2 mL, Rfl: 3    glimepiride (AMARYL) 4 MG tablet, TAKE 1 TABLET BY MOUTH EVERY MORNING BEFORE BREAKFAST, Disp: 90 tablet, Rfl: 1    rosuvastatin (CRESTOR) 20 MG tablet, Take 1 tablet by mouth daily, Disp: 90 tablet, Rfl: 3    warfarin (COUMADIN) 4 MG tablet, TAKE 1 TABLET BY MOUTH EVERY DAY, Disp: 90 tablet, Rfl: 1    omeprazole (PRILOSEC) 20 MG delayed release capsule, TAKE 1 CAPSULE BY MOUTH DAILY, Disp: 90 capsule, Rfl: 1    blood glucose monitor strips, by Other route daily Test 1 times a day & as needed for symptoms of irregular blood glucose., Disp: 50 strip, Rfl: 3    Blood Glucose Monitoring Suppl KIT, Meter as covered by insurance also test strips and lancets. , Disp: 1 kit, Rfl: 3    tamsulosin (FLOMAX) 0.4 MG capsule, Take 1 capsule by mouth daily, Disp: 90 capsule, Rfl: 5    Lancets MISC, 1 each by Does not apply route daily, Disp: 50 each, Rfl: 3    Elastic Bandages & Supports (JOBST KNEE HIGH COMPRESSION SM) MISC, 1 each by Does not apply route daily as needed (leg swelling/CHF), Disp: 2 each, Rfl: 1   amiodarone (CORDARONE) 200 MG tablet, Take 1 tablet by mouth daily, Disp: 90 tablet, Rfl: 3    Blood Pressure Monitoring (BLOOD PRESSURE CUFF) MISC, 1 Device by Does not apply route daily, Disp: 1 each, Rfl: 0    blood glucose monitor strips, Test 1 times a day & as needed for symptoms of irregular blood glucose., Disp: 100 strip, Rfl: 3    aspirin 81 MG tablet, Take 1 tablet by mouth daily Pt.will stop 10-30 -18 AM for OR (Patient taking differently: Take 81 mg by mouth daily ), Disp: 30 tablet, Rfl: 3    Multiple Vitamins-Minerals (THERAPEUTIC MULTIVITAMIN-MINERALS) tablet, Take 1 tablet by mouth daily (with breakfast), Disp: 30 tablet, Rfl: 3    Current Facility-Administered Medications:     regadenoson (LEXISCAN) injection 0.4 mg, 0.4 mg, Intravenous, Once, Samantha Martines DO    Facility-Administered Medications Ordered in Other Encounters:     technetium sestamibi (CARDIOLITE) injection 30 millicurie, 30 millicurie, Intravenous, ONCE PRN, Samantha Martines DO    technetium sestamibi (CARDIOLITE) injection 10 millicurie, 10 millicurie, Intravenous, ONCE PRN, Samantha Martines, DO      ----------------------------------------------------------------------------------------------------------                Lexiscan 0.4 mg injected over 10 seconds. IV Cardiolite injected 20 seconds post Lexiscan injection.      Heart Rate:  66  Resting Blood Pressure:  159/77   ----------------------------------------------------------------------------------------------------------     HR BP   1 min 76 100/62   2 min     3 min 76 138/65   4 min     5 min 76 125/63   6 min     7 min 76 114/70   8 min     9 min 74 136/73   10 min       Symptoms:  Chest Pain:  No      Nausea:  Yes     Headache:  No    Shortness of Breath:  Yes     Other:  No      Electronically signed by Magali Miller RN on 5/25/21 at 2:26 PM EDT  ----------------------------------------------------------------------------------------------------------    Resting EKG:  NSR     Arrhythmias:  None     EKG Changes:  None     Maximum Changes:  None     Leads with maximum changes:  None     EKG returned to baseline 0-1 minutes in recovery. Interpretation:    1. Normal ECG portion of stress test.  2. Nuclear perfusion scan report to follow.         Supervising Physician:  Alma Pinzon,  DO

## 2021-05-26 ENCOUNTER — TELEPHONE (OUTPATIENT)
Dept: CARDIOLOGY | Age: 76
End: 2021-05-26

## 2021-05-26 PROCEDURE — 93018 CV STRESS TEST I&R ONLY: CPT | Performed by: INTERNAL MEDICINE

## 2021-05-26 NOTE — TELEPHONE ENCOUNTER
Left detailed message on voicemail with results of stress and echo, asked pt to call us with any questions and keep their follow-up appointment with Cardiology.

## 2021-05-26 NOTE — PROCEDURES
[facility] [address]                 [address] [place] [id number]                              CARDIAC STRESS TEST    PATIENT NAME: Jesus Mandujano                   :        1945  MED REC NO:   [id number]                             ROOM:  ACCOUNT NO:   [de-identified]                           ADMIT DATE: 2021  PROVIDER:     Jenaro Mayers MD    DATE OF STUDY:  2021    STRESS TEST    Ordering Provider:  Princess Aguilar MD    Primary Care Provider:  JAYHSREE Winkler    Patient's Age: 67     Height: 5 feet 6 inches  Weight: 201 pounds    INDICATION:  Chest pain, shortness of breath, abnormal EKG, status post  AVR. Lexiscan 0.4 mg injected over 10 seconds. IV Cardiolite injected 20 seconds post Lexiscan injection. Heart Rate: 66 Resting blood pressure:  159/77              HR   BP  1 min          76   100/62  2 min  3 min          76   138/65  4 min  5 min          76   125/63  6 min  7 min          76   114/70  8 min  9 min          74   136/73  10 min    Symptoms:  Chest Pain: No  Nausea: Yes  Headache:  No  Shortness of  breath: Yes  Other: No    Resting EKG:  Normal sinus rhythm. Arrhythmias:  None. EKG changes:  None. Maximum changes:  None. Leads with maximum changes:  None. EKG returned to baseline 0-1 minutes in recovery. INTERPRETATION:  1. Normal ECG portion of stress test.  2.  Nuclear perfusion scan report to follow.     Nuclear Myocardial Perfusion Imaging (SPECT)    TESTING METHOD  STRESS:   Lexiscan  AGENT:    Cardiolite  REST:          Injection Date:  2021  Time:  1310  Amount:  13.46  mCi  STRESS:   Injection Date:  2021  Time:  1428  Amount:  38.9 mCi  IMAGE TIME:    Rest:  1348  Stress:  1500    EF:  66%  TID:  0.88  LHR:  0.40    FINDINGS:  Ischemia (Reversible Defect):           No  Infarction (Irreversible Defect):       No  Ejection fraction Calculated:           Yes  Segmental wall

## 2021-05-27 ENCOUNTER — HOSPITAL ENCOUNTER (OUTPATIENT)
Dept: PHARMACY | Age: 76
Setting detail: THERAPIES SERIES
Discharge: HOME | End: 2021-05-27
Payer: MEDICARE

## 2021-05-27 DIAGNOSIS — Z79.01 ANTICOAGULATED ON COUMADIN: ICD-10-CM

## 2021-05-27 DIAGNOSIS — Z95.2 HISTORY OF AORTIC VALVE REPLACEMENT: Primary | ICD-10-CM

## 2021-05-27 LAB
INR BLD: 4.8
PROTIME: 57.5 SECONDS

## 2021-05-27 PROCEDURE — 99211 OFF/OP EST MAY X REQ PHY/QHP: CPT

## 2021-05-27 PROCEDURE — 85610 PROTHROMBIN TIME: CPT

## 2021-05-27 PROCEDURE — 36416 COLLJ CAPILLARY BLOOD SPEC: CPT

## 2021-06-10 ENCOUNTER — HOSPITAL ENCOUNTER (OUTPATIENT)
Dept: PHARMACY | Age: 76
Setting detail: THERAPIES SERIES
Discharge: HOME | End: 2021-06-10
Payer: MEDICARE

## 2021-06-10 DIAGNOSIS — Z95.2 HISTORY OF AORTIC VALVE REPLACEMENT: Primary | ICD-10-CM

## 2021-06-10 DIAGNOSIS — Z79.01 ANTICOAGULATED ON COUMADIN: ICD-10-CM

## 2021-06-10 LAB
INR BLD: 1.6
PROTIME: 19.2 SECONDS

## 2021-06-10 PROCEDURE — 36416 COLLJ CAPILLARY BLOOD SPEC: CPT

## 2021-06-10 PROCEDURE — 85610 PROTHROMBIN TIME: CPT

## 2021-06-10 PROCEDURE — 99211 OFF/OP EST MAY X REQ PHY/QHP: CPT

## 2021-06-10 NOTE — PROGRESS NOTES
ANTICOAGULATION SERVICE    Date of Clinic Visit:  2/27/3645    Dave Dowling is a 67 y.o. male who presents to clinic today for anticoagulation monitoring and adjustment. Recent INR Results:  Internal QC passed  Lab Results   Component Value Date    INR 1.6 06/10/2021    INR 4.8 05/27/2021       Current Warfarin Dosage:  Dosing Plan  As of 6/10/2021    TTR:  25.0 % (1.1 y)   Full warfarin instructions:  2 mg every Tue, Fri; 4 mg all other days                 Assessment/Plan:    Modify warfarin dose as noted above: INR is now low after decreasing dose at last visit. I will increase dose back to old dose. I believe Darian probably took incorrect dose causing INR to be elevated at last visit. Recheck 4 weeks. Next Clinic Appointment:  Return date  As of 6/10/2021    TTR:  25.0 % (1.1 y)   Next INR check:  7/8/2021             Please call [facility] at [phone] with any questions. Thanks!   Warren Barrera [facility]  Anticoagulation Service Pharmacist  6/10/2021 12:32 PM     For Pharmacy Admin Tracking Only     Intervention Detail: Dose Adjustment: 1: reason: Therapy Optimization   Total # of Interventions Recommended: 1   Total # of Interventions Accepted: 1   Time Spent (min): 20

## 2021-06-29 DIAGNOSIS — K21.9 GASTROESOPHAGEAL REFLUX DISEASE: ICD-10-CM

## 2021-06-29 RX ORDER — OMEPRAZOLE 20 MG/1
CAPSULE, DELAYED RELEASE ORAL
Qty: 90 CAPSULE | Refills: 1 | Status: SHIPPED | OUTPATIENT
Start: 2021-06-29 | End: 2021-10-01

## 2021-07-03 DIAGNOSIS — E11.9 TYPE 2 DIABETES MELLITUS WITHOUT COMPLICATION, WITHOUT LONG-TERM CURRENT USE OF INSULIN (HCC): ICD-10-CM

## 2021-07-06 DIAGNOSIS — E11.65 TYPE 2 DIABETES MELLITUS WITH HYPERGLYCEMIA, WITHOUT LONG-TERM CURRENT USE OF INSULIN (HCC): ICD-10-CM

## 2021-07-06 DIAGNOSIS — Z79.01 ANTICOAGULATED ON COUMADIN: Primary | ICD-10-CM

## 2021-07-06 RX ORDER — DULAGLUTIDE 1.5 MG/.5ML
INJECTION, SOLUTION SUBCUTANEOUS
Qty: 2 ML | Refills: 3 | Status: SHIPPED | OUTPATIENT
Start: 2021-07-06 | End: 2021-10-01

## 2021-07-06 RX ORDER — GLIMEPIRIDE 4 MG/1
TABLET ORAL
Qty: 90 TABLET | Refills: 1 | Status: SHIPPED | OUTPATIENT
Start: 2021-07-06 | End: 2021-10-22

## 2021-07-06 RX ORDER — WARFARIN SODIUM 4 MG/1
TABLET ORAL
Qty: 90 TABLET | Refills: 1 | Status: SHIPPED | OUTPATIENT
Start: 2021-07-06 | End: 2021-10-22

## 2021-07-06 NOTE — TELEPHONE ENCOUNTER
Roly Ríos is calling to request a refill on the following medication(s):  Requested Prescriptions     Pending Prescriptions Disp Refills    glimepiride (AMARYL) 4 MG tablet 90 tablet 1    warfarin (COUMADIN) 4 MG tablet 90 tablet 1     Sig: TAKE 1 TABLET BY MOUTH EVERY DAY       Last Visit Date (If Applicable):  9/82/8140    Next Visit Date:    10/27/2021

## 2021-07-08 ENCOUNTER — HOSPITAL ENCOUNTER (OUTPATIENT)
Dept: PHARMACY | Age: 76
Setting detail: THERAPIES SERIES
Discharge: HOME | End: 2021-07-08
Payer: MEDICARE

## 2021-07-08 DIAGNOSIS — Z95.2 HISTORY OF AORTIC VALVE REPLACEMENT: Primary | ICD-10-CM

## 2021-07-08 DIAGNOSIS — Z79.01 ANTICOAGULATED ON COUMADIN: ICD-10-CM

## 2021-07-08 LAB
INR BLD: 2.8
PROTIME: 34.2 SECONDS

## 2021-07-08 PROCEDURE — 36416 COLLJ CAPILLARY BLOOD SPEC: CPT

## 2021-07-08 PROCEDURE — 85610 PROTHROMBIN TIME: CPT

## 2021-07-08 PROCEDURE — 99211 OFF/OP EST MAY X REQ PHY/QHP: CPT

## 2021-07-08 NOTE — PROGRESS NOTES
ANTICOAGULATION SERVICE    Date of Clinic Visit:  4396    Srinivas Bell is a 67 y.o. male who presents to clinic today for anticoagulation monitoring and adjustment. Recent INR Results:  Internal QC passed  Lab Results   Component Value Date    INR 2.8 2021    INR 1.6 06/10/2021       Current Warfarin Dosage:  Dosing Plan  As of 2021    TTR:  27.8 % (1.1 y)   Full warfarin instructions:  2 mg every Tue, Fri; 4 mg all other days               Assessment/Plan:    Continue current regimen as INR remains stable. Recheck 4 weeks. Next Clinic Appointment:  Return date  As of 2021    TTR:  27.8 % (1.1 y)   Next INR check:  2021             Please call [facility] at [phone] with any questions. Thanks!   Malik Gutierrez, [address] [facility]  Anticoagulation Service Pharmacist  2021 1:23 PM     For Pharmacy Admin Tracking Only     Intervention Detail: Adherence Monitorin   Total # of Interventions Recommended: 0   Total # of Interventions Accepted: 0   Time Spent (min): 15

## 2021-08-03 ENCOUNTER — OFFICE VISIT (OUTPATIENT)
Dept: DIABETES SERVICES | Age: 76
End: 2021-08-03
Payer: MEDICARE

## 2021-08-03 VITALS
HEIGHT: 66 IN | HEART RATE: 68 BPM | RESPIRATION RATE: 20 BRPM | SYSTOLIC BLOOD PRESSURE: 120 MMHG | BODY MASS INDEX: 32.66 KG/M2 | WEIGHT: 203.2 LBS | DIASTOLIC BLOOD PRESSURE: 62 MMHG

## 2021-08-03 DIAGNOSIS — E11.9 TYPE 2 DIABETES MELLITUS WITHOUT COMPLICATION, WITHOUT LONG-TERM CURRENT USE OF INSULIN (HCC): Primary | ICD-10-CM

## 2021-08-03 DIAGNOSIS — I10 ESSENTIAL HYPERTENSION: ICD-10-CM

## 2021-08-03 DIAGNOSIS — E78.2 MIXED HYPERLIPIDEMIA: ICD-10-CM

## 2021-08-03 LAB — HBA1C MFR BLD: 7.4 %

## 2021-08-03 PROCEDURE — G8417 CALC BMI ABV UP PARAM F/U: HCPCS | Performed by: NURSE PRACTITIONER

## 2021-08-03 PROCEDURE — 99214 OFFICE O/P EST MOD 30 MIN: CPT | Performed by: NURSE PRACTITIONER

## 2021-08-03 PROCEDURE — 83036 HEMOGLOBIN GLYCOSYLATED A1C: CPT | Performed by: NURSE PRACTITIONER

## 2021-08-03 PROCEDURE — 1123F ACP DISCUSS/DSCN MKR DOCD: CPT | Performed by: NURSE PRACTITIONER

## 2021-08-03 PROCEDURE — 4040F PNEUMOC VAC/ADMIN/RCVD: CPT | Performed by: NURSE PRACTITIONER

## 2021-08-03 PROCEDURE — G8427 DOCREV CUR MEDS BY ELIG CLIN: HCPCS | Performed by: NURSE PRACTITIONER

## 2021-08-03 PROCEDURE — 3051F HG A1C>EQUAL 7.0%<8.0%: CPT | Performed by: NURSE PRACTITIONER

## 2021-08-03 PROCEDURE — 1036F TOBACCO NON-USER: CPT | Performed by: NURSE PRACTITIONER

## 2021-08-03 ASSESSMENT — ENCOUNTER SYMPTOMS
SHORTNESS OF BREATH: 0
RESPIRATORY NEGATIVE: 1
ABDOMINAL PAIN: 0
DIARRHEA: 0

## 2021-08-03 NOTE — PROGRESS NOTES
[address] [facility] INTERNAL MED A DEPARTMENT OF [place] [address]  [address], [address]  [facility] [address] [phone]        HISTORY:    Chari Jones presents today for evaluation and management of:  Chief Complaint   Patient presents with    Diabetes     4 month appt        HPI    Interval History:    Past DM Medications   Actos- therapy completed      Current Diabetic Medications  amaryl 4 mg daily, metformin 3720 mg BID Trulicity 1.5 mg weekly. He also takes a daily ASA     DKA episodes: 0    10/06/20   At previous visit we stopped amaryl due to hypoglycemia but was refilled  by pcp   Diet:high salt diet   Exercise: none   BS testing: none  Issues: denies      01/05/21   At previous no changes were made. He does c/o of occasional shakiness but does not test bs and resolves quickly   Diet: unchanged   Exercise: none   BS testing: not testing   Issues: denies      04/06/21   At previous visit dm counseling was provided. He is compliant with medications. Diet: smaller frequent meals  Exercise: none   BS testing: none   Issues: denies     08/03/21   He is due for a1c today. Denies any s/s of hyper/hypoglcyemia. Diet: smaller frequent meals  Exercise: none   BS testing: none declines   Issues: denies     High cholesterol-  Takes crestor and denies any adverse effects with its use.  Watches diet and exercise.      Hypertension-  Takes lisinopril and lopressor and denies any adverse effects with their use. Watches diet and exercise. Denies any chest pain, dizziness or edema.  Follows with cardiology:Yes.        Obesity- Working on weight loss.  Trending down      Past Medical History:   Diagnosis Date    Aortic stenosis     Aortic valve replacement 2019    Degenerative disc disease, lumbar     Diabetes mellitus (HCC)     PCP Dr. Filemon Blair    Diverticulosis of sigmoid colon     GERD (gastroesophageal reflux disease)     Hyperlipidemia     Hypertension  Kidney stone     Mitral regurgitation     Obesity      Family History   Problem Relation Age of Onset    Cancer Father         prostate    Alzheimer's Disease Father     Heart Disease Neg Hx     Diabetes Neg Hx     Glaucoma Neg Hx     Macular Degen Neg Hx      Social History     Tobacco Use    Smoking status: Former Smoker     Packs/day: 1.50     Years: 40.00     Pack years: 60.00     Types: Cigarettes     Quit date:      Years since quittin.6    Smokeless tobacco: Never Used   Vaping Use    Vaping Use: Never used   Substance Use Topics    Alcohol use: No    Drug use: No     Allergies   Allergen Reactions    Invokana [Canagliflozin]      dizziness    Januvia [Sitagliptin]      dizziness       MEDICATIONS:  Current Outpatient Medications   Medication Sig Dispense Refill    TRULICITY 1.5 TF/5.9GP SOPN ADMINISTER 0.5 ML UNDER THE SKIN 1 TIME A WEEK 2 mL 3    glimepiride (AMARYL) 4 MG tablet Take 1 tablet by mouth every morning before breakfast. 90 tablet 1    warfarin (COUMADIN) 4 MG tablet TAKE 1 TABLET BY MOUTH EVERY DAY 90 tablet 1    omeprazole (PRILOSEC) 20 MG delayed release capsule Take 1 capsule by mouth daily.  90 capsule 1    lisinopril-hydroCHLOROthiazide (PRINZIDE;ZESTORETIC) 10-12.5 MG per tablet Take 1 tablet by mouth daily 90 tablet 1    loratadine (CLARITIN) 10 MG tablet Take 1 tablet by mouth daily as needed (allergies) 30 tablet 0    metFORMIN (GLUCOPHAGE) 500 MG tablet Take 2 tablets by mouth 2 times daily (with meals) 120 tablet 3    metoprolol tartrate (LOPRESSOR) 25 MG tablet TAKE 1 TABLET BY MOUTH TWICE DAILY 180 tablet 3    rosuvastatin (CRESTOR) 20 MG tablet Take 1 tablet by mouth daily 90 tablet 3    tamsulosin (FLOMAX) 0.4 MG capsule Take 1 capsule by mouth daily 90 capsule 5    amiodarone (CORDARONE) 200 MG tablet Take 1 tablet by mouth daily 90 tablet 3    aspirin 81 MG tablet Take 1 tablet by mouth daily Pt.will stop 10-30 -18 AM for OR (Patient taking differently: Take 81 mg by mouth daily ) 30 tablet 3    Multiple Vitamins-Minerals (THERAPEUTIC MULTIVITAMIN-MINERALS) tablet Take 1 tablet by mouth daily (with breakfast) 30 tablet 3    blood glucose monitor strips by Other route daily Test 1 times a day & as needed for symptoms of irregular blood glucose. 50 strip 3    Blood Glucose Monitoring Suppl KIT Meter as covered by insurance also test strips and lancets. 1 kit 3    Lancets MISC 1 each by Does not apply route daily 50 each 3    Elastic Bandages & Supports (JOBST KNEE HIGH COMPRESSION SM) MISC 1 each by Does not apply route daily as needed (leg swelling/CHF) 2 each 1    Blood Pressure Monitoring (BLOOD PRESSURE CUFF) MISC 1 Device by Does not apply route daily 1 each 0    blood glucose monitor strips Test 1 times a day & as needed for symptoms of irregular blood glucose. 100 strip 3     No current facility-administered medications for this visit. Review ofSymptoms:  Review of Systems   Constitutional: Positive for fatigue. Negative for unexpected weight change. Eyes: Negative for visual disturbance. Respiratory: Negative. Negative for shortness of breath. Cardiovascular: Negative for chest pain and leg swelling. Gastrointestinal: Negative for abdominal pain and diarrhea. Endocrine: Negative for polydipsia, polyphagia and polyuria. Genitourinary: Negative. Musculoskeletal: Negative. Skin: Negative for rash and wound. Neurological: Negative for dizziness, tremors, seizures and headaches. Psychiatric/Behavioral: Negative. Negative for confusion and decreased concentration. Theremainder of a complete 14-point review of systems is negative.        Vital Signs: /62 (Site: Right Upper Arm, Position: Sitting, Cuff Size: Medium Adult)   Pulse 68   Resp 20   Ht 5' 6\" (1.676 m)   Wt 203 lb 3.2 oz (92.2 kg)   BMI 32.80 kg/m²      Wt Readings from Last 3 Encounters:   08/03/21 203 lb 3.2 oz (92.2 kg)   05/13/21 201 lb (91.2 kg)   05/04/21 205 lb (93 kg)     Body mass index is 32.8 kg/m². LABS:  Hemoglobin A1C   Date Value Ref Range Status   08/03/2021 7.4 % Final   04/27/2021 7.5 % Final     Lab Results   Component Value Date    LABMICR 28 (H) 01/28/2020     Lab Results   Component Value Date     10/27/2020    K 4.7 10/27/2020    CL 99 10/27/2020    CO2 27 10/27/2020    BUN 22 10/27/2020    CREATININE 1.07 10/27/2020    GLUCOSE 170 (H) 10/27/2020    CALCIUM 9.8 10/27/2020    PROT 6.8 10/27/2020    LABALBU 4.2 10/27/2020    BILITOT 0.54 10/27/2020    ALKPHOS 63 10/27/2020    AST 37 10/27/2020    ALT 38 10/27/2020    LABGLOM >60 10/27/2020    GFRAA >60 10/27/2020     Lab Results   Component Value Date    CHOL 126 12/16/2019    CHOL 179 05/21/2019    CHOL 337 (H) 02/15/2019     Lab Results   Component Value Date    TRIG 310 (H) 12/16/2019    TRIG 239 05/21/2019    TRIG 289 (H) 02/15/2019     Lab Results   Component Value Date    HDL 44 10/27/2020    HDL 35 (L) 12/16/2019    HDL 42 04/13/2017     Lab Results   Component Value Date    LDLCHOLESTEROL 75 10/27/2020    LDLCHOLESTEROL 29 12/16/2019    LDLCALC 85.2 05/21/2019    LDLCALC 234.2 (H) 02/15/2019    LDLCALC 60.8 02/19/2018     Lab Results   Component Value Date    VLDL NOT REPORTED (H) 10/27/2020    VLDL NOT REPORTED (H) 12/16/2019    VLDL 48 (H) 05/21/2019     Lab Results   Component Value Date    CHOLHDLRATIO 3.5 10/27/2020    CHOLHDLRATIO 3.6 12/16/2019    CHOLHDLRATIO 3.9 05/21/2019           Physical Exam  Constitutional:       Appearance: He is well-developed. Eyes:      Pupils: Pupils are equal, round, and reactive to light. Cardiovascular:      Rate and Rhythm: Normal rate and regular rhythm. Pulmonary:      Effort: Pulmonary effort is normal.      Breath sounds: Normal breath sounds. Skin:     General: Skin is warm and dry. Findings: No lesion (no lipohypertrophy) or rash.    Neurological:      Mental Status: He is alert and oriented to person, place, and time. Sensory: No sensory deficit. Psychiatric:         Speech: Speech normal.         Behavior: Behavior normal.         Thought Content: Thought content normal.         Judgment: Judgment normal.           ASSESSMENT/PLAN:     Diagnosis Orders   1. Type 2 diabetes mellitus without complication, without long-term current use of insulin ([facility])  POCT glycosylated hemoglobin (Hb A1C)    Basic Metabolic Panel    Hemoglobin A1C    Lipid, Fasting    Microalbumin, Ur    CBC Auto Differential   2. Mixed hyperlipidemia     3. Essential hypertension     4. BMI 32.0-32.9,adult       Orders Placed This Encounter   Procedures    Basic Metabolic Panel    Hemoglobin A1C    Lipid, Fasting    Microalbumin, Ur    CBC Auto Differential    POCT glycosylated hemoglobin (Hb A1C)     No orders of the defined types were placed in this encounter. Requested Prescriptions      No prescriptions requested or ordered in this encounter       1. Type 2 diabetes mellitus without complication, without long-term current use of insulin ([facility])  - Unstable  HbA1C goal is less than 7%. - Fasting blood glucose goal is 70-130mg/dl and postprandial blood sugar goal is less than 180 mg/dl. -Diabetic foot exam up-to-date: Yes  -Diabetic retinal exam up-to-date: Yes  - Labs reviewed includes: Most recent A1C 7.4%, Microalb/Crt. Ratio 28 mcg/mg creat and GFR >60 mL/min. Repeat labs due in 3 months.    -We discussed in great detail dietary modifications they can make to better improve their blood sugars. -follow up diabetes education completed, all questions answered. -doing well. Do to risk of hypoglycemia, age and declining to test at home less aggressive a1c control is recommended. Reviewed risk with pt and he is agreeable. Discussed signs and symptoms of hyper/hypoglycemia and how to treat. Encouraged 150 minutes of physical activity per week. Follow a low carbohydrate diet. Encouraged at least 7 hours of sleep. The patient occasionally words are mis-transcribed.)

## 2021-08-03 NOTE — LETTER
[address] [facility] A department of [place] [address]  Phone: [phone]    PAIGE Guardado CNP        February 23, 2022     Stephany Carmichael [address] [facility] [facility]      Dear Jessie Florence: This letter is a reminder that your nonfasting lab tests are due. Please call our office to schedule an appointment. If you've already underwent or scheduled this procedure, please disregard this notice.     Sincerely,        PAIGE Guardado CNP

## 2021-08-05 ENCOUNTER — HOSPITAL ENCOUNTER (OUTPATIENT)
Dept: PHARMACY | Age: 76
Setting detail: THERAPIES SERIES
Discharge: HOME | End: 2021-08-05
Payer: MEDICARE

## 2021-08-05 DIAGNOSIS — Z79.01 ANTICOAGULATED ON COUMADIN: ICD-10-CM

## 2021-08-05 DIAGNOSIS — Z95.2 HISTORY OF AORTIC VALVE REPLACEMENT: Primary | ICD-10-CM

## 2021-08-05 LAB
INR BLD: 2.1
PROTIME: 25.2 SECONDS

## 2021-08-05 PROCEDURE — 99211 OFF/OP EST MAY X REQ PHY/QHP: CPT

## 2021-08-05 PROCEDURE — 85610 PROTHROMBIN TIME: CPT

## 2021-08-05 PROCEDURE — 36416 COLLJ CAPILLARY BLOOD SPEC: CPT

## 2021-08-05 NOTE — PROGRESS NOTES
ANTICOAGULATION SERVICE    Date of Clinic Visit:  4811    Kenny Liz is a 67 y.o. male who presents to clinic today for anticoagulation monitoring and adjustment. Recent INR Results:  Internal QC passed  Lab Results   Component Value Date    INR 2.1 2021    INR 2.8 2021       Current Warfarin Dosage:  Dosing Plan  As of 2021    TTR:  32.4 % (1.2 y)   Full warfarin instructions:  2 mg every Tue, Fri; 4 mg all other days               Assessment/Plan:    Continue current regimen as INR remains stable. Recheck 4 weeks. Next Clinic Appointment:  Return date  As of 2021    TTR:  32.4 % (1.2 y)   Next INR check:  2021             Please call [facility] at [phone] with any questions. Thanks!   Jose Valdez, [facility] [facility]  Anticoagulation Service Pharmacist  2021 12:32 PM     For Pharmacy Admin Tracking Only     Intervention Detail: Adherence Monitorin   Total # of Interventions Recommended: 0   Total # of Interventions Accepted: 0   Time Spent (min): 15

## 2021-08-12 DIAGNOSIS — E11.9 TYPE 2 DIABETES MELLITUS WITHOUT COMPLICATION, WITHOUT LONG-TERM CURRENT USE OF INSULIN (HCC): Primary | ICD-10-CM

## 2021-08-12 DIAGNOSIS — E87.5 SERUM POTASSIUM ELEVATED: ICD-10-CM

## 2021-08-12 LAB
ANION GAP SERPL CALCULATED.3IONS-SCNC: 17.1 MMOL/L
BASOPHILS %: 0.89 (ref 0–3)
BASOPHILS ABSOLUTE: 0.1 (ref 0–0.3)
BUN BLDV-MCNC: 20 MG/DL (ref 9–20)
CALCIUM SERPL-MCNC: 9.4 MG/DL (ref 8.4–10.2)
CHLORIDE BLD-SCNC: 103 MMOL/L (ref 98–120)
CHOLESTEROL/HDL RATIO: 3.21 RATIO (ref 0–4.5)
CHOLESTEROL: 138 MG/DL (ref 50–200)
CO2: 21 MMOL/L (ref 22–31)
CREAT SERPL-MCNC: 1.3 MG/DL (ref 0.7–1.3)
CREATININE, RANDOM URINE: 70.3 MG/DL (ref 20–370)
EOSINOPHILS %: 0 (ref 0–10)
EOSINOPHILS ABSOLUTE: 0 (ref 0–1.1)
GFR CALCULATED: 57
GLUCOSE: 169 MG/DL (ref 75–110)
HBA1C MFR BLD: 7.9 % (ref 4.4–6.4)
HCT VFR BLD CALC: 35.2 % (ref 42–52)
HDLC SERPL-MCNC: 43 MG/DL (ref 36–68)
HEMOGLOBIN: 12 (ref 13.8–17.8)
LDL CHOLESTEROL CALCULATED: 49.8 MG/DL (ref 0–160)
LYMPHOCYTE %: 30.32 (ref 20–51.1)
LYMPHOCYTES ABSOLUTE: 3.31 (ref 1–5.5)
MCH RBC QN AUTO: 31.3 PG (ref 28.5–32.5)
MCHC RBC AUTO-ENTMCNC: 34.1 G/DL (ref 32–37)
MCV RBC AUTO: 92 FL (ref 80–94)
MICROALBUMIN UR-MCNC: 3.4 MG/DL (ref 0–1.7)
MICROALBUMIN/CREAT UR-RTO: 48.36
MONOCYTES %: 5.85 (ref 1.7–9.3)
MONOCYTES ABSOLUTE: 0.64 (ref 0.1–1)
NEUTROPHILS %: 62.94 (ref 42.2–75.2)
NEUTROPHILS ABSOLUTE: 6.87 (ref 2–8.1)
PDW BLD-RTO: 11.9 % (ref 10–15.5)
PLATELET # BLD: 242.2 THOU/MM3 (ref 130–400)
POTASSIUM SERPL-SCNC: 5.1 MMOL/L (ref 3.6–5)
RBC: 3.82 M/UL (ref 4.7–6.1)
SODIUM BLD-SCNC: 136 MMOL/L (ref 135–145)
TRIGL SERPL-MCNC: 226 MG/DL (ref 10–250)
VLDLC SERPL CALC-MCNC: 45 MG/DL (ref 0–50)
WBC: 10.9 THOU/ML3 (ref 4.8–10.8)

## 2021-08-24 DIAGNOSIS — E11.65 TYPE 2 DIABETES MELLITUS WITH HYPERGLYCEMIA, WITHOUT LONG-TERM CURRENT USE OF INSULIN (HCC): ICD-10-CM

## 2021-08-24 NOTE — TELEPHONE ENCOUNTER
Jimena Rivera is calling to request a refill on the following medication(s):  Requested Prescriptions     Pending Prescriptions Disp Refills    metFORMIN (GLUCOPHAGE) 500 MG tablet [Pharmacy Med Name: METFORMIN 500MG TABLETS] 120 tablet 3     Sig: TAKE 2 TABLETS BY MOUTH TWICE DAILY WITH MEALS       Last Visit Date (If Applicable):  0/26/7308    Next Visit Date:    10/27/2021

## 2021-09-16 ENCOUNTER — HOSPITAL ENCOUNTER (OUTPATIENT)
Dept: PHARMACY | Age: 76
Setting detail: THERAPIES SERIES
Discharge: HOME | End: 2021-09-16
Payer: MEDICARE

## 2021-09-16 DIAGNOSIS — Z95.2 HISTORY OF AORTIC VALVE REPLACEMENT: Primary | ICD-10-CM

## 2021-09-16 DIAGNOSIS — Z79.01 ANTICOAGULATED ON COUMADIN: ICD-10-CM

## 2021-09-16 LAB
INR BLD: 1.2
PROTIME: 14.4 SECONDS

## 2021-09-16 PROCEDURE — 99211 OFF/OP EST MAY X REQ PHY/QHP: CPT

## 2021-09-16 PROCEDURE — 36416 COLLJ CAPILLARY BLOOD SPEC: CPT

## 2021-09-16 PROCEDURE — 85610 PROTHROMBIN TIME: CPT

## 2021-09-16 NOTE — PROGRESS NOTES
ANTICOAGULATION SERVICE    Date of Clinic Visit:  1/18/2460    June Bustillos is a 67 y.o. male who presents to clinic today for anticoagulation monitoring and adjustment. Recent INR Results:  Internal QC passed  Lab Results   Component Value Date    INR 1.2 09/16/2021    INR 2.1 08/05/2021       Current Warfarin Dosage:  Dosing Plan  As of 9/16/2021    TTR:  30.6 % (1.3 y)   Full warfarin instructions:  9/16: 8 mg; Otherwise 2 mg every Tue, Fri; 4 mg all other days               Assessment/Plan:    Modify warfarin dose as noted above: INR is very low today. Darian said he did not miss any doses although his INR has been in range on this dose for many months. I will double dose today then back on regular dose. Recheck two weeks. Next Clinic Appointment:  Return date  As of 9/16/2021    TTR:  30.6 % (1.3 y)   Next INR check:  9/30/2021             Please call [address] [facility] at [phone] with any questions. Thanks!   Pema Elder, [address] [facility]  Anticoagulation Service Pharmacist  9/16/2021 1:14 PM     For Pharmacy Admin Tracking Only     Intervention Detail: Dose Adjustment: 1, reason: Therapy Optimization   Total # of Interventions Recommended: 1   Total # of Interventions Accepted: 1   Time Spent (min): 15

## 2021-09-30 ENCOUNTER — HOSPITAL ENCOUNTER (OUTPATIENT)
Dept: PHARMACY | Age: 76
Setting detail: THERAPIES SERIES
Discharge: HOME | End: 2021-09-30
Payer: MEDICARE

## 2021-09-30 DIAGNOSIS — Z79.01 ANTICOAGULATED ON COUMADIN: ICD-10-CM

## 2021-09-30 DIAGNOSIS — Z95.2 HISTORY OF AORTIC VALVE REPLACEMENT: Primary | ICD-10-CM

## 2021-09-30 LAB
INR BLD: 1.7
PROTIME: 20.8 SECONDS

## 2021-09-30 PROCEDURE — 85610 PROTHROMBIN TIME: CPT

## 2021-09-30 PROCEDURE — 99211 OFF/OP EST MAY X REQ PHY/QHP: CPT

## 2021-09-30 PROCEDURE — 36416 COLLJ CAPILLARY BLOOD SPEC: CPT

## 2021-09-30 NOTE — PROGRESS NOTES
ANTICOAGULATION SERVICE    Date of Clinic Visit:  0/04/0790    Melody Bro is a 67 y.o. male who presents to clinic today for anticoagulation monitoring and adjustment. Curbside visit    Recent INR Results:  Internal QC passed  Lab Results   Component Value Date    INR 1.7 09/30/2021    INR 1.2 09/16/2021       Current Warfarin Dosage:  Dosing Plan  As of 9/30/2021    TTR:  29.7 % (1.4 y)   Full warfarin instructions:  2 mg every Tue; 4 mg all other days               Assessment/Plan:    Modify warfarin dose as noted above: INR remains below range but did increase since dose increase two weeks ago. I will continue with dose increase and recheck in 4 weeks. Next Clinic Appointment:  Return date  As of 9/30/2021    TTR:  29.7 % (1.4 y)   Next INR check:               Please call [facility] at [phone] with any questions. Thanks!   Arnulfo Parnell, [facility]  Anticoagulation Service Pharmacist  9/30/2021 12:58 PM    For Pharmacy Admin Tracking Only     Intervention Detail: Dose Adjustment: 1, reason: Therapy Optimization   Total # of Interventions Recommended: 1   Total # of Interventions Accepted: 1   Time Spent (min): 15

## 2021-10-01 DIAGNOSIS — K21.9 GASTROESOPHAGEAL REFLUX DISEASE: ICD-10-CM

## 2021-10-01 DIAGNOSIS — E11.9 TYPE 2 DIABETES MELLITUS WITHOUT COMPLICATION, WITHOUT LONG-TERM CURRENT USE OF INSULIN (HCC): ICD-10-CM

## 2021-10-01 DIAGNOSIS — E78.2 MIXED HYPERLIPIDEMIA: ICD-10-CM

## 2021-10-01 RX ORDER — DULAGLUTIDE 1.5 MG/.5ML
INJECTION, SOLUTION SUBCUTANEOUS
Qty: 2 ML | Refills: 0 | Status: SHIPPED | OUTPATIENT
Start: 2021-10-01 | End: 2021-11-08

## 2021-10-01 RX ORDER — ROSUVASTATIN CALCIUM 20 MG/1
20 TABLET, COATED ORAL DAILY
Qty: 90 TABLET | Refills: 3 | Status: SHIPPED | OUTPATIENT
Start: 2021-10-01

## 2021-10-01 RX ORDER — OMEPRAZOLE 20 MG/1
CAPSULE, DELAYED RELEASE ORAL
Qty: 90 CAPSULE | Refills: 1 | Status: SHIPPED | OUTPATIENT
Start: 2021-10-01 | End: 2022-03-31 | Stop reason: SDUPTHER

## 2021-10-01 NOTE — TELEPHONE ENCOUNTER
Piedadvanessa Adelso is requesting a refill on the following medication(s):  Requested Prescriptions     Pending Prescriptions Disp Refills    rosuvastatin (CRESTOR) 20 MG tablet [Pharmacy Med Name: ROSUVASTATIN 20MG TABLETS] 90 tablet 3     Sig: TAKE 1 TABLET BY MOUTH DAILY    omeprazole (PRILOSEC) 20 MG delayed release capsule [Pharmacy Med Name: OMEPRAZOLE 20MG CAPSULES] 90 capsule 1     Sig: TAKE 1 CAPSULE BY MOUTH DAILY       Last Visit Date (If Applicable):  2/53/9699    Next Visit Date:    10/27/2021

## 2021-10-27 ENCOUNTER — OFFICE VISIT (OUTPATIENT)
Dept: FAMILY MEDICINE CLINIC | Age: 76
End: 2021-10-27
Payer: MEDICARE

## 2021-10-27 VITALS
DIASTOLIC BLOOD PRESSURE: 84 MMHG | SYSTOLIC BLOOD PRESSURE: 126 MMHG | HEART RATE: 75 BPM | BODY MASS INDEX: 32.85 KG/M2 | WEIGHT: 203.5 LBS | OXYGEN SATURATION: 98 %

## 2021-10-27 DIAGNOSIS — D64.9 ANEMIA, UNSPECIFIED TYPE: ICD-10-CM

## 2021-10-27 DIAGNOSIS — I10 ESSENTIAL HYPERTENSION: ICD-10-CM

## 2021-10-27 DIAGNOSIS — I65.21 STENOSIS OF RIGHT CAROTID ARTERY: ICD-10-CM

## 2021-10-27 DIAGNOSIS — K21.9 GASTROESOPHAGEAL REFLUX DISEASE, UNSPECIFIED WHETHER ESOPHAGITIS PRESENT: ICD-10-CM

## 2021-10-27 DIAGNOSIS — J30.9 ALLERGIC RHINITIS, UNSPECIFIED SEASONALITY, UNSPECIFIED TRIGGER: ICD-10-CM

## 2021-10-27 DIAGNOSIS — E66.09 CLASS 1 OBESITY DUE TO EXCESS CALORIES WITH SERIOUS COMORBIDITY AND BODY MASS INDEX (BMI) OF 32.0 TO 32.9 IN ADULT: ICD-10-CM

## 2021-10-27 DIAGNOSIS — Z00.00 ROUTINE GENERAL MEDICAL EXAMINATION AT A HEALTH CARE FACILITY: Primary | ICD-10-CM

## 2021-10-27 DIAGNOSIS — E11.65 TYPE 2 DIABETES MELLITUS WITH HYPERGLYCEMIA, WITHOUT LONG-TERM CURRENT USE OF INSULIN (HCC): ICD-10-CM

## 2021-10-27 DIAGNOSIS — I35.0 AORTIC STENOSIS, SEVERE: ICD-10-CM

## 2021-10-27 DIAGNOSIS — E78.2 MIXED HYPERLIPIDEMIA: ICD-10-CM

## 2021-10-27 PROCEDURE — 4040F PNEUMOC VAC/ADMIN/RCVD: CPT | Performed by: NURSE PRACTITIONER

## 2021-10-27 PROCEDURE — G8484 FLU IMMUNIZE NO ADMIN: HCPCS | Performed by: NURSE PRACTITIONER

## 2021-10-27 PROCEDURE — 3051F HG A1C>EQUAL 7.0%<8.0%: CPT | Performed by: NURSE PRACTITIONER

## 2021-10-27 PROCEDURE — 1123F ACP DISCUSS/DSCN MKR DOCD: CPT | Performed by: NURSE PRACTITIONER

## 2021-10-27 PROCEDURE — G0439 PPPS, SUBSEQ VISIT: HCPCS | Performed by: NURSE PRACTITIONER

## 2021-10-27 RX ORDER — LORATADINE 10 MG/1
10 TABLET ORAL DAILY
Qty: 30 TABLET | Refills: 2 | COMMUNITY
Start: 2021-10-27

## 2021-10-27 SDOH — ECONOMIC STABILITY: FOOD INSECURITY: WITHIN THE PAST 12 MONTHS, THE FOOD YOU BOUGHT JUST DIDN'T LAST AND YOU DIDN'T HAVE MONEY TO GET MORE.: NEVER TRUE

## 2021-10-27 SDOH — ECONOMIC STABILITY: FOOD INSECURITY: WITHIN THE PAST 12 MONTHS, YOU WORRIED THAT YOUR FOOD WOULD RUN OUT BEFORE YOU GOT MONEY TO BUY MORE.: NEVER TRUE

## 2021-10-27 ASSESSMENT — PATIENT HEALTH QUESTIONNAIRE - PHQ9
1. LITTLE INTEREST OR PLEASURE IN DOING THINGS: 0
2. FEELING DOWN, DEPRESSED OR HOPELESS: 0
SUM OF ALL RESPONSES TO PHQ QUESTIONS 1-9: 0
SUM OF ALL RESPONSES TO PHQ9 QUESTIONS 1 & 2: 0
SUM OF ALL RESPONSES TO PHQ QUESTIONS 1-9: 0
SUM OF ALL RESPONSES TO PHQ QUESTIONS 1-9: 0

## 2021-10-27 ASSESSMENT — LIFESTYLE VARIABLES: HOW OFTEN DO YOU HAVE A DRINK CONTAINING ALCOHOL: 0

## 2021-10-27 ASSESSMENT — SOCIAL DETERMINANTS OF HEALTH (SDOH): HOW HARD IS IT FOR YOU TO PAY FOR THE VERY BASICS LIKE FOOD, HOUSING, MEDICAL CARE, AND HEATING?: NOT HARD AT ALL

## 2021-10-27 NOTE — PATIENT INSTRUCTIONS
Advance Directives: Care Instructions  Overview  An advance directive is a legal way to state your wishes at the end of your life. It tells your family and your doctor what to do if you can't say what you want. There are two main types of advance directives. You can change them any time your wishes change. Living will. This form tells your family and your doctor your wishes about life support and other treatment. The form is also called a declaration. Medical power of . This form lets you name a person to make treatment decisions for you when you can't speak for yourself. This person is called a health care agent (health care proxy, health care surrogate). The form is also called a durable power of  for health care. If you do not have an advance directive, decisions about your medical care may be made by a family member, or by a doctor or a  who doesn't know you. It may help to think of an advance directive as a gift to the people who care for you. If you have one, they won't have to make tough decisions by themselves. Follow-up care is a key part of your treatment and safety. Be sure to make and go to all appointments, and call your doctor if you are having problems. It's also a good idea to know your test results and keep a list of the medicines you take. What should you include in an advance directive? Many states have a unique advance directive form. (It may ask you to address specific issues.) Or you might use a universal form that's approved by many states. If your form doesn't tell you what to address, it may be hard to know what to include in your advance directive. Use the questions below to help you get started. · Who do you want to make decisions about your medical care if you are not able to? · What life-support measures do you want if you have a serious illness that gets worse over time or can't be cured? · What are you most afraid of that might happen? (Maybe you're afraid of having pain, losing your independence, or being kept alive by machines.)  · Where would you prefer to die? (Your home? A hospital? A nursing home?)  · Do you want to donate your organs when you die? · Do you want certain Alevism practices performed before you die? When should you call for help? Be sure to contact your doctor if you have any questions. Where can you learn more? Go to [email or web address] and sign in to your Codewars account. Enter R264 in the [employer] box to learn more about \"Advance Directives: Care Instructions. \"     If you do not have an account, please click on the \"Sign Up Now\" link. Current as of: March 17, 2021               Content Version: 13.0  © 2006-2021 [employer]. Care instructions adapted under license by [facility] [address] If you have questions about a medical condition or this instruction, always ask your healthcare professional. [place] [address] any warranty or liability for your use of this information. Learning About Medical Power of   What is a medical power of ? A medical power of , also called a durable power of  for health care, is one type of the legal forms called advance directives. It lets you name the person you want to make treatment decisions for you if you can't speak or decide for yourself. The person you choose is called your health care agent. This person is also called a health care proxy or health care surrogate. A medical power of  may be called something else in your state. How do you choose a health care agent? Choose your health care agent carefully. This person may or may not be a family member. Talk to the person before you make your final decision. Make sure he or she is comfortable with this responsibility. It's a good idea to choose someone who:  · Is at least 25years old.   · Knows you well and you learn more? Go to [email or web address] and sign in to your 1d[employer]ty account. Enter [id number] in the [facility] box to learn more about \"Learning About [address]. \"     If you do not have an account, please click on the \"Sign Up Now\" link. Current as of: March 17, 2021               Content Version: 13.0  © 2006-2021 Healthwise, [employer]. Care instructions adapted under license by [facility] ([facility]). If you have questions about a medical condition or this instruction, always ask your healthcare professional. [address] any warranty or liability for your use of this information. Learning About Living Perroy  What is a living will? A living will, also called a declaration, is a legal form. It tells your family and your doctor your wishes when you can't speak for yourself. It's used by the health professionals who will treat you as you near the end of your life or if you get seriously hurt or ill. If you put your wishes in writing, your loved ones and others will know what kind of care you want. They won't need to guess. This can ease your mind and be helpful to others. And you can change or cancel your living will at any time. A living will is not the same as an estate or property will. An estate will explains what you want to happen with your money and property after you die. How do you use it? A living will is used to describe the kinds of treatment or life support you want as you near the end of your life or if you get seriously hurt or ill. Keep these facts in mind about living mercer. · Your living will is used only if you can't speak or make decisions for yourself. Most often, one or more doctors must certify that you can't speak or decide for yourself before your living will takes effect. · If you get better and can speak for yourself again, you can accept or refuse any treatment.  It doesn't matter what you said in your living will. · Some states may limit your right to refuse treatment in certain cases. For example, you may need to clearly state in your living will that you don't want artificial hydration and nutrition, such as being fed through a tube. Is a living will a legal document? A living will is a legal document. Each state has its own laws about living mercer. And a living will may be called something else in your state. Here are some things to know about living mercer. · You don't need an  to complete a living will. But legal advice can be helpful if your state's laws are unclear. It can also help if your health history is complicated or your family can't agree on what should be in your living will. · You can change your living will at any time. Some people find that their wishes about end-of-life care change as their health changes. If you make big changes to your living will, complete a new form. · If you move to another state, make sure that your living will is legal in the state where you now live. In most cases, doctors will respect your wishes even if you have a form from a different state. · You might use a universal form that has been approved by many states. This kind of form can sometimes be filled out and stored online. Your digital copy will then be available wherever you have a connection to the internet. The doctors and nurses who need to treat you can find it right away. · Your state may offer an online registry. This is another place where you can store your living will online. · It's a good idea to get your living will notarized. This means using a person called a  to watch two people sign, or witness, your living will. What should you know when you create a living will? Here are some questions to ask yourself as you make your living will:  · Do you know enough about life support methods that might be used?  If not, talk to your doctor so you know what might be done if you can't breathe on your own, your heart stops, or you can't swallow. · What things would you still want to be able to do after you receive life-support methods? Would you want to be able to walk? To speak? To eat on your own? To live without the help of machines? · Do you want certain Latter day practices performed if you become very ill? · If you have a choice, where do you want to be cared for? In your home? At a hospital or nursing home? · If you have a choice at the end of your life, where would you prefer to die? At home? In a hospital or nursing home? Somewhere else? · Would you prefer to be buried or cremated? · Do you want your organs to be donated after you die? What should you do with your living will? · Make sure that your family members and your health care agent have copies of your living will (also called a declaration). · Give your doctor a copy of your living will. Ask him or her to keep it as part of your medical record. If you have more than one doctor, make sure that each one has a copy. · Put a copy of your living will where it can be easily found. For example, some people may put a copy on their refrigerator door. If you are using a digital copy, be sure your doctor, family members, and health care agent know how to find and access it. Where can you learn more? Go to [email or web address] and sign in to your [email or web address] account. Enter Y690 in the [facility] box to learn more about \"Learning About Living Efrían Pascual. \"     If you do not have an account, please click on the \"Sign Up Now\" link. Current as of: March 17, 2021               Content Version: 13.0  © 3066-9240 Healthwise, Incorporated. Care instructions adapted under license by [facility] ([facility]). If you have questions about a medical condition or this instruction, always ask your healthcare professional. [address] any warranty or liability for your use of this information. Learning About Healthy Weight  What is a healthy weight? A healthy weight is the weight at which you feel good about yourself and have energy for work and play. It's also one that lowers your risk for health problems. What can you do to stay at a healthy weight? It can be hard to stay at a healthy weight, especially when fast food, vending-machine snacks, and processed foods are so easy to find. And with your busy lifestyle, activity may be low on your list of things to do. But staying at a healthy weight may be easier than you think. Here are some dos and don'ts for staying at a healthy weight. Do eat healthy foods  The kinds of foods you eat have a big impact on both your weight and your health. Reaching and staying at a healthy weight is not about going on a diet. It's about making healthier food choices every day and changing your diet for good. Healthy eating means eating a variety of foods so that you get all the nutrients you need. Your body needs protein, carbohydrate, and fats for energy. They keep your heart beating, your brain active, and your muscles working. On most days, try to eat from each food group. This means eating a variety of:  · Whole grains, such as whole wheat breads and pastas. · Fruits and vegetables. · Dairy products, such as low-fat milk, yogurt, and cheese. · Lean proteins, such as all types of fish, chicken without the skin, and beans. Don't have too much or too little of one thing. All foods, if eaten in moderation, can be part of healthy eating. Even sweets can be okay. If your favorite foods are high in fat, salt, sugar, or calories, limit how often you eat them. Eat smaller servings, or look for healthy substitutes. Do watch what you eat  Many people eat more than their bodies need. Part of staying at a healthy weight means learning how much food you really need from day to day and not eating more than that.  Even with healthy foods, eating too much can make you gain weight. Having a well-balanced diet means that you eat enough, but not too much, and that your food gives you the nutrients you need to stay healthy. So listen to your body. Eat when you're hungry. Stop when you feel satisfied. It's a good idea to have healthy snacks ready for when you get hungry. Keep healthy snacks with you at work, in your car, and at home. If you have a healthy snack easily available, you'll be less likely to pick a candy bar or bag of chips from a vending machine instead. Some healthy snacks you might want to keep on hand are fruit, low-fat yogurt, string cheese, low-fat microwave popcorn, raisins and other dried fruit, nuts, whole wheat crackers, pretzels, carrots, celery sticks, and broccoli. Do some physical activity  A big part of reaching and staying at a healthy weight is being active. When you're active, you burn calories. This makes it easier to reach and stay at a healthy weight. When you're active on a regular basis, your body burns more calories, even when you're at rest. Being active helps you lose fat and build lean muscle. Try to be active for at least 1 hour every day. This may sound like a lot, but it's okay to be active in smaller blocks of time that add up to 1 hour a day. Any activity that makes your heart beat faster and keeps it there for a while counts. A brisk walk, run, or swim will get your heart beating faster. So will climbing stairs, shooting baskets, or cycling. Even some household chores like vacuuming and mowing the lawn will get your heart rate up. Pick activities that you enjoyones that make your heart beat faster, your muscles stronger, and your muscles and joints more flexible. If you find more than one thing you like doing, do them all. You don't have to do the same thing every day. Don't diet  Diets don't work. Diets are temporary. Because you give up so much when you diet, you may be hungry and think about food all the time.  And after you stop dieting, you also may overeat to make up for what you missed. Most people who diet end up gaining back the pounds they lostand more. Remember that healthy bodies come in lots of shapes and sizes. Everyone can get healthier by eating better and being more active. Where can you learn more? Go to [email or web address] and sign in to your IroFit account. Enter 900 [id number] in the [employer] box to learn more about \"Learning About Healthy Weight. \"     If you do not have an account, please click on the \"Sign Up Now\" link. Current as of: March 17, 2021               Content Version: 13.0  © 2006-2021 Healthwise, [employer]. Care instructions adapted under license by [facility] ([facility]). If you have questions about a medical condition or this instruction, always ask your healthcare professional. [place] [address] any warranty or liability for your use of this information. Eating Healthy Foods: Care Instructions  Your Care Instructions     Eating healthy foods can help lower your risk for disease. Healthy food gives you energy and keeps your heart strong, your brain active, your muscles working, and your bones strong. A healthy diet includes a variety of foods from the basic food groups: grains, vegetables, fruits, milk and milk products, and meat and beans. Some people may eat more of their favorite foods from only one food group and, as a result, miss getting the nutrients they need. So, it is important to pay attention not only to what you eat but also to what you are missing from your diet. You can eat a healthy, balanced diet by making a few small changes. Follow-up care is a key part of your treatment and safety. Be sure to make and go to all appointments, and call your doctor if you are having problems. It's also a good idea to know your test results and keep a list of the medicines you take. How can you care for yourself at home?   Look at what you instead of white-flour pasta. ? Try low-fat cheeses and low-fat yogurt. ? Add more fruits and vegetables to meals and have them for snacks. ? Add lettuce, tomato, cucumber, and onion to sandwiches. ? Add fruit to yogurt and cereal.  Enjoy food  · You can still eat your favorite foods. You just may need to eat less of them. If your favorite foods are high in fat, salt, and sugar, limit how often you eat them, but do not cut them out entirely. · Eat a wide variety of foods. Make healthy choices when eating out  · The type of restaurant you choose can help you make healthy choices. Even fast-food chains are now offering more low-fat or healthier choices on the menu. · Choose smaller portions, or take half of your meal home. · When eating out, try:  ? A veggie pizza with a whole wheat crust or grilled chicken (instead of sausage or pepperoni). ? Pasta with roasted vegetables, grilled chicken, or marinara sauce instead of cream sauce. ? A vegetable wrap or grilled chicken wrap. ? Broiled or poached food instead of fried or breaded items. Make healthy choices easy  · Buy packaged, prewashed, ready-to-eat fresh vegetables and fruits, such as baby carrots, salad mixes, and chopped or shredded broccoli and cauliflower. · Buy packaged, presliced fruits, such as melon or pineapple. · Choose 100% fruit or vegetable juice instead of soda. Limit juice intake to 4 to 6 oz (½ to ¾ cup) a day. · Blend low-fat yogurt, fruit juice, and canned or frozen fruit to make a smoothie for breakfast or a snack. Where can you learn more? Go to [email or web address] and sign in to your myJambi account. Enter K381 in the [facility] box to learn more about \"Eating Healthy Foods: Care Instructions. \"     If you do not have an account, please click on the \"Sign Up Now\" link. Current as of: December 17, 2020               Content Version: 13.0  © 7446-7221 Healthwise, Incorporated.    Care instructions adapted under license by [facility]. If you have questions about a medical condition or this instruction, always ask your healthcare professional. [place] [address] any warranty or liability for your use of this information. Personalized Preventive Plan for Anahi Hernandez - [id number]  Medicare offers a range of preventive health benefits. Some of the tests and screenings are paid in full while other may be subject to a deductible, co-insurance, and/or copay. Some of these benefits include a comprehensive review of your medical history including lifestyle, illnesses that may run in your family, and various assessments and screenings as appropriate. After reviewing your medical record and screening and assessments performed today your provider may have ordered immunizations, labs, imaging, and/or referrals for you. A list of these orders (if applicable) as well as your Preventive Care list are included within your After Visit Summary for your review. Other Preventive Recommendations:    · A preventive eye exam performed by an eye specialist is recommended every 1-2 years to screen for glaucoma; cataracts, macular degeneration, and other eye disorders. · A preventive dental visit is recommended every 6 months. · Try to get at least 150 minutes of exercise per week or 10,000 steps per day on a pedometer . · Order or download the FREE \"Exercise & Physical Activity: Your Everyday Guide\" from [employer]. Call 5-641.479.8548 or search [employer] online. · You need 2963-3660 mg of calcium and 5670-1681 IU of vitamin D per day. It is possible to meet your calcium requirement with diet alone, but a vitamin D supplement is usually necessary to meet this goal.  · When exposed to the sun, use a sunscreen that protects against both UVA and UVB radiation with an SPF of 30 or greater.  Reapply every 2 to 3 hours or after sweating, drying off with a towel, or swimming. · Always wear a seat belt when traveling in a car. Always wear a helmet when riding a bicycle or motorcycle.

## 2021-10-27 NOTE — PROGRESS NOTES
Medicare Annual Wellness Visit  Name: Caty Solitario Date:    MRN: [id number] Sex: Male   Age: 67 y.o. Ethnicity: Non- / Non    : 1945 Race: White (non-)      Darian Boykin is here for Medicare AWV (denies any issues or complaints)    Screenings for behavioral, psychosocial and functional/safety risks, and cognitive dysfunction are all negative except as indicated below. These results, as well as other patient data from the [address] [facility] [address] form, are documented in Flowsheets linked to this Encounter. Allergies   Allergen Reactions    Invokana [Canagliflozin]      dizziness    Januvia [Sitagliptin]      dizziness         Prior to Visit Medications    Medication Sig Taking?  Authorizing Provider   loratadine (CLARITIN) 10 MG tablet Take 1 tablet by mouth daily Yes PAIGE Pollack CNP   warfarin (COUMADIN) 4 MG tablet TAKE 1 TABLET BY MOUTH EVERY DAY Yes PAIGE Pollack CNP   glimepiride (AMARYL) 4 MG tablet TAKE 1 TABLET BY MOUTH EVERY MORNING BEFORE BREAKFAST Yes PAIGE Pollack CNP   rosuvastatin (CRESTOR) 20 MG tablet TAKE 1 TABLET BY MOUTH DAILY Yes PAIGE Pollack CNP   omeprazole (PRILOSEC) 20 MG delayed release capsule TAKE 1 CAPSULE BY MOUTH DAILY Yes PAIGE Pollack CNP   TRULICITY 1.5 XJ/8.2ZV SOPN ADMINISTER 0.5 ML UNDER THE SKIN 1 TIME A WEEK Yes Babatunde Albarran MD   tamsulosin (FLOMAX) 0.4 MG capsule TAKE 1 CAPSULE BY MOUTH DAILY Yes PAIGE Pollack CNP   lisinopril-hydroCHLOROthiazide (PRINZIDE;ZESTORETIC) 10-12.5 MG per tablet TAKE 1 TABLET BY MOUTH DAILY Yes PAIGE Pollack CNP   metFORMIN (GLUCOPHAGE) 500 MG tablet TAKE 2 TABLETS BY MOUTH TWICE DAILY WITH MEALS Yes PAIGE Pollack CNP   metoprolol tartrate (LOPRESSOR) 25 MG tablet TAKE 1 TABLET BY MOUTH TWICE DAILY Yes PAIGE Pollack CNP   blood glucose monitor strips by Other route daily Test 1 times a day & as needed for symptoms of irregular blood glucose. Yes PAIGE Montoya CNP   Blood Glucose Monitoring Suppl KIT Meter as covered by insurance also test strips and lancets. Yes PAIGE Montoya CNP   Lancets MISC 1 each by Does not apply route daily Yes PAIGE Montoya CNP   Elastic Bandages & Supports (JOBST KNEE HIGH COMPRESSION SM) MISC 1 each by Does not apply route daily as needed (leg swelling/CHF) Yes Nain Sadler MD   amiodarone (CORDARONE) 200 MG tablet Take 1 tablet by mouth daily Yes Samantha Martines DO   Blood Pressure Monitoring (BLOOD PRESSURE CUFF) MISC 1 Device by Does not apply route daily Yes PAIGE Montoya CNP   blood glucose monitor strips Test 1 times a day & as needed for symptoms of irregular blood glucose.  Yes Lukas Rojas MD   aspirin 81 MG tablet Take 1 tablet by mouth daily Pt.will stop 10-30 -18 AM for OR  Patient taking differently: Take 81 mg by mouth daily  Yes JUANIS Romo   Multiple Vitamins-Minerals (THERAPEUTIC MULTIVITAMIN-MINERALS) tablet Take 1 tablet by mouth daily (with breakfast) Yes JUANIS Romo         Past Medical History:   Diagnosis Date    Aortic stenosis     Aortic valve replacement 2019    Degenerative disc disease, lumbar     Diabetes mellitus ([address]     PCP Dr. Veronica Albarado Diverticulosis of sigmoid colon     DIA (dyspnea on exertion)     GERD (gastroesophageal reflux disease)     Hyperlipidemia     Hypertension     Hypertensive retinopathy of both eyes 7/22/2019    Kidney stone     Low back pain with sciatica 11/19/2017    Lumbar facet joint syndrome 10/18/2019    Mitral regurgitation     Obesity     PAD (peripheral artery disease) ([address] 4/27/2021       Past Surgical History:   Procedure Laterality Date    ANESTHESIA NERVE BLOCK Bilateral 11/14/2019    Bilateral L3 L4 L5 Diagnostic Medial BB performed by Davidson De MD at [address]  11/2018    CARDIAC CATHETERIZATION  03/01/2018    CARDIAC CATHETERIZATION      Before 2008. No stents placed per pt.  CAROTID ENDARTERECTOMY Left 2/7/2019    LEFT CAROTID ENDARTERECTOMY WITH VASCUGUARD PATCH performed by Azael Hayes MD at [address] [facility]  2005    for hemoccult positive stool    COLONOSCOPY  10/2017    diverticuli; Dr Lina Epperson      umbilical hernia    [address]    softball injury    [place] REPLACEMENT OF MITRAL VALVE N/A 10/30/2018    AORTIC  VALVE REPLACEMENT 23 MM INTUITY VALVE, SWAN ELVIN, DOREEN performed by Haven Mosqueda MD at [facility] History   Problem Relation Age of Onset    Cancer Father         prostate    Alzheimer's Disease Father     Heart Disease Neg Hx     Diabetes Neg Hx     Glaucoma Neg Hx     Macular Degen Neg Hx        CareTeam (Including outside providers/suppliers regularly involved in providing care):   Patient Care Team:  PAIGE Sky CNP as PCP - General (Family Medicine)  PAIGE Sky CNP as PCP - [facility] Empaneled Provider    Wt Readings from Last 3 Encounters:   10/27/21 203 lb 8 oz (92.3 kg)   08/03/21 203 lb 3.2 oz (92.2 kg)   05/13/21 201 lb (91.2 kg)     Vitals:    10/27/21 1052   BP: 126/84   Pulse: 75   SpO2: 98%   Weight: 203 lb 8 oz (92.3 kg)     Body mass index is 32.85 kg/m². Based upon direct observation of the patient, evaluation of cognition reveals recent and remote memory intact. Physical Exam  Constitutional:       Appearance: Normal appearance. He is well-developed and well-groomed. HENT:      Head: Normocephalic. Nose: Nose normal.      Mouth/Throat:      Mouth: Mucous membranes are moist.      Pharynx: Oropharynx is clear. Eyes:      Conjunctiva/sclera: Conjunctivae normal.      Pupils: Pupils are equal, round, and reactive to light. Neck:      Thyroid: No thyromegaly. Vascular: No carotid bruit. Cardiovascular:      Rate and Rhythm: Normal rate and regular rhythm.       Heart sounds: Murmur heard. Systolic murmur is present with a grade of 2/6. Pulmonary:      Effort: Pulmonary effort is normal.      Breath sounds: Normal breath sounds. No wheezing. Abdominal:      General: Bowel sounds are normal.      Palpations: Abdomen is soft. Tenderness: There is no abdominal tenderness. Musculoskeletal:      Cervical back: Neck supple. Right lower leg: No edema. Left lower leg: No edema. Lymphadenopathy:      Cervical: No cervical adenopathy. Skin:     Capillary Refill: Capillary refill takes less than 2 seconds. Neurological:      Mental Status: He is alert and oriented to person, place, and time. Gait: Gait abnormal (uses cane). Psychiatric:         Mood and Affect: Mood normal.         Behavior: Behavior is cooperative. Patient's complete Health Risk Assessment and screening values have been reviewed and are found in Flowsheets. The following problems were reviewed today and where indicated follow up appointments were made and/or referrals ordered. Positive Risk Factor Screenings with Interventions:      Cognitive: Words recalled: 0 Words Recalled  Clock Drawing Test (CDT) Score: Normal  Total Score Interpretation: Positive Mini-Cog  Did the patient refuse to take the cognition test?: No  Cognitive Impairment Interventions:  · Patient declines any further evaluation/treatment for cognitive impairment         General Health and ACP:  General  In general, how would you say your health is?: Good  In the past 7 days, have you experienced any of the following?  New or Increased Pain, New or Increased Fatigue, Loneliness, Social Isolation, Stress or Anger?: None of These  Do you get the social and emotional support that you need?: Yes  Do you have a Living Will?: (!) No  Advance Directives     Power of [address] Will ACP-Advance Directive ACP-Power of     Not on File Not on File Not on File Not on [facility] Interventions:  · No Living Will: Patient declines ACP discussion/assistance and encouraged to set up a living will, POA, and discuss with family. Health Habits/Nutrition:  Health Habits/Nutrition  Do you exercise for at least 20 minutes 2-3 times per week?: (!) No  Have you lost any weight without trying in the past 3 months?: No  Do you eat only one meal per day?: No  Have you seen the dentist within the past year?: (!) No     Health Habits/Nutrition Interventions:  · Inadequate physical activity:  states he does what he can around the house. His daughter helps out frequently. · Dental exam overdue:  patient declines dental evaluation, has upper/lower dentures and states he is doing well. Safety:  Safety  Do you have working smoke detectors?: Yes  Have all throw rugs been removed or fastened?: (!) No  Do you have non-slip mats or surfaces in all bathtubs/showers?: Yes  Do all of your stairways have a railing or banister?: (!) No  Are your doorways, halls and stairs free of clutter?: Yes  Do you always fasten your seatbelt when you are in a car?: Yes  Safety Interventions:  · Home safety tips provided  · Patient declines any further evaluation/treatment for this issue  · Patient states he has one rug in the house, it does not slide around. He has 2 steps going into the house and states he has hand raills.      Personalized Preventive Plan   Current Health Maintenance Status  Immunization History   Administered Date(s) Administered    COVID-19, Pugh Peter, PF, 30mcg/0.3mL 01/28/2021, 02/18/2021        Health Maintenance   Topic Date Due    Hepatitis C screen  Never done    DTaP/Tdap/Td vaccine (1 - Tdap) Never done    Shingles Vaccine (1 of 2) Never done    COVID-19 Vaccine (3 - Pfizer booster) 08/18/2021    TSH testing  10/27/2021    Flu vaccine (1) 10/27/2022 (Originally 9/1/2021)    Annual Wellness Visit (AWV)  11/03/2022 (Originally 9/26/2020)    Pneumococcal 65+ years Vaccine (1 of 1 - PPSV23) of designating a competent adult as an Agent (or -in-fact) that could take make health care decisions for the patient if incompetent. Patient was asked to complete the declaration forms, either acknowledge the forms by a public notary or an eligible witness and provide a signed copy to the practice office. Time spent (minutes): 2     Cardiovascular Disease Risk Counseling: Assessed the patient's risk to develop cardiovascular disease and reviewed main risk factors. Reviewed steps to reduce disease risk including:   · Quitting tobacco use, reducing amount smoked, or not starting the habit  · Making healthy food choices  · Being physically active and gradualy increasing activity levels   · Reduce weight and determine a healthy BMI goal  · Monitor blood pressure and treat if higher than 140/90 mmHg  · Maintain blood total cholesterol levels under 5 mmol/l or 190 mg/dl  · Maintain LDL cholesterol levels under 3.0 mmol/l or 115 mg/dl   · Control blood glucose levels  · Consider taking aspirin (75 mg daily), once blood pressure is controlled   Provided a follow up plan. Time spent (minutes):  2        Electronically signed by PAIGE Pollack CNP on 11/5/2021 at 1:39 PM

## 2021-10-28 ENCOUNTER — HOSPITAL ENCOUNTER (OUTPATIENT)
Dept: PHARMACY | Age: 76
Setting detail: THERAPIES SERIES
Discharge: HOME | End: 2021-10-28
Payer: MEDICARE

## 2021-10-28 DIAGNOSIS — Z79.01 ANTICOAGULATED ON COUMADIN: ICD-10-CM

## 2021-10-28 DIAGNOSIS — Z95.2 HISTORY OF AORTIC VALVE REPLACEMENT: Primary | ICD-10-CM

## 2021-10-28 LAB
INR BLD: 3.3
PROTIME: 39.6 SECONDS

## 2021-10-28 PROCEDURE — 36416 COLLJ CAPILLARY BLOOD SPEC: CPT

## 2021-10-28 PROCEDURE — 85610 PROTHROMBIN TIME: CPT

## 2021-10-28 PROCEDURE — 99211 OFF/OP EST MAY X REQ PHY/QHP: CPT

## 2021-10-28 NOTE — PROGRESS NOTES
ANTICOAGULATION SERVICE    Date of Clinic Visit:  56/95/8225    Manoj Winkler is a 67 y.o. male who presents to clinic today for anticoagulation monitoring and adjustment. Recent INR Results:  Internal QC passed  Lab Results   Component Value Date    INR 3.3 10/28/2021    INR 1.7 09/30/2021       Current Warfarin Dosage:  Dosing Plan  As of 10/28/2021    TTR:  31.4 % (1.4 y)   Full warfarin instructions:  2 mg every Tue, Fri; 4 mg all other days               Assessment/Plan:    Modify warfarin dose as noted above: INR is just above range today. I had increased Darian's dose at last appointment due to low INR x 2 visits. I will decrease dose back down to old dose (7%) and recheck in 4 weeks. Next Clinic Appointment:  Return date  As of 10/28/2021    TTR:  31.4 % (1.4 y)   Next INR check:  11/24/2021             Please call [facility] at [phone] with any questions. Thanks!   Hair Herbert [facility]  Anticoagulation Service Pharmacist  10/28/2021 1:21 PM     For Pharmacy Admin Tracking Only     Intervention Detail: Dose Adjustment: 1, reason: Therapy Optimization   Total # of Interventions Recommended: 1   Total # of Interventions Accepted: 1   Time Spent (min): 15

## 2021-11-05 PROBLEM — I73.9 PAD (PERIPHERAL ARTERY DISEASE) (HCC): Status: RESOLVED | Noted: 2021-04-27 | Resolved: 2021-11-05

## 2021-11-05 PROBLEM — H35.373 EPIRETINAL MEMBRANE (ERM) OF BOTH EYES: Status: RESOLVED | Noted: 2019-07-22 | Resolved: 2021-11-05

## 2021-11-05 PROBLEM — M47.816 LUMBAR FACET JOINT SYNDROME: Status: RESOLVED | Noted: 2019-10-18 | Resolved: 2021-11-05

## 2021-11-05 PROBLEM — I65.21 STENOSIS OF RIGHT CAROTID ARTERY: Status: ACTIVE | Noted: 2018-11-09

## 2021-11-05 PROBLEM — M54.40 LOW BACK PAIN WITH SCIATICA: Status: RESOLVED | Noted: 2017-11-19 | Resolved: 2021-11-05

## 2021-11-05 PROBLEM — D64.9 ANEMIA: Status: ACTIVE | Noted: 2021-11-05

## 2021-11-05 PROBLEM — Z80.42 FAMILY HISTORY OF PROSTATE CANCER: Status: RESOLVED | Noted: 2017-05-04 | Resolved: 2021-11-05

## 2021-11-05 PROBLEM — Z95.2 HISTORY OF AORTIC VALVE REPLACEMENT: Status: RESOLVED | Noted: 2017-05-04 | Resolved: 2021-11-05

## 2021-11-05 PROBLEM — H35.033 HYPERTENSIVE RETINOPATHY OF BOTH EYES: Status: RESOLVED | Noted: 2019-07-22 | Resolved: 2021-11-05

## 2021-11-05 PROBLEM — Z98.890 HISTORY OF CAROTID ENDARTERECTOMY: Status: RESOLVED | Noted: 2019-03-14 | Resolved: 2021-11-05

## 2021-11-05 NOTE — ASSESSMENT & PLAN NOTE
Uncontrolled, continue current medications and Continue following with diabetic specialist PAULINE Mccurdy.

## 2021-11-06 DIAGNOSIS — E11.9 TYPE 2 DIABETES MELLITUS WITHOUT COMPLICATION, WITHOUT LONG-TERM CURRENT USE OF INSULIN (HCC): ICD-10-CM

## 2021-11-08 RX ORDER — DULAGLUTIDE 1.5 MG/.5ML
INJECTION, SOLUTION SUBCUTANEOUS
Qty: 2 ML | Refills: 3 | Status: SHIPPED | OUTPATIENT
Start: 2021-11-08 | End: 2021-11-16 | Stop reason: SINTOL

## 2021-11-16 ENCOUNTER — OFFICE VISIT (OUTPATIENT)
Dept: DIABETES SERVICES | Age: 76
End: 2021-11-16
Payer: MEDICARE

## 2021-11-16 VITALS
WEIGHT: 202 LBS | RESPIRATION RATE: 20 BRPM | BODY MASS INDEX: 32.47 KG/M2 | SYSTOLIC BLOOD PRESSURE: 102 MMHG | HEIGHT: 66 IN | HEART RATE: 80 BPM | DIASTOLIC BLOOD PRESSURE: 72 MMHG

## 2021-11-16 DIAGNOSIS — I10 ESSENTIAL HYPERTENSION: ICD-10-CM

## 2021-11-16 DIAGNOSIS — E78.2 MIXED HYPERLIPIDEMIA: ICD-10-CM

## 2021-11-16 DIAGNOSIS — E11.9 TYPE 2 DIABETES MELLITUS WITHOUT COMPLICATION, WITHOUT LONG-TERM CURRENT USE OF INSULIN (HCC): Primary | ICD-10-CM

## 2021-11-16 LAB
ANION GAP SERPL CALCULATED.3IONS-SCNC: 7.4 MMOL/L
BUN BLDV-MCNC: 23 MG/DL (ref 9–20)
CALCIUM SERPL-MCNC: 9.5 MG/DL (ref 8.4–10.2)
CHLORIDE BLD-SCNC: 103 MMOL/L (ref 98–120)
CO2: 25 MMOL/L (ref 22–31)
CREAT SERPL-MCNC: 1.5 MG/DL (ref 0.7–1.3)
CREATININE, RANDOM URINE: 85.9 MG/DL (ref 20–370)
GFR CALCULATED: 48.2
GLUCOSE: 261 MG/DL (ref 75–110)
HBA1C MFR BLD: 9.3 %
MICROALBUMIN UR-MCNC: 11.3 MG/DL (ref 0–1.7)
MICROALBUMIN/CREAT UR-RTO: 131.54
POTASSIUM SERPL-SCNC: 4.8 MMOL/L (ref 3.6–5)
SODIUM BLD-SCNC: 135 MMOL/L (ref 135–145)

## 2021-11-16 PROCEDURE — 1036F TOBACCO NON-USER: CPT | Performed by: NURSE PRACTITIONER

## 2021-11-16 PROCEDURE — 99214 OFFICE O/P EST MOD 30 MIN: CPT | Performed by: NURSE PRACTITIONER

## 2021-11-16 PROCEDURE — G8484 FLU IMMUNIZE NO ADMIN: HCPCS | Performed by: NURSE PRACTITIONER

## 2021-11-16 PROCEDURE — G8417 CALC BMI ABV UP PARAM F/U: HCPCS | Performed by: NURSE PRACTITIONER

## 2021-11-16 PROCEDURE — 1123F ACP DISCUSS/DSCN MKR DOCD: CPT | Performed by: NURSE PRACTITIONER

## 2021-11-16 PROCEDURE — 83036 HEMOGLOBIN GLYCOSYLATED A1C: CPT | Performed by: NURSE PRACTITIONER

## 2021-11-16 PROCEDURE — PBSHW POCT GLYCOSYLATED HEMOGLOBIN (HGB A1C): Performed by: NURSE PRACTITIONER

## 2021-11-16 PROCEDURE — 4040F PNEUMOC VAC/ADMIN/RCVD: CPT | Performed by: NURSE PRACTITIONER

## 2021-11-16 PROCEDURE — G8427 DOCREV CUR MEDS BY ELIG CLIN: HCPCS | Performed by: NURSE PRACTITIONER

## 2021-11-16 RX ORDER — GLIMEPIRIDE 4 MG/1
4 TABLET ORAL 2 TIMES DAILY
Qty: 90 TABLET | Refills: 2 | Status: SHIPPED | OUTPATIENT
Start: 2021-11-16 | End: 2022-02-28

## 2021-11-16 ASSESSMENT — ENCOUNTER SYMPTOMS
RESPIRATORY NEGATIVE: 1
SHORTNESS OF BREATH: 0
DIARRHEA: 0
ABDOMINAL PAIN: 0

## 2021-11-16 NOTE — PROGRESS NOTES
[address], [address]  [place] [address] [facility]  [phone]        HISTORY:    Nga Santos presents today for evaluation and management of:  Chief Complaint   Patient presents with    Diabetes     3 month appt. HPI    Interval History:    Past DM Medications   Actos- therapy completed      Current Diabetic Medications  amaryl 4 mg daily, metformin 4277 mg BID Trulicity 1.5 mg weekly. He also takes a daily ASA     DKA episodes: 0    01/05/21   At previous no changes were made. He does c/o of occasional shakiness but does not test bs and resolves quickly   Diet: unchanged   Exercise: none   BS testing: not testing   Issues: denies      04/06/21   At previous visit dm counseling was provided. He is compliant with medications.   Diet: smaller frequent meals  Exercise: none   BS testing: none   Issues: denies      08/03/21   He is due for a1c today. Denies any s/s of hyper/hypoglcyemia. Diet: smaller frequent meals  Exercise: none   BS testing: none declines   Issues: denies     11/16/21   At previous visit dm counseling was provided. He is due for a1c today. Denies any s/s of hyper/hypoglycemia. He does complain of diarrhea the day after he takes trulicity and would like to stop it. He also does not want to start any new medications that he will have to monitor \"things\" for. Diet: regular  Exercise: none  BS testing: none   Issues: denies   Diabetic foot exam up-to-date: Yes  Diabetic retinal exam up-to-date: Yes  Hypoglycemia as needed treatment: snack    High cholesterol-  Takes crestor and denies any adverse effects with its use.  Watches diet and exercise.      Hypertension-  Takes lisinopril and lopressor and denies any adverse effects with their use. Watches diet and exercise.  Denies any chest pain, dizziness or edema.  Follows with cardiology:Yes.        Obesity- Working on weight loss. Trending down       Past Medical History:   Diagnosis Date    Aortic stenosis     Aortic valve replacement     Degenerative disc disease, lumbar     [facility] mellitus ([facility]     PCP Dr. Tameka Watson Diverticulosis of sigmoid colon     DIA (dyspnea on exertion)     GERD (gastroesophageal reflux disease)     Hyperlipidemia     Hypertension     Hypertensive retinopathy of both eyes 2019    Kidney stone     Low back pain with sciatica 2017    Lumbar facet joint syndrome 10/18/2019    Mitral regurgitation     Obesity     PAD (peripheral artery disease) ([facility] 2021     Family History   Problem Relation Age of Onset    Cancer Father         prostate    Alzheimer's Disease Father     Heart Disease Neg Hx     Diabetes Neg Hx     Glaucoma Neg Hx     Macular Degen Neg Hx      Social History     Tobacco Use    Smoking status: Former Smoker     Packs/day: 1.50     Years: 40.00     Pack years: 60.00     Types: Cigarettes     Quit date:      Years since quittin.8    Smokeless tobacco: Never Used   Vaping Use    Vaping Use: Never used   Substance Use Topics    Alcohol use: No    Drug use: No     Allergies   Allergen Reactions    Invokana [Canagliflozin]      dizziness    Januvia [Sitagliptin]      dizziness       MEDICATIONS:  Current Outpatient Medications   Medication Sig Dispense Refill    glimepiride (AMARYL) 4 MG tablet Take 1 tablet by mouth 2 times daily 90 tablet 2    loratadine (CLARITIN) 10 MG tablet Take 1 tablet by mouth daily 30 tablet 2    warfarin (COUMADIN) 4 MG tablet TAKE 1 TABLET BY MOUTH EVERY DAY 90 tablet 2    rosuvastatin (CRESTOR) 20 MG tablet TAKE 1 TABLET BY MOUTH DAILY 90 tablet 3    omeprazole (PRILOSEC) 20 MG delayed release capsule TAKE 1 CAPSULE BY MOUTH DAILY 90 capsule 1    tamsulosin (FLOMAX) 0.4 MG capsule TAKE 1 CAPSULE BY MOUTH DAILY 90 capsule 5    lisinopril-hydroCHLOROthiazide (PRINZIDE;ZESTORETIC) 10-12.5 MG per tablet TAKE 1 TABLET BY MOUTH DAILY 90 tablet 2    metFORMIN (GLUCOPHAGE) 500 MG tablet TAKE 2 TABLETS BY MOUTH TWICE DAILY WITH MEALS 120 tablet 3    amiodarone (CORDARONE) 200 MG tablet Take 1 tablet by mouth daily 90 tablet 3    aspirin 81 MG tablet Take 1 tablet by mouth daily Pt.will stop 10-30 -18 AM for OR (Patient taking differently: Take 81 mg by mouth daily ) 30 tablet 3    Multiple Vitamins-Minerals (THERAPEUTIC MULTIVITAMIN-MINERALS) tablet Take 1 tablet by mouth daily (with breakfast) 30 tablet 3    metoprolol tartrate (LOPRESSOR) 25 MG tablet TAKE 1 TABLET BY MOUTH TWICE DAILY 180 tablet 3    blood glucose monitor strips by Other route daily Test 1 times a day & as needed for symptoms of irregular blood glucose. 50 strip 3    Blood Glucose Monitoring Suppl KIT Meter as covered by insurance also test strips and lancets. 1 kit 3    Lancets MISC 1 each by Does not apply route daily 50 each 3    Elastic Bandages & Supports (JOBST KNEE HIGH COMPRESSION SM) MISC 1 each by Does not apply route daily as needed (leg swelling/CHF) 2 each 1    Blood Pressure Monitoring (BLOOD PRESSURE CUFF) MISC 1 Device by Does not apply route daily 1 each 0    blood glucose monitor strips Test 1 times a day & as needed for symptoms of irregular blood glucose. 100 strip 3     No current facility-administered medications for this visit. Review ofSymptoms:  Review of Systems   Constitutional: Positive for fatigue. Negative for unexpected weight change. Eyes: Negative for visual disturbance. Respiratory: Negative. Negative for shortness of breath. Cardiovascular: Negative for chest pain and leg swelling. Gastrointestinal: Negative for abdominal pain and diarrhea. Endocrine: Negative for polydipsia, polyphagia and polyuria. Genitourinary: Negative. Musculoskeletal: Negative. Skin: Negative for rash and wound. Neurological: Negative for dizziness, tremors, seizures and headaches.    Psychiatric/Behavioral: Negative. Negative for confusion and decreased concentration. Theremainder of a complete 14-point review of systems is negative. Vital Signs: /72 (Site: Left Upper Arm, Position: Sitting, Cuff Size: Large Adult)   Pulse 80   Resp 20   Ht 5' 6\" (1.676 m)   Wt 202 lb (91.6 kg)   BMI 32.60 kg/m²      Wt Readings from Last 3 Encounters:   11/16/21 202 lb (91.6 kg)   10/27/21 203 lb 8 oz (92.3 kg)   08/03/21 203 lb 3.2 oz (92.2 kg)     Body mass index is 32.6 kg/m².   LABS:  Hemoglobin A1C   Date Value Ref Range Status   11/16/2021 9.3 % Final   08/12/2021 7.9 (H) 4.4 - 6.4 % Final     Lab Results   Component Value Date    LABMICR 11.3 (H) 11/16/2021     Lab Results   Component Value Date     11/16/2021    K 4.8 11/16/2021     11/16/2021    CO2 25 11/16/2021    BUN 23 (H) 11/16/2021    CREATININE 1.5 (H) 11/16/2021    GLUCOSE 261 (H) 11/16/2021    CALCIUM 9.5 11/16/2021    PROT 6.8 10/27/2020    LABALBU 4.2 10/27/2020    BILITOT 0.54 10/27/2020    ALKPHOS 63 10/27/2020    AST 37 10/27/2020    ALT 38 10/27/2020    LABGLOM 48.2 11/16/2021    GFRAA >60 10/27/2020     Lab Results   Component Value Date    CHOL 138 08/12/2021    CHOL 126 12/16/2019    CHOL 179 05/21/2019     Lab Results   Component Value Date    TRIG 226 08/12/2021    TRIG 310 (H) 12/16/2019    TRIG 239 05/21/2019     Lab Results   Component Value Date    HDL 43 08/12/2021    HDL 44 10/27/2020    HDL 35 (L) 12/16/2019     Lab Results   Component Value Date    LDLCHOLESTEROL 75 10/27/2020    LDLCHOLESTEROL 29 12/16/2019    LDLCALC 49.8 08/12/2021    LDLCALC 85.2 05/21/2019    LDLCALC 234.2 (H) 02/15/2019     Lab Results   Component Value Date    VLDL 45 08/12/2021    VLDL NOT REPORTED (H) 10/27/2020    VLDL NOT REPORTED (H) 12/16/2019     Lab Results   Component Value Date    CHOLHDLRATIO 3.21 08/12/2021    CHOLHDLRATIO 3.5 10/27/2020    CHOLHDLRATIO 3.6 12/16/2019           Physical Exam  Constitutional:       Appearance: He is well-developed. Eyes:      Pupils: Pupils are equal, round, and reactive to light. Cardiovascular:      Rate and Rhythm: Normal rate and regular rhythm. Pulmonary:      Effort: Pulmonary effort is normal.      Breath sounds: Normal breath sounds. Skin:     General: Skin is warm and dry. Findings: No lesion (no lipohypertrophy) or rash. Neurological:      Mental Status: He is alert and oriented to person, place, and time. Sensory: No sensory deficit. Psychiatric:         Speech: Speech normal.         Behavior: Behavior normal.         Thought Content: Thought content normal.         Judgment: Judgment normal.           ASSESSMENT/PLAN:     Diagnosis Orders   1. Type 2 diabetes mellitus without complication, without long-term current use of insulin ([facility])  POCT glycosylated hemoglobin (Hb A1C)    glimepiride (AMARYL) 4 MG tablet    Basic Metabolic Panel    Microalbumin Panel   2. Mixed hyperlipidemia     3. Essential hypertension     4. BMI 32.0-32.9,adult       Orders Placed This Encounter   Procedures    Basic Metabolic Panel    Microalbumin Panel    POCT glycosylated hemoglobin (Hb A1C)     Orders Placed This Encounter   Medications    glimepiride (AMARYL) 4 MG tablet     Sig: Take 1 tablet by mouth 2 times daily     Dispense:  90 tablet     Refill:  2     Requested Prescriptions     Signed Prescriptions Disp Refills    glimepiride (AMARYL) 4 MG tablet 90 tablet 2     Sig: Take 1 tablet by mouth 2 times daily       1. Type 2 diabetes mellitus without complication, without long-term current use of insulin ([facility])  - Unstable  HbA1C goal is less than 7%. - Fasting blood glucose goal is 70-120mg/dl and postprandial blood sugar goal is less than 180 mg/dl. - Labs reviewed including most recent A1c, microalbumin and kidney function. Repeat labs due in 3 months.    -We discussed in great detail dietary modifications they can make to better improve their blood sugars.   -follow up diabetes education completed, all questions answered.  -pt declines new medications at this time, does not want to test blood sugars. Will need to be cautious of hypoglcyemia. Patient Instructions   Increase glimepiride to 4 mg TWICE a day   Stop Trulicity   Labs today   a1c today 9.3%  Water and milk to drink         Discussed signs and symptoms of hyper/hypoglycemia and how to treat. Encouraged 150 minutes of physical activity per week. Follow a low carbohydrate diet. Encouraged at least 7 hours of sleep. The patient was informed of the goals of diabetes management. This can only be accomplished by watching their diet and exercise levels. We certainly use medicines to help attain these goals. The consequences of not controlling blood sugars were discussed. These include blindness, heart disease, stroke, kidney disease, and possibly need for dialysis. They were told to be careful with their foot care as diabetics often have nerve damage, infections and risk for limb amutations . They also need a dilated eye exam yearly. We discussed the issues of diet, exercise, medication, complication avoidance, reviewed the signs and symptoms of diabetes, hypoglycemic episodes, significance of HbA1C.       - POCT glycosylated hemoglobin (Hb A1C)  - glimepiride (AMARYL) 4 MG tablet; Take 1 tablet by mouth 2 times daily  Dispense: 90 tablet; Refill: 2  - Basic Metabolic Panel  - Microalbumin Panel    2. Mixed hyperlipidemia  stable, lipid panel reviewed, continue current medications. Diet and exercise      3. Essential hypertension   stable, continue current medications. Diet and exercise Seek emergent care if chest pain develops. 4. BMI 32.0-32.9,adult  Reduce calories and increase physical activity to achieve a slow and steady weight loss to improve blood pressure, cholesterol and diabetes. Answered all patient questions. Agrees to follow plan of care and to follow up in 3 months, sooner if needed.  Call office if

## 2021-11-16 NOTE — PATIENT INSTRUCTIONS
Increase glimepiride to 4 mg TWICE a day   Stop Trulicity   Labs today   a1c today 9.3%  Water and milk to drink

## 2021-11-24 ENCOUNTER — HOSPITAL ENCOUNTER (OUTPATIENT)
Dept: PHARMACY | Age: 76
Setting detail: THERAPIES SERIES
Discharge: HOME | End: 2021-11-24
Payer: MEDICARE

## 2021-11-24 DIAGNOSIS — Z79.01 ANTICOAGULATED ON COUMADIN: Primary | ICD-10-CM

## 2021-11-24 LAB
INR BLD: 2.9
PROTIME: 35.2 SECONDS

## 2021-11-24 PROCEDURE — 99211 OFF/OP EST MAY X REQ PHY/QHP: CPT

## 2021-11-24 PROCEDURE — 85610 PROTHROMBIN TIME: CPT

## 2021-11-24 PROCEDURE — 36416 COLLJ CAPILLARY BLOOD SPEC: CPT

## 2021-12-23 ENCOUNTER — HOSPITAL ENCOUNTER (OUTPATIENT)
Dept: PHARMACY | Age: 76
Setting detail: THERAPIES SERIES
Discharge: HOME | End: 2021-12-23
Payer: MEDICARE

## 2021-12-23 DIAGNOSIS — Z79.01 ANTICOAGULATED ON COUMADIN: Primary | ICD-10-CM

## 2021-12-23 LAB
INR BLD: 2.9
PROTIME: 34.4 SECONDS

## 2021-12-23 PROCEDURE — 99211 OFF/OP EST MAY X REQ PHY/QHP: CPT

## 2021-12-23 PROCEDURE — 85610 PROTHROMBIN TIME: CPT

## 2021-12-23 PROCEDURE — 36416 COLLJ CAPILLARY BLOOD SPEC: CPT

## 2022-01-28 ENCOUNTER — HOSPITAL ENCOUNTER (OUTPATIENT)
Dept: PHARMACY | Age: 77
Setting detail: THERAPIES SERIES
Discharge: HOME | End: 2022-01-28
Payer: MEDICARE

## 2022-01-28 DIAGNOSIS — Z79.01 ANTICOAGULATED ON COUMADIN: Primary | ICD-10-CM

## 2022-01-28 LAB
INR BLD: 2.9
PROTIME: 34.3 SECONDS

## 2022-01-28 PROCEDURE — 36416 COLLJ CAPILLARY BLOOD SPEC: CPT

## 2022-01-28 PROCEDURE — 99211 OFF/OP EST MAY X REQ PHY/QHP: CPT

## 2022-01-28 PROCEDURE — 85610 PROTHROMBIN TIME: CPT

## 2022-01-28 NOTE — PROGRESS NOTES
ANTICOAGULATION SERVICE    Date of Clinic Visit:      Rachel Perez is a 67 y.o. male who presents to clinic today for anticoagulation monitoring and adjustment. Recent INR Results:  Internal QC passed  Lab Results   Component Value Date    INR 2.9 2022    INR 2.9 2021       Current Warfarin Dosage:  Dosing Plan  As of 2022    TTR:  38.4 % (1.7 y)   Full warfarin instructions:  2 mg every Tue, Fri; 4 mg all other days               Assessment/Plan:    Continue current regimen as INR remains stable. Recheck one month    Next Clinic Appointment:  Return date  As of 2022    TTR:  38.4 % (1.7 y)   Next INR check:  3/3/2022             Please call [facility] at [phone] with any questions. Thanks!   Berny Heranndes [facility]  Anticoagulation Service Pharmacist  2022 12:59 PM     For Pharmacy Admin Tracking Only     Intervention Detail: Adherence Monitorin   Total # of Interventions Recommended: 0   Total # of Interventions Accepted: 0   Time Spent (min): 15

## 2022-02-15 ENCOUNTER — OFFICE VISIT (OUTPATIENT)
Dept: DIABETES SERVICES | Age: 77
End: 2022-02-15
Payer: MEDICARE

## 2022-02-15 VITALS
HEIGHT: 66 IN | BODY MASS INDEX: 31.18 KG/M2 | WEIGHT: 194 LBS | HEART RATE: 68 BPM | SYSTOLIC BLOOD PRESSURE: 108 MMHG | DIASTOLIC BLOOD PRESSURE: 60 MMHG | RESPIRATION RATE: 22 BRPM

## 2022-02-15 DIAGNOSIS — I10 ESSENTIAL HYPERTENSION: ICD-10-CM

## 2022-02-15 DIAGNOSIS — E78.2 MIXED HYPERLIPIDEMIA: ICD-10-CM

## 2022-02-15 DIAGNOSIS — E11.65 TYPE 2 DIABETES MELLITUS WITH HYPERGLYCEMIA, WITHOUT LONG-TERM CURRENT USE OF INSULIN (HCC): Primary | ICD-10-CM

## 2022-02-15 LAB — HBA1C MFR BLD: 12.5 %

## 2022-02-15 PROCEDURE — PBSHW POCT GLYCOSYLATED HEMOGLOBIN (HGB A1C): Performed by: NURSE PRACTITIONER

## 2022-02-15 PROCEDURE — G8427 DOCREV CUR MEDS BY ELIG CLIN: HCPCS | Performed by: NURSE PRACTITIONER

## 2022-02-15 PROCEDURE — 1123F ACP DISCUSS/DSCN MKR DOCD: CPT | Performed by: NURSE PRACTITIONER

## 2022-02-15 PROCEDURE — G8417 CALC BMI ABV UP PARAM F/U: HCPCS | Performed by: NURSE PRACTITIONER

## 2022-02-15 PROCEDURE — 1036F TOBACCO NON-USER: CPT | Performed by: NURSE PRACTITIONER

## 2022-02-15 PROCEDURE — G8484 FLU IMMUNIZE NO ADMIN: HCPCS | Performed by: NURSE PRACTITIONER

## 2022-02-15 PROCEDURE — 99214 OFFICE O/P EST MOD 30 MIN: CPT | Performed by: NURSE PRACTITIONER

## 2022-02-15 PROCEDURE — 4040F PNEUMOC VAC/ADMIN/RCVD: CPT | Performed by: NURSE PRACTITIONER

## 2022-02-15 PROCEDURE — 83036 HEMOGLOBIN GLYCOSYLATED A1C: CPT | Performed by: NURSE PRACTITIONER

## 2022-02-15 ASSESSMENT — ENCOUNTER SYMPTOMS
ABDOMINAL PAIN: 0
DIARRHEA: 0
SHORTNESS OF BREATH: 0
RESPIRATORY NEGATIVE: 1

## 2022-02-15 NOTE — PROGRESS NOTES
[address], [address]  [place] [address] [facility]  [phone]        HISTORY:    Nara Duque presents today for evaluation and management of:  Chief Complaint   Patient presents with    Diabetes     3 month appt. HPI    Interval History:    Past DM Medications   Actos- therapy completed      Current Diabetic Medications  amaryl 4 mg daily, metformin 0514 mg BID Trulicity 1.5 mg weekly. He also takes a daily ASA     DKA episodes: 0    04/06/21   At previous visit dm counseling was provided. He is compliant with medications.   Diet: smaller frequent meals  Exercise: none   BS testing: none   Issues: denies      08/03/21   He is due for a1c today. Denies any s/s of hyper/hypoglcyemia.   Diet: smaller frequent meals  Exercise: none   BS testing: none declines   Issues: denies      11/16/21   At previous visit dm counseling was provided. He is due for a1c today. Denies any s/s of hyper/hypoglycemia. He does complain of diarrhea the day after he takes trulicity and would like to stop it. He also does not want to start any new medications that he will have to monitor \"things\" for. Diet: regular  Exercise: none  BS testing: none   Issues: denies   Diabetic foot exam up-to-date: Yes  Diabetic retinal exam up-to-date: Yes  Hypoglycemia as needed treatment: snack    95/23/66   Daryl J Leeroy Cogan is a 67 y.o. male patient who presents to clinic today for follow up on his diabetes. he has a history of HTN, CAD, GERD, obesity which contributes to his diabetes. At previous visit Trulicity was stopped due to patient preference, declines to test glucose. Due for a1c today. States dirrhea has not improved since stopping trulicity  he denies any signs or symptoms of hyper/hypoglycemia. he states they are taking their medications as prescribed without any adverse effects.  He recently lost his son unexpectedly and feels less motivated to work on his health. Diet: regular small portions   Exercise: none  BS testing: refuses to test blood sugar, no meter at home. Issues: denies  Diabetic foot exam up-to-date: Yes  Diabetic retinal exam up-to-date: Yes  Hypoglycemia as needed treatment: snack    High cholesterol-  Takes crestor and denies any adverse effects with its use.  Watches diet and exercise.      Hypertension-  Takes lisinopril and lopressor and denies any adverse effects with their use. Watches diet and exercise.  Denies any chest pain, dizziness or edema.  Follows with cardiology:Yes.        Obesity- Working on weight loss. Trending down        Past Medical History:   Diagnosis Date    Aortic stenosis     Aortic valve replacement     Degenerative disc disease, lumbar     Diabetes mellitus ([facility] [address]     PCP Dr. Santy Santacruz Diverticulosis of sigmoid colon     DIA (dyspnea on exertion)     GERD (gastroesophageal reflux disease)     Hyperlipidemia     Hypertension     Hypertensive retinopathy of both eyes 2019    Kidney stone     Low back pain with sciatica 2017    Lumbar facet joint syndrome 10/18/2019    Mitral regurgitation     Obesity     PAD (peripheral artery disease) ([facility] [address] 2021     Family History   Problem Relation Age of Onset    Cancer Father         prostate    Alzheimer's Disease Father     Heart Disease Neg Hx     Diabetes Neg Hx     Glaucoma Neg Hx     Macular Degen Neg Hx      Social History     Tobacco Use    Smoking status: Former Smoker     Packs/day: 1.50     Years: 40.00     Pack years: 60.00     Types: Cigarettes     Quit date:      Years since quittin.1    Smokeless tobacco: Never Used   Vaping Use    Vaping Use: Never used   Substance Use Topics    Alcohol use: No    Drug use: No     Allergies   Allergen Reactions    Invokana [Canagliflozin]      dizziness    Januvia [Sitagliptin]      dizziness       MEDICATIONS:  Current Outpatient Medications   Medication Sig Dispense Refill    Dulaglutide 0.75 MG/0.5ML SOPN Inject 0.75 mg into the skin once a week 2 pen 1    metFORMIN (GLUCOPHAGE) 500 MG tablet TAKE 2 TABLETS BY MOUTH TWICE DAILY WITH MEALS 120 tablet 2    glimepiride (AMARYL) 4 MG tablet Take 1 tablet by mouth 2 times daily 90 tablet 2    loratadine (CLARITIN) 10 MG tablet Take 1 tablet by mouth daily 30 tablet 2    warfarin (COUMADIN) 4 MG tablet TAKE 1 TABLET BY MOUTH EVERY DAY 90 tablet 2    rosuvastatin (CRESTOR) 20 MG tablet TAKE 1 TABLET BY MOUTH DAILY 90 tablet 3    omeprazole (PRILOSEC) 20 MG delayed release capsule TAKE 1 CAPSULE BY MOUTH DAILY 90 capsule 1    tamsulosin (FLOMAX) 0.4 MG capsule TAKE 1 CAPSULE BY MOUTH DAILY 90 capsule 5    lisinopril-hydroCHLOROthiazide (PRINZIDE;ZESTORETIC) 10-12.5 MG per tablet TAKE 1 TABLET BY MOUTH DAILY 90 tablet 2    metoprolol tartrate (LOPRESSOR) 25 MG tablet TAKE 1 TABLET BY MOUTH TWICE DAILY 180 tablet 3    amiodarone (CORDARONE) 200 MG tablet Take 1 tablet by mouth daily 90 tablet 3    aspirin 81 MG tablet Take 1 tablet by mouth daily Pt.will stop 10-30 -18 AM for OR (Patient taking differently: Take 81 mg by mouth daily ) 30 tablet 3    Multiple Vitamins-Minerals (THERAPEUTIC MULTIVITAMIN-MINERALS) tablet Take 1 tablet by mouth daily (with breakfast) 30 tablet 3    blood glucose monitor strips by Other route daily Test 1 times a day & as needed for symptoms of irregular blood glucose. 50 strip 3    Blood Glucose Monitoring Suppl KIT Meter as covered by insurance also test strips and lancets.  1 kit 3    Lancets MISC 1 each by Does not apply route daily 50 each 3    Elastic Bandages & Supports (JOBST KNEE HIGH COMPRESSION SM) MISC 1 each by Does not apply route daily as needed (leg swelling/CHF) 2 each 1    Blood Pressure Monitoring (BLOOD PRESSURE CUFF) MISC 1 Device by Does not apply route daily 1 each 0    blood glucose monitor strips Test 1 times a day & as needed for symptoms of irregular blood glucose. 100 strip 3     No current facility-administered medications for this visit. Review ofSymptoms:  Review of Systems   Constitutional: Positive for fatigue. Negative for unexpected weight change. Eyes: Negative for visual disturbance. Respiratory: Negative. Negative for shortness of breath. Cardiovascular: Negative for chest pain and leg swelling. Gastrointestinal: Negative for abdominal pain and diarrhea. Endocrine: Negative for polydipsia, polyphagia and polyuria. Genitourinary: Negative. Musculoskeletal: Negative. Skin: Negative for rash and wound. Neurological: Negative for dizziness, tremors, seizures and headaches. Psychiatric/Behavioral: Negative. Negative for confusion and decreased concentration. Theremainder of a complete 14-point review of systems is negative. Vital Signs: /60 (Site: Right Upper Arm, Position: Sitting, Cuff Size: Medium Adult)   Pulse 68   Resp 22   Ht 5' 6\" (1.676 m)   Wt 194 lb (88 kg)   BMI 31.31 kg/m²      Wt Readings from Last 3 Encounters:   02/15/22 194 lb (88 kg)   11/16/21 202 lb (91.6 kg)   10/27/21 203 lb 8 oz (92.3 kg)     Body mass index is 31.31 kg/m².   LABS:  Hemoglobin A1C   Date Value Ref Range Status   02/15/2022 12.5 % Final   11/16/2021 9.3 % Final     Lab Results   Component Value Date    LABMICR 11.3 (H) 11/16/2021     Lab Results   Component Value Date     11/16/2021    K 4.8 11/16/2021     11/16/2021    CO2 25 11/16/2021    BUN 23 (H) 11/16/2021    CREATININE 1.5 (H) 11/16/2021    GLUCOSE 261 (H) 11/16/2021    CALCIUM 9.5 11/16/2021    PROT 6.8 10/27/2020    LABALBU 4.2 10/27/2020    BILITOT 0.54 10/27/2020    ALKPHOS 63 10/27/2020    AST 37 10/27/2020    ALT 38 10/27/2020    LABGLOM 48.2 11/16/2021    GFRAA >60 10/27/2020     Lab Results   Component Value Date    CHOL 138 08/12/2021    CHOL 126 12/16/2019    CHOL 179 05/21/2019     Lab Results   Component Value Date    TRIG 226 08/12/2021    TRIG 310 (H) 12/16/2019    TRIG 239 05/21/2019     Lab Results   Component Value Date    HDL 43 08/12/2021    HDL 44 10/27/2020    HDL 35 (L) 12/16/2019     Lab Results   Component Value Date    LDLCHOLESTEROL 75 10/27/2020    LDLCHOLESTEROL 29 12/16/2019    LDLCALC 49.8 08/12/2021    LDLCALC 85.2 05/21/2019    LDLCALC 234.2 (H) 02/15/2019     Lab Results   Component Value Date    VLDL 45 08/12/2021    VLDL NOT REPORTED (H) 10/27/2020    VLDL NOT REPORTED (H) 12/16/2019     Lab Results   Component Value Date    CHOLHDLRATIO 3.21 08/12/2021    CHOLHDLRATIO 3.5 10/27/2020    CHOLHDLRATIO 3.6 12/16/2019           Physical Exam  Constitutional:       Appearance: He is well-developed. Eyes:      Pupils: Pupils are equal, round, and reactive to light. Cardiovascular:      Rate and Rhythm: Normal rate and regular rhythm. Pulmonary:      Effort: Pulmonary effort is normal.      Breath sounds: Normal breath sounds. Skin:     General: Skin is warm and dry. Findings: No lesion (no lipohypertrophy) or rash. Neurological:      Mental Status: He is alert and oriented to person, place, and time. Sensory: No sensory deficit. Psychiatric:         Speech: Speech normal.         Behavior: Behavior normal.         Thought Content: Thought content normal.         Judgment: Judgment normal.           ASSESSMENT/PLAN:     Diagnosis Orders   1. Type 2 diabetes mellitus with hyperglycemia, without long-term current use of insulin (HCC)  POCT Hb A1C (glycosylated hemoglobin)    Dulaglutide 0.75 MG/0.5ML SOPN   2. Mixed hyperlipidemia     3. Essential hypertension     4.  BMI 31.0-31.9,adult       Orders Placed This Encounter   Procedures    POCT Hb A1C (glycosylated hemoglobin)     Orders Placed This Encounter   Medications    Dulaglutide 0.75 MG/0.5ML SOPN     Sig: Inject 0.75 mg into the skin once a week     Dispense:  2 pen     Refill:  1     Requested Prescriptions     Signed week  Dispense: 2 pen; Refill: 1    2. Mixed hyperlipidemia  stable, lipid panel reviewed, continue current medications. Diet and exercise      3. Essential hypertension   stable, continue current medications. Diet and exercise Seek emergent care if chest pain develops. 4. BMI 31.0-31.9,adult  Reduce calories and increase physical activity to achieve a slow and steady weight loss to improve blood pressure, cholesterol and diabetes. Answered all patient questions. Agrees to follow plan of care and to follow up in 1 months, sooner if needed. Call office if unexplained blood sugars less than 70 occur or above 400. Call office or access [employer]t with any further questions or concerns. Be sure to bring glucometer/food log at next appointment. Total time spent reviewing chart, labs, counseling patient and documenting on the date of the encounter: 30 min.       Electronically signed by PAIGE Cabezas CNP on 2/15/2022 at 12:26 PM      (Please note that portions of this note were completed with a voice-recognition program. Efforts were made to edit the dictation but occasionally words are mis-transcribed.)

## 2022-02-15 NOTE — PATIENT INSTRUCTIONS
a1c today 27.2%    Add Trulicity . 75 mg once weekly for 2 weeks then increase 1.5 mg once weekly     Cut out sweetened drinks

## 2022-02-25 DIAGNOSIS — E11.9 TYPE 2 DIABETES MELLITUS WITHOUT COMPLICATION, WITHOUT LONG-TERM CURRENT USE OF INSULIN (HCC): ICD-10-CM

## 2022-02-28 RX ORDER — GLIMEPIRIDE 4 MG/1
TABLET ORAL
Qty: 180 TABLET | Refills: 1 | Status: SHIPPED | OUTPATIENT
Start: 2022-02-28 | End: 2022-06-02 | Stop reason: SDUPTHER

## 2022-03-03 ENCOUNTER — HOSPITAL ENCOUNTER (OUTPATIENT)
Dept: PHARMACY | Age: 77
Setting detail: THERAPIES SERIES
Discharge: HOME | End: 2022-03-03
Payer: MEDICARE

## 2022-03-03 DIAGNOSIS — Z79.01 ANTICOAGULATED ON COUMADIN: Primary | ICD-10-CM

## 2022-03-03 LAB
INR BLD: 2.8
PROTIME: 33.6 SECONDS

## 2022-03-03 PROCEDURE — 36416 COLLJ CAPILLARY BLOOD SPEC: CPT

## 2022-03-03 PROCEDURE — 85610 PROTHROMBIN TIME: CPT

## 2022-03-03 PROCEDURE — 99211 OFF/OP EST MAY X REQ PHY/QHP: CPT

## 2022-03-03 NOTE — PROGRESS NOTES
ANTICOAGULATION SERVICE    Date of Clinic Visit:  3971    Rosemary Wood is a 67 y.o. male who presents to clinic today for anticoagulation monitoring and adjustment. Recent INR Results:  Internal QC passed  Lab Results   Component Value Date    INR 2.8 2022    INR 2.9 2022       Current Warfarin Dosage:  Dosing Plan  As of 3/3/2022    TTR:  41.6 % (1.8 y)   Full warfarin instructions:  2 mg every Tue, Fri; 4 mg all other days               Assessment/Plan:    Continue current regimen as INR remains stable. Recheck 4 weeks. Next Clinic Appointment:  Return date  As of 3/3/2022    TTR:  41.6 % (1.8 y)   Next INR check:  3/31/2022             Please call [facility] at [phone] with any questions. Thanks!   Tatiana Herrera [facility]  Anticoagulation Service Pharmacist  3/3/2022 12:56 PM     For Pharmacy Admin Tracking Only     Intervention Detail: Adherence Monitorin   Total # of Interventions Recommended: 0   Total # of Interventions Accepted: 0   Time Spent (min): 15

## 2022-03-15 ENCOUNTER — OFFICE VISIT (OUTPATIENT)
Dept: DIABETES SERVICES | Age: 77
End: 2022-03-15
Payer: MEDICARE

## 2022-03-15 VITALS
BODY MASS INDEX: 30.53 KG/M2 | HEIGHT: 66 IN | SYSTOLIC BLOOD PRESSURE: 92 MMHG | DIASTOLIC BLOOD PRESSURE: 68 MMHG | HEART RATE: 76 BPM | WEIGHT: 190 LBS | RESPIRATION RATE: 18 BRPM

## 2022-03-15 DIAGNOSIS — E11.65 TYPE 2 DIABETES MELLITUS WITH HYPERGLYCEMIA, WITHOUT LONG-TERM CURRENT USE OF INSULIN (HCC): ICD-10-CM

## 2022-03-15 DIAGNOSIS — I10 ESSENTIAL HYPERTENSION: ICD-10-CM

## 2022-03-15 DIAGNOSIS — E78.2 MIXED HYPERLIPIDEMIA: Primary | ICD-10-CM

## 2022-03-15 PROCEDURE — G8427 DOCREV CUR MEDS BY ELIG CLIN: HCPCS | Performed by: NURSE PRACTITIONER

## 2022-03-15 PROCEDURE — 1123F ACP DISCUSS/DSCN MKR DOCD: CPT | Performed by: NURSE PRACTITIONER

## 2022-03-15 PROCEDURE — 99214 OFFICE O/P EST MOD 30 MIN: CPT | Performed by: NURSE PRACTITIONER

## 2022-03-15 PROCEDURE — G8417 CALC BMI ABV UP PARAM F/U: HCPCS | Performed by: NURSE PRACTITIONER

## 2022-03-15 PROCEDURE — 1036F TOBACCO NON-USER: CPT | Performed by: NURSE PRACTITIONER

## 2022-03-15 PROCEDURE — 4040F PNEUMOC VAC/ADMIN/RCVD: CPT | Performed by: NURSE PRACTITIONER

## 2022-03-15 PROCEDURE — G8484 FLU IMMUNIZE NO ADMIN: HCPCS | Performed by: NURSE PRACTITIONER

## 2022-03-15 ASSESSMENT — ENCOUNTER SYMPTOMS
RESPIRATORY NEGATIVE: 1
ABDOMINAL PAIN: 0
DIARRHEA: 0
SHORTNESS OF BREATH: 0

## 2022-03-15 NOTE — PROGRESS NOTES
[address] [facility] INTERNAL MED A DEPARTMENT OF [place] [address]  [address] [facility], [address]  [facility] [address] [phone]        HISTORY:    Osman Gallo presents today for evaluation and management of:  Chief Complaint   Patient presents with    Diabetes     1m       HPI    Interval History:    Past DM Medications   Actos- therapy completed      Current Diabetic Medications  amaryl 4 mg daily, metformin 0129 mg BID Trulicity 1.5 mg weekly. He also takes a daily ASA     DKA episodes: 0    08/03/21   He is due for a1c today. Denies any s/s of hyper/hypoglcyemia.   Diet: smaller frequent meals  Exercise: none   BS testing: none declines   Issues: denies      11/16/21   At previous visit dm counseling was provided. He is due for a1c today.  Denies any s/s of hyper/hypoglycemia. He does complain of diarrhea the day after he takes trulicity and would like to stop it. He also does not want to start any new medications that he will have to monitor \"things\" for.   Diet: regular  Exercise: none  BS testing: none   Issues: denies   Diabetic foot exam up-to-date: Yes  Diabetic retinal exam up-to-date: Yes  Hypoglycemia as needed treatment: snack     24/[birth year]   Yanick Barrow Player is a 67 y.o. male patient who presents to clinic today for follow up on his diabetes. he has a history of HTN, CAD, GERD, obesity which contributes to his diabetes. At previous visit Trulicity was stopped due to patient preference, declines to test glucose. Due for a1c today. States dirrhea has not improved since stopping trulicity  he denies any signs or symptoms of hyper/hypoglycemia. he states they are taking their medications as prescribed without any adverse effects. He recently lost his son unexpectedly and feels less motivated to work on his health. Diet: regular small portions   Exercise: none  BS testing: refuses to test blood sugar, no meter at home.    Issues: denies  Diabetic foot exam up-to-date: Yes  Diabetic retinal exam up-to-date: Yes  Hypoglycemia as needed treatment: snack    06/80/69   Darian Avilez is a 67 y.o. male patient who presents to clinic today for his diabetes. he has a history of HTN, CAD, GERD, obesity which contributes to his diabetes. At previous visit Trulicity was added. he denies any current signs or symptoms of hyper/hypoglycemia. he states they are taking their medications as prescribed without any adverse effects. He states he continues to have diarrhea which started before he restarted trulicity. Diet: regular, regular pop   Exercise: none  BS testing: refuses to test blood sugar, no meter at home. Issues: denies  Diabetic foot exam up-to-date: Yes  Diabetic retinal exam up-to-date: Yes  Hypoglycemia as needed treatment: snack    High cholesterol-  Takes crestor and denies any adverse effects with its use.  Watches diet and exercise.      Hypertension-  Takes lisinopril and lopressor and denies any adverse effects with their use. Watches diet and exercise.  Denies any chest pain, dizziness or edema.  Follows with cardiology:Yes.        Obesity- Working on weight loss. Trending down        Past Medical History:   Diagnosis Date    Aortic stenosis     Aortic valve replacement 2019    Degenerative disc disease, lumbar     Diabetes mellitus ([address]     PCP Dr. Theo Banegas Diverticulosis of sigmoid colon     DIA (dyspnea on exertion)     GERD (gastroesophageal reflux disease)     Hyperlipidemia     Hypertension     Hypertensive retinopathy of both eyes 7/22/2019    Kidney stone     Low back pain with sciatica 11/19/2017    Lumbar facet joint syndrome 10/18/2019    Mitral regurgitation     Obesity     PAD (peripheral artery disease) ([address] 4/27/2021     Family History   Problem Relation Age of Onset    Cancer Father         prostate    Alzheimer's Disease Father     Heart Disease Neg Hx     Diabetes Neg Hx     Glaucoma Neg Hx     Macular Degen Neg Hx      Social History     Tobacco Use    Smoking status: Former Smoker     Packs/day: 1.50     Years: 40.00     Pack years: 60.00     Types: Cigarettes     Quit date:      Years since quittin.2    Smokeless tobacco: Never Used   Vaping Use    Vaping Use: Never used   Substance Use Topics    Alcohol use: No    Drug use: No     Allergies   Allergen Reactions    Invokana [Canagliflozin]      dizziness    Januvia [Sitagliptin]      dizziness       MEDICATIONS:  Current Outpatient Medications   Medication Sig Dispense Refill    Dulaglutide 1.5 MG/0.5ML SOPN Inject 1.5 mg into the skin once a week 4 pen 3    glimepiride (AMARYL) 4 MG tablet TAKE 1 TABLET BY MOUTH TWICE DAILY 180 tablet 1    metFORMIN (GLUCOPHAGE) 500 MG tablet TAKE 2 TABLETS BY MOUTH TWICE DAILY WITH MEALS 120 tablet 2    loratadine (CLARITIN) 10 MG tablet Take 1 tablet by mouth daily 30 tablet 2    warfarin (COUMADIN) 4 MG tablet TAKE 1 TABLET BY MOUTH EVERY DAY 90 tablet 2    rosuvastatin (CRESTOR) 20 MG tablet TAKE 1 TABLET BY MOUTH DAILY 90 tablet 3    omeprazole (PRILOSEC) 20 MG delayed release capsule TAKE 1 CAPSULE BY MOUTH DAILY 90 capsule 1    tamsulosin (FLOMAX) 0.4 MG capsule TAKE 1 CAPSULE BY MOUTH DAILY 90 capsule 5    lisinopril-hydroCHLOROthiazide (PRINZIDE;ZESTORETIC) 10-12.5 MG per tablet TAKE 1 TABLET BY MOUTH DAILY 90 tablet 2    metoprolol tartrate (LOPRESSOR) 25 MG tablet TAKE 1 TABLET BY MOUTH TWICE DAILY 180 tablet 3    amiodarone (CORDARONE) 200 MG tablet Take 1 tablet by mouth daily 90 tablet 3    aspirin 81 MG tablet Take 1 tablet by mouth daily Pt.will stop 10-30 -18 AM for OR (Patient taking differently: Take 81 mg by mouth daily ) 30 tablet 3    Multiple Vitamins-Minerals (THERAPEUTIC MULTIVITAMIN-MINERALS) tablet Take 1 tablet by mouth daily (with breakfast) 30 tablet 3    blood glucose monitor strips by Other route daily Test 1 times a day & as needed for symptoms of irregular blood glucose.  48 strip 3    Blood Glucose Monitoring Suppl KIT Meter as covered by insurance also test strips and lancets. 1 kit 3    Lancets MISC 1 each by Does not apply route daily 50 each 3    Elastic Bandages & Supports (JOBST KNEE HIGH COMPRESSION SM) MISC 1 each by Does not apply route daily as needed (leg swelling/CHF) 2 each 1    Blood Pressure Monitoring (BLOOD PRESSURE CUFF) MISC 1 Device by Does not apply route daily 1 each 0    blood glucose monitor strips Test 1 times a day & as needed for symptoms of irregular blood glucose. 100 strip 3     No current facility-administered medications for this visit. Review ofSymptoms:  Review of Systems   Constitutional: Positive for fatigue. Negative for unexpected weight change. Eyes: Negative for visual disturbance. Respiratory: Negative. Negative for shortness of breath. Cardiovascular: Negative for chest pain and leg swelling. Gastrointestinal: Negative for abdominal pain and diarrhea. Endocrine: Negative for polydipsia, polyphagia and polyuria. Genitourinary: Negative. Musculoskeletal: Negative. Skin: Negative for rash and wound. Neurological: Negative for dizziness, tremors, seizures and headaches. Psychiatric/Behavioral: Negative. Negative for confusion and decreased concentration. Theremainder of a complete 14-point review of systems is negative. Vital Signs: BP 92/68 (Site: Right Upper Arm, Position: Sitting, Cuff Size: Large Adult)   Pulse 76   Resp 18   Ht 5' 6\" (1.676 m)   Wt 190 lb (86.2 kg)   BMI 30.67 kg/m²      Wt Readings from Last 3 Encounters:   03/15/22 190 lb (86.2 kg)   02/15/22 194 lb (88 kg)   11/16/21 202 lb (91.6 kg)     Body mass index is 30.67 kg/m².   LABS:  Hemoglobin A1C   Date Value Ref Range Status   02/15/2022 12.5 % Final   11/16/2021 9.3 % Final     Lab Results   Component Value Date    LABMICR 11.3 (H) 11/16/2021     Lab Results   Component Value Date     11/16/2021    K 4.8 11/16/2021    CL 103 11/16/2021    CO2 25 11/16/2021    BUN 23 (H) 11/16/2021    CREATININE 1.5 (H) 11/16/2021    GLUCOSE 261 (H) 11/16/2021    CALCIUM 9.5 11/16/2021    PROT 6.8 10/27/2020    LABALBU 4.2 10/27/2020    BILITOT 0.54 10/27/2020    ALKPHOS 63 10/27/2020    AST 37 10/27/2020    ALT 38 10/27/2020    LABGLOM 48.2 11/16/2021    GFRAA >60 10/27/2020     Lab Results   Component Value Date    CHOL 138 08/12/2021    CHOL 126 12/16/2019    CHOL 179 05/21/2019     Lab Results   Component Value Date    TRIG 226 08/12/2021    TRIG 310 (H) 12/16/2019    TRIG 239 05/21/2019     Lab Results   Component Value Date    HDL 43 08/12/2021    HDL 44 10/27/2020    HDL 35 (L) 12/16/2019     Lab Results   Component Value Date    LDLCHOLESTEROL 75 10/27/2020    LDLCHOLESTEROL 29 12/16/2019    LDLCALC 49.8 08/12/2021    LDLCALC 85.2 05/21/2019    LDLCALC 234.2 (H) 02/15/2019     Lab Results   Component Value Date    VLDL 45 08/12/2021    VLDL NOT REPORTED (H) 10/27/2020    VLDL NOT REPORTED (H) 12/16/2019     Lab Results   Component Value Date    CHOLHDLRATIO 3.21 08/12/2021    CHOLHDLRATIO 3.5 10/27/2020    CHOLHDLRATIO 3.6 12/16/2019           Physical Exam  Constitutional:       Appearance: He is well-developed. Eyes:      Pupils: Pupils are equal, round, and reactive to light. Cardiovascular:      Rate and Rhythm: Normal rate and regular rhythm. Pulmonary:      Effort: Pulmonary effort is normal.      Breath sounds: Normal breath sounds. Skin:     General: Skin is warm and dry. Findings: No lesion (no lipohypertrophy) or rash. Neurological:      Mental Status: He is alert and oriented to person, place, and time. Sensory: No sensory deficit. Psychiatric:         Speech: Speech normal.         Behavior: Behavior normal.         Thought Content: Thought content normal.         Judgment: Judgment normal.       .    ASSESSMENT/PLAN:     Diagnosis Orders   1. Mixed hyperlipidemia     2.  Type 2 diabetes mellitus with hyperglycemia, without long-term current use of insulin (HCC)  Dulaglutide 1.5 MG/0.5ML SOPN   3. Essential hypertension     4. BMI 30.0-30.9,adult       No orders of the defined types were placed in this encounter. Orders Placed This Encounter   Medications    Dulaglutide 1.5 MG/0.5ML SOPN     Sig: Inject 1.5 mg into the skin once a week     Dispense:  4 pen     Refill:  3     Requested Prescriptions     Signed Prescriptions Disp Refills    Dulaglutide 1.5 MG/0.5ML SOPN 4 pen 3     Sig: Inject 1.5 mg into the skin once a week       1. Type 2 diabetes mellitus with hyperglycemia, without long-term current use of insulin (HCC)  - Unstable  HbA1C goal is less than 7%. - Fasting blood glucose goal is 70-120mg/dl and postprandial blood sugar goal is less than 180 mg/dl. - Labs reviewed including most recent A1c, microalbumin and kidney function. Repeat labs due in 2 months.    -We discussed in great detail dietary modifications they can make to better improve their blood sugars. -follow up diabetes education completed, all questions answered. Patient Instructions   Decrease metformin to 500 mg twice daily   Increase Trulicity to 1.5 mg once weekly-then you can stop metformin to see if there is improvement in stool. Drop of proof of income to apply for patient assitance   Cut out regular pop   You can try diet         Discussed signs and symptoms of hyper/hypoglycemia and how to treat. Encouraged 150 minutes of physical activity per week. Follow a low carbohydrate diet. Encouraged at least 7 hours of sleep. The patient was informed of the goals of diabetes management. This can only be accomplished by watching their diet and exercise levels. We certainly use medicines to help attain these goals. The consequences of not controlling blood sugars were discussed. These include blindness, heart disease, stroke, kidney disease, and possibly need for dialysis.  They were told to be careful with their foot care as diabetics often have nerve damage, infections and risk for limb amutations . They also need a dilated eye exam yearly. We discussed the issues of diet, exercise, medication, complication avoidance, reviewed the signs and symptoms of diabetes, hypoglycemic episodes, significance of HbA1C.       - Dulaglutide 1.5 MG/0.5ML SOPN; Inject 1.5 mg into the skin once a week  Dispense: 4 pen; Refill: 3    2. Mixed hyperlipidemia  stable, lipid panel reviewed, continue current medications. Diet and exercise      3. Essential hypertension   stable, continue current medications. Diet and exercise Seek emergent care if chest pain develops. 4. BMI 30.0-30.9,adult  Reduce calories and increase physical activity to achieve a slow and steady weight loss to improve blood pressure, cholesterol and diabetes. Answered all patient questions. Agrees to follow plan of care and to follow up in 1 months, sooner if needed. Call office if unexplained blood sugars less than 70 occur or above 400. Call office or access MyChart with any further questions or concerns. Be sure to bring glucometer/food log at next appointment. Total time spent reviewing chart, labs, counseling patient and documenting on the date of the encounter: 30 min.       Electronically signed by PAIGE Witt CNP on 3/15/2022 at 1:35 PM      (Please note that portions of this note were completed with a voice-recognition program. Efforts were made to edit the dictation but occasionally words are mis-transcribed.)

## 2022-03-15 NOTE — PATIENT INSTRUCTIONS
Decrease metformin to 500 mg twice daily   Increase Trulicity to 1.5 mg once weekly-then you can stop metformin to see if there is improvement in stool.    Drop of proof of income to apply for patient assitance   Cut out regular pop   You can try diet

## 2022-03-31 ENCOUNTER — HOSPITAL ENCOUNTER (OUTPATIENT)
Dept: PHARMACY | Age: 77
Setting detail: THERAPIES SERIES
Discharge: HOME | End: 2022-03-31
Payer: MEDICARE

## 2022-03-31 DIAGNOSIS — Z79.01 ANTICOAGULATED ON COUMADIN: Primary | ICD-10-CM

## 2022-03-31 DIAGNOSIS — I10 ESSENTIAL HYPERTENSION: ICD-10-CM

## 2022-03-31 DIAGNOSIS — K21.9 GASTROESOPHAGEAL REFLUX DISEASE: ICD-10-CM

## 2022-03-31 LAB
INR BLD: 2.5
PROTIME: 29.6 SECONDS

## 2022-03-31 PROCEDURE — 36416 COLLJ CAPILLARY BLOOD SPEC: CPT

## 2022-03-31 PROCEDURE — 99211 OFF/OP EST MAY X REQ PHY/QHP: CPT

## 2022-03-31 PROCEDURE — 85610 PROTHROMBIN TIME: CPT

## 2022-03-31 RX ORDER — LISINOPRIL AND HYDROCHLOROTHIAZIDE 12.5; 1 MG/1; MG/1
1 TABLET ORAL DAILY
Qty: 90 TABLET | Refills: 3 | Status: SHIPPED | OUTPATIENT
Start: 2022-03-31

## 2022-03-31 RX ORDER — OMEPRAZOLE 20 MG/1
CAPSULE, DELAYED RELEASE ORAL
Qty: 90 CAPSULE | Refills: 3 | Status: SHIPPED | OUTPATIENT
Start: 2022-03-31

## 2022-03-31 NOTE — TELEPHONE ENCOUNTER
Osman Gallo is requesting a refill on the following medication(s):  Requested Prescriptions     Pending Prescriptions Disp Refills    lisinopril-hydroCHLOROthiazide (PRINZIDE;ZESTORETIC) 10-12.5 MG per tablet 90 tablet 2     Sig: Take 1 tablet by mouth daily    metoprolol tartrate (LOPRESSOR) 25 MG tablet 180 tablet 3     Sig: TAKE 1 TABLET BY MOUTH TWICE DAILY    omeprazole (PRILOSEC) 20 MG delayed release capsule 90 capsule 1       Last Visit Date (If Applicable):  78/43/6067    Next Visit Date:    4/27/2022

## 2022-03-31 NOTE — PROGRESS NOTES
ANTICOAGULATION SERVICE    Date of Clinic Visit:      Eleno Savage is a 67 y.o. male who presents to clinic today for anticoagulation monitoring and adjustment. Recent INR Results:  Internal QC passed  Lab Results   Component Value Date    INR 2.5 2022    INR 2.8 2022       Current Warfarin Dosage:  Dosing Plan  As of 3/31/2022    TTR:  44.0 % (1.9 y)   Full warfarin instructions:  2 mg every Tue, Fri; 4 mg all other days               Assessment/Plan:    Continue current regimen as INR remains stable. Recheck 4 weeks. Next Clinic Appointment:  Return date  As of 3/31/2022    TTR:  44.0 % (1.9 y)   Next INR check:  2022             Please call [facility] at [phone] with any questions. Thanks!   Namita Romero, [address] [facility]  Anticoagulation Service Pharmacist  3/31/2022 1:05 PM     For Pharmacy Admin Tracking Only     Intervention Detail: Adherence Monitorin   Total # of Interventions Recommended: 0   Total # of Interventions Accepted: 0   Time Spent (min): 15

## 2022-04-11 ENCOUNTER — TELEPHONE (OUTPATIENT)
Dept: CARDIOLOGY | Age: 77
End: 2022-04-11

## 2022-04-11 DIAGNOSIS — Z95.2 S/P AVR: Primary | ICD-10-CM

## 2022-04-11 NOTE — TELEPHONE ENCOUNTER
Per Lawanda Parada from Anticoagulation Clinic:    Current referral for Med Mgmt treatment expires on 05/08/11  Please place a new referral to continue treatment for the patient.   Evette Eric  4/11/22  9:08 AM    Last Appt:  5/4/2021  Next Appt:   Visit date not found  Med verified in Epic

## 2022-04-19 ENCOUNTER — OFFICE VISIT (OUTPATIENT)
Dept: DIABETES SERVICES | Age: 77
End: 2022-04-19
Payer: MEDICARE

## 2022-04-19 VITALS
BODY MASS INDEX: 31.02 KG/M2 | DIASTOLIC BLOOD PRESSURE: 78 MMHG | HEIGHT: 66 IN | WEIGHT: 193 LBS | RESPIRATION RATE: 18 BRPM | HEART RATE: 80 BPM | SYSTOLIC BLOOD PRESSURE: 98 MMHG

## 2022-04-19 DIAGNOSIS — E78.2 MIXED HYPERLIPIDEMIA: ICD-10-CM

## 2022-04-19 DIAGNOSIS — E11.65 TYPE 2 DIABETES MELLITUS WITH HYPERGLYCEMIA, WITHOUT LONG-TERM CURRENT USE OF INSULIN (HCC): Primary | ICD-10-CM

## 2022-04-19 DIAGNOSIS — I10 ESSENTIAL HYPERTENSION: ICD-10-CM

## 2022-04-19 PROCEDURE — 1036F TOBACCO NON-USER: CPT | Performed by: NURSE PRACTITIONER

## 2022-04-19 PROCEDURE — 4040F PNEUMOC VAC/ADMIN/RCVD: CPT | Performed by: NURSE PRACTITIONER

## 2022-04-19 PROCEDURE — 99214 OFFICE O/P EST MOD 30 MIN: CPT | Performed by: NURSE PRACTITIONER

## 2022-04-19 PROCEDURE — G8417 CALC BMI ABV UP PARAM F/U: HCPCS | Performed by: NURSE PRACTITIONER

## 2022-04-19 PROCEDURE — 1123F ACP DISCUSS/DSCN MKR DOCD: CPT | Performed by: NURSE PRACTITIONER

## 2022-04-19 PROCEDURE — 3046F HEMOGLOBIN A1C LEVEL >9.0%: CPT | Performed by: NURSE PRACTITIONER

## 2022-04-19 PROCEDURE — G8427 DOCREV CUR MEDS BY ELIG CLIN: HCPCS | Performed by: NURSE PRACTITIONER

## 2022-04-19 ASSESSMENT — ENCOUNTER SYMPTOMS
ABDOMINAL PAIN: 0
SHORTNESS OF BREATH: 0
RESPIRATORY NEGATIVE: 1
DIARRHEA: 0

## 2022-04-19 NOTE — PATIENT INSTRUCTIONS
Continue to take Trulicity on Tuesdays   Work on low carb diet   Cut out regular pop   Sweet treat only every other day  Come in to office the end of may for a1c check and we will call with results.

## 2022-04-19 NOTE — PROGRESS NOTES
[address] [facility] INTERNAL MED A DEPARTMENT OF [place] [address]  [address] [facility], [address]  [facility] [address] [phone]        HISTORY:    Marietta Cheney presents today for evaluation and management of:  Chief Complaint   Patient presents with    1 Month Follow-Up    Diabetes       HPI    Interval History:    Past DM Medications   Actos- therapy completed      Current Diabetic Medications  amaryl 4 mg bid, metformin 8908 mg BID Trulicity 1.5 mg weekly. He also takes a daily ASA     DKA episodes: 0    11/16/21   At previous visit dm counseling was provided. He is due for a1c today.  Denies any s/s of hyper/hypoglycemia. He does complain of diarrhea the day after he takes trulicity and would like to stop it. He also does not want to start any new medications that he will have to monitor \"things\" for.   Diet: regular  Exercise: none  BS testing: none   Issues: denies   Diabetic foot exam up-to-date: Yes  Diabetic retinal exam up-to-date: Yes  Hypoglycemia as needed treatment: snack     78/91/09   Darian Merchant is a 67 y. o. male patient who presents to clinic today for follow up on his diabetes. he has a history of HTN, CAD, GERD, obesity which contributes to his diabetes. At previous visit Trulicity was stopped due to patient preference, declines to test glucose. Due for a1c today. States dirrhea has not improved since stopping trulicity  he denies any signs or symptoms of hyper/hypoglycemia. he states they are taking their medications as prescribed without any adverse effects.  He recently lost his son unexpectedly and feels less motivated to work on his health.   Diet: regular small portions   Exercise: none  BS testing: refuses to test blood sugar, no meter at home.   Issues: denies  Diabetic foot exam up-to-date: Yes  Diabetic retinal exam up-to-date: Yes  Hypoglycemia as needed treatment: snack     95/03/41   Felix Santos is a 67 y.o. male patient who presents to clinic today for his diabetes. he has a history of HTN, CAD, GERD, obesity which contributes to his diabetes. At previous visit Trulicity was added. he denies any current signs or symptoms of hyper/hypoglycemia. he states they are taking their medications as prescribed without any adverse effects. He states he continues to have diarrhea which started before he restarted trulicity. Diet: regular, regular pop   Exercise: none  BS testing: refuses to test blood sugar, no meter at home.   Issues: denies  Diabetic foot exam up-to-date: Yes  Diabetic retinal exam up-to-date: Yes  Hypoglycemia as needed treatment: snack    46/60/08   Stephanie Arcos is a 67 y.o. male patient who presents to clinic today for his diabetes. he has a history of HTN, CAD, GERD, obesity which contributes to his diabetes. At previous visit trulicity was increased and metformin was decreased. he denies any current signs or symptoms of hyper/hypoglycemia. he states they are taking their medications as prescribed without any adverse effects. Feeling down as he recently lost his son. He did receive Trulicity patient assistance. He is not due for a1c today. Diet: regular, regular pop   Exercise: none  BS testing: refuses to test blood sugar, no meter at home.   Issues: denies  Diabetic foot exam up-to-date: Yes  Diabetic retinal exam up-to-date: Yes  Hypoglycemia as needed treatment: snack    High cholesterol-  Takes crestor and denies any adverse effects with its use.  Watches diet and exercise.      Hypertension-  Takes prinizide and lopressor and denies any adverse effects with their use. Watches diet and exercise.  Denies any chest pain, dizziness or edema.  Follows with cardiology:Yes.        Obesity- Working on weight loss. Trending down           Past Medical History:   Diagnosis Date    Aortic stenosis     Aortic valve replacement 2019    Degenerative disc disease, lumbar     Diabetes mellitus ([facility]     PCP Dr. Lurdes Blair  Diverticulosis of sigmoid colon     DIA (dyspnea on exertion)     GERD (gastroesophageal reflux disease)     Hyperlipidemia     Hypertension     Hypertensive retinopathy of both eyes 2019    Kidney stone     Low back pain with sciatica 2017    Lumbar facet joint syndrome 10/18/2019    Mitral regurgitation     Obesity     [facility]     Family History   Problem Relation Age of Onset    Cancer Father         prostate    Alzheimer's Disease Father     Heart Disease Neg Hx     Diabetes Neg Hx     Glaucoma Neg Hx     Macular Degen Neg Hx      Social History     Tobacco Use    Smoking status: Former Smoker     Packs/day: 1.50     Years: 40.00     Pack years: 60.00     Types: Cigarettes     Quit date:      Years since quittin.3    Smokeless tobacco: Never Used   Vaping Use    Vaping Use: Never used   Substance Use Topics    Alcohol use: No    Drug use: No     Allergies   Allergen Reactions    Invokana [Canagliflozin]      dizziness    Januvia [Sitagliptin]      dizziness       MEDICATIONS:  Current Outpatient Medications   Medication Sig Dispense Refill    lisinopril-hydroCHLOROthiazide (PRINZIDE;ZESTORETIC) 10-12.5 MG per tablet Take 1 tablet by mouth daily 90 tablet 3    metoprolol tartrate (LOPRESSOR) 25 MG tablet TAKE 1 TABLET BY MOUTH TWICE DAILY 180 tablet 3    omeprazole (PRILOSEC) 20 MG delayed release capsule Take 1 tablet by mouth daily.  90 capsule 3    Dulaglutide 1.5 MG/0.5ML SOPN Inject 1.5 mg into the skin once a week 4 pen 3    glimepiride (AMARYL) 4 MG tablet TAKE 1 TABLET BY MOUTH TWICE DAILY 180 tablet 1    metFORMIN (GLUCOPHAGE) 500 MG tablet TAKE 2 TABLETS BY MOUTH TWICE DAILY WITH MEALS 120 tablet 2    warfarin (COUMADIN) 4 MG tablet TAKE 1 TABLET BY MOUTH EVERY DAY 90 tablet 2    rosuvastatin (CRESTOR) 20 MG tablet TAKE 1 TABLET BY MOUTH DAILY 90 tablet 3    tamsulosin (FLOMAX) 0.4 MG capsule TAKE 1 CAPSULE BY MOUTH DAILY 90 capsule 5    amiodarone (CORDARONE) 200 MG tablet Take 1 tablet by mouth daily 90 tablet 3    aspirin 81 MG tablet Take 1 tablet by mouth daily Pt.will stop 10-30 -18 AM for OR (Patient taking differently: Take 81 mg by mouth daily ) 30 tablet 3    Multiple Vitamins-Minerals (THERAPEUTIC MULTIVITAMIN-MINERALS) tablet Take 1 tablet by mouth daily (with breakfast) 30 tablet 3    loratadine (CLARITIN) 10 MG tablet Take 1 tablet by mouth daily 30 tablet 2    blood glucose monitor strips by Other route daily Test 1 times a day & as needed for symptoms of irregular blood glucose. 50 strip 3    Blood Glucose Monitoring Suppl KIT Meter as covered by insurance also test strips and lancets. 1 kit 3    Lancets MISC 1 each by Does not apply route daily 50 each 3    Elastic Bandages & Supports (JOBST KNEE HIGH COMPRESSION SM) MISC 1 each by Does not apply route daily as needed (leg swelling/CHF) 2 each 1    Blood Pressure Monitoring (BLOOD PRESSURE CUFF) MISC 1 Device by Does not apply route daily 1 each 0    blood glucose monitor strips Test 1 times a day & as needed for symptoms of irregular blood glucose. 100 strip 3     No current facility-administered medications for this visit. Review ofSymptoms:  Review of Systems   Constitutional: Positive for fatigue. Negative for unexpected weight change. Eyes: Negative for visual disturbance. Respiratory: Negative. Negative for shortness of breath. Cardiovascular: Negative for chest pain and leg swelling. Gastrointestinal: Negative for abdominal pain and diarrhea. Endocrine: Negative for polydipsia, polyphagia and polyuria. Genitourinary: Negative. Musculoskeletal: Negative. Skin: Negative for rash and wound. Neurological: Negative for dizziness, tremors, seizures and headaches. Psychiatric/Behavioral: Negative. Negative for confusion and decreased concentration.      Theremainder of a complete 14-point review of systems is negative. Vital Signs: BP 98/78 (Site: Left Upper Arm, Position: Sitting, Cuff Size: Large Adult)   Pulse 80   Resp 18   Ht 5' 6\" (1.676 m)   Wt 193 lb (87.5 kg)   BMI 31.15 kg/m²      Wt Readings from Last 3 Encounters:   04/19/22 193 lb (87.5 kg)   03/15/22 190 lb (86.2 kg)   02/15/22 194 lb (88 kg)     Body mass index is 31.15 kg/m². LABS:  Hemoglobin A1C   Date Value Ref Range Status   02/15/2022 12.5 % Final   11/16/2021 9.3 % Final     Lab Results   Component Value Date    LABMICR 11.3 (H) 11/16/2021     Lab Results   Component Value Date     11/16/2021    K 4.8 11/16/2021     11/16/2021    CO2 25 11/16/2021    BUN 23 (H) 11/16/2021    CREATININE 1.5 (H) 11/16/2021    GLUCOSE 261 (H) 11/16/2021    CALCIUM 9.5 11/16/2021    PROT 6.8 10/27/2020    LABALBU 4.2 10/27/2020    BILITOT 0.54 10/27/2020    ALKPHOS 63 10/27/2020    AST 37 10/27/2020    ALT 38 10/27/2020    LABGLOM 48.2 11/16/2021    GFRAA >60 10/27/2020     Lab Results   Component Value Date    CHOL 138 08/12/2021    CHOL 126 12/16/2019    CHOL 179 05/21/2019     Lab Results   Component Value Date    TRIG 226 08/12/2021    TRIG 310 (H) 12/16/2019    TRIG 239 05/21/2019     Lab Results   Component Value Date    HDL 43 08/12/2021    HDL 44 10/27/2020    HDL 35 (L) 12/16/2019     Lab Results   Component Value Date    LDLCHOLESTEROL 75 10/27/2020    LDLCHOLESTEROL 29 12/16/2019    LDLCALC 49.8 08/12/2021    LDLCALC 85.2 05/21/2019    LDLCALC 234.2 (H) 02/15/2019     Lab Results   Component Value Date    VLDL 45 08/12/2021    VLDL NOT REPORTED (H) 10/27/2020    VLDL NOT REPORTED (H) 12/16/2019     Lab Results   Component Value Date    CHOLHDLRATIO 3.21 08/12/2021    CHOLHDLRATIO 3.5 10/27/2020    CHOLHDLRATIO 3.6 12/16/2019           Physical Exam  Constitutional:       Appearance: He is well-developed. Eyes:      Pupils: Pupils are equal, round, and reactive to light.    Cardiovascular:      Rate and Rhythm: Normal rate and regular rhythm. Pulmonary:      Effort: Pulmonary effort is normal.      Breath sounds: Normal breath sounds. Skin:     General: Skin is warm and dry. Findings: No lesion (no lipohypertrophy) or rash. Neurological:      Mental Status: He is alert and oriented to person, place, and time. Sensory: No sensory deficit. Psychiatric:         Speech: Speech normal.         Behavior: Behavior normal.         Thought Content: Thought content normal.         Judgment: Judgment normal.           ASSESSMENT/PLAN:     Diagnosis Orders   1. Type 2 diabetes mellitus with hyperglycemia, without long-term current use of insulin ([facility]     2. Mixed hyperlipidemia     3. Essential hypertension     4. BMI 31.0-31.9,adult       No orders of the defined types were placed in this encounter. No orders of the defined types were placed in this encounter. Requested Prescriptions      No prescriptions requested or ordered in this encounter       1. Type 2 diabetes mellitus with hyperglycemia, without long-term current use of insulin (HCC)  - Unstable  HbA1C goal is less than 7%. - Fasting blood glucose goal is 70-120mg/dl and postprandial blood sugar goal is less than 180 mg/dl. - Labs reviewed including most recent A1c, microalbumin and kidney function. Repeat labs due in 1 month.    -We discussed in great detail dietary modifications they can make to better improve their blood sugars. -follow up diabetes education completed, all questions answered. Patient Instructions   Continue to take Trulicity on Tuesdays   Work on low carb diet   Cut out regular pop   Sweet treat only every other day  Come in to office the end of may for a1c check and we will call with results. Discussed signs and symptoms of hyper/hypoglycemia and how to treat. Encouraged 150 minutes of physical activity per week. Follow a low carbohydrate diet. Encouraged at least 7 hours of sleep. The patient was informed of the goals of diabetes management. This can only be accomplished by watching their diet and exercise levels. We certainly use medicines to help attain these goals. The consequences of not controlling blood sugars were discussed. These include blindness, heart disease, stroke, kidney disease, and possibly need for dialysis. They were told to be careful with their foot care as diabetics often have nerve damage, infections and risk for limb amutations . They also need a dilated eye exam yearly. We discussed the issues of diet, exercise, medication, complication avoidance, reviewed the signs and symptoms of diabetes, hypoglycemic episodes, significance of HbA1C.         2. Mixed hyperlipidemia  stable, lipid panel reviewed, continue current medications. Diet and exercise      3. Essential hypertension   stable, continue current medications. Diet and exercise Seek emergent care if chest pain develops. 4. BMI 31.0-31.9,adult  Reduce calories and increase physical activity to achieve a slow and steady weight loss to improve blood pressure, cholesterol and diabetes. Answered all patient questions. Agrees to follow plan of care and to follow up in 3 months, sooner if needed. Call office if unexplained blood sugars less than 70 occur or above 400. Call office or access GI Trackt with any further questions or concerns. Be sure to bring glucometer/food log at next appointment. Total time spent reviewing chart, labs, counseling patient and documenting on the date of the encounter: 30 min.       Electronically signed by PAIGE Groves CNP on 4/19/2022 at 1:04 PM      (Please note that portions of this note were completed with a voice-recognition program. Efforts were made to edit the dictation but occasionally words are mis-transcribed.)

## 2022-04-27 ENCOUNTER — OFFICE VISIT (OUTPATIENT)
Dept: FAMILY MEDICINE CLINIC | Age: 77
End: 2022-04-27
Payer: MEDICARE

## 2022-04-27 VITALS
DIASTOLIC BLOOD PRESSURE: 70 MMHG | BODY MASS INDEX: 30.12 KG/M2 | OXYGEN SATURATION: 99 % | HEART RATE: 77 BPM | SYSTOLIC BLOOD PRESSURE: 110 MMHG | WEIGHT: 186.6 LBS

## 2022-04-27 DIAGNOSIS — J30.9 ALLERGIC RHINITIS, UNSPECIFIED SEASONALITY, UNSPECIFIED TRIGGER: ICD-10-CM

## 2022-04-27 DIAGNOSIS — E78.2 MIXED HYPERLIPIDEMIA: ICD-10-CM

## 2022-04-27 DIAGNOSIS — E11.65 TYPE 2 DIABETES MELLITUS WITH HYPERGLYCEMIA, WITHOUT LONG-TERM CURRENT USE OF INSULIN (HCC): ICD-10-CM

## 2022-04-27 DIAGNOSIS — K21.9 GASTROESOPHAGEAL REFLUX DISEASE, UNSPECIFIED WHETHER ESOPHAGITIS PRESENT: ICD-10-CM

## 2022-04-27 DIAGNOSIS — I20.9 TYPICAL ANGINA (HCC): ICD-10-CM

## 2022-04-27 DIAGNOSIS — R05.9 COUGH: ICD-10-CM

## 2022-04-27 DIAGNOSIS — I10 ESSENTIAL HYPERTENSION: Primary | ICD-10-CM

## 2022-04-27 PROBLEM — N18.30 CHRONIC RENAL DISEASE, STAGE III (HCC): Status: ACTIVE | Noted: 2022-04-27

## 2022-04-27 PROCEDURE — 3046F HEMOGLOBIN A1C LEVEL >9.0%: CPT | Performed by: NURSE PRACTITIONER

## 2022-04-27 PROCEDURE — 99212 OFFICE O/P EST SF 10 MIN: CPT

## 2022-04-27 PROCEDURE — G8427 DOCREV CUR MEDS BY ELIG CLIN: HCPCS | Performed by: NURSE PRACTITIONER

## 2022-04-27 PROCEDURE — 99213 OFFICE O/P EST LOW 20 MIN: CPT | Performed by: NURSE PRACTITIONER

## 2022-04-27 PROCEDURE — 1123F ACP DISCUSS/DSCN MKR DOCD: CPT | Performed by: NURSE PRACTITIONER

## 2022-04-27 PROCEDURE — 1036F TOBACCO NON-USER: CPT | Performed by: NURSE PRACTITIONER

## 2022-04-27 PROCEDURE — 4040F PNEUMOC VAC/ADMIN/RCVD: CPT | Performed by: NURSE PRACTITIONER

## 2022-04-27 PROCEDURE — G8417 CALC BMI ABV UP PARAM F/U: HCPCS | Performed by: NURSE PRACTITIONER

## 2022-04-27 RX ORDER — MONTELUKAST SODIUM 10 MG/1
10 TABLET ORAL NIGHTLY
Qty: 30 TABLET | Refills: 2 | Status: SHIPPED | OUTPATIENT
Start: 2022-04-27

## 2022-04-27 ASSESSMENT — PATIENT HEALTH QUESTIONNAIRE - PHQ9
SUM OF ALL RESPONSES TO PHQ QUESTIONS 1-9: 0
SUM OF ALL RESPONSES TO PHQ QUESTIONS 1-9: 0
1. LITTLE INTEREST OR PLEASURE IN DOING THINGS: 0
2. FEELING DOWN, DEPRESSED OR HOPELESS: 0
SUM OF ALL RESPONSES TO PHQ QUESTIONS 1-9: 0
SUM OF ALL RESPONSES TO PHQ QUESTIONS 1-9: 0
SUM OF ALL RESPONSES TO PHQ9 QUESTIONS 1 & 2: 0

## 2022-04-27 ASSESSMENT — ENCOUNTER SYMPTOMS: COUGH: 1

## 2022-04-28 ENCOUNTER — HOSPITAL ENCOUNTER (OUTPATIENT)
Dept: PHARMACY | Age: 77
Setting detail: THERAPIES SERIES
Discharge: HOME | End: 2022-04-28
Payer: MEDICARE

## 2022-04-28 DIAGNOSIS — R05.9 COUGH: ICD-10-CM

## 2022-04-28 DIAGNOSIS — Z79.01 ANTICOAGULATED ON COUMADIN: Primary | ICD-10-CM

## 2022-04-28 LAB
INR BLD: 3.2
PROTIME: 38.9 SECONDS

## 2022-04-28 PROCEDURE — 99211 OFF/OP EST MAY X REQ PHY/QHP: CPT

## 2022-04-28 PROCEDURE — 36416 COLLJ CAPILLARY BLOOD SPEC: CPT

## 2022-04-28 PROCEDURE — 85610 PROTHROMBIN TIME: CPT

## 2022-04-28 NOTE — PROGRESS NOTES
ANTICOAGULATION SERVICE    Date of Clinic Visit:      Nicho Ross is a 67 y.o. male who presents to clinic today for anticoagulation monitoring and adjustment. Recent INR Results:  Internal QC passed  Lab Results   Component Value Date    INR 3.2 2022    INR 2.5 2022       Current Warfarin Dosage:  Dosing Plan  As of 2022    TTR:  45.0 % (1.9 y)   Full warfarin instructions:  : 2 mg; Otherwise 2 mg every Tue, Fri; 4 mg all other days               Assessment/Plan:    Continue current regimen as INR remains stable. INR is just above range today. I will decrease dose x 1 day then continue same weekly dose. If INR remains elevated at next visit, I will decrease weekly dose. Next Clinic Appointment:  Return date  As of 2022    TTR:  45.0 % (1.9 y)   Next INR check:  2022             Please call [facility] at [phone] with any questions. Thanks!   LM [facility]  Anticoagulation Service Pharmacist  2022 1:27 PM     For Pharmacy Admin Tracking Only     Intervention Detail: Adherence Monitorin   Total # of Interventions Recommended: 0   Total # of Interventions Accepted: 0   Time Spent (min): 15

## 2022-04-30 ASSESSMENT — ENCOUNTER SYMPTOMS
DIARRHEA: 0
ABDOMINAL PAIN: 0
EYES NEGATIVE: 1
CONSTIPATION: 0
SHORTNESS OF BREATH: 0
WHEEZING: 0

## 2022-05-02 DIAGNOSIS — E11.65 TYPE 2 DIABETES MELLITUS WITH HYPERGLYCEMIA, WITHOUT LONG-TERM CURRENT USE OF INSULIN (HCC): ICD-10-CM

## 2022-05-02 NOTE — TELEPHONE ENCOUNTER
PT will be out of medication tomorrow      Treasure Corey is requesting a refill on the following medication(s):  Requested Prescriptions     Pending Prescriptions Disp Refills    metFORMIN (GLUCOPHAGE) 500 MG tablet 120 tablet 2     Sig: TAKE 2 TABLETS BY MOUTH TWICE DAILY WITH MEALS       Last Visit Date (If Applicable):  0/65/0196    Next Visit Date:    Visit date not found

## 2022-05-09 ENCOUNTER — HOSPITAL ENCOUNTER (OUTPATIENT)
Dept: INTERVENTIONAL RADIOLOGY/VASCULAR | Age: 77
Discharge: HOME | End: 2022-05-11
Payer: MEDICARE

## 2022-05-09 DIAGNOSIS — I65.21 ASYMPTOMATIC STENOSIS OF RIGHT CAROTID ARTERY: ICD-10-CM

## 2022-05-09 PROCEDURE — 93880 EXTRACRANIAL BILAT STUDY: CPT

## 2022-05-12 ENCOUNTER — OFFICE VISIT (OUTPATIENT)
Dept: VASCULAR SURGERY | Age: 77
End: 2022-05-12
Payer: MEDICARE

## 2022-05-12 VITALS
WEIGHT: 189 LBS | SYSTOLIC BLOOD PRESSURE: 124 MMHG | HEIGHT: 66 IN | DIASTOLIC BLOOD PRESSURE: 62 MMHG | BODY MASS INDEX: 30.37 KG/M2 | HEART RATE: 72 BPM

## 2022-05-12 DIAGNOSIS — Z98.890 HISTORY OF LEFT-SIDED CAROTID ENDARTERECTOMY: ICD-10-CM

## 2022-05-12 DIAGNOSIS — I65.21 ASYMPTOMATIC STENOSIS OF RIGHT CAROTID ARTERY: Primary | ICD-10-CM

## 2022-05-12 DIAGNOSIS — E11.9 TYPE 2 DIABETES MELLITUS WITHOUT COMPLICATION, WITHOUT LONG-TERM CURRENT USE OF INSULIN (HCC): ICD-10-CM

## 2022-05-12 PROBLEM — E11.22 TYPE 2 DIABETES MELLITUS WITH CHRONIC KIDNEY DISEASE (HCC): Status: ACTIVE | Noted: 2022-05-12

## 2022-05-12 PROBLEM — E11.65 TYPE 2 DIABETES MELLITUS WITH HYPERGLYCEMIA (HCC): Status: ACTIVE | Noted: 2022-05-12

## 2022-05-12 PROCEDURE — G8417 CALC BMI ABV UP PARAM F/U: HCPCS | Performed by: SURGERY

## 2022-05-12 PROCEDURE — 99214 OFFICE O/P EST MOD 30 MIN: CPT | Performed by: SURGERY

## 2022-05-12 PROCEDURE — 4040F PNEUMOC VAC/ADMIN/RCVD: CPT | Performed by: SURGERY

## 2022-05-12 PROCEDURE — G8427 DOCREV CUR MEDS BY ELIG CLIN: HCPCS | Performed by: SURGERY

## 2022-05-12 PROCEDURE — 1123F ACP DISCUSS/DSCN MKR DOCD: CPT | Performed by: SURGERY

## 2022-05-12 PROCEDURE — 3046F HEMOGLOBIN A1C LEVEL >9.0%: CPT | Performed by: SURGERY

## 2022-05-12 PROCEDURE — 1036F TOBACCO NON-USER: CPT | Performed by: SURGERY

## 2022-05-12 NOTE — PROGRESS NOTES
[address]  [address] [facility]  [address] [facility] [address]    Dilan Erickson  [birth year]  67 y. o.male       Chief Complaint:  Chief Complaint   Patient presents with    Carotid Disease     yearly with carotid       History of present Illness:  Pt is here today for evaluation and treatment of his carotid stenosis. He previously underwent a left carotid endarterectomy in February 2019 and has been doing well. He denies amaurosis fugax, lateralizing symptoms or claudication. Recent carotid duplex performed on 5/9/2022 shows mild plaque involving the right carotid bifurcation with 30 to 40% stenosis and left carotid bifurcation appears widely patent without evidence of recurrent plaque. Past Medical History:  has a past medical history of Aortic stenosis, Degenerative disc disease, lumbar, Diabetes mellitus ([address] Diverticulosis of sigmoid colon, DIA (dyspnea on exertion), GERD (gastroesophageal reflux disease), Hyperlipidemia, Hypertension, Hypertensive retinopathy of both eyes, Kidney stone, Low back pain with sciatica, Lumbar facet joint syndrome, Mitral regurgitation, Obesity, and PAD (peripheral artery disease) ([address] Past Surgical History:   Past Surgical History:   Procedure Laterality Date    ANESTHESIA NERVE BLOCK Bilateral 11/14/2019    Bilateral L3 L4 L5 Diagnostic Medial BB performed by Ariadna Gomez MD at [address]  11/2018    CARDIAC CATHETERIZATION  03/01/2018    CARDIAC CATHETERIZATION      Before 2008. No stents placed per pt.     CAROTID ENDARTERECTOMY Left 2/7/2019    LEFT CAROTID ENDARTERECTOMY WITH VASCUGUARD PATCH performed by Lorenza Mcgill MD at [address]  2005    for hemoccult positive stool    COLONOSCOPY  10/2017    diverticuli; Dr Cleo mariano hernia    ORBITAL FRACTURE SURGERY Left 1971    softball injury    MI REPLACEMENT OF MITRAL VALVE N/A 10/30/2018    AORTIC  VALVE REPLACEMENT 23 MM INTUITY VALVE, CHUNG OCONNOR, DOREEN performed by Abdoulaye Ferrell MD at [address] [place] [address] History:  reports that he quit smoking about 24 years ago. His smoking use included cigarettes. He has a 60.00 pack-year smoking history. He has never used smokeless tobacco. He reports that he does not drink alcohol and does not use drugs. Family History: family history includes Alzheimer's Disease in his father; Cancer in his father. Review of Systems:   Constitutional:  negative for  fevers, chills, sweats, fatigue, and weight loss  HEENT:  negative for vision or hearing changes,   Respiratory:  negative for shortness of breath, cough, or congestion  Cardiovascular:  negative for  chest pain, palpitations  Gastrointestinal:  negative for nausea, vomiting, diarrhea, constipation, abdominal pain  Genitourinary:  negative for frequency, dysuria  Integument/Breast:  negative for rash, skin lesions  Musculoskeletal:  negative for muscle aches or joint pain  Neurological:  negative for headaches, dizziness, lightheadedness, numbness, pain and tingling extremities  Behavior/Psych:  negative for depression and anxiety    Allergies: Invokana [canagliflozin] and Januvia [sitagliptin]    Current Meds:  Current Outpatient Medications:     metFORMIN (GLUCOPHAGE) 500 MG tablet, TAKE 2 TABLETS BY MOUTH TWICE DAILY WITH MEALS, Disp: 120 tablet, Rfl: 2    montelukast (SINGULAIR) 10 MG tablet, Take 1 tablet by mouth nightly, Disp: 30 tablet, Rfl: 2    lisinopril-hydroCHLOROthiazide (PRINZIDE;ZESTORETIC) 10-12.5 MG per tablet, Take 1 tablet by mouth daily, Disp: 90 tablet, Rfl: 3    metoprolol tartrate (LOPRESSOR) 25 MG tablet, TAKE 1 TABLET BY MOUTH TWICE DAILY, Disp: 180 tablet, Rfl: 3    omeprazole (PRILOSEC) 20 MG delayed release capsule, Take 1 tablet by mouth daily. , Disp: 90 capsule, Rfl: 3    Dulaglutide 1.5 MG/0.5ML SOPN, Inject 1.5 mg into the skin once a week, Disp: 4 pen, Rfl: 3    glimepiride (AMARYL) 4 MG tablet, TAKE 1 TABLET BY MOUTH TWICE DAILY, Disp: 180 tablet, Rfl: 1    loratadine (CLARITIN) 10 MG tablet, Take 1 tablet by mouth daily, Disp: 30 tablet, Rfl: 2    warfarin (COUMADIN) 4 MG tablet, TAKE 1 TABLET BY MOUTH EVERY DAY, Disp: 90 tablet, Rfl: 2    rosuvastatin (CRESTOR) 20 MG tablet, TAKE 1 TABLET BY MOUTH DAILY, Disp: 90 tablet, Rfl: 3    tamsulosin (FLOMAX) 0.4 MG capsule, TAKE 1 CAPSULE BY MOUTH DAILY, Disp: 90 capsule, Rfl: 5    blood glucose monitor strips, by Other route daily Test 1 times a day & as needed for symptoms of irregular blood glucose., Disp: 50 strip, Rfl: 3    Blood Glucose Monitoring Suppl KIT, Meter as covered by insurance also test strips and lancets. , Disp: 1 kit, Rfl: 3    Lancets MISC, 1 each by Does not apply route daily, Disp: 50 each, Rfl: 3    Elastic Bandages & Supports (JOBST KNEE HIGH COMPRESSION SM) MISC, 1 each by Does not apply route daily as needed (leg swelling/CHF), Disp: 2 each, Rfl: 1    amiodarone (CORDARONE) 200 MG tablet, Take 1 tablet by mouth daily, Disp: 90 tablet, Rfl: 3    Blood Pressure Monitoring (BLOOD PRESSURE CUFF) MISC, 1 Device by Does not apply route daily, Disp: 1 each, Rfl: 0    blood glucose monitor strips, Test 1 times a day & as needed for symptoms of irregular blood glucose., Disp: 100 strip, Rfl: 3    aspirin 81 MG tablet, Take 1 tablet by mouth daily Pt.will stop 10-30 -18 AM for OR (Patient taking differently: Take 81 mg by mouth daily ), Disp: 30 tablet, Rfl: 3    Multiple Vitamins-Minerals (THERAPEUTIC MULTIVITAMIN-MINERALS) tablet, Take 1 tablet by mouth daily (with breakfast), Disp: 30 tablet, Rfl: 3    Vital Signs:  Vitals:    05/12/22 0921   BP: 124/62   Pulse: 72       Physical Exam:  General appearance - alert, well appearing and in no acute distress  Mental status - oriented to person, place and time with normal affect  Head - normocephalic and atraumatic  Eyes - pupils equal and reactive, extraocular eye movements intact, conjunctiva clear  Ears - hearing appears to be intact  Nose - no drainage noted  Mouth - mucous membranes moist  Neck - supple, no carotid bruits, thyroid not palpable, no JVD, left neck incision well-healed  Chest - clear to auscultation, normal effort  Heart - normal rate, regular rhythm, no murmurs  Abdomen - soft, non-tender, non-distended, bowel sounds present all four quadrants, no masses, hepatomegaly, splenomegaly or aortic enlargement  Neurological - normal speech, no focal findings or movement disorder noted, cranial nerves II through XII grossly intact  Extremities - peripheral pulses palpable, no pedal edema or calf pain with palpation  Skin - no gross lesions, rashes, or induration noted    Labs:   Lab Results   Component Value Date    WBC 10.9 08/12/2021    WBC 9.1 12/16/2019    HGB 12.0 08/12/2021    HCT 35.2 08/12/2021    MCV 92.0 08/12/2021    .2 08/12/2021     Lab Results   Component Value Date     11/16/2021    K 4.8 11/16/2021     11/16/2021    CO2 25 11/16/2021    BUN 23 11/16/2021    CREATININE 1.5 11/16/2021    GLUCOSE 261 11/16/2021    CALCIUM 9.5 11/16/2021     Lab Results   Component Value Date    ALKPHOS 63 10/27/2020    ALT 38 10/27/2020    AST 37 10/27/2020    PROT 6.8 10/27/2020    BILITOT 0.54 10/27/2020    LABALBU 4.2 10/27/2020     No results found for: LACTA  No results found for: AMYLASE  No results found for: LIPASE  Lab Results   Component Value Date    INR 3.2 04/28/2022         Assessment:  1. Mild 30 to 40% right internal carotid artery stenosis  2. Widely patent left carotid endarterectomy site without evidence of recurrent stenosis    1. Asymptomatic stenosis of right carotid artery    2. History of left-sided carotid endarterectomy    3.  Type 2 diabetes mellitus without complication, without long-term current use of insulin ([facility]        Plan:   1. Continue Coumadin and aspirin daily. 2. Continue Crestor daily. 3. Repeat carotid duplex in 1 year with a follow-up appointment at the [facility]. No orders of the defined types were placed in this encounter.           Electronically signed by Jimmie Reyes MD on 5/12/2022 at 9:29 AM     Copy sent to PAIGE Palmer - CNP

## 2022-05-25 ENCOUNTER — TELEPHONE (OUTPATIENT)
Dept: INTERNAL MEDICINE | Age: 77
End: 2022-05-25

## 2022-05-25 DIAGNOSIS — E11.9 TYPE 2 DIABETES MELLITUS WITHOUT COMPLICATION, WITHOUT LONG-TERM CURRENT USE OF INSULIN (HCC): Primary | ICD-10-CM

## 2022-05-25 PROCEDURE — PBSHW POCT GLYCOSYLATED HEMOGLOBIN (HGB A1C): Performed by: NURSE PRACTITIONER

## 2022-05-25 NOTE — TELEPHONE ENCOUNTER
----- Message from PAIGE Guardado CNP sent at 4/19/2022 12:46 PM EDT -----  Regarding: a1c  Call pt to have him go to [place] office for poct a1c and we will call him with results and change trulicity dose if needed. He gets patient assistance.

## 2022-05-27 NOTE — TELEPHONE ENCOUNTER
Patient took a fall a couple weeks ago and was in [facility] he was recently discharged and is now in [address] [facility] in [place] and they are handling his A1c for now.  If there are any questions please call his daughter Andre Haynes at  [phone]

## 2022-06-02 DIAGNOSIS — E11.9 TYPE 2 DIABETES MELLITUS WITHOUT COMPLICATION, WITHOUT LONG-TERM CURRENT USE OF INSULIN (HCC): ICD-10-CM

## 2022-06-02 RX ORDER — GLIMEPIRIDE 4 MG/1
TABLET ORAL
Qty: 180 TABLET | Refills: 1 | Status: SHIPPED | OUTPATIENT
Start: 2022-06-02

## 2022-06-08 ENCOUNTER — HOSPITAL ENCOUNTER (OUTPATIENT)
Age: 77
Setting detail: SPECIMEN
Discharge: HOME | End: 2022-06-08

## 2022-06-08 LAB
INR BLD: 6.9
PROTHROMBIN TIME: 59 SEC (ref 11.5–14.2)

## 2022-06-08 PROCEDURE — 85610 PROTHROMBIN TIME: CPT

## 2022-06-08 PROCEDURE — 36415 COLL VENOUS BLD VENIPUNCTURE: CPT

## 2022-06-28 ENCOUNTER — HOSPITAL ENCOUNTER (OUTPATIENT)
Age: 77
Setting detail: SPECIMEN
Discharge: HOME | End: 2022-06-28

## 2022-06-28 LAB
INR BLD: 2.6
PROTHROMBIN TIME: 27.7 SEC (ref 11.5–14.2)

## 2022-06-28 PROCEDURE — 85610 PROTHROMBIN TIME: CPT

## 2022-11-21 ENCOUNTER — HOSPITAL ENCOUNTER (OUTPATIENT)
Age: 77
Setting detail: SPECIMEN
Discharge: HOME | End: 2022-11-21
Payer: MEDICARE

## 2022-11-21 LAB
INR BLD: 2.2
PROTHROMBIN TIME: 24.5 SEC (ref 11.5–14.2)

## 2022-11-21 PROCEDURE — 85610 PROTHROMBIN TIME: CPT

## 2023-01-18 ENCOUNTER — TELEPHONE (OUTPATIENT)
Dept: INTERNAL MEDICINE | Age: 78
End: 2023-01-18

## 2023-01-18 NOTE — TELEPHONE ENCOUNTER
Received fax stating pts patient assistance with [employer] is going to  on 3/21/2023 and will need to enroll again. Faxed letter along with [employer] application to patient.      See scan for copy of letter

## 2023-10-11 NOTE — PROGRESS NOTES
[address] [address] [address]  Dept: [phone]  Dept Fax: [phone]    Patient:  Dhruv Lozada  YOB: 1945  Date of Service:  4/27/2022    Chief Complaint   Patient presents with    6 Month Follow-Up     c/o cough for past week     Hyperlipidemia     denies any chest pains leg edema on occasion will have dizziness     Hypertension    Diabetes     follows diabetic educator maría lopez and last A1C was 12.5        SUBJECTIVE:   Has follow up appointment in May with cardiology and vascular. Hypertension, diabetes, and hyperlipidemia:  He indicates that he is feeling well and denies any symptoms referable to his elevated blood pressure or diabetes. Specifically denies chest pain, palpitations, dyspnea, peripheral edema, thirst, frequent urination, and blurred vision. Current medication regimen is as listed below. He denies any side effects of medication, and has been compliant, taking it regularly. Home glucose readings have been n/a. Checks blood sugars n/a.  Last eye exam: n/a Diabetic complications include: none    Treatment Adherence:   Medication compliance:  compliant most of the time  Diet compliance:  compliant most of the time  Weight trend: decreasing  Current exercise:sedentary bicycle machine  Barriers: none    [x]  Hemoglobin A1c has been completed in the last 3-6 months  []  To be ordered today    []  Urine MicroAlbumin  completed and reviewed within the last 12 month  [x]  To be ordered today    []  Annual eye exam has been completed referring that patient does or does not have diabetic retinopathy  [x]  Recommendation to schedule and/or referral completed if required    [x]  Annual diabetic foot exam has been completed in office in the last 12 months  []  To be completed today    The 10-year ASCVD risk score (Marilyn Jung., et al., 2013) is: 41.2%    Values used to calculate the score:      Age: 67 years      Sex: Male      Is Non- : No      Diabetic: Yes      Tobacco smoker: No      Systolic Blood Pressure: 555 mmHg      Is BP treated: Yes      HDL Cholesterol: 43 mg/dL      Total Cholesterol: 138 mg/dL     BP Readings from Last 3 Encounters:   04/27/22 110/70   04/19/22 98/78   03/15/22 92/68      Pulse Readings from Last 3 Encounters:   04/27/22 77   04/19/22 80   03/15/22 76      Wt Readings from Last 3 Encounters:   04/27/22 186 lb 9.6 oz (84.6 kg)   04/19/22 193 lb (87.5 kg)   03/15/22 190 lb (86.2 kg)        Allergies   Allergen Reactions    Invokana [Canagliflozin]      dizziness    Januvia [Sitagliptin]      dizziness     Current Outpatient Medications   Medication Sig Dispense Refill    montelukast (SINGULAIR) 10 MG tablet Take 1 tablet by mouth nightly 30 tablet 2    lisinopril-hydroCHLOROthiazide (PRINZIDE;ZESTORETIC) 10-12.5 MG per tablet Take 1 tablet by mouth daily 90 tablet 3    metoprolol tartrate (LOPRESSOR) 25 MG tablet TAKE 1 TABLET BY MOUTH TWICE DAILY 180 tablet 3    omeprazole (PRILOSEC) 20 MG delayed release capsule Take 1 tablet by mouth daily. 90 capsule 3    Dulaglutide 1.5 MG/0.5ML SOPN Inject 1.5 mg into the skin once a week 4 pen 3    glimepiride (AMARYL) 4 MG tablet TAKE 1 TABLET BY MOUTH TWICE DAILY 180 tablet 1    metFORMIN (GLUCOPHAGE) 500 MG tablet TAKE 2 TABLETS BY MOUTH TWICE DAILY WITH MEALS 120 tablet 2    loratadine (CLARITIN) 10 MG tablet Take 1 tablet by mouth daily 30 tablet 2    warfarin (COUMADIN) 4 MG tablet TAKE 1 TABLET BY MOUTH EVERY DAY 90 tablet 2    rosuvastatin (CRESTOR) 20 MG tablet TAKE 1 TABLET BY MOUTH DAILY 90 tablet 3    tamsulosin (FLOMAX) 0.4 MG capsule TAKE 1 CAPSULE BY MOUTH DAILY 90 capsule 5    blood glucose monitor strips by Other route daily Test 1 times a day & as needed for symptoms of irregular blood glucose.  50 strip 3    Blood Glucose Monitoring Suppl KIT Meter as covered by insurance also test strips and lancets. 1 kit 3    Lancets MISC 1 each by Does not apply route daily 50 each 3    Elastic Bandages & Supports (JOBST KNEE HIGH COMPRESSION SM) MISC 1 each by Does not apply route daily as needed (leg swelling/CHF) 2 each 1    amiodarone (CORDARONE) 200 MG tablet Take 1 tablet by mouth daily 90 tablet 3    Blood Pressure Monitoring (BLOOD PRESSURE CUFF) MISC 1 Device by Does not apply route daily 1 each 0    blood glucose monitor strips Test 1 times a day & as needed for symptoms of irregular blood glucose. 100 strip 3    aspirin 81 MG tablet Take 1 tablet by mouth daily Pt.will stop 10-30 -18 AM for OR (Patient taking differently: Take 81 mg by mouth daily ) 30 tablet 3    Multiple Vitamins-Minerals (THERAPEUTIC MULTIVITAMIN-MINERALS) tablet Take 1 tablet by mouth daily (with breakfast) 30 tablet 3     No current facility-administered medications for this visit. Past Medical History:   Diagnosis Date    Aortic stenosis     Aortic valve replacement 2019    Degenerative disc disease, lumbar     Diabetes mellitus (HCC)     PCP Dr. Chang Anton Diverticulosis of sigmoid colon     DIA (dyspnea on exertion)     GERD (gastroesophageal reflux disease)     Hyperlipidemia     Hypertension     Hypertensive retinopathy of both eyes 7/22/2019    Kidney stone     Low back pain with sciatica 11/19/2017    Lumbar facet joint syndrome 10/18/2019    Mitral regurgitation     Obesity     PAD (peripheral artery disease) ([address] 4/27/2021      Past Surgical History:   Procedure Laterality Date    ANESTHESIA NERVE BLOCK Bilateral 11/14/2019    Bilateral L3 L4 L5 Diagnostic Medial BB performed by Erica Lisa MD at [address]  11/2018    CARDIAC CATHETERIZATION  03/01/2018    CARDIAC CATHETERIZATION      Before 2008. No stents placed per pt.     CAROTID ENDARTERECTOMY Left 2/7/2019    LEFT CAROTID ENDARTERECTOMY WITH VASCUGUARD PATCH performed by Vilma Brewer MD at [address]  2005    for hemoccult positive stool    COLONOSCOPY  10/2017    diverticuli; Dr Afua Kat      umbilical hernia    ORBITAL FRACTURE SURGERY Left 1971    softball injury    CO REPLACEMENT OF MITRAL VALVE N/A 10/30/2018    AORTIC  VALVE REPLACEMENT 23 MM INTUITY VALVE, CHUNG OCONNOR, DOREEN [facility] History   Problem Relation Age of Onset    Cancer Father         prostate    Alzheimer's Disease Father     Heart Disease Neg Hx     Diabetes Neg Hx     Glaucoma Neg Hx     Macular Degen Neg Hx        REVIEW OF SYSTEMS:     Review of Systems   Constitutional: Negative for appetite change, chills, fatigue and fever. HENT: Negative. Eyes: Negative. Respiratory: Positive for cough. Negative for shortness of breath and wheezing. Cardiovascular: Negative for chest pain, palpitations and leg swelling. Gastrointestinal: Negative for abdominal pain, constipation and diarrhea. Endocrine: Negative for cold intolerance, heat intolerance, polydipsia, polyphagia and polyuria. Genitourinary: Negative. Musculoskeletal: Negative for arthralgias and myalgias. Allergic/Immunologic: Negative for environmental allergies and food allergies. Neurological: Negative for dizziness, weakness, light-headedness and headaches. Psychiatric/Behavioral: Negative for agitation, dysphoric mood, self-injury, sleep disturbance and suicidal ideas. The patient is not nervous/anxious.          PHQ Scores 4/27/2022 10/27/2021 4/27/2021 1/7/2020 9/26/2019 2/15/2019 6/1/2018   PHQ2 Score 0 0 0 0 0 1 0   PHQ9 Score 0 0 0 0 0 1 0     Interpretation of Total Score Depression Severity: 1-4 = Minimal depression, 5-9 = Mild depression, 10-14 = Moderate depression, 15-19 = Moderately severe depression, 20-27 = Severe depression     PHYSICAL EXAM:     /70   Pulse 77   Wt 186 lb 9.6 oz (84.6 kg)   SpO2 99%   BMI 30.12 kg/m²    Body mass index is 30.12 kg/m². Physical Exam  Constitutional:       Appearance: Normal appearance. He is well-developed and well-groomed. HENT:      Head: Normocephalic. Eyes:      Conjunctiva/sclera: Conjunctivae normal.   Neck:      Thyroid: No thyromegaly. Vascular: No carotid bruit. Cardiovascular:      Rate and Rhythm: Normal rate and regular rhythm. Heart sounds: Murmur heard. Systolic murmur is present with a grade of 2/6. Pulmonary:      Effort: Pulmonary effort is normal.      Breath sounds: Normal breath sounds. No wheezing. Musculoskeletal:      Cervical back: Neck supple. Right lower leg: No edema. Left lower leg: No edema. Lymphadenopathy:      Cervical: No cervical adenopathy. Skin:     Capillary Refill: Capillary refill takes less than 2 seconds. Neurological:      Mental Status: He is alert and oriented to person, place, and time. Gait: Gait abnormal (uses cane). Psychiatric:         Mood and Affect: Mood normal.         Behavior: Behavior is cooperative. ASSESSMENT /PLAN   1. Essential hypertension  2. Mixed hyperlipidemia  3. Type 2 diabetes mellitus with hyperglycemia, without long-term current use of insulin ([address]  4. Cough  -     XR CHEST (2 VW); Future  -     montelukast (SINGULAIR) 10 MG tablet; Take 1 tablet by mouth nightly, Disp-30 tablet, R-2Normal  5. Typical angina ([address]  6. Gastroesophageal reflux disease, unspecified whether esophagitis present  7. Allergic rhinitis, unspecified seasonality, unspecified trigger  -     montelukast (SINGULAIR) 10 MG tablet; Take 1 tablet by mouth nightly, Disp-30 tablet, R-2Normal         Return in about 6 months (around 10/27/2022) for Annual Medicare Wellness. All patient questions answered. Patient voiced understanding. Health Maintenance reviewed. Instructed to continue current medications. Patient agreed with treatment plan. Follow up as directed.      Please note that this chart was generated using voice recognition Dragon dictation software. Although every effort was made to ensure the accuracy of this automated transcription, some errors in transcription may have occurred.     Electronically signed by PAIGE Cortez CNP on 4/30/2022 Prescriptions electronically submitted to pharmacy from [facility]

## (undated) DEVICE — Z DISCONTINUED APPLICATOR SURG PREP 0.35OZ 2% CHG 70% ISO ALC W/ HI LT

## (undated) DEVICE — GLOVE SURG SZ 8 L12IN FNGR THK87MIL WHT LTX FREE

## (undated) DEVICE — CHLORAPREP 26ML CLEAR

## (undated) DEVICE — CHLORAPREP 26ML ORANGE

## (undated) DEVICE — SET CATH 20GA L1.75IN RAD ART POLYUR RADPQ W/ INTEGR

## (undated) DEVICE — Z INACTIVE USE 2735373 APPLICATOR FBR LAIN COT WOOD TIP ECONOMICAL

## (undated) DEVICE — BANDAGE ADH DIA7/8IN NAT FLEX-FABRIC WVN FOR WND PROTCT

## (undated) DEVICE — TAPE MED W1/8XL30IN WHT POLY

## (undated) DEVICE — SUTURE ETHIB EXCL BR GRN TAPR PT 2-0 30 X563H X563H

## (undated) DEVICE — Z INACTIVE USE 2540311 LEAD PACE L475MM CHN A OR V MYOCARDIAL STEROID ELUT SIL

## (undated) DEVICE — BAG ENDOSCP TRNSPRT CLR RECLOSABLE 24INX20IN

## (undated) DEVICE — GOWN,AURORA,NONRNF,XL,30/CS: Brand: MEDLINE

## (undated) DEVICE — GLOVE SURG SZ 75 L12IN FNGR THK79MIL GRN LTX FREE

## (undated) DEVICE — PLATELET CONCENTRATION PACK PROC 14-20 ML SMARTPREP 2

## (undated) DEVICE — LINE SAMP O2 6.5FT W/FEMALE CONN F/ADULT CAPNOLINE PLUS

## (undated) DEVICE — SUTURE VCRL SZ 3-0 L27IN ABSRB UD L26MM SH 1/2 CIR J416H

## (undated) DEVICE — SUTURE NONABSORBABLE MONOFILAMENT 4-0 RB-1 36 IN BLU PROLENE 8557H

## (undated) DEVICE — FOGARTY - HYDRAGRIP SURGICAL - CLAMP INSERTS: Brand: FOGARTY HYDRAJAW

## (undated) DEVICE — COVER,LIGHT HANDLE,FLX,2/PK: Brand: MEDLINE INDUSTRIES, INC.

## (undated) DEVICE — TOWEL,OR,DSP,ST,BLUE,DLX,XR,4/PK,20PK/CS: Brand: MEDLINE

## (undated) DEVICE — CONTAINER,SPECIMEN,OR STERILE,4OZ: Brand: MEDLINE

## (undated) DEVICE — GARMENT,MEDLINE,DVT,INT,CALF,MED, GEN2: Brand: MEDLINE

## (undated) DEVICE — GLOVE SURG SZ 8 L11.77IN FNGR THK9.8MIL STRW LTX POLYMER

## (undated) DEVICE — GLOVE SURG SZ 7 L12IN FNGR THK87MIL WHT LTX FREE

## (undated) DEVICE — GLOVE SURG SZ 7 L12IN FNGR THK79MIL GRN LTX FREE

## (undated) DEVICE — GOWN,SIRUS,POLYRNF,BRTHSLV,2XL,18/CS: Brand: MEDLINE

## (undated) DEVICE — CLIP INT SM WIDE RED TI TRNSVRS GRV CHEVRON SHP W/ PRECIS

## (undated) DEVICE — GLOVE SURG SZ 75 L12IN FNGR THK87MIL WHT LTX FREE

## (undated) DEVICE — GOWN,AURORA,NONREINFORCED,LARGE: Brand: MEDLINE

## (undated) DEVICE — PROTECTOR ULN NRV PUR FOAM HK LOOP STRP ANATOMICALLY

## (undated) DEVICE — SUTURE PROL SZ 6-0 L30IN NONABSORBABLE BLU L13MM RB-2 1/2 8711H

## (undated) DEVICE — GLOVE SURG SZ 8 L12IN THK91MIL BRN LTX FREE POLYCHLOROPRENE

## (undated) DEVICE — Device

## (undated) DEVICE — GLOVE SURG SZ 65 THK91MIL LTX FREE SYN POLYISOPRENE

## (undated) DEVICE — GOWN,SIRUS,POLYRNF,BRTHSLV,LG,30/CS: Brand: MEDLINE

## (undated) DEVICE — NEEDLE, QUINCKE, 22GX5": Brand: MEDLINE

## (undated) DEVICE — SUTURE PROL SZ 4-0 L36IN NONABSORBABLE BLU L26MM SH 1/2 CIR 8521H

## (undated) DEVICE — Z DISCONTINUED BY MEDLINE USE 2711682 TRAY SKIN PREP DRY W/ PREM GLV

## (undated) DEVICE — SUTURE PDS II SZ 0 L27IN ABSRB VLT L36MM CT-1 1/2 CIR Z340H

## (undated) DEVICE — DRAPE SLUSH DISC W44XL66IN ST FOR RND BSIN HUSH SLUSH SYS

## (undated) DEVICE — GLOVE SURG SZ 65 L12IN FNGR THK87MIL WHT LTX FREE

## (undated) DEVICE — YANKAUER,POOLE TIP,STERILE,50/CS: Brand: MEDLINE

## (undated) DEVICE — DRESSING TRNSPAR W4XL10IN FLM MIC POR SURESITE 123

## (undated) DEVICE — SUTURE MCRYL SZ 4-0 L18IN ABSRB UD L19MM PS-2 3/8 CIR PRIM Y496G

## (undated) DEVICE — 60 ML SYRINGE LUER-LOCK TIP: Brand: MONOJECT

## (undated) DEVICE — GLOVE ORANGE PI 8   MSG9080

## (undated) DEVICE — 500ML,PRESSURE INFUSER W/STOPCOCK: Brand: MEDLINE

## (undated) DEVICE — DISPOSABLE SUCTION/IRRIGATOR TUBE SET, DUAL SPIKE: Brand: AHTO

## (undated) DEVICE — DRESSING HEMSTAT W3INXL4YD WHT IMPREG KAOLIN HYDRPHLC SFT

## (undated) DEVICE — TRAY PAIN CUST

## (undated) DEVICE — SUTURE VCRL + SZ 3-0 L27IN ABSRB WHT CT-1 1/2 CIR VCP258H

## (undated) DEVICE — HYPODERMIC SAFETY NEEDLE: Brand: MAGELLAN

## (undated) DEVICE — 9165 UNIVERSAL PATIENT PLATE: Brand: 3M™

## (undated) DEVICE — 3M™ WARMING BLANKET, LOWER BODY, 10 PER CASE, 42568: Brand: BAIR HUGGER™

## (undated) DEVICE — CONNECTOR TBNG Y 6IN 1 PLAS LTWT

## (undated) DEVICE — SKIN AFFIX SURG ADHESIVE 72/CS 0.55ML: Brand: MEDLINE

## (undated) DEVICE — Z INACTIVE USE 2535480 CLIP LIG M BLU TI HRT SHP WIRE HORZ 180 PER BX

## (undated) DEVICE — SHUNT CV L30CM DIA3X5MM SIL EXT LOOP FULL SPR REINF SUNDT
Type: IMPLANTABLE DEVICE | Site: CAROTID | Status: NON-FUNCTIONAL
Removed: 2019-02-07

## (undated) DEVICE — DRAPE,CVMAX,CARDIOVASCULAR: Brand: MEDLINE

## (undated) DEVICE — SUTURE SZ 7 L18IN NONABSORBABLE SIL CCS L48MM 1/2 CIR STRNM M655G

## (undated) DEVICE — SUTURE PROL SZ 5-0 L30IN NONABSORBABLE BLU L13MM RB-2 1/2 8710H

## (undated) DEVICE — PACK PROCEDURE SURG OPN HRT

## (undated) DEVICE — AVANOS* QUINCKE NEEDLE: Brand: AVANOS

## (undated) DEVICE — BLADE SAW W6.35XL32MM STRNM CUT STRNOTMY

## (undated) DEVICE — STERNUM BLADE, OFFSET (31.7 X 0.64 X 6.3MM)

## (undated) DEVICE — POSITIONER HD W8XH4XL8.5IN RASPBERRY FOAM SLT

## (undated) DEVICE — INTENDED FOR TISSUE SEPARATION, AND OTHER PROCEDURES THAT REQUIRE A SHARP SURGICAL BLADE TO PUNCTURE OR CUT.: Brand: BARD-PARKER ® CARBON RIB-BACK BLADES

## (undated) DEVICE — COR-KNOT MINI® COMBO KITBASE PACKAGE TYPE - KITEACH STERILE PACKAGE KIT CONTAINS (2) SINGLE PATIENT USE COR-KNOT MINI® DEVICES AND (12) COR-KNOT® QUICK LOADS®.: Brand: COR-KNOT MINI®

## (undated) DEVICE — SOLUTION IV 250ML 0.9% SOD CHL PH 5 INJ USP VIAFLX PLAS

## (undated) DEVICE — TUBING, SUCTION, 9/32" X 20', STRAIGHT: Brand: MEDLINE INDUSTRIES, INC.

## (undated) DEVICE — HEADREST NEURO DONUT 7 IN

## (undated) DEVICE — CANNULA PERFUSION 5.5IN 9FR AORTIC ROOT

## (undated) DEVICE — CONTAINER,SPECIMEN,4OZ,OR STRL: Brand: MEDLINE

## (undated) DEVICE — Z DISCONTINUED NO SUB IDED DRAIN SURG 2 COLL PT TB FOR ATS BG OASIS

## (undated) DEVICE — SOLUTION IV 500ML 0.9% SOD CHL PH 5 INJ USP VIAFLX PLAS

## (undated) DEVICE — PRESSURE MONITORING SET: Brand: TRUWAVE

## (undated) DEVICE — SOLUTION PREP 4OZ 3% H PEROX 1ST AID ANTISEP ORAL DEBRIDING

## (undated) DEVICE — CATHETER THOR 28FR SIL 6 EYE STR HI TEAR STRENGTH

## (undated) DEVICE — PLEDGET SURG W3.5XL7MM THK1.5MM WHT PTFE RECT FIRM TFE

## (undated) DEVICE — GLOVE SURG SZ 65 L12IN FNGR THK79MIL GRN LTX FREE

## (undated) DEVICE — TOWEL,OR,DSP,ST,BLUE,STD,4/PK,20PK/CS: Brand: MEDLINE

## (undated) DEVICE — SUTURE PERMAHAND SZ 2-0 L18IN NONABSORBABLE BLK L26MM SH C012D

## (undated) DEVICE — DRAPE THYROID